# Patient Record
Sex: FEMALE | Race: WHITE | Employment: OTHER | URBAN - METROPOLITAN AREA
[De-identification: names, ages, dates, MRNs, and addresses within clinical notes are randomized per-mention and may not be internally consistent; named-entity substitution may affect disease eponyms.]

---

## 2017-01-03 ENCOUNTER — HOSPITAL ENCOUNTER (OUTPATIENT)
Dept: WOUND CARE | Age: 57
Discharge: HOME | End: 2017-01-03
Attending: SURGERY
Payer: COMMERCIAL

## 2017-01-03 PROCEDURE — 99213 OFFICE O/P EST LOW 20 MIN: CPT

## 2017-01-03 PROCEDURE — 77030013575 HC DRSG HYDROFERA HOLL -B

## 2017-01-17 PROCEDURE — 99213 OFFICE O/P EST LOW 20 MIN: CPT

## 2017-01-19 ENCOUNTER — HOSPITAL ENCOUNTER (OUTPATIENT)
Dept: MAMMOGRAPHY | Age: 57
Discharge: HOME | End: 2017-01-19
Attending: INTERNAL MEDICINE
Payer: COMMERCIAL

## 2017-01-19 DIAGNOSIS — Z12.39 SCREENING FOR MALIGNANT NEOPLASM OF BREAST: ICD-10-CM

## 2017-01-19 PROCEDURE — 77067 SCR MAMMO BI INCL CAD: CPT

## 2017-01-25 ENCOUNTER — HOSPITAL ENCOUNTER (OUTPATIENT)
Dept: MAMMOGRAPHY | Age: 57
Discharge: HOME | End: 2017-01-25
Attending: INTERNAL MEDICINE
Payer: COMMERCIAL

## 2017-01-25 DIAGNOSIS — R92.8 ABNORMAL SCREENING MAMMOGRAM: ICD-10-CM

## 2017-01-25 PROCEDURE — 77065 DX MAMMO INCL CAD UNI: CPT

## 2017-02-20 ENCOUNTER — HOSPITAL ENCOUNTER (OUTPATIENT)
Dept: MAMMOGRAPHY | Age: 57
Discharge: HOME | End: 2017-02-20
Attending: INTERNAL MEDICINE
Payer: COMMERCIAL

## 2017-02-20 VITALS
TEMPERATURE: 96.7 F | OXYGEN SATURATION: 100 % | DIASTOLIC BLOOD PRESSURE: 70 MMHG | SYSTOLIC BLOOD PRESSURE: 135 MMHG | HEART RATE: 58 BPM

## 2017-02-20 DIAGNOSIS — R92.1 BREAST CALCIFICATION SEEN ON MAMMOGRAM: ICD-10-CM

## 2017-02-20 PROCEDURE — 74011250636 HC RX REV CODE- 250/636: Performed by: INTERNAL MEDICINE

## 2017-02-20 PROCEDURE — 19081 BX BREAST 1ST LESION STRTCTC: CPT

## 2017-02-20 PROCEDURE — 77065 DX MAMMO INCL CAD UNI: CPT

## 2017-02-20 PROCEDURE — 88305 TISSUE EXAM BY PATHOLOGIST: CPT | Performed by: INTERNAL MEDICINE

## 2017-02-20 PROCEDURE — 74011000250 HC RX REV CODE- 250: Performed by: INTERNAL MEDICINE

## 2017-02-20 RX ORDER — LIDOCAINE HYDROCHLORIDE 10 MG/ML
8 INJECTION INFILTRATION; PERINEURAL
Status: COMPLETED | OUTPATIENT
Start: 2017-02-20 | End: 2017-02-20

## 2017-02-20 RX ORDER — LIDOCAINE HYDROCHLORIDE AND EPINEPHRINE 10; 10 MG/ML; UG/ML
1.5 INJECTION, SOLUTION INFILTRATION; PERINEURAL
Status: COMPLETED | OUTPATIENT
Start: 2017-02-20 | End: 2017-02-20

## 2017-02-20 RX ORDER — LIDOCAINE HYDROCHLORIDE AND EPINEPHRINE 10; 10 MG/ML; UG/ML
10 INJECTION, SOLUTION INFILTRATION; PERINEURAL
Status: COMPLETED | OUTPATIENT
Start: 2017-02-20 | End: 2017-02-20

## 2017-02-20 RX ADMIN — LIDOCAINE HYDROCHLORIDE,EPINEPHRINE BITARTRATE 100 MG: 10; .01 INJECTION, SOLUTION INFILTRATION; PERINEURAL at 10:08

## 2017-02-20 RX ADMIN — SODIUM BICARBONATE 2 ML: 0.2 INJECTION, SOLUTION INTRAVENOUS at 10:08

## 2017-02-20 RX ADMIN — SODIUM CHLORIDE 250 ML: 900 INJECTION, SOLUTION INTRAVENOUS at 10:09

## 2017-02-20 RX ADMIN — LIDOCAINE HYDROCHLORIDE,EPINEPHRINE BITARTRATE 15 MG: 10; .01 INJECTION, SOLUTION INFILTRATION; PERINEURAL at 10:08

## 2017-02-20 RX ADMIN — LIDOCAINE HYDROCHLORIDE 4 ML: 10 INJECTION, SOLUTION INFILTRATION; PERINEURAL at 10:07

## 2017-02-21 NOTE — PROGRESS NOTES
I spoke with Ms Tung Wheeler via phone today to give her the results of her Left breast Stereo bx. Results showed Fibrocystic Mastopathy; fibroadenoma. She is to return to her screening 1/18, no other recommendations needed.  The patient had no post bx issues or complaints and was very appreciative with her results

## 2018-03-08 ENCOUNTER — HOSPITAL ENCOUNTER (OUTPATIENT)
Dept: SURGERY | Age: 58
Discharge: HOME | End: 2018-03-08
Payer: COMMERCIAL

## 2018-03-08 VITALS
BODY MASS INDEX: 35.31 KG/M2 | WEIGHT: 233 LBS | HEART RATE: 75 BPM | DIASTOLIC BLOOD PRESSURE: 78 MMHG | SYSTOLIC BLOOD PRESSURE: 142 MMHG | TEMPERATURE: 98.1 F | HEIGHT: 68 IN | RESPIRATION RATE: 16 BRPM

## 2018-03-08 PROBLEM — M86.9 OSTEOMYELITIS OF TOE OF LEFT FOOT (HCC): Status: ACTIVE | Noted: 2018-03-08

## 2018-03-08 PROBLEM — L97.509 TOE ULCER (HCC): Status: ACTIVE | Noted: 2018-03-08

## 2018-03-08 LAB
ANION GAP SERPL CALC-SCNC: 5 MMOL/L (ref 7–16)
BUN SERPL-MCNC: 36 MG/DL (ref 6–23)
CALCIUM SERPL-MCNC: 9.4 MG/DL (ref 8.3–10.4)
CHLORIDE SERPL-SCNC: 107 MMOL/L (ref 98–107)
CO2 SERPL-SCNC: 25 MMOL/L (ref 21–32)
CREAT SERPL-MCNC: 2.06 MG/DL (ref 0.6–1)
GLUCOSE BLD STRIP.AUTO-MCNC: 104 MG/DL (ref 65–100)
GLUCOSE SERPL-MCNC: 112 MG/DL (ref 65–100)
POTASSIUM SERPL-SCNC: 4.5 MMOL/L (ref 3.5–5.1)
SODIUM SERPL-SCNC: 137 MMOL/L (ref 136–145)

## 2018-03-08 PROCEDURE — 80048 BASIC METABOLIC PNL TOTAL CA: CPT | Performed by: SURGERY

## 2018-03-08 PROCEDURE — 82962 GLUCOSE BLOOD TEST: CPT

## 2018-03-08 NOTE — PERIOP NOTES
Patient verified name, , and surgery as listed in [facility]. Patient provided medical/health information and PTA medications to the best of their ability. TYPE  CASE:1B  Orders per surgeon: Received  and dated  . Labs per surgeon:bmp and glucose. Labs per anesthesia protocol: glucose on arrival, cbc done 3/5/2018 and is on chart for review. EKG  :  ekg and echo from 2016 on chart, patient has hx of cad with CABG in , no chest pain, stents or cardiac events since    Patient provided with and instructed on education handouts including Guide to Surgery, blood transfusions, pain management, and hand hygiene for the family and community, and [facility] brochure. Antibacterial soap and hibiclens and instructions given per hospital policy. Instructed patient to continue previous medications as prescribed prior to surgery unless otherwise directed and to take the following medications the day of surgery according to anesthesia guidelines : amlodipine, and asa 81 mg , lexapro, and tresiba 48 units which is 80 of routine dose and levothyrixine . Instructed patient to hold  the following medications: metformin and invokona on am of surgery only. Original medication prescription bottles were not visualized during patient appointment. Patient teach back successful and patient demonstrates knowledge of instruction.

## 2018-03-08 NOTE — PERIOP NOTES
Recent Results (from the past 12 hour(s))   GLUCOSE, POC    Collection Time: 03/08/18 11:36 AM   Result Value Ref Range    Glucose (POC) 104 (H) 65 - 527 mg/dL   METABOLIC PANEL, BASIC    Collection Time: 03/08/18 11:55 AM   Result Value Ref Range    Sodium 137 136 - 145 mmol/L    Potassium 4.5 3.5 - 5.1 mmol/L    Chloride 107 98 - 107 mmol/L    CO2 25 21 - 32 mmol/L    Anion gap 5 (L) 7 - 16 mmol/L    Glucose 112 (H) 65 - 100 mg/dL    BUN 36 (H) 6 - 23 MG/DL    Creatinine 2.06 (H) 0.6 - 1.0 MG/DL    GFR est AA 32 (L) >60 ml/min/1.73m2    GFR est non-AA 26 (L) >60 ml/min/1.73m2    Calcium 9.4 8.3 - 10.4 MG/DL     Labs routed and called to Dr Estefania Nix office at request of Dr Roem Howell. Patient has had a significant decrease in kidney function since 11/2017. Dr Rome Howell cleared for this surgery due to the need for toe amputation (patient with osteomyelitis) . Spoke with Laurin Romberg at Dr Mac Ranks office and he said he would call Dr Zhang Del Toro and obtain any orders for this patient.

## 2018-03-12 ENCOUNTER — HOSPITAL ENCOUNTER (OUTPATIENT)
Age: 58
Setting detail: OUTPATIENT SURGERY
Discharge: HOME | End: 2018-03-12
Attending: SURGERY | Admitting: SURGERY
Payer: COMMERCIAL

## 2018-03-12 ENCOUNTER — ANESTHESIA (OUTPATIENT)
Dept: SURGERY | Age: 58
End: 2018-03-12
Payer: COMMERCIAL

## 2018-03-12 ENCOUNTER — APPOINTMENT (OUTPATIENT)
Dept: INTERVENTIONAL RADIOLOGY/VASCULAR | Age: 58
End: 2018-03-12
Attending: SURGERY
Payer: COMMERCIAL

## 2018-03-12 ENCOUNTER — ANESTHESIA EVENT (OUTPATIENT)
Dept: SURGERY | Age: 58
End: 2018-03-12
Payer: COMMERCIAL

## 2018-03-12 VITALS
BODY MASS INDEX: 35.39 KG/M2 | HEIGHT: 68 IN | TEMPERATURE: 98.4 F | RESPIRATION RATE: 16 BRPM | OXYGEN SATURATION: 98 % | DIASTOLIC BLOOD PRESSURE: 77 MMHG | WEIGHT: 233.5 LBS | SYSTOLIC BLOOD PRESSURE: 136 MMHG | HEART RATE: 74 BPM

## 2018-03-12 LAB
GLUCOSE BLD STRIP.AUTO-MCNC: 106 MG/DL (ref 65–100)
GLUCOSE BLD STRIP.AUTO-MCNC: 71 MG/DL (ref 65–100)
GLUCOSE BLD STRIP.AUTO-MCNC: 75 MG/DL (ref 65–100)

## 2018-03-12 PROCEDURE — 74011250636 HC RX REV CODE- 250/636

## 2018-03-12 PROCEDURE — 77030018836 HC SOL IRR NACL ICUM -A: Performed by: SURGERY

## 2018-03-12 PROCEDURE — 88311 DECALCIFY TISSUE: CPT | Performed by: SURGERY

## 2018-03-12 PROCEDURE — 77030002916 HC SUT ETHLN J&J -A: Performed by: SURGERY

## 2018-03-12 PROCEDURE — 74011250636 HC RX REV CODE- 250/636: Performed by: ANESTHESIOLOGY

## 2018-03-12 PROCEDURE — 77030011640 HC PAD GRND REM COVD -A: Performed by: SURGERY

## 2018-03-12 PROCEDURE — 77030003602 HC NDL NRV BLK BBMI -B: Performed by: NURSE ANESTHETIST, CERTIFIED REGISTERED

## 2018-03-12 PROCEDURE — 76010010054 HC POST OP PAIN BLOCK: Performed by: SURGERY

## 2018-03-12 PROCEDURE — 74011250637 HC RX REV CODE- 250/637: Performed by: ANESTHESIOLOGY

## 2018-03-12 PROCEDURE — 74011250636 HC RX REV CODE- 250/636: Performed by: SURGERY

## 2018-03-12 PROCEDURE — 82962 GLUCOSE BLOOD TEST: CPT

## 2018-03-12 PROCEDURE — 77030020782 HC GWN BAIR PAWS FLX 3M -B: Performed by: NURSE ANESTHETIST, CERTIFIED REGISTERED

## 2018-03-12 PROCEDURE — 76060000034 HC ANESTHESIA 1.5 TO 2 HR: Performed by: SURGERY

## 2018-03-12 PROCEDURE — 76210000006 HC OR PH I REC 0.5 TO 1 HR: Performed by: SURGERY

## 2018-03-12 PROCEDURE — 76010000129 HC CV SURG 1.5 TO 2 HR: Performed by: SURGERY

## 2018-03-12 PROCEDURE — 88305 TISSUE EXAM BY PATHOLOGIST: CPT | Performed by: SURGERY

## 2018-03-12 PROCEDURE — 74011000250 HC RX REV CODE- 250: Performed by: SURGERY

## 2018-03-12 PROCEDURE — 76210000020 HC REC RM PH II FIRST 0.5 HR: Performed by: SURGERY

## 2018-03-12 RX ORDER — HYDROMORPHONE HYDROCHLORIDE 2 MG/ML
0.5 INJECTION, SOLUTION INTRAMUSCULAR; INTRAVENOUS; SUBCUTANEOUS
Status: DISCONTINUED | OUTPATIENT
Start: 2018-03-12 | End: 2018-03-12 | Stop reason: HOSPADM

## 2018-03-12 RX ORDER — PROPOFOL 10 MG/ML
INJECTION, EMULSION INTRAVENOUS
Status: DISCONTINUED | OUTPATIENT
Start: 2018-03-12 | End: 2018-03-12 | Stop reason: HOSPADM

## 2018-03-12 RX ORDER — FAMOTIDINE 20 MG/1
20 TABLET, FILM COATED ORAL ONCE
Status: COMPLETED | OUTPATIENT
Start: 2018-03-12 | End: 2018-03-12

## 2018-03-12 RX ORDER — CEFAZOLIN SODIUM/WATER 2 G/20 ML
2 SYRINGE (ML) INTRAVENOUS
Status: COMPLETED | OUTPATIENT
Start: 2018-03-12 | End: 2018-03-12

## 2018-03-12 RX ORDER — SODIUM CHLORIDE 0.9 % (FLUSH) 0.9 %
5-10 SYRINGE (ML) INJECTION AS NEEDED
Status: DISCONTINUED | OUTPATIENT
Start: 2018-03-12 | End: 2018-03-12 | Stop reason: HOSPADM

## 2018-03-12 RX ORDER — NALOXONE HYDROCHLORIDE 0.4 MG/ML
0.1 INJECTION, SOLUTION INTRAMUSCULAR; INTRAVENOUS; SUBCUTANEOUS AS NEEDED
Status: DISCONTINUED | OUTPATIENT
Start: 2018-03-12 | End: 2018-03-12 | Stop reason: HOSPADM

## 2018-03-12 RX ORDER — FENTANYL CITRATE 50 UG/ML
INJECTION, SOLUTION INTRAMUSCULAR; INTRAVENOUS AS NEEDED
Status: DISCONTINUED | OUTPATIENT
Start: 2018-03-12 | End: 2018-03-12 | Stop reason: HOSPADM

## 2018-03-12 RX ORDER — LIDOCAINE HYDROCHLORIDE 10 MG/ML
0.1 INJECTION INFILTRATION; PERINEURAL AS NEEDED
Status: DISCONTINUED | OUTPATIENT
Start: 2018-03-12 | End: 2018-03-12 | Stop reason: HOSPADM

## 2018-03-12 RX ORDER — FENTANYL CITRATE 50 UG/ML
100 INJECTION, SOLUTION INTRAMUSCULAR; INTRAVENOUS ONCE
Status: DISCONTINUED | OUTPATIENT
Start: 2018-03-12 | End: 2018-03-12 | Stop reason: HOSPADM

## 2018-03-12 RX ORDER — HYDROCODONE BITARTRATE AND ACETAMINOPHEN 7.5; 325 MG/1; MG/1
1 TABLET ORAL AS NEEDED
Status: DISCONTINUED | OUTPATIENT
Start: 2018-03-12 | End: 2018-03-12 | Stop reason: HOSPADM

## 2018-03-12 RX ORDER — SODIUM CHLORIDE 9 MG/ML
25 INJECTION, SOLUTION INTRAVENOUS CONTINUOUS
Status: DISCONTINUED | OUTPATIENT
Start: 2018-03-12 | End: 2018-03-12 | Stop reason: HOSPADM

## 2018-03-12 RX ORDER — SODIUM CHLORIDE 0.9 % (FLUSH) 0.9 %
5-10 SYRINGE (ML) INJECTION EVERY 8 HOURS
Status: DISCONTINUED | OUTPATIENT
Start: 2018-03-12 | End: 2018-03-12 | Stop reason: HOSPADM

## 2018-03-12 RX ORDER — OXYCODONE HYDROCHLORIDE 5 MG/1
5 TABLET ORAL
Status: DISCONTINUED | OUTPATIENT
Start: 2018-03-12 | End: 2018-03-12 | Stop reason: HOSPADM

## 2018-03-12 RX ORDER — MIDAZOLAM HYDROCHLORIDE 1 MG/ML
INJECTION, SOLUTION INTRAMUSCULAR; INTRAVENOUS AS NEEDED
Status: DISCONTINUED | OUTPATIENT
Start: 2018-03-12 | End: 2018-03-12 | Stop reason: HOSPADM

## 2018-03-12 RX ORDER — SODIUM CHLORIDE, SODIUM LACTATE, POTASSIUM CHLORIDE, CALCIUM CHLORIDE 600; 310; 30; 20 MG/100ML; MG/100ML; MG/100ML; MG/100ML
100 INJECTION, SOLUTION INTRAVENOUS CONTINUOUS
Status: DISCONTINUED | OUTPATIENT
Start: 2018-03-12 | End: 2018-03-12 | Stop reason: HOSPADM

## 2018-03-12 RX ORDER — MIDAZOLAM HYDROCHLORIDE 1 MG/ML
2 INJECTION, SOLUTION INTRAMUSCULAR; INTRAVENOUS
Status: DISCONTINUED | OUTPATIENT
Start: 2018-03-12 | End: 2018-03-12 | Stop reason: HOSPADM

## 2018-03-12 RX ADMIN — MIDAZOLAM HYDROCHLORIDE 1 MG: 1 INJECTION, SOLUTION INTRAMUSCULAR; INTRAVENOUS at 13:04

## 2018-03-12 RX ADMIN — SODIUM CHLORIDE, SODIUM LACTATE, POTASSIUM CHLORIDE, AND CALCIUM CHLORIDE 100 ML/HR: 600; 310; 30; 20 INJECTION, SOLUTION INTRAVENOUS at 10:12

## 2018-03-12 RX ADMIN — FENTANYL CITRATE 50 MCG: 50 INJECTION, SOLUTION INTRAMUSCULAR; INTRAVENOUS at 14:29

## 2018-03-12 RX ADMIN — PROPOFOL 100 MCG/KG/MIN: 10 INJECTION, EMULSION INTRAVENOUS at 13:08

## 2018-03-12 RX ADMIN — FENTANYL CITRATE 25 MCG: 50 INJECTION, SOLUTION INTRAMUSCULAR; INTRAVENOUS at 14:34

## 2018-03-12 RX ADMIN — FAMOTIDINE 20 MG: 20 TABLET, FILM COATED ORAL at 10:06

## 2018-03-12 RX ADMIN — SODIUM CHLORIDE, SODIUM LACTATE, POTASSIUM CHLORIDE, AND CALCIUM CHLORIDE: 600; 310; 30; 20 INJECTION, SOLUTION INTRAVENOUS at 14:47

## 2018-03-12 RX ADMIN — Medication 2 G: at 14:04

## 2018-03-12 RX ADMIN — MIDAZOLAM HYDROCHLORIDE 1 MG: 1 INJECTION, SOLUTION INTRAMUSCULAR; INTRAVENOUS at 13:10

## 2018-03-12 RX ADMIN — FENTANYL CITRATE 25 MCG: 50 INJECTION, SOLUTION INTRAMUSCULAR; INTRAVENOUS at 14:39

## 2018-03-12 RX ADMIN — MIDAZOLAM HYDROCHLORIDE 2 MG: 1 INJECTION, SOLUTION INTRAMUSCULAR; INTRAVENOUS at 10:11

## 2018-03-12 NOTE — ANESTHESIA POSTPROCEDURE EVALUATION
Post-Anesthesia Evaluation and Assessment    Patient: Valentine Lee MRN: [id number]  SSN: [id number]    YOB: 1960  Age: 59 y.o. Sex: female       Cardiovascular Function/Vital Signs  Visit Vitals    /70    Pulse 66    Temp 36.7 °C (98 °F)    Resp 20    Ht 5' 8\" (1.727 m)    Wt 105.9 kg (233 lb 8 oz)    SpO2 98%    BMI 35.5 kg/m2       Patient is status post total IV anesthesia anesthesia for Procedure(s):  AMPUTATION TOE LEFT 4TH/ CHOICE. Nausea/Vomiting: None    Postoperative hydration reviewed and adequate. Pain:  Pain Scale 1: Numeric (0 - 10) (03/12/18 1501)  Pain Intensity 1: 0 (03/12/18 1501)   Managed    Neurological Status:   Neuro (WDL): Exceptions to WDL (03/12/18 1501)  Neuro  LUE Motor Response: Purposeful (03/12/18 1501)  LLE Motor Response: Numbness (left foot) (03/12/18 1501)  RUE Motor Response: Purposeful (03/12/18 1501)  RLE Motor Response: Purposeful (03/12/18 1501)   At baseline    Mental Status and Level of Consciousness: Arousable    Pulmonary Status:   O2 Device: Room air (03/12/18 1519)   Adequate oxygenation and airway patent    Complications related to anesthesia: None    Post-anesthesia assessment completed.  No concerns    Signed By: Alden Garcia MD     March 12, 2018

## 2018-03-12 NOTE — ANESTHESIA POSTPROCEDURE EVALUATION
Post-Anesthesia Evaluation and Assessment    Patient: Jessica Forde MRN: [id number]  SSN: [id number]    YOB: 1960  Age: 59 y.o. Sex: female       Cardiovascular Function/Vital Signs  Visit Vitals    /77    Pulse 74    Temp 36.9 °C (98.4 °F)    Resp 16    Ht 5' 8\" (1.727 m)    Wt 105.9 kg (233 lb 8 oz)    SpO2 98%    BMI 35.5 kg/m2       Patient is status post total IV anesthesia anesthesia for Procedure(s):  AMPUTATION TOE LEFT 4TH/ CHOICE. Nausea/Vomiting: None    Postoperative hydration reviewed and adequate. Pain:  Pain Scale 1: Numeric (0 - 10) (03/12/18 1534)  Pain Intensity 1: 0 (03/12/18 1534)   Managed    Neurological Status:   Neuro (WDL): Within Defined Limits (03/12/18 1534)  Neuro  LUE Motor Response: Purposeful (03/12/18 1501)  LLE Motor Response: Numbness (left foot) (03/12/18 1501)  RUE Motor Response: Purposeful (03/12/18 1501)  RLE Motor Response: Purposeful (03/12/18 1501)   At baseline    Mental Status and Level of Consciousness: Arousable    Pulmonary Status:   O2 Device: Room air (03/12/18 1534)   Adequate oxygenation and airway patent    Complications related to anesthesia: None    Post-anesthesia assessment completed.  No concerns    Signed By: Clement Becerril MD     March 12, 2018

## 2018-03-12 NOTE — BRIEF OP NOTE
BRIEF OPERATIVE NOTE    Date of Procedure: 3/12/2018   Preoperative Diagnosis: Osteomyelitis of toe of left foot (HCC) [M86.9]  Postoperative Diagnosis: Osteomyelitis of toe of left foot (HCC) [M86.9]    Procedure(s):  AMPUTATION TOE LEFT 4TH/ CHOICE  Surgeon(s) and Role:     * Sebastien Zaidi MD - Primary         Assistant Staff: None      Surgical Staff:  Circ-1: Alice Whyte RN  Scrub Tech-1: Anay Sharp  Scrub Tech-Relief: Rhonda Randolph  Event Time In   Incision Start 1427   Incision Close 1444     Anesthesia: Regional   Estimated Blood Loss: 10cc  Specimens:   ID Type Source Tests Collected by Time Destination   1 : left 4th toe Preservative Toe  Sebastien Zaidi MD 3/12/2018 1439 Pathology      Findings:    Complications: None  Implants: * No implants in log *

## 2018-03-12 NOTE — ANESTHESIA PROCEDURE NOTES
Peripheral Block    Start time: 3/12/2018 10:10 AM  End time: 3/12/2018 10:46 AM  Performed by: Dusty Kimbrough  Authorized by: Dusty Kimbrough       Pre-procedure: Indications: primary anesthetic    Preanesthetic Checklist: patient identified, risks and benefits discussed, site marked, timeout performed, anesthesia consent given and patient being monitored    Timeout Time: 10:10          Block Type:   Block Type:   Ankle  Laterality:  Left  Monitoring:  Continuous pulse ox, frequent vital sign checks, heart rate, responsive to questions and oxygen  Injection Technique:  Single shot  Procedures: other (comment)    Procedures comment:  Infiltration  Patient Position: supine  Prep: DuraPrep    Location:  Foot  Needle Type:  Stimuplex  Needle Gauge:  20 G  Medication Injected:  1.5%  mepivacaine  Volume (mL):  25  Volume (mL):  20    Assessment:  Number of attempts:  1  Injection Assessment:  Incremental injection every 5 mL, negative aspiration for blood, no intravascular symptoms and no paresthesia  Patient tolerance:  Patient tolerated the procedure well with no immediate complications

## 2018-03-12 NOTE — IP AVS SNAPSHOT
[address] [address] 
[phone] Patient: Silvia Yañez MRN: [id number] HMN:[id number] About your hospitalization You were admitted on:  March 12, 2018 You last received care in the:  [facility] PACU You were discharged on:  March 12, 2018 Why you were hospitalized Your primary diagnosis was:  Not on File Follow-up Information Follow up With Details Comments Contact Info Jordy Gill, DO   [address] [facility] [address] 
[phone] Your Scheduled Appointments Monday March 26, 2018  2:45 PM EDT Global Post Op with Toni Campos NP  
[facility] ([facility] [address] [place] [id number] [phone] Discharge Orders None A check jagdish indicates which time of day the medication should be taken. My Medications CHANGE how you take these medications Instructions Each Dose to Equal  
 Morning Noon Evening Bedtime  
 amLODIPine 5 mg tablet Commonly known as:  Iwona Sánchez What changed:  when to take this Your last dose was: Your next dose is: Take 1 Tab by mouth daily. 5 mg  
    
   
   
   
  
 atorvastatin 20 mg tablet Commonly known as:  LIPITOR What changed:  additional instructions Your last dose was: Your next dose is: Take 1 Tab by mouth daily. 20 mg  
    
   
   
   
  
 canagliflozin 300 mg tablet Commonly known as:  Bertram Fells What changed:  additional instructions Your last dose was: Your next dose is: Take 1 Tab by mouth Daily (before breakfast). 300 mg  
    
   
   
   
  
 metFORMIN  mg tablet Commonly known as:  GLUCOPHAGE XR What changed:   
- how much to take - when to take this 
- additional instructions Your last dose was: Your next dose is: 2tabs qam  
     
   
   
   
  
 valsartan 320 mg tablet Commonly known as:  DIOVAN What changed:  when to take this Your last dose was: Your next dose is: Take 1 Tab by mouth daily. 320 mg CONTINUE taking these medications Instructions Each Dose to Equal  
 Morning Noon Evening Bedtime  
 aspirin delayed-release 81 mg tablet Your last dose was: Your next dose is: Take 162 mg by mouth every morning. 162 mg  
    
   
   
   
  
 escitalopram oxalate 20 mg tablet Commonly known as:  Ky Lemmings Your last dose was: Your next dose is: Take 1 Tab by mouth two (2) times a day. 20 mg  
    
   
   
   
  
 exenatide microspheres 2 mg/0.65 mL Pnij Commonly known as:  BYDUREON Your last dose was: Your next dose is:    
   
   
 2 mg by SubCUTAneous route every seven (7) days. 2 mg  
    
   
   
   
  
 ibuprofen 200 mg Cap Your last dose was: Your next dose is: Take  by mouth as needed. levothyroxine 125 mcg tablet Commonly known as:  SYNTHROID Your last dose was: Your next dose is: Take 1 Tab by mouth Daily (before breakfast). 125 mcg TRESIBA FLEXTOUCH U-100 100 unit/mL (3 mL) Inpn Generic drug:  insulin degludec Your last dose was: Your next dose is:    
   
   
 60 Units by SubCUTAneous route every morning. Indications: type 1 diabetes mellitus 60 Units Discharge Instructions ACTIVITY · As tolerated and as directed by your doctor. · You may shower in 24 hours. Please keep your left foot dry. Do not take a bath until cleared by MD.  
 
DIET · Clear liquids until no nausea or vomiting; then light diet for the first day. · Advance to regular diet on second day, unless your doctor orders otherwise. · If nausea and vomiting continues, call your doctor. PAIN 
· Take pain medication as directed by your doctor. · Call your doctor if pain is NOT relieved by medication. · DO NOT take aspirin of blood thinners unless directed by your doctor. CALL YOUR DOCTOR IF  
· Excessive bleeding that does not stop after holding pressure over the area · Temperature of 101 degrees F or above · Excessive redness, swelling or bruising, and/ or green or yellow, smelly discharge from incision AFTER ANESTHESIA · For the first 24 hours: DO NOT Drive, Drink alcoholic beverages, or Make important decisions. · Be aware of dizziness following anesthesia and while taking pain medication. · Limit your activities · Do not operate hazardous machinery · If you have not urinated within 8 hours after discharge, please contact your surgeon on call. *  Please give a list of your current medications to your Primary Care Provider. *  Please update this list whenever your medications are discontinued, doses are 
    changed, or new medications (including over-the-counter products) are added. *  Please carry medication information at all times in case of emergency situations. These are general instructions for a healthy lifestyle: No smoking/ No tobacco products/ Avoid exposure to second hand smoke Surgeon General's Warning:  Quitting smoking now greatly reduces serious risk to your health. Obesity, smoking, and sedentary lifestyle greatly increases your risk for illness A healthy diet, regular physical exercise & weight monitoring are important for maintaining a healthy lifestyle Recognize signs and symptoms of STROKE: 
 
F-face looks uneven A-arms unable to move or move unevenly S-speech slurred or non-existent T-time-call 911 as soon as signs and symptoms begin-DO NOT go Back to bed or wait to see if you get better-TIME IS BRAIN. Introducing [facility] HE[facility]!    
 Dear Ryan Ch: 
 Thank you for requesting a [employer] account. Our records indicate that you already have an active [employer] account. You can access your account anytime at [email or web address] Did you know that you can access your hospital and ER discharge instructions at any time in [employer]? You can also review all of your test results from your hospital stay or ER visit. Additional Information If you have questions, please visit the Frequently Asked Questions section of the [employer] website at [email or web address]. Remember, [employer] is NOT to be used for urgent needs. For medical emergencies, dial 911. Now available from your iPhone and Android! Providers Seen During Your Hospitalization Provider Specialty Primary office phone Faby Martin MD Vascular Surgery [phone] Your Primary Care Physician (PCP) Primary Care Physician Office Phone Office Fax Salina Locket [phone] You are allergic to the following No active allergies Recent Documentation Height Weight BMI OB Status Smoking Status 1.727 m 105.9 kg 35.5 kg/m2 Postmenopausal Former Smoker Emergency Contacts Name Discharge Info Relation Home Work Mobile Eduardo Marte DISCHARGE CAREGIVER [3] Child [2] [phone] Patient Belongings The following personal items are in your possession at time of discharge: 
  Dental Appliances: None         Home Medications: None   Jewelry: None  Clothing: Shirt, Pants, Footwear, Undergarments    Other Valuables: None Please provide this summary of care documentation to your next provider. Signatures-by signing, you are acknowledging that this After Visit Summary has been reviewed with you and you have received a copy. Patient Signature:  ____________________________________________________________ Date:  ____________________________________________________________  
  
[place] Favorite Provider Signature:  ____________________________________________________________ Date:  ____________________________________________________________

## 2018-03-12 NOTE — ANESTHESIA PREPROCEDURE EVALUATION
Anesthetic History               Review of Systems / Medical History  Patient summary reviewed and pertinent labs reviewed    Pulmonary          Smoker (Former)         Neuro/Psych              Cardiovascular    Hypertension          CAD and CABG (2006)  Pertinent negatives: No past MI  Exercise tolerance: >4 METS     GI/Hepatic/Renal                Endo/Other    Diabetes: type 2         Other Findings              Physical Exam    Airway  Mallampati: II    Neck ROM: normal range of motion   Mouth opening: Normal     Cardiovascular  Regular rate and rhythm,  S1 and S2 normal,  no murmur, click, rub, or gallop             Dental  No notable dental hx       Pulmonary  Breath sounds clear to auscultation               Abdominal         Other Findings            Anesthetic Plan    ASA: 3  Anesthesia type: total IV anesthesia      Post-op pain plan if not by surgeon: peripheral nerve block single      Anesthetic plan and risks discussed with: Patient

## 2018-03-12 NOTE — DISCHARGE INSTRUCTIONS
ACTIVITY  · As tolerated and as directed by your doctor. · You may shower in 24 hours. Please keep your left foot dry. Do not take a bath until cleared by MD.     DIET  · Clear liquids until no nausea or vomiting; then light diet for the first day. · Advance to regular diet on second day, unless your doctor orders otherwise. · If nausea and vomiting continues, call your doctor. PAIN  · Take pain medication as directed by your doctor. · Call your doctor if pain is NOT relieved by medication. · DO NOT take aspirin of blood thinners unless directed by your doctor. CALL YOUR DOCTOR IF   · Excessive bleeding that does not stop after holding pressure over the area  · Temperature of 101 degrees F or above  · Excessive redness, swelling or bruising, and/ or green or yellow, smelly discharge from incision    AFTER ANESTHESIA   · For the first 24 hours: DO NOT Drive, Drink alcoholic beverages, or Make important decisions. · Be aware of dizziness following anesthesia and while taking pain medication. · Limit your activities  · Do not operate hazardous machinery  · If you have not urinated within 8 hours after discharge, please contact your surgeon on call. *  Please give a list of your current medications to your Primary Care Provider. *  Please update this list whenever your medications are discontinued, doses are      changed, or new medications (including over-the-counter products) are added. *  Please carry medication information at all times in case of emergency situations. These are general instructions for a healthy lifestyle:  No smoking/ No tobacco products/ Avoid exposure to second hand smoke  Surgeon General's Warning:  Quitting smoking now greatly reduces serious risk to your health.   Obesity, smoking, and sedentary lifestyle greatly increases your risk for illness  A healthy diet, regular physical exercise & weight monitoring are important for maintaining a healthy lifestyle    Recognize signs and symptoms of STROKE:    F-face looks uneven  A-arms unable to move or move unevenly  S-speech slurred or non-existent  T-time-call 911 as soon as signs and symptoms begin-DO NOT go       Back to bed or wait to see if you get better-TIME IS BRAIN.

## 2018-03-13 NOTE — OP NOTES
[facility] REPORT    Shannon Suarez  MR#: [id number]  : 1960  ACCOUNT #: [de-identified]   DATE OF SERVICE: 2018    PREOPERATIVE DIAGNOSIS:  Left fourth toe osteomyelitis. POSTOPERATIVE DIAGNOSIS:  Left fourth toe osteomyelitis. PROCEDURE PERFORMED:  Left fourth toe amputation. SURGEON:  Tennille Fuchs MD.    ANESTHESIA:  Regional block. ESTIMATED BLOOD LOSS:  20 mL. DRAINS:  None. SPECIMENS REMOVED:  None. COMPLICATIONS:  None. ASSISTANT: .     IMPLANTS: .     DESCRIPTION OF THE PROCEDURE:  The patient was brought to the operating room and placed on the operating table in the supine position. Following adequate regional block to the level of the ankle and a timeout procedure, the left foot was draped and prepped in sterile fashion. An elliptical incision was made circumferentially around the base of the fourth toe. The wound was deepened using the Bovie down to the level of the bone. The proximal phalanx was then divided with a bone cutter. The residual proximal phalanx was then debrided with a rongeur. The articular surface of the fourth metatarsal head was debrided as well. Once the surface of the debrided bone was made smooth, the wound was irrigated with sterile saline and then closed with interrupted nylon sutures. Sterile dressings were applied. The patient was awakened from anesthesia and transferred to the recovery room in stable condition. Patient tolerated the procedure well. No complications. All needle and instrument counts were correct.       MD Meño Duncan / Beryl Curry  D: 2018 10:35     T: 2018 12:08  JOB #: [id number]

## 2018-03-26 PROBLEM — Z89.422 S/P AMPUTATION OF LESSER TOE, LEFT (HCC): Status: ACTIVE | Noted: 2018-03-26

## 2018-05-02 PROBLEM — E66.01 SEVERE OBESITY (BMI 35.0-39.9) WITH COMORBIDITY (HCC): Status: ACTIVE | Noted: 2018-05-02

## 2018-05-02 PROBLEM — E11.21 TYPE 2 DIABETES WITH NEPHROPATHY (HCC): Status: ACTIVE | Noted: 2018-05-02

## 2019-04-24 ENCOUNTER — HOSPITAL ENCOUNTER (OUTPATIENT)
Dept: ULTRASOUND IMAGING | Age: 59
Discharge: HOME | End: 2019-04-24
Attending: NURSE PRACTITIONER
Payer: COMMERCIAL

## 2019-04-24 DIAGNOSIS — I73.9 RIGHT LEG CLAUDICATION (HCC): ICD-10-CM

## 2019-04-24 DIAGNOSIS — M79.661 PAIN IN RIGHT LOWER LEG: ICD-10-CM

## 2019-04-24 PROCEDURE — 93971 EXTREMITY STUDY: CPT

## 2019-05-01 ENCOUNTER — HOSPITAL ENCOUNTER (OUTPATIENT)
Dept: MAMMOGRAPHY | Age: 59
Discharge: HOME | End: 2019-05-01
Attending: NURSE PRACTITIONER
Payer: COMMERCIAL

## 2019-05-01 DIAGNOSIS — Z12.39 BREAST CANCER SCREENING: ICD-10-CM

## 2019-05-01 PROBLEM — M79.604 RIGHT LEG PAIN: Status: ACTIVE | Noted: 2019-05-01

## 2019-05-01 PROBLEM — M79.89 LEG SWELLING: Status: ACTIVE | Noted: 2019-05-01

## 2019-05-01 PROCEDURE — 77067 SCR MAMMO BI INCL CAD: CPT

## 2019-07-16 PROBLEM — E11.621 DIABETIC ULCER OF TOE OF RIGHT FOOT (HCC): Status: ACTIVE | Noted: 2019-07-16

## 2019-07-16 PROBLEM — L97.519 DIABETIC ULCER OF TOE OF RIGHT FOOT (HCC): Status: ACTIVE | Noted: 2019-07-16

## 2019-07-31 ENCOUNTER — ANESTHESIA EVENT (OUTPATIENT)
Dept: SURGERY | Age: 59
End: 2019-07-31
Payer: COMMERCIAL

## 2019-08-01 ENCOUNTER — HOSPITAL ENCOUNTER (OUTPATIENT)
Age: 59
Setting detail: OUTPATIENT SURGERY
Discharge: HOME | End: 2019-08-01
Attending: SURGERY | Admitting: SURGERY
Payer: COMMERCIAL

## 2019-08-01 ENCOUNTER — ANESTHESIA (OUTPATIENT)
Dept: SURGERY | Age: 59
End: 2019-08-01
Payer: COMMERCIAL

## 2019-08-01 VITALS
SYSTOLIC BLOOD PRESSURE: 161 MMHG | RESPIRATION RATE: 16 BRPM | HEART RATE: 69 BPM | TEMPERATURE: 98 F | BODY MASS INDEX: 39.75 KG/M2 | HEIGHT: 68 IN | WEIGHT: 262.3 LBS | OXYGEN SATURATION: 98 % | DIASTOLIC BLOOD PRESSURE: 95 MMHG

## 2019-08-01 DIAGNOSIS — Z89.422 S/P AMPUTATION OF LESSER TOE, LEFT (HCC): Primary | ICD-10-CM

## 2019-08-01 LAB
ANION GAP SERPL CALC-SCNC: 7 MMOL/L (ref 7–16)
BUN SERPL-MCNC: 37 MG/DL (ref 8–23)
CALCIUM SERPL-MCNC: 9 MG/DL (ref 8.3–10.4)
CHLORIDE SERPL-SCNC: 110 MMOL/L (ref 98–107)
CO2 SERPL-SCNC: 24 MMOL/L (ref 21–32)
CREAT SERPL-MCNC: 2.01 MG/DL (ref 0.6–1)
ERYTHROCYTE [DISTWIDTH] IN BLOOD BY AUTOMATED COUNT: 13.2 % (ref 11.9–14.6)
GLUCOSE BLD STRIP.AUTO-MCNC: 107 MG/DL (ref 65–100)
GLUCOSE BLD STRIP.AUTO-MCNC: 120 MG/DL (ref 65–100)
GLUCOSE SERPL-MCNC: 122 MG/DL (ref 65–100)
HCT VFR BLD AUTO: 37.5 % (ref 35.8–46.3)
HGB BLD-MCNC: 12.3 G/DL (ref 11.7–15.4)
MCH RBC QN AUTO: 29.7 PG (ref 26.1–32.9)
MCHC RBC AUTO-ENTMCNC: 32.8 G/DL (ref 31.4–35)
MCV RBC AUTO: 90.6 FL (ref 79.6–97.8)
NRBC # BLD: 0 K/UL (ref 0–0.2)
PLATELET # BLD AUTO: 222 K/UL (ref 150–450)
PMV BLD AUTO: 11 FL (ref 9.4–12.3)
POTASSIUM SERPL-SCNC: 4.1 MMOL/L (ref 3.5–5.1)
RBC # BLD AUTO: 4.14 M/UL (ref 4.05–5.2)
SODIUM SERPL-SCNC: 141 MMOL/L (ref 136–145)
WBC # BLD AUTO: 6.3 K/UL (ref 4.3–11.1)

## 2019-08-01 PROCEDURE — 76010000138 HC OR TIME 0.5 TO 1 HR: Performed by: SURGERY

## 2019-08-01 PROCEDURE — 74011250636 HC RX REV CODE- 250/636: Performed by: SURGERY

## 2019-08-01 PROCEDURE — 77030018836 HC SOL IRR NACL ICUM -A: Performed by: SURGERY

## 2019-08-01 PROCEDURE — 80048 BASIC METABOLIC PNL TOTAL CA: CPT

## 2019-08-01 PROCEDURE — 76010010054 HC POST OP PAIN BLOCK: Performed by: SURGERY

## 2019-08-01 PROCEDURE — 74011250636 HC RX REV CODE- 250/636: Performed by: NURSE PRACTITIONER

## 2019-08-01 PROCEDURE — 74011250636 HC RX REV CODE- 250/636

## 2019-08-01 PROCEDURE — 76060000032 HC ANESTHESIA 0.5 TO 1 HR: Performed by: SURGERY

## 2019-08-01 PROCEDURE — 74011250636 HC RX REV CODE- 250/636: Performed by: ANESTHESIOLOGY

## 2019-08-01 PROCEDURE — 74011000250 HC RX REV CODE- 250: Performed by: SURGERY

## 2019-08-01 PROCEDURE — 76210000020 HC REC RM PH II FIRST 0.5 HR: Performed by: SURGERY

## 2019-08-01 PROCEDURE — 88305 TISSUE EXAM BY PATHOLOGIST: CPT

## 2019-08-01 PROCEDURE — 77030002916 HC SUT ETHLN J&J -A: Performed by: SURGERY

## 2019-08-01 PROCEDURE — 85027 COMPLETE CBC AUTOMATED: CPT

## 2019-08-01 PROCEDURE — 82962 GLUCOSE BLOOD TEST: CPT

## 2019-08-01 PROCEDURE — 76210000006 HC OR PH I REC 0.5 TO 1 HR: Performed by: SURGERY

## 2019-08-01 PROCEDURE — 77030003602 HC NDL NRV BLK BBMI -B: Performed by: ANESTHESIOLOGY

## 2019-08-01 RX ORDER — MIDAZOLAM HYDROCHLORIDE 1 MG/ML
2 INJECTION, SOLUTION INTRAMUSCULAR; INTRAVENOUS
Status: DISCONTINUED | OUTPATIENT
Start: 2019-08-01 | End: 2019-08-01 | Stop reason: HOSPADM

## 2019-08-01 RX ORDER — PROPOFOL 10 MG/ML
INJECTION, EMULSION INTRAVENOUS
Status: DISCONTINUED | OUTPATIENT
Start: 2019-08-01 | End: 2019-08-01 | Stop reason: HOSPADM

## 2019-08-01 RX ORDER — PROPOFOL 10 MG/ML
INJECTION, EMULSION INTRAVENOUS AS NEEDED
Status: DISCONTINUED | OUTPATIENT
Start: 2019-08-01 | End: 2019-08-01 | Stop reason: HOSPADM

## 2019-08-01 RX ORDER — SODIUM CHLORIDE, SODIUM LACTATE, POTASSIUM CHLORIDE, CALCIUM CHLORIDE 600; 310; 30; 20 MG/100ML; MG/100ML; MG/100ML; MG/100ML
100 INJECTION, SOLUTION INTRAVENOUS CONTINUOUS
Status: DISCONTINUED | OUTPATIENT
Start: 2019-08-01 | End: 2019-08-01 | Stop reason: HOSPADM

## 2019-08-01 RX ORDER — FENTANYL CITRATE 50 UG/ML
100 INJECTION, SOLUTION INTRAMUSCULAR; INTRAVENOUS ONCE
Status: DISCONTINUED | OUTPATIENT
Start: 2019-08-01 | End: 2019-08-01 | Stop reason: HOSPADM

## 2019-08-01 RX ORDER — SODIUM CHLORIDE 0.9 % (FLUSH) 0.9 %
5-40 SYRINGE (ML) INJECTION AS NEEDED
Status: DISCONTINUED | OUTPATIENT
Start: 2019-08-01 | End: 2019-08-01 | Stop reason: HOSPADM

## 2019-08-01 RX ORDER — MIDAZOLAM HYDROCHLORIDE 1 MG/ML
2 INJECTION, SOLUTION INTRAMUSCULAR; INTRAVENOUS ONCE
Status: DISCONTINUED | OUTPATIENT
Start: 2019-08-01 | End: 2019-08-01 | Stop reason: HOSPADM

## 2019-08-01 RX ORDER — NALOXONE HYDROCHLORIDE 0.4 MG/ML
0.04 INJECTION, SOLUTION INTRAMUSCULAR; INTRAVENOUS; SUBCUTANEOUS
Status: DISCONTINUED | OUTPATIENT
Start: 2019-08-01 | End: 2019-08-01 | Stop reason: HOSPADM

## 2019-08-01 RX ORDER — LIDOCAINE HYDROCHLORIDE 10 MG/ML
0.1 INJECTION INFILTRATION; PERINEURAL AS NEEDED
Status: DISCONTINUED | OUTPATIENT
Start: 2019-08-01 | End: 2019-08-01 | Stop reason: HOSPADM

## 2019-08-01 RX ORDER — HYDROMORPHONE HYDROCHLORIDE 2 MG/ML
0.5 INJECTION, SOLUTION INTRAMUSCULAR; INTRAVENOUS; SUBCUTANEOUS
Status: DISCONTINUED | OUTPATIENT
Start: 2019-08-01 | End: 2019-08-01 | Stop reason: HOSPADM

## 2019-08-01 RX ORDER — SODIUM CHLORIDE 0.9 % (FLUSH) 0.9 %
5-40 SYRINGE (ML) INJECTION EVERY 8 HOURS
Status: DISCONTINUED | OUTPATIENT
Start: 2019-08-01 | End: 2019-08-01 | Stop reason: HOSPADM

## 2019-08-01 RX ORDER — OXYCODONE AND ACETAMINOPHEN 5; 325 MG/1; MG/1
1 TABLET ORAL
Qty: 18 TAB | Refills: 0 | Status: SHIPPED | OUTPATIENT
Start: 2019-08-01 | End: 2019-08-04

## 2019-08-01 RX ORDER — OXYCODONE HYDROCHLORIDE 5 MG/1
5 TABLET ORAL
Status: DISCONTINUED | OUTPATIENT
Start: 2019-08-01 | End: 2019-08-01 | Stop reason: HOSPADM

## 2019-08-01 RX ORDER — CEFAZOLIN SODIUM/WATER 2 G/20 ML
2 SYRINGE (ML) INTRAVENOUS ONCE
Status: COMPLETED | OUTPATIENT
Start: 2019-08-01 | End: 2019-08-01

## 2019-08-01 RX ORDER — LIDOCAINE HYDROCHLORIDE 20 MG/ML
INJECTION, SOLUTION EPIDURAL; INFILTRATION; INTRACAUDAL; PERINEURAL AS NEEDED
Status: DISCONTINUED | OUTPATIENT
Start: 2019-08-01 | End: 2019-08-01 | Stop reason: HOSPADM

## 2019-08-01 RX ADMIN — PROPOFOL 100 MCG/KG/MIN: 10 INJECTION, EMULSION INTRAVENOUS at 07:15

## 2019-08-01 RX ADMIN — SODIUM CHLORIDE, SODIUM LACTATE, POTASSIUM CHLORIDE, AND CALCIUM CHLORIDE 100 ML/HR: 600; 310; 30; 20 INJECTION, SOLUTION INTRAVENOUS at 06:14

## 2019-08-01 RX ADMIN — LIDOCAINE HYDROCHLORIDE 30 MG: 20 INJECTION, SOLUTION EPIDURAL; INFILTRATION; INTRACAUDAL; PERINEURAL at 07:14

## 2019-08-01 RX ADMIN — PROPOFOL 50 MG: 10 INJECTION, EMULSION INTRAVENOUS at 07:15

## 2019-08-01 RX ADMIN — Medication 2 G: at 07:15

## 2019-08-01 NOTE — ANESTHESIA POSTPROCEDURE EVALUATION
Procedure(s): 
AMPUTATION OF RIGHT SECOND TOE. total IV anesthesia Anesthesia Post Evaluation Patient location during evaluation: PACU Patient participation: complete - patient participated Level of consciousness: awake Pain management: satisfactory to patient Airway patency: patent Anesthetic complications: no 
Cardiovascular status: hemodynamically stable Respiratory status: spontaneous ventilation Hydration status: euvolemic Post anesthesia nausea and vomiting:  none Vitals Value Taken Time /80 8/1/2019  8:22 AM  
Temp Pulse 68 8/1/2019  8:25 AM  
Resp 16 8/1/2019  8:05 AM  
SpO2 97 % 8/1/2019  8:25 AM  
Vitals shown include unvalidated device data.

## 2019-08-01 NOTE — DISCHARGE INSTRUCTIONS
INSTRUCTIONS FOLLOWING FOOT SURGERY    ACTIVITY  Elevate foot (feet) for 48 hours. Use crutches as directed by your doctor. DO NOT BEAR WEIGHT ON YOUR RIGHT FOOT WHILE STILL NUMB!!!  Weight bearing as tolerated in post op shoe, ONLY when full sensation returns to your foot. DIET  Clear liquids until no nausea or vomiting; then light diet for the first day. Advance to regular diet on second day, unless your doctor orders otherwise. PAIN  Take pain medications as directed by your doctor. Call your doctor if pain is NOT relieved by medication. DO NOT take aspirin or blood thinners until directed by your doctor. DRESSING CARE  Keep clean and dry until follow up appointment. FOLLOW-UP PHONE CALLS  Calls will be made by nursing staff. If you have any problems, call your doctor as needed. CALL YOUR DOCTOR IF YOU HAVE  Excessive bleeding that does not stop after holding mild pressure over the area. Temperature of 101 degrees or above. Redness, excessive swelling or bruising, and/or green or yellow, smelly discharge from incision. Loss of sensation - cold, white or blue toes. After general anesthesia or intravenous sedation, for 24 hours or while taking prescription Narcotics:  · Limit your activities  · Do not drive and operate hazardous machinery  · Do not make important personal or business decisions  · Do  not drink alcoholic beverages  · If you have not urinated within 8 hours after discharge, please contact your surgeon on call. *  Please give a list of your current medications to your Primary Care Provider. *  Please update this list whenever your medications are discontinued, doses are      changed, or new medications (including over-the-counter products) are added. *  Please carry medication information at all times in case of emergency situations.       These are general instructions for a healthy lifestyle:    No smoking/ No tobacco products/ Avoid exposure to second hand smoke    Surgeon General's Warning:  Quitting smoking now greatly reduces serious risk to your health. Obesity, smoking, and sedentary lifestyle greatly increases your risk for illness    A healthy diet, regular physical exercise & weight monitoring are important for maintaining a healthy lifestyle    You may be retaining fluid if you have a history of heart failure or if you experience any of the following symptoms:  Weight gain of 3 pounds or more overnight or 5 pounds in a week, increased swelling in our hands or feet or shortness of breath while lying flat in bed. Please call your doctor as soon as you notice any of these symptoms; do not wait until your next office visit. Recognize signs and symptoms of STROKE:    F-face looks uneven    A-arms unable to move or move unevenly    S-speech slurred or non-existent    T-time-call 911 as soon as signs and symptoms begin-DO NOT go       Back to bed or wait to see if you get better-TIME IS BRAIN.

## 2019-08-01 NOTE — PROGRESS NOTES
Patient provided with post-op shoe, crutches and written Rx for oxycodone/APAP 5mg (via EMR). Hieu Butcher PA-C Physician Assistant with [facility] Vascular Surgery [facility].  Radha Gonzáles MD / Mariely Hernandez MD

## 2019-08-01 NOTE — ANESTHESIA PROCEDURE NOTES
Peripheral Block Start time: 8/1/2019 6:30 AM 
End time: 8/1/2019 6:38 AM 
Performed by: Liz Andrade MD 
Authorized by: Liz Andrade MD  
 
 
Pre-procedure: Indications: at surgeon's request and post-op pain management Preanesthetic Checklist: patient identified, risks and benefits discussed, site marked, timeout performed, anesthesia consent given and patient being monitored Timeout Time: 06:30 Block Type:  
Block Type: Ankle Laterality:  Right Monitoring:  Standard ASA monitoring, responsive to questions, continuous pulse ox, oxygen, heart rate and frequent vital sign checks Injection Technique:  Single shot Patient Position: supine Prep: chlorhexidine Catheter size: 25 ga 1.5 inch needle. Needle Localization:  Anatomical landmarks and infiltration Assessment: 
Number of attempts:  1 Injection Assessment:  Incremental injection every 5 mL, negative aspiration for blood, no intravascular symptoms and no paresthesia Patient tolerance:  Patient tolerated the procedure well with no immediate complications Propofol 30 mg for block.

## 2019-08-01 NOTE — PERIOP NOTES
PACU DISCHARGE NOTE Vital signs stable, pain well controlled, alert and oriented times three or at baseline, follow up per surgeon, no anesthetic complications. Discharge instructions given to pt and her daughter. Pt is without c/o pain or discomfort. Pt's IV removed intact. Pt to discharge door via wheelchair and left in care of her daughter.

## 2019-08-01 NOTE — BRIEF OP NOTE
BRIEF OPERATIVE NOTE Date of Procedure: 8/1/2019 Preoperative Diagnosis: Diabetic ulcer of toe of right foot associated with type 2 diabetes mellitus, unspecified ulcer stage ([facility] [P68.377, B40.658] Postoperative Diagnosis: Diabetic ulcer of toe of right foot associated with type 2 diabetes mellitus, unspecified ulcer stage ([facility] [J18.838, L97.519] Procedure(s): 
AMPUTATION OF RIGHT SECOND TOE Surgeon(s) and Role: No Randolph MD - Primary Surgical Assistant:  
 
Surgical Staff: 
Circ-1: Danna Owen RN Scrub Tech-1: Valentino Pott Scrub Tech-2: Tiffanyone Shove Event Time In Time Out Incision Start  7:26 AM   
Incision Close Anesthesia: General  
Estimated Blood Loss: 20cc Specimens:  
ID Type Source Tests Collected by Time Destination 1 : Right second toe Fresh Toe  Jeffrey Holter, MD 8/1/2019  7:29 AM Pathology Findings:   
Complications: None Implants: * No implants in log *

## 2019-08-02 NOTE — OP NOTES
[address] [facility] 
OPERATIVE REPORT Name:  Lu Islas 
MR#:  [id number] :  1960 ACCOUNT #:  [de-identified] DATE OF SERVICE:  2019 PREOPERATIVE DIAGNOSIS:  Right second toe gangrene. POSTOPERATIVE DIAGNOSIS:  Right second toe gangrene. PROCEDURE PERFORMED:  Right second toe amputation. SURGEON:  Nilsa Raya MD 
 
ASSISTANT:  None. ANESTHESIA:  Regional block. COMPLICATIONS:  None. SPECIMENS REMOVED:  None. IMPLANTS:  None. ESTIMATED BLOOD LOSS:  20 mL. DRAINS: None. DESCRIPTION OF PROCEDURE:  The patient was brought to the operating room and placed on the operating table in supine position. Following adequate IV sedation and a regional block at the ankle, the right foot was draped and prepped in a sterile fashion. Next, a circumferential incision was made at the base of the second toe. The wound was deepened using Bovie. The dissection was carried down at the level of the bone. The bone was then transected with a bone cutter. Next, the proximal phalanx was then debrided with a rongeur back to the metatarsal head. The articular surface of the metatarsal head was then removed. Next, hemostasis was achieved and the wound was irrigated. The wound was then closed with nylon suture. Sterile dry dressings were applied. The patient was awakened from anesthesia and transported to the recovery room in stable condition. The patient tolerated the procedure well. No complications. All needle and sponge counts were correct. Forest Benavides MD 
 
 
[id number]/ 
D:  2019 16:11 
T:  2019 21:21 
JOB #:  [id number]

## 2019-08-10 ENCOUNTER — HOSPITAL ENCOUNTER (INPATIENT)
Age: 59
LOS: 4 days | Discharge: HOME | DRG: 246 | End: 2019-08-14
Attending: EMERGENCY MEDICINE | Admitting: INTERNAL MEDICINE
Payer: COMMERCIAL

## 2019-08-10 ENCOUNTER — APPOINTMENT (OUTPATIENT)
Dept: GENERAL RADIOLOGY | Age: 59
DRG: 246 | End: 2019-08-10
Attending: EMERGENCY MEDICINE
Payer: COMMERCIAL

## 2019-08-10 DIAGNOSIS — I10 HYPERTENSION, UNSPECIFIED TYPE: ICD-10-CM

## 2019-08-10 DIAGNOSIS — E11.59 TYPE 2 DIABETES MELLITUS WITH OTHER CIRCULATORY COMPLICATION, UNSPECIFIED WHETHER LONG TERM INSULIN USE (HCC): ICD-10-CM

## 2019-08-10 DIAGNOSIS — I21.4 NSTEMI (NON-ST ELEVATED MYOCARDIAL INFARCTION) (HCC): Primary | ICD-10-CM

## 2019-08-10 LAB
ALBUMIN SERPL-MCNC: 2.8 G/DL (ref 3.2–4.6)
ALBUMIN/GLOB SERPL: 0.7 {RATIO} (ref 1.2–3.5)
ALP SERPL-CCNC: 81 U/L (ref 50–136)
ALT SERPL-CCNC: 26 U/L (ref 12–65)
ANION GAP SERPL CALC-SCNC: 9 MMOL/L (ref 7–16)
APTT PPP: 29.6 SEC (ref 24.7–39.8)
APTT PPP: 61 SEC (ref 24.7–39.8)
APTT PPP: 85.8 SEC (ref 24.7–39.8)
AST SERPL-CCNC: 47 U/L (ref 15–37)
ATRIAL RATE: 72 BPM
ATRIAL RATE: 88 BPM
BASOPHILS # BLD: 0.1 K/UL (ref 0–0.2)
BASOPHILS NFR BLD: 1 % (ref 0–2)
BILIRUB SERPL-MCNC: 0.4 MG/DL (ref 0.2–1.1)
BUN SERPL-MCNC: 29 MG/DL (ref 8–23)
CALCIUM SERPL-MCNC: 8.7 MG/DL (ref 8.3–10.4)
CALCULATED P AXIS, ECG09: 44 DEGREES
CALCULATED P AXIS, ECG09: 55 DEGREES
CALCULATED R AXIS, ECG10: -32 DEGREES
CALCULATED R AXIS, ECG10: -55 DEGREES
CALCULATED T AXIS, ECG11: 105 DEGREES
CALCULATED T AXIS, ECG11: 110 DEGREES
CHLORIDE SERPL-SCNC: 113 MMOL/L (ref 98–107)
CO2 SERPL-SCNC: 22 MMOL/L (ref 21–32)
CREAT SERPL-MCNC: 1.98 MG/DL (ref 0.6–1)
DIAGNOSIS, 93000: NORMAL
DIAGNOSIS, 93000: NORMAL
DIFFERENTIAL METHOD BLD: NORMAL
EOSINOPHIL # BLD: 0.4 K/UL (ref 0–0.8)
EOSINOPHIL NFR BLD: 6 % (ref 0.5–7.8)
ERYTHROCYTE [DISTWIDTH] IN BLOOD BY AUTOMATED COUNT: 13.2 % (ref 11.9–14.6)
EST. AVERAGE GLUCOSE BLD GHB EST-MCNC: 160 MG/DL
GLOBULIN SER CALC-MCNC: 4.1 G/DL (ref 2.3–3.5)
GLUCOSE BLD STRIP.AUTO-MCNC: 210 MG/DL (ref 65–100)
GLUCOSE BLD STRIP.AUTO-MCNC: 87 MG/DL (ref 65–100)
GLUCOSE BLD STRIP.AUTO-MCNC: 98 MG/DL (ref 65–100)
GLUCOSE SERPL-MCNC: 115 MG/DL (ref 65–100)
HBA1C MFR BLD: 7.2 % (ref 4.8–6)
HCT VFR BLD AUTO: 37.5 % (ref 35.8–46.3)
HGB BLD-MCNC: 12.2 G/DL (ref 11.7–15.4)
IMM GRANULOCYTES # BLD AUTO: 0 K/UL (ref 0–0.5)
IMM GRANULOCYTES NFR BLD AUTO: 1 % (ref 0–5)
LYMPHOCYTES # BLD: 1.4 K/UL (ref 0.5–4.6)
LYMPHOCYTES NFR BLD: 23 % (ref 13–44)
MCH RBC QN AUTO: 28.9 PG (ref 26.1–32.9)
MCHC RBC AUTO-ENTMCNC: 32.5 G/DL (ref 31.4–35)
MCV RBC AUTO: 88.9 FL (ref 79.6–97.8)
MONOCYTES # BLD: 0.5 K/UL (ref 0.1–1.3)
MONOCYTES NFR BLD: 8 % (ref 4–12)
NEUTS SEG # BLD: 3.9 K/UL (ref 1.7–8.2)
NEUTS SEG NFR BLD: 63 % (ref 43–78)
NRBC # BLD: 0 K/UL (ref 0–0.2)
P-R INTERVAL, ECG05: 174 MS
P-R INTERVAL, ECG05: 178 MS
PLATELET # BLD AUTO: 234 K/UL (ref 150–450)
PMV BLD AUTO: 10.8 FL (ref 9.4–12.3)
POTASSIUM SERPL-SCNC: 4.6 MMOL/L (ref 3.5–5.1)
PROT SERPL-MCNC: 6.9 G/DL (ref 6.3–8.2)
Q-T INTERVAL, ECG07: 380 MS
Q-T INTERVAL, ECG07: 422 MS
QRS DURATION, ECG06: 100 MS
QRS DURATION, ECG06: 104 MS
QTC CALCULATION (BEZET), ECG08: 459 MS
QTC CALCULATION (BEZET), ECG08: 462 MS
RBC # BLD AUTO: 4.22 M/UL (ref 4.05–5.2)
SODIUM SERPL-SCNC: 144 MMOL/L (ref 136–145)
TROPONIN I BLD-MCNC: 3.42 NG/ML (ref 0.02–0.05)
TROPONIN I SERPL-MCNC: 4.15 NG/ML (ref 0.02–0.05)
TROPONIN I SERPL-MCNC: 4.5 NG/ML (ref 0.02–0.05)
TROPONIN I SERPL-MCNC: 4.53 NG/ML (ref 0.02–0.05)
VENTRICULAR RATE, ECG03: 72 BPM
VENTRICULAR RATE, ECG03: 88 BPM
WBC # BLD AUTO: 6.2 K/UL (ref 4.3–11.1)

## 2019-08-10 PROCEDURE — 36415 COLL VENOUS BLD VENIPUNCTURE: CPT

## 2019-08-10 PROCEDURE — 74011250636 HC RX REV CODE- 250/636: Performed by: INTERNAL MEDICINE

## 2019-08-10 PROCEDURE — 71045 X-RAY EXAM CHEST 1 VIEW: CPT

## 2019-08-10 PROCEDURE — 74011250636 HC RX REV CODE- 250/636: Performed by: PHYSICIAN ASSISTANT

## 2019-08-10 PROCEDURE — 83036 HEMOGLOBIN GLYCOSYLATED A1C: CPT

## 2019-08-10 PROCEDURE — 84484 ASSAY OF TROPONIN QUANT: CPT

## 2019-08-10 PROCEDURE — 74011250637 HC RX REV CODE- 250/637: Performed by: INTERNAL MEDICINE

## 2019-08-10 PROCEDURE — 85025 COMPLETE CBC W/AUTO DIFF WBC: CPT

## 2019-08-10 PROCEDURE — 74011636637 HC RX REV CODE- 636/637: Performed by: INTERNAL MEDICINE

## 2019-08-10 PROCEDURE — 80053 COMPREHEN METABOLIC PANEL: CPT

## 2019-08-10 PROCEDURE — 85730 THROMBOPLASTIN TIME PARTIAL: CPT

## 2019-08-10 PROCEDURE — 93005 ELECTROCARDIOGRAM TRACING: CPT | Performed by: INTERNAL MEDICINE

## 2019-08-10 PROCEDURE — 93005 ELECTROCARDIOGRAM TRACING: CPT | Performed by: EMERGENCY MEDICINE

## 2019-08-10 PROCEDURE — 74011250637 HC RX REV CODE- 250/637: Performed by: EMERGENCY MEDICINE

## 2019-08-10 PROCEDURE — 99285 EMERGENCY DEPT VISIT HI MDM: CPT | Performed by: EMERGENCY MEDICINE

## 2019-08-10 PROCEDURE — 74011250636 HC RX REV CODE- 250/636: Performed by: EMERGENCY MEDICINE

## 2019-08-10 PROCEDURE — 74011000250 HC RX REV CODE- 250: Performed by: INTERNAL MEDICINE

## 2019-08-10 PROCEDURE — 65660000000 HC RM CCU STEPDOWN

## 2019-08-10 PROCEDURE — 82962 GLUCOSE BLOOD TEST: CPT

## 2019-08-10 RX ORDER — INSULIN GLARGINE 100 [IU]/ML
70 INJECTION, SOLUTION SUBCUTANEOUS
Status: DISCONTINUED | OUTPATIENT
Start: 2019-08-10 | End: 2019-08-11

## 2019-08-10 RX ORDER — INSULIN LISPRO 100 [IU]/ML
INJECTION, SOLUTION INTRAVENOUS; SUBCUTANEOUS
Status: DISCONTINUED | OUTPATIENT
Start: 2019-08-10 | End: 2019-08-14 | Stop reason: HOSPADM

## 2019-08-10 RX ORDER — GUAIFENESIN 100 MG/5ML
324 LIQUID (ML) ORAL
Status: COMPLETED | OUTPATIENT
Start: 2019-08-10 | End: 2019-08-10

## 2019-08-10 RX ORDER — AMLODIPINE BESYLATE 5 MG/1
5 TABLET ORAL DAILY
Status: DISCONTINUED | OUTPATIENT
Start: 2019-08-11 | End: 2019-08-14 | Stop reason: HOSPADM

## 2019-08-10 RX ORDER — HEPARIN SODIUM 5000 [USP'U]/ML
5000 INJECTION, SOLUTION INTRAVENOUS; SUBCUTANEOUS
Status: COMPLETED | OUTPATIENT
Start: 2019-08-10 | End: 2019-08-10

## 2019-08-10 RX ORDER — NITROGLYCERIN 20 MG/100ML
0-20 INJECTION INTRAVENOUS
Status: DISCONTINUED | OUTPATIENT
Start: 2019-08-10 | End: 2019-08-13

## 2019-08-10 RX ORDER — HEPARIN SODIUM 5000 [USP'U]/ML
35 INJECTION, SOLUTION INTRAVENOUS; SUBCUTANEOUS ONCE
Status: COMPLETED | OUTPATIENT
Start: 2019-08-11 | End: 2019-08-10

## 2019-08-10 RX ORDER — NITROGLYCERIN 20 MG/100ML
20 INJECTION INTRAVENOUS
Status: COMPLETED | OUTPATIENT
Start: 2019-08-10 | End: 2019-08-10

## 2019-08-10 RX ORDER — LEVOTHYROXINE SODIUM 50 UG/1
25 TABLET ORAL
Status: DISCONTINUED | OUTPATIENT
Start: 2019-08-11 | End: 2019-08-14 | Stop reason: HOSPADM

## 2019-08-10 RX ORDER — HEPARIN SODIUM 5000 [USP'U]/100ML
12-25 INJECTION, SOLUTION INTRAVENOUS
Status: DISCONTINUED | OUTPATIENT
Start: 2019-08-10 | End: 2019-08-13

## 2019-08-10 RX ORDER — ASPIRIN 81 MG/1
81 TABLET ORAL
Status: DISCONTINUED | OUTPATIENT
Start: 2019-08-11 | End: 2019-08-13

## 2019-08-10 RX ORDER — LEVOTHYROXINE SODIUM 100 UG/1
200 TABLET ORAL
Status: DISCONTINUED | OUTPATIENT
Start: 2019-08-11 | End: 2019-08-14 | Stop reason: HOSPADM

## 2019-08-10 RX ORDER — ESCITALOPRAM OXALATE 10 MG/1
20 TABLET ORAL 2 TIMES DAILY
Status: DISCONTINUED | OUTPATIENT
Start: 2019-08-10 | End: 2019-08-14 | Stop reason: HOSPADM

## 2019-08-10 RX ORDER — VALSARTAN 80 MG/1
320 TABLET ORAL DAILY
Status: DISCONTINUED | OUTPATIENT
Start: 2019-08-11 | End: 2019-08-12

## 2019-08-10 RX ORDER — NITROGLYCERIN 0.4 MG/1
0.4 TABLET SUBLINGUAL
Status: DISCONTINUED | OUTPATIENT
Start: 2019-08-10 | End: 2019-08-13

## 2019-08-10 RX ADMIN — ASPIRIN 81 MG 324 MG: 81 TABLET ORAL at 09:17

## 2019-08-10 RX ADMIN — ESCITALOPRAM OXALATE 20 MG: 10 TABLET, FILM COATED ORAL at 20:29

## 2019-08-10 RX ADMIN — HEPARIN SODIUM 12 UNITS/KG/HR: 5000 INJECTION, SOLUTION INTRAVENOUS at 09:19

## 2019-08-10 RX ADMIN — HEPARIN SODIUM 5000 UNITS: 5000 INJECTION INTRAVENOUS; SUBCUTANEOUS at 09:18

## 2019-08-10 RX ADMIN — NITROGLYCERIN 20 MCG/MIN: 20 INJECTION INTRAVENOUS at 09:20

## 2019-08-10 RX ADMIN — NITROGLYCERIN 0.4 MG: 0.4 TABLET, ORALLY DISINTEGRATING SUBLINGUAL at 08:13

## 2019-08-10 RX ADMIN — NITROGLYCERIN 0.4 MG: 0.4 TABLET, ORALLY DISINTEGRATING SUBLINGUAL at 08:06

## 2019-08-10 RX ADMIN — INSULIN LISPRO 6 UNITS: 100 INJECTION, SOLUTION INTRAVENOUS; SUBCUTANEOUS at 21:30

## 2019-08-10 RX ADMIN — HEPARIN SODIUM 4150 UNITS: 5000 INJECTION INTRAVENOUS; SUBCUTANEOUS at 23:34

## 2019-08-10 NOTE — H&P
History of present illness:59 y.o. female History of four-vessel coronary artery bypass grafting 2006 [facility] LIMA to LAD and SVG to diagonal and SVG to OM, SVG to OM. She has had no subsequent follow-up with a cardiologist.  She was previously seen by Dr. Jaja Gilmore who performed a cardiac catheterization in 2006. The patient had recent toe amputation by vascular surgery and August 2019. She's had subsequent events of substernal chest discomfort with pain in the center of her chest occurring on multiple occasions over the past week. Patient attributed these symptoms to anxiety attacks. She had onset of symptoms of chest discomfort approximately 9 PM on 8/9/2019 with improvement of symptoms after several hours. At the time of exam patient is chest pain-free. Cardiac history 1. 2006 coronary artery bypass grafting LIMA to LAD and SVG diagonal SVG OM, SVG OM 
2. 2016 echocardiogram EF 60-65% Assessment 1. Non-ST elevated myocardial infarction current Killip class I symptoms. Patient has ECG findings on admission with 1 mm of ST elevation noted in inferior leads. At the time of exam patient chest pain-free troponin of 4 with repeat point-of-care troponin and 3. She has Q waves on inferior leads and lateral leads that a change from her previous ECG. At this time ECGs will be repeated with close monitoring of troponins. We will continue to monitor her clinical course closely to see if cardiac catheterization is necessary within the next several hours. Unfortunately she has chronic kidney disease with elevated creatinine and bypass graft anatomy that was complicated with 4 bypass grafts. 2. Diabetes last A1c not known resume insulin 3. CKD No signs of CHF careful hydration prior to cardiac catheterization 4. Elevated LFT Review of Systems Constitutional: Negative for fever. Respiratory: Positive for cough. Cardiovascular: Negative for chest pain. Genitourinary: Negative for dysuria. Musculoskeletal: Positive for myalgias. Skin: Negative for rash. Neurological: Negative for dizziness. Past Medical History:  
Diagnosis Date  Acute bronchitis  Acute pharyngitis  Allergic rhinitis due to other allergen  Coronary atherosclerosis of native coronary artery CABG x3  ~2006, no cardiac events or stents or chest pain since  Depressive disorder, not elsewhere classified   
 daily meds  Essential hypertension, benign   
 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 9)  Osteomyelitis of toe ([address] forth toe of left foot  Other specified acquired hypothyroidism  Thromboembolus ([address]   
 possible to back of left knee???, superficial no anticoagulation therapy required  Type II or unspecified type diabetes mellitus without mention of complication, uncontrolled   
 type 2: oral/insulin: avg. 112: s/s hypo shakes @100: A1C 7.2 on 07/2019 Past Surgical History:  
Procedure Laterality Date  CARDIAC SURG PROCEDURE UNLIST  2006 CABG x3  
 HX BREAST BIOPSY Left 02/20/2017  
 stereo  HX CORONARY ARTERY BYPASS GRAFT  2006 Three  VASCULAR SURGERY PROCEDURE UNLIST  08/10/2016  
 arteriogram of leg  VASCULAR SURGERY PROCEDURE UNLIST Left 08/26/2016  
  fem pop endarterectomy  VASCULAR SURGERY PROCEDURE UNLIST Left 03/12/2018  
 4th toe amp Family History Problem Relation Age of Onset  Diabetes Other  Hypertension Other  Heart Disease Other  Diabetes Mother  Hypertension Mother  Cancer Father  Diabetes Sister  Heart Disease Sister  Diabetes Brother  Diabetes Sister  Hypertension Sister  Diabetes Sister  Hypertension Sister  Diabetes Sister  Hypertension Sister  Diabetes Brother Social History Socioeconomic History  Marital status:  Spouse name: Not on file  Number of children: Not on file  Years of education: Not on file  Highest education level: Not on file Occupational History  Not on file Social Needs  Financial resource strain: Not on file  Food insecurity:  
  Worry: Not on file Inability: Not on file  Transportation needs:  
  Medical: Not on file Non-medical: Not on file Tobacco Use  Smoking status: Former Smoker  Smokeless tobacco: Never Used  Tobacco comment: quit early 1990's Substance and Sexual Activity  Alcohol use: No  
 Drug use: No  
 Sexual activity: Not on file Lifestyle  Physical activity:  
  Days per week: Not on file Minutes per session: Not on file  Stress: Not on file Relationships  Social connections:  
  Talks on phone: Not on file Gets together: Not on file Attends Mosque service: Not on file Active member of club or organization: Not on file Attends meetings of clubs or organizations: Not on file Relationship status: Not on file  Intimate partner violence:  
  Fear of current or ex partner: Not on file Emotionally abused: Not on file Physically abused: Not on file Forced sexual activity: Not on file Other Topics Concern  Not on file Social History Narrative  Not on file Current Facility-Administered Medications Medication Dose Route Frequency  nitroglycerin (NITROSTAT) tablet 0.4 mg  0.4 mg SubLINGual Q5MIN PRN  
 heparin 25,000 units in dextrose 500 mL infusion  12-25 Units/kg/hr (Adjusted) IntraVENous TITRATE  insulin lispro (HUMALOG) injection   SubCUTAneous AC&HS Current Outpatient Medications Medication Sig  escitalopram oxalate (LEXAPRO) 20 mg tablet Take 1 Tab by mouth two (2) times a day.  amLODIPine (NORVASC) 5 mg tablet Take 1 Tab by mouth daily.  valsartan (DIOVAN) 320 mg tablet Take 1 Tab by mouth daily.  exenatide microspheres (BYDUREON BCISE) 2 TN/7.66 mL atIn 1 Applicator by SubCUTAneous route every seven (7) days. (Patient taking differently: 1 Applicator by SubCUTAneous route Every Thursday. Indications: type 2 diabetes mellitus)  atorvastatin (LIPITOR) 40 mg tablet Take 1 Tab by mouth daily.  insulin degludec (TRESIBA FLEXTOUCH U-100) 100 unit/mL (3 mL) inpn 70 Units by SubCUTAneous route daily for 90 days. (Patient taking differently: 70 Units by SubCUTAneous route daily. Indications: type 2 diabetes mellitus)  levothyroxine (SYNTHROID) 200 mcg tablet Take 1 Tab by mouth Daily (before breakfast).  levothyroxine (SYNTHROID) 25 mcg tablet Take 1 Tab by mouth Daily (before breakfast).  aspirin delayed-release 81 mg tablet Take 81 mg by mouth every morning. Visit Vitals /77 Pulse 79 Temp 97.7 °F (36.5 °C) Resp 22 Ht 5' 8\" (1.727 m) Wt 118.8 kg (262 lb) SpO2 94% BMI 39.84 kg/m² Physical Exam  
Constitutional: She is oriented to person, place, and time. HENT:  
Head: Normocephalic and atraumatic. Eyes: Pupils are equal, round, and reactive to light. Conjunctivae are normal.  
Neck: Normal range of motion. No JVD present. Cardiovascular: Normal rate and normal heart sounds. No murmur heard. Pulmonary/Chest: Effort normal. She has no wheezes. Abdominal: She exhibits no distension. Musculoskeletal: She exhibits no edema. Neurological: She is alert and oriented to person, place, and time. No cranial nerve deficit. Skin: Skin is warm and dry. No rash noted. She is not diaphoretic. Psychiatric: She has a normal mood and affect. No intake or output data in the 24 hours ending 08/10/19 0929 Lab Results Component Value Date/Time  Sodium 144 08/10/2019 07:21 AM  
 Potassium 4.6 08/10/2019 07:21 AM  
 Chloride 113 (H) 08/10/2019 07:21 AM  
 CO2 22 08/10/2019 07:21 AM  
 Anion gap 9 08/10/2019 07:21 AM  
 Glucose 115 (H) 08/10/2019 07:21 AM  
 BUN 29 (H) 08/10/2019 07:21 AM  
 Creatinine 1.98 (H) 08/10/2019 07:21 AM  
 BUN/Creatinine ratio 15 07/25/2019 09:12 AM  
 GFR est AA 33 (L) 08/10/2019 07:21 AM  
 GFR est non-AA 27 (L) 08/10/2019 07:21 AM  
 Calcium 8.7 08/10/2019 07:21 AM  
 
Lab Results Component Value Date/Time WBC 6.2 08/10/2019 07:21 AM  
 HGB 12.2 08/10/2019 07:21 AM  
 HCT 37.5 08/10/2019 07:21 AM  
 PLATELET 294 51/87/1633 07:21 AM  
 MCV 88.9 08/10/2019 07:21 AM  
 
Lab Results Component Value Date/Time Troponin-I, Qt. 4.50 (HH) 08/10/2019 07:21 AM  
 
Lab Results Component Value Date/Time Hemoglobin A1c 7.2 (H) 07/16/2019 10:16 AM  
 Hemoglobin A1c, External 9.1 12/18/2014 Assessment: 
[unfilled] Recommendations:

## 2019-08-10 NOTE — PROGRESS NOTES
Bedside and Verbal shift change report given to self (oncoming nurse) by Sharon Levi (offgoing nurse). Report included the following information SBAR, Kardex, MAR and Recent Results. Nitro gtt running at 20 and heparin running at 12; BP cycling on the eagle every hour

## 2019-08-10 NOTE — ROUTINE PROCESS
TRANSFER - OUT REPORT: 
 
Verbal report given to Altaf Manzanares RN on Cristo Lay  being transferred to tele for routine progression of care Report consisted of patients Situation, Background, Assessment and  
Recommendations(SBAR). Information from the following report(s) ED Summary was reviewed with the receiving nurse. Lines:  
Peripheral IV 08/10/19 Right Hand (Active) Site Assessment Clean, dry, & intact 8/10/2019  9:17 AM  
Phlebitis Assessment 0 8/10/2019  9:17 AM  
Infiltration Assessment 0 8/10/2019  9:17 AM  
Dressing Status Clean, dry, & intact 8/10/2019  9:17 AM  
Hub Color/Line Status Pink 8/10/2019  9:17 AM  
  
 
Opportunity for questions and clarification was provided. Patient transported with: 
 Monitor Patient-specific medications from Pharmacy Registered Nurse

## 2019-08-10 NOTE — PROGRESS NOTES
Verbal bedside report given to dustin Denson RN. Patient's situation, background, assessment and recommendations provided. Opportunity for questions provided. Oncoming RN assumed care of patient. Heparin and Nitro IV drip verified at bedside with oncoming RN.

## 2019-08-10 NOTE — PROGRESS NOTES
TRANSFER - IN REPORT: 
 
Verbal report received from NENA Sandhu on Marichuy Fuentes being received from ED for routine progression of care. Report consisted of patients Situation, Background, Assessment and Recommendations(SBAR). Information from the following report(s) SBAR, Kardex and Cardiac Rhythm NSR was reviewed. Opportunity for questions and clarification was provided. Assessment completed upon patients arrival to unit and care assumed. Patient received to room 309. Patient connected to monitor and assessment completed. Plan of care reviewed. Patient oriented to room and call light. Patient aware to use call light to communicate any chest pain or needs. Admission skin assessment completed with second RN and reveals the following: Sacrum and heels are intact, without breakdown. Pt has abrasion on R foot 2nd digit where the toe was removed surgically 1 week ago. Band-aid currently on the site. Pt has 4th digit on left foot removed. CABG scar.

## 2019-08-10 NOTE — ED PROVIDER NOTES
Patient has a history of diabetes and peripheral vascular disease and coronary artery disease with CABG in 2006, is complaining of chest pain. She states it started at 9 PM last night and lasted for approximately 6 hours before resolving. She describes it as left-sided tightness radiating to her neck with associated shortness of breath. It is worse when she moves. She denies any diaphoresis. She did have some nausea this morning. She says her pain gets up to an 8/10, is currently a 5/10 in intensity. She did not take any medicine for her symptoms. Elements of this note were made using speech recognition software. As such, errors of speech recognition may occur. Past Medical History:  
Diagnosis Date  Acute bronchitis  Acute pharyngitis  Allergic rhinitis due to other allergen  Coronary atherosclerosis of native coronary artery CABG x3  ~2006, no cardiac events or stents or chest pain since  Depressive disorder, not elsewhere classified   
 daily meds  Essential hypertension, benign   
 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 9)  Osteomyelitis of toe ([address] forth toe of left foot  Other specified acquired hypothyroidism  Thromboembolus ([address]   
 possible to back of left knee???, superficial no anticoagulation therapy required  Type II or unspecified type diabetes mellitus without mention of complication, uncontrolled   
 type 2: oral/insulin: avg. 112: s/s hypo shakes @100: A1C 7.2 on 07/2019 Past Surgical History:  
Procedure Laterality Date  CARDIAC SURG PROCEDURE UNLIST  2006 CABG x3  
 HX BREAST BIOPSY Left 02/20/2017  
 stereo  HX CORONARY ARTERY BYPASS GRAFT  2006 Three  VASCULAR SURGERY PROCEDURE UNLIST  08/10/2016  
 arteriogram of leg  VASCULAR SURGERY PROCEDURE UNLIST Left 08/26/2016  
  fem pop endarterectomy  VASCULAR SURGERY PROCEDURE UNLIST Left 03/12/2018  
 4th toe amp Family History:  
Problem Relation Age of Onset  Diabetes Other  Hypertension Other  Heart Disease Other  Diabetes Mother  Hypertension Mother  Cancer Father  Diabetes Sister  Heart Disease Sister  Diabetes Brother  Diabetes Sister  Hypertension Sister  Diabetes Sister  Hypertension Sister  Diabetes Sister  Hypertension Sister  Diabetes Brother Social History Socioeconomic History  Marital status:  Spouse name: Not on file  Number of children: Not on file  Years of education: Not on file  Highest education level: Not on file Occupational History  Not on file Social Needs  Financial resource strain: Not on file  Food insecurity:  
  Worry: Not on file Inability: Not on file  Transportation needs:  
  Medical: Not on file Non-medical: Not on file Tobacco Use  Smoking status: Former Smoker  Smokeless tobacco: Never Used  Tobacco comment: quit early 1990's Substance and Sexual Activity  Alcohol use: No  
 Drug use: No  
 Sexual activity: Not on file Lifestyle  Physical activity:  
  Days per week: Not on file Minutes per session: Not on file  Stress: Not on file Relationships  Social connections:  
  Talks on phone: Not on file Gets together: Not on file Attends Mu-ism service: Not on file Active member of club or organization: Not on file Attends meetings of clubs or organizations: Not on file Relationship status: Not on file  Intimate partner violence:  
  Fear of current or ex partner: Not on file Emotionally abused: Not on file Physically abused: Not on file Forced sexual activity: Not on file Other Topics Concern  Not on file Social History Narrative  Not on file ALLERGIES: Patient has no known allergies. Review of Systems Constitutional: Negative for chills and fever. Gastrointestinal: Negative for nausea and vomiting. All other systems reviewed and are negative. Vitals:  
 08/10/19 0014 BP: 189/84 Pulse: 100 Resp: (!) 34 Temp: 97.7 °F (36.5 °C) SpO2: 98% Weight: 118.8 kg (262 lb) Height: 5' 8\" (1.727 m) Physical Exam  
Constitutional: She is oriented to person, place, and time. She appears well-developed and well-nourished. HENT:  
Head: Normocephalic and atraumatic. Eyes: Pupils are equal, round, and reactive to light. Conjunctivae are normal.  
Neck: Normal range of motion. Neck supple. Cardiovascular: Normal rate, regular rhythm and normal heart sounds. Pulmonary/Chest: Effort normal and breath sounds normal.  
Abdominal: Soft. Bowel sounds are normal.  
Musculoskeletal: She exhibits no edema or tenderness. Neurological: She is alert and oriented to person, place, and time. Skin: Skin is warm and dry. Psychiatric: She has a normal mood and affect. Her behavior is normal.  
Nursing note and vitals reviewed. MDM Number of Diagnoses or Management Options Diagnosis management comments: 7:33 AM story is very concerning for unstable angina. She had bypass surgery in 2006, has not seen a cardiologist since then. Her pain is currently a 5/10, will give her nitroglycerin 8:21 AM prior to nitroglycerin, her chest pain was a 2/10. After 2 nitroglycerin, her chest pain is unchanged but her systolic blood pressure is now 102 
8:50 AM troponin is 4.5. I spoke with Dr. Martina Montgomery, to see patient. Patient is still having pain at 2/10 intensity. Discussed results with patient, need for admission. Total critical care time spent on initial evaluation, repeat evaluations, couple records, documenting and speaking with the consultant was 40 minutes. Amount and/or Complexity of Data Reviewed Clinical lab tests: ordered and reviewed Tests in the radiology section of CPT®: ordered and reviewed Tests in the medicine section of CPT®: ordered and reviewed Discuss the patient with other providers: yes Risk of Complications, Morbidity, and/or Mortality Presenting problems: high Diagnostic procedures: moderate Management options: high Critical Care Total time providing critical care: 30-74 minutes Procedures

## 2019-08-11 LAB
ANION GAP SERPL CALC-SCNC: 8 MMOL/L (ref 7–16)
APTT PPP: 108.8 SEC (ref 24.7–39.8)
APTT PPP: 135.3 SEC (ref 24.7–39.8)
APTT PPP: 72.5 SEC (ref 24.7–39.8)
BUN SERPL-MCNC: 26 MG/DL (ref 8–23)
CALCIUM SERPL-MCNC: 8.5 MG/DL (ref 8.3–10.4)
CHLORIDE SERPL-SCNC: 113 MMOL/L (ref 98–107)
CO2 SERPL-SCNC: 23 MMOL/L (ref 21–32)
CREAT SERPL-MCNC: 1.94 MG/DL (ref 0.6–1)
GLUCOSE BLD STRIP.AUTO-MCNC: 129 MG/DL (ref 65–100)
GLUCOSE BLD STRIP.AUTO-MCNC: 137 MG/DL (ref 65–100)
GLUCOSE BLD STRIP.AUTO-MCNC: 144 MG/DL (ref 65–100)
GLUCOSE BLD STRIP.AUTO-MCNC: 224 MG/DL (ref 65–100)
GLUCOSE SERPL-MCNC: 131 MG/DL (ref 65–100)
POTASSIUM SERPL-SCNC: 4.3 MMOL/L (ref 3.5–5.1)
SODIUM SERPL-SCNC: 144 MMOL/L (ref 136–145)

## 2019-08-11 PROCEDURE — 74011250636 HC RX REV CODE- 250/636: Performed by: INTERNAL MEDICINE

## 2019-08-11 PROCEDURE — 74011250637 HC RX REV CODE- 250/637: Performed by: INTERNAL MEDICINE

## 2019-08-11 PROCEDURE — 82962 GLUCOSE BLOOD TEST: CPT

## 2019-08-11 PROCEDURE — 65660000000 HC RM CCU STEPDOWN

## 2019-08-11 PROCEDURE — 36415 COLL VENOUS BLD VENIPUNCTURE: CPT

## 2019-08-11 PROCEDURE — C8929 TTE W OR WO FOL WCON,DOPPLER: HCPCS

## 2019-08-11 PROCEDURE — 85730 THROMBOPLASTIN TIME PARTIAL: CPT

## 2019-08-11 PROCEDURE — 74011636637 HC RX REV CODE- 636/637: Performed by: INTERNAL MEDICINE

## 2019-08-11 PROCEDURE — 80048 BASIC METABOLIC PNL TOTAL CA: CPT

## 2019-08-11 PROCEDURE — 77030020263 HC SOL INJ SOD CL0.9% LFCR 1000ML

## 2019-08-11 RX ORDER — HEPARIN SODIUM 5000 [USP'U]/ML
35 INJECTION, SOLUTION INTRAVENOUS; SUBCUTANEOUS ONCE
Status: COMPLETED | OUTPATIENT
Start: 2019-08-11 | End: 2019-08-11

## 2019-08-11 RX ORDER — SODIUM CHLORIDE 9 MG/ML
100 INJECTION, SOLUTION INTRAVENOUS CONTINUOUS
Status: DISPENSED | OUTPATIENT
Start: 2019-08-11 | End: 2019-08-12

## 2019-08-11 RX ORDER — METOPROLOL TARTRATE 25 MG/1
25 TABLET, FILM COATED ORAL EVERY 12 HOURS
Status: DISCONTINUED | OUTPATIENT
Start: 2019-08-11 | End: 2019-08-14 | Stop reason: HOSPADM

## 2019-08-11 RX ORDER — INSULIN GLARGINE 100 [IU]/ML
60 INJECTION, SOLUTION SUBCUTANEOUS
Status: DISCONTINUED | OUTPATIENT
Start: 2019-08-11 | End: 2019-08-14 | Stop reason: HOSPADM

## 2019-08-11 RX ADMIN — ASPIRIN 81 MG: 81 TABLET ORAL at 07:04

## 2019-08-11 RX ADMIN — HEPARIN SODIUM 3000 UNITS: 5000 INJECTION INTRAVENOUS; SUBCUTANEOUS at 15:02

## 2019-08-11 RX ADMIN — LEVOTHYROXINE SODIUM 200 MCG: 150 TABLET ORAL at 07:04

## 2019-08-11 RX ADMIN — METOPROLOL TARTRATE 25 MG: 25 TABLET ORAL at 22:28

## 2019-08-11 RX ADMIN — ESCITALOPRAM OXALATE 20 MG: 10 TABLET, FILM COATED ORAL at 09:06

## 2019-08-11 RX ADMIN — METOPROLOL TARTRATE 25 MG: 25 TABLET ORAL at 09:06

## 2019-08-11 RX ADMIN — AMLODIPINE BESYLATE 5 MG: 5 TABLET ORAL at 09:06

## 2019-08-11 RX ADMIN — ESCITALOPRAM OXALATE 20 MG: 10 TABLET, FILM COATED ORAL at 22:28

## 2019-08-11 RX ADMIN — INSULIN LISPRO 6 UNITS: 100 INJECTION, SOLUTION INTRAVENOUS; SUBCUTANEOUS at 22:28

## 2019-08-11 RX ADMIN — SODIUM CHLORIDE 100 ML/HR: 900 INJECTION, SOLUTION INTRAVENOUS at 23:45

## 2019-08-11 RX ADMIN — LEVOTHYROXINE SODIUM 25 MCG: 150 TABLET ORAL at 07:04

## 2019-08-11 NOTE — PROGRESS NOTES
[facility] CARDIOLOGY PROGRESS NOTE 
      
 
8/11/2019 7:34 AM 
 
Admit Date: 8/10/2019 Subjective: No pain overnight Review of Systems Constitutional: Negative. Negative for fever. Eyes: Negative for blurred vision. Cardiovascular: Negative for chest pain. Genitourinary: Negative for dysuria. Musculoskeletal: Negative for myalgias. Psychiatric/Behavioral: Negative for depression. Objective:  
  
Vitals:  
 08/10/19 1850 08/10/19 2031 08/11/19 0034 08/11/19 0440 BP: 151/71 156/73 141/81 132/78 Pulse: 88 84 89 82 Resp:  18 18 16 Temp:  98.8 °F (37.1 °C) 98 °F (36.7 °C) 98 °F (36.7 °C) SpO2:  93% 94% 97% Weight:    118.8 kg (262 lb) Height:      
 
 
 
Physical Exam  
HENT:  
Head: Normocephalic. Eyes: Pupils are equal, round, and reactive to light. Cardiovascular: Murmur heard. Pulmonary/Chest: No respiratory distress. Musculoskeletal: She exhibits edema. Neurological: No cranial nerve deficit. Psychiatric: She has a normal mood and affect. Data Review:  
Recent Labs 08/10/19 
1327 08/10/19 
0919 08/10/19 
7547 NA  --   --  144 K  --   --  4.6 BUN  --   --  29* CREA  --   --  1.98* GLU  --   --  115* WBC  --   --  6.2 HGB  --   --  12.2 HCT  --   --  37.5 PLT  --   --  234 TROIQ 4.15* 4.53* 4.50* No intake or output data in the 24 hours ending 08/11/19 0734 Current Facility-Administered Medications Medication Dose Route Frequency  metoprolol tartrate (LOPRESSOR) tablet 25 mg  25 mg Oral Q12H  
 0.9% sodium chloride infusion  100 mL/hr IntraVENous CONTINUOUS  
 insulin glargine (LANTUS) injection 60 Units  60 Units SubCUTAneous QHS  nitroglycerin (NITROSTAT) tablet 0.4 mg  0.4 mg SubLINGual Q5MIN PRN  
 heparin 25,000 units in dextrose 500 mL infusion  12-25 Units/kg/hr (Adjusted) IntraVENous TITRATE  aspirin delayed-release tablet 81 mg  81 mg Oral 7am  
  amLODIPine (NORVASC) tablet 5 mg  5 mg Oral DAILY  escitalopram oxalate (LEXAPRO) tablet 20 mg  20 mg Oral BID  [START ON 8/15/2019] exenatide microspheres serr 2 mcg (Patient Supplied)  2 mcg SubCUTAneous Q7D  
 levothyroxine (SYNTHROID) tablet 200 mcg  200 mcg Oral ACB  levothyroxine (SYNTHROID) tablet 25 mcg  25 mcg Oral ACB  [Held by provider] valsartan (DIOVAN) tablet 320 mg  320 mg Oral DAILY  insulin lispro (HUMALOG) injection   SubCUTAneous AC&HS  nitroglycerin (Tridil) 200 mcg/ml infusion  0-20 mcg/min IntraVENous TITRATE History of four-vessel bypass 2006 Coronary anatomy LIMA to LAD SVG to diagonal SVG OM, SVG OM Assessment/Plan:  
 
77-year-old female with history of diabetes coronary artery disease status post coronary artery bypass grafting in 2006 who presented with several day history of chest pain and shortness of breath. ECG shows Q waves in inferior and lateral leads suggesting an infarct in the past 
 
 
1. Non-ST elevated myocardial infarction previous coronary artery bypass grafting 2006 patient now chest pain-free on heparin drip and nitroglycerin. Plan for cardiac catheterization with the next 24-48 hours. IV hydration overnight and start beta blocker. Echo pending 2. Hypertension uncontrolled elevated blood pressure readings hold Ace/ARB overnight 3. Diabetes A1c 7.2 decrease Lantus dose due to hypoglycemia this morning to 16 units daily consult hospitalists if patient has further issues 4.  Chronic kidney disease IV hydration to be initiated tonight holding valsartan and 
 
 
Joanna Hernandez MD 
8/11/2019 7:34 AM

## 2019-08-11 NOTE — PROGRESS NOTES
Verbal bedside report given to dustin Ellison RN. Patient's situation, background, assessment and recommendations provided. Opportunity for questions provided. Oncoming RN assumed care of patient. Heparin and Cardizem IV drip verified at bedside with oncoming RN.

## 2019-08-11 NOTE — PROGRESS NOTES
Bedside and Verbal shift change report given to self (oncoming nurse) by Naomy Bartlett (offgoing nurse). Report included the following information SBAR, Kardex, MAR and Recent Results. Heparin running at 12 ml/hr verified with oncoming RN; Nitro running at 20ml/hr; BP cycling on the eagle hourly

## 2019-08-12 PROBLEM — I21.4 NSTEMI (NON-ST ELEVATED MYOCARDIAL INFARCTION) (HCC): Status: RESOLVED | Noted: 2019-08-10 | Resolved: 2019-08-12

## 2019-08-12 LAB
ACT BLD: 246 SECS (ref 70–128)
ACT BLD: 274 SECS (ref 70–128)
ACT BLD: 296 SECS (ref 70–128)
ANION GAP SERPL CALC-SCNC: 5 MMOL/L (ref 7–16)
APTT PPP: 114.4 SEC (ref 24.7–39.8)
BUN SERPL-MCNC: 24 MG/DL (ref 8–23)
CALCIUM SERPL-MCNC: 8.6 MG/DL (ref 8.3–10.4)
CHLORIDE SERPL-SCNC: 113 MMOL/L (ref 98–107)
CO2 SERPL-SCNC: 25 MMOL/L (ref 21–32)
CREAT SERPL-MCNC: 2.03 MG/DL (ref 0.6–1)
GLUCOSE BLD STRIP.AUTO-MCNC: 117 MG/DL (ref 65–100)
GLUCOSE BLD STRIP.AUTO-MCNC: 130 MG/DL (ref 65–100)
GLUCOSE BLD STRIP.AUTO-MCNC: 156 MG/DL (ref 65–100)
GLUCOSE SERPL-MCNC: 140 MG/DL (ref 65–100)
POTASSIUM SERPL-SCNC: 4.6 MMOL/L (ref 3.5–5.1)
SODIUM SERPL-SCNC: 143 MMOL/L (ref 136–145)

## 2019-08-12 PROCEDURE — 74011250636 HC RX REV CODE- 250/636: Performed by: INTERNAL MEDICINE

## 2019-08-12 PROCEDURE — 80048 BASIC METABOLIC PNL TOTAL CA: CPT

## 2019-08-12 PROCEDURE — 77030013687 HC GD NDL BARD -B

## 2019-08-12 PROCEDURE — 93455 CORONARY ART/GRFT ANGIO S&I: CPT

## 2019-08-12 PROCEDURE — 65610000006 HC RM INTENSIVE CARE

## 2019-08-12 PROCEDURE — 74011250636 HC RX REV CODE- 250/636

## 2019-08-12 PROCEDURE — C1769 GUIDE WIRE: HCPCS

## 2019-08-12 PROCEDURE — 77030013794 HC KT TRNSDUC BLD EDWD -B

## 2019-08-12 PROCEDURE — 92928 PRQ TCAT PLMT NTRAC ST 1 LES: CPT

## 2019-08-12 PROCEDURE — 74011636637 HC RX REV CODE- 636/637: Performed by: INTERNAL MEDICINE

## 2019-08-12 PROCEDURE — 77030004534 HC CATH ANGI DX INFN CARD -A

## 2019-08-12 PROCEDURE — 77030029997 HC DEV COM RDL R BND TELE -B

## 2019-08-12 PROCEDURE — 85730 THROMBOPLASTIN TIME PARTIAL: CPT

## 2019-08-12 PROCEDURE — 36415 COLL VENOUS BLD VENIPUNCTURE: CPT

## 2019-08-12 PROCEDURE — C1894 INTRO/SHEATH, NON-LASER: HCPCS

## 2019-08-12 PROCEDURE — 74011250637 HC RX REV CODE- 250/637: Performed by: INTERNAL MEDICINE

## 2019-08-12 PROCEDURE — 82962 GLUCOSE BLOOD TEST: CPT

## 2019-08-12 PROCEDURE — 77030002888 HC SUT CHRMC J&J -A

## 2019-08-12 PROCEDURE — 77030034850

## 2019-08-12 PROCEDURE — C1887 CATHETER, GUIDING: HCPCS

## 2019-08-12 PROCEDURE — 85347 COAGULATION TIME ACTIVATED: CPT

## 2019-08-12 PROCEDURE — 77030020263 HC SOL INJ SOD CL0.9% LFCR 1000ML

## 2019-08-12 PROCEDURE — C1874 STENT, COATED/COV W/DEL SYS: HCPCS

## 2019-08-12 PROCEDURE — C1725 CATH, TRANSLUMIN NON-LASER: HCPCS

## 2019-08-12 PROCEDURE — 99153 MOD SED SAME PHYS/QHP EA: CPT

## 2019-08-12 PROCEDURE — 74011636320 HC RX REV CODE- 636/320: Performed by: INTERNAL MEDICINE

## 2019-08-12 PROCEDURE — 92937 PRQ TRLUML REVSC CAB GRF 1: CPT

## 2019-08-12 PROCEDURE — 74011000250 HC RX REV CODE- 250: Performed by: INTERNAL MEDICINE

## 2019-08-12 PROCEDURE — 92938: CPT

## 2019-08-12 PROCEDURE — 99152 MOD SED SAME PHYS/QHP 5/>YRS: CPT

## 2019-08-12 RX ORDER — FENTANYL CITRATE 50 UG/ML
25-50 INJECTION, SOLUTION INTRAMUSCULAR; INTRAVENOUS
Status: DISCONTINUED | OUTPATIENT
Start: 2019-08-12 | End: 2019-08-13

## 2019-08-12 RX ORDER — HEPARIN SODIUM 200 [USP'U]/100ML
3 INJECTION, SOLUTION INTRAVENOUS CONTINUOUS
Status: DISCONTINUED | OUTPATIENT
Start: 2019-08-12 | End: 2019-08-13

## 2019-08-12 RX ORDER — SODIUM CHLORIDE 9 MG/ML
100 INJECTION, SOLUTION INTRAVENOUS CONTINUOUS
Status: DISPENSED | OUTPATIENT
Start: 2019-08-12 | End: 2019-08-12

## 2019-08-12 RX ORDER — ATORVASTATIN CALCIUM 40 MG/1
40 TABLET, FILM COATED ORAL
Status: DISCONTINUED | OUTPATIENT
Start: 2019-08-12 | End: 2019-08-14 | Stop reason: HOSPADM

## 2019-08-12 RX ORDER — HEPARIN SODIUM 10000 [USP'U]/ML
1000-10000 INJECTION, SOLUTION INTRAVENOUS; SUBCUTANEOUS
Status: DISCONTINUED | OUTPATIENT
Start: 2019-08-12 | End: 2019-08-13

## 2019-08-12 RX ORDER — LIDOCAINE HYDROCHLORIDE 10 MG/ML
6 INJECTION INFILTRATION; PERINEURAL ONCE
Status: COMPLETED | OUTPATIENT
Start: 2019-08-12 | End: 2019-08-12

## 2019-08-12 RX ORDER — FUROSEMIDE 10 MG/ML
20 INJECTION INTRAMUSCULAR; INTRAVENOUS ONCE
Status: COMPLETED | OUTPATIENT
Start: 2019-08-12 | End: 2019-08-12

## 2019-08-12 RX ORDER — FUROSEMIDE 10 MG/ML
40 INJECTION INTRAMUSCULAR; INTRAVENOUS ONCE
Status: COMPLETED | OUTPATIENT
Start: 2019-08-12 | End: 2019-08-12

## 2019-08-12 RX ORDER — MIDAZOLAM HYDROCHLORIDE 1 MG/ML
.5-2 INJECTION, SOLUTION INTRAMUSCULAR; INTRAVENOUS
Status: DISCONTINUED | OUTPATIENT
Start: 2019-08-12 | End: 2019-08-13

## 2019-08-12 RX ORDER — ROSUVASTATIN CALCIUM 20 MG/1
20 TABLET, COATED ORAL
Status: DISCONTINUED | OUTPATIENT
Start: 2019-08-12 | End: 2019-08-12

## 2019-08-12 RX ADMIN — MIDAZOLAM HYDROCHLORIDE 2 MG: 1 INJECTION, SOLUTION INTRAMUSCULAR; INTRAVENOUS at 16:10

## 2019-08-12 RX ADMIN — HEPARIN SODIUM 14 UNITS/KG/HR: 5000 INJECTION, SOLUTION INTRAVENOUS at 06:16

## 2019-08-12 RX ADMIN — TICAGRELOR 180 MG: 90 TABLET ORAL at 18:21

## 2019-08-12 RX ADMIN — METOPROLOL TARTRATE 25 MG: 25 TABLET ORAL at 08:52

## 2019-08-12 RX ADMIN — ESCITALOPRAM OXALATE 20 MG: 10 TABLET, FILM COATED ORAL at 08:51

## 2019-08-12 RX ADMIN — LIDOCAINE HYDROCHLORIDE 6 ML: 10 INJECTION, SOLUTION INFILTRATION; PERINEURAL at 15:39

## 2019-08-12 RX ADMIN — IOPAMIDOL 280 ML: 755 INJECTION, SOLUTION INTRAVENOUS at 18:01

## 2019-08-12 RX ADMIN — HEPARIN SODIUM 6000 UNITS: 10000 INJECTION INTRAVENOUS; SUBCUTANEOUS at 16:28

## 2019-08-12 RX ADMIN — HEPARIN SODIUM 3000 UNITS: 10000 INJECTION INTRAVENOUS; SUBCUTANEOUS at 16:38

## 2019-08-12 RX ADMIN — HEPARIN SODIUM 2000 UNITS: 10000 INJECTION INTRAVENOUS; SUBCUTANEOUS at 16:54

## 2019-08-12 RX ADMIN — HEPARIN SODIUM 2 ML: 10000 INJECTION INTRAVENOUS; SUBCUTANEOUS at 17:35

## 2019-08-12 RX ADMIN — SODIUM CHLORIDE 100 ML/HR: 900 INJECTION, SOLUTION INTRAVENOUS at 08:46

## 2019-08-12 RX ADMIN — HEPARIN SODIUM 3 ML/HR: 200 INJECTION, SOLUTION INTRAVENOUS at 15:32

## 2019-08-12 RX ADMIN — ESCITALOPRAM OXALATE 20 MG: 10 TABLET, FILM COATED ORAL at 20:03

## 2019-08-12 RX ADMIN — HEPARIN SODIUM 2000 UNITS: 10000 INJECTION INTRAVENOUS; SUBCUTANEOUS at 17:39

## 2019-08-12 RX ADMIN — ATORVASTATIN CALCIUM 40 MG: 40 TABLET, FILM COATED ORAL at 20:03

## 2019-08-12 RX ADMIN — METOPROLOL TARTRATE 25 MG: 25 TABLET ORAL at 20:03

## 2019-08-12 RX ADMIN — FUROSEMIDE 40 MG: 10 INJECTION, SOLUTION INTRAMUSCULAR; INTRAVENOUS at 17:30

## 2019-08-12 RX ADMIN — HEPARIN SODIUM 12 UNITS/KG/HR: 5000 INJECTION, SOLUTION INTRAVENOUS at 16:10

## 2019-08-12 RX ADMIN — NITROGLYCERIN 0.2 MG: 200 INJECTION, SOLUTION INTRAVENOUS at 16:34

## 2019-08-12 RX ADMIN — INSULIN LISPRO 3 UNITS: 100 INJECTION, SOLUTION INTRAVENOUS; SUBCUTANEOUS at 22:34

## 2019-08-12 RX ADMIN — FENTANYL CITRATE 50 MCG: 50 INJECTION, SOLUTION INTRAMUSCULAR; INTRAVENOUS at 15:31

## 2019-08-12 RX ADMIN — ASPIRIN 81 MG: 81 TABLET ORAL at 06:13

## 2019-08-12 RX ADMIN — LIDOCAINE HYDROCHLORIDE 3 ML: 10 INJECTION, SOLUTION INFILTRATION; PERINEURAL at 17:36

## 2019-08-12 RX ADMIN — MIDAZOLAM HYDROCHLORIDE 2 MG: 1 INJECTION, SOLUTION INTRAMUSCULAR; INTRAVENOUS at 15:31

## 2019-08-12 RX ADMIN — MIDAZOLAM HYDROCHLORIDE 2 MG: 1 INJECTION, SOLUTION INTRAMUSCULAR; INTRAVENOUS at 17:27

## 2019-08-12 RX ADMIN — NITROGLYCERIN 20 MCG/MIN: 20 INJECTION INTRAVENOUS at 03:30

## 2019-08-12 RX ADMIN — LEVOTHYROXINE SODIUM 25 MCG: 150 TABLET ORAL at 08:20

## 2019-08-12 RX ADMIN — HEPARIN SODIUM 2 ML: 10000 INJECTION INTRAVENOUS; SUBCUTANEOUS at 15:39

## 2019-08-12 RX ADMIN — LEVOTHYROXINE SODIUM 200 MCG: 150 TABLET ORAL at 08:19

## 2019-08-12 RX ADMIN — FUROSEMIDE 20 MG: 10 INJECTION, SOLUTION INTRAMUSCULAR; INTRAVENOUS at 08:51

## 2019-08-12 RX ADMIN — AMLODIPINE BESYLATE 5 MG: 5 TABLET ORAL at 08:52

## 2019-08-12 RX ADMIN — FENTANYL CITRATE 50 MCG: 50 INJECTION, SOLUTION INTRAMUSCULAR; INTRAVENOUS at 16:09

## 2019-08-12 RX ADMIN — LIDOCAINE HYDROCHLORIDE 6 ML: 10 INJECTION, SOLUTION INFILTRATION; PERINEURAL at 16:12

## 2019-08-12 NOTE — PROGRESS NOTES
Bedside and Verbal shift change report given to Maru Chatman RN (oncoming nurse) by self (offgoing nurse). Report included the following information SBAR, Kardex, MAR and Recent Results.

## 2019-08-12 NOTE — PROGRESS NOTES
[facility] CARDIOLOGY PROGRESS NOTE 
      
 
8/12/2019 7:46 AM 
 
Admit Date: 8/10/2019 Subjective:  
Patient denies any chest pain. Notes worsening dyspnea and mild orthopnea. Has been on IV fluids. Echo with normal LV function. ROS: 
Cardiovascular:  As noted above Objective:  
  
Vitals:  
 08/11/19 1745 08/11/19 2030 08/12/19 0005 08/12/19 6741 BP: 129/64 154/81 121/57 146/81 Pulse: 83 85 77 84 Resp: 16 18 16 18 Temp: 97.6 °F (36.4 °C) 98.1 °F (36.7 °C) 97.7 °F (36.5 °C) 98.2 °F (36.8 °C) SpO2: 97% 95% 96% 97% Weight:    118.8 kg (261 lb 14.4 oz) Height:    5' 8\" (1.727 m) Physical Exam: 
General-No Acute Distress Neck- supple, no JVD 
CV- regular rate and rhythm no MRG Lung- clear bilaterally Abd- soft, nontender, nondistended Ext- no edema bilaterally. Skin- warm and dry Data Review:  
Recent Labs 08/12/19 
5783 08/11/19 
0533 08/10/19 
4103  144 144  
K 4.6 4.3 4.6 BUN 24* 26* 29* CREA 2.03* 1.94* 1.98* * 131* 115* WBC  --   --  6.2 HGB  --   --  12.2 HCT  --   --  37.5 PLT  --   --  234 No results found for: JULIA Garcia Echo (8/11/19):  -  Left ventricle: Systolic function was at the lower limits of normal. 
Ejection fraction was estimated in the range of 50 % to 55 %. There is basilar-mid inferoseptal hypokinesis. There was mild concentric hypertrophy. Left ventricular diastolic function parameters were abnormal. 
-  Aortic valve: The valve was structurally normal, tri-commissural. Leaflets exhibited mild sclerosis. -  Mitral valve: There was mild regurgitation. Assessment/Plan: Active Problems: 
  Essential hypertension, benign () BP controlled. Mixed hyperlipidemia () Not on home Liptor. Will restart. Coronary atherosclerosis of native coronary artery () See below. Type 2 diabetes, uncontrolled, with cellulitis of foot ([address] () Continue insulin and diabetic diet. Non-STEMI (non-ST elevated myocardial infarction) ([address] (8/10/2019) On Heparin. ON ASA. [facility] today.   
 
 
 
 
 
 
 
 
Red Zhong MD 
8/12/2019 7:46 AM

## 2019-08-12 NOTE — PROGRESS NOTES
Bedside report completed and pt is npo for cath later today. Family at bedside and  IV drips reviewed.

## 2019-08-12 NOTE — PROCEDURES
Brief Cardiac Procedure Note Patient: Gallito Nova MRN: [id number]  SSN: [id number] YOB: 1960  Age: 62 y.o. Sex: female Date of Procedure: 8/12/2019 Pre-procedure Diagnosis: NSTEMI Post-procedure Diagnosis: Coronary Artery Disease Procedure: Left Heart Catheterization with Percutaneous Coronary Intervention Brief Description of Procedure: As above Performed By: Eri Fiore MD  
 
Assistants: None Anesthesia: Moderate Sedation Estimated Blood Loss: Less than 10 mL Specimens: None Implants: None Findings: 95% to 0% LAD via LIMA. Complicated by dissection of LIMA at ostium with retrograde involvement of Left subclavian. Stents in proximal vessel with good flow. Reviewed with vascular surgery. SVG to diagonal with good flow to LAD. Ione best approach is close monitoring and anticoagulation for 24 hours. Complications: None Recommendations: Continue medical therapy. Signed By: Eri Fiore MD   
 August 12, 2019

## 2019-08-12 NOTE — PROGRESS NOTES
TRANSFER - OUT REPORT: 
 
Verbal report given to reyna monson(name) on Lloyd Lua  being transferred to cvicu(unit) for routine progression of care Report consisted of patients Situation, Background, Assessment and  
Recommendations(SBAR). Information from the following report(s) SBAR was reviewed with the receiving nurse. Opportunity for questions and clarification was provided. Procedure: [facility]   Finding Summary: stent to lad x 2, stent to lima x 2(cath/pci/pacer settings) Location: right groin,left groin,left wrist    Closure Device: tr band right wrist 14ml(yes/no/description) Post Site Assessment: no bleeding or hematoma Pre Procedure Meds:(if any) Intra Procedure Meds: 
 
Versed: 6mg Fentanyl: 100mcg Heparin: 76094 units, [email or web address] Angiomax Stop Time: na 
Reopro:  na Integrelin: na Antiplatelet: brilinta [id number] Left wrist 6 fr sheath art line Right groin 6 fr sheath art line Peripheral IV 08/10/19 Right Hand (Active) Site Assessment Clean, dry, & intact 8/12/2019  4:00 AM  
Phlebitis Assessment 0 8/12/2019  4:00 AM  
Infiltration Assessment 0 8/12/2019  4:00 AM  
Dressing Status Clean, dry, & intact 8/12/2019  4:00 AM  
Dressing Type Tape;Transparent 8/12/2019  4:00 AM  
Hub Color/Line Status Infusing 8/12/2019  4:00 AM  
Alcohol Cap Used No 8/11/2019  4:23 AM  
   
Peripheral IV 08/10/19 Left Hand (Active) Site Assessment Clean, dry, & intact 8/12/2019  4:00 AM  
Phlebitis Assessment 0 8/12/2019  4:00 AM  
Infiltration Assessment 0 8/12/2019  4:00 AM  
Dressing Status Clean, dry, & intact 8/12/2019  4:00 AM  
Dressing Type Tape;Transparent 8/12/2019  4:00 AM  
Hub Color/Line Status Infusing 8/12/2019  4:00 AM  
Alcohol Cap Used No 8/11/2019  4:23 AM  
 
Sheath 08/12/19 (Active) Site Assessment Clean, dry, & intact 8/12/2019  6:25 PM  
Dressing Status Clean, dry, & intact 8/12/2019  6:25 PM  
Hub Color/Line Status Green 8/12/2019  6:25 PM  
   
 Sheath 08/12/19 (Active) Site Assessment Clean, dry, & intact 8/12/2019  6:26 PM  
Dressing Status Clean, dry, & intact 8/12/2019  6:26 PM  
Hub Color/Line Status Green 8/12/2019  6:26 PM  
 
Post-Procedure Site Assessment (1) Wound Type: Catheter entry/exit Location: Wrist 
Orientation : Right Hemostasis : TR Band(14ml) Site Assessment: No bleeding, No hematoma, Dry/intact Post-Procedure Site Assessment (2) Wound Type: Catheter entry/exit Location : Groin Orientation : Right Closure Device : Tegaderm(sutured in attached to pressure bag) Site Assessment : No bleeding, No hematoma, Dry/intact Post-Procedure Site Assessment (3) Wound Type: Catheter entry/exit Location : Radial 
Orientation : Left Closure Device : Tegaderm(attached to pressure bag) Site Assessment : No bleeding, No hematoma 
  
  
  
  
  
has No Known Allergies. Past Medical History:  
Diagnosis Date  Acute bronchitis  Acute pharyngitis  Allergic rhinitis due to other allergen  Coronary atherosclerosis of native coronary artery CABG x3  ~2006, no cardiac events or stents or chest pain since  Depressive disorder, not elsewhere classified   
 daily meds  Essential hypertension, benign   
 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 9)  Osteomyelitis of toe ([address] forth toe of left foot  Other specified acquired hypothyroidism  Thromboembolus ([address]   
 possible to back of left knee???, superficial no anticoagulation therapy required  Type II or unspecified type diabetes mellitus without mention of complication, uncontrolled   
 type 2: oral/insulin: avg. 112: s/s hypo shakes @100: A1C 7.2 on 07/2019 Visit Vitals /68 Pulse 76 Temp 98.7 °F (37.1 °C) Resp 19 Ht 5' 8\" (1.727 m) Wt 118.8 kg (261 lb 14.4 oz) SpO2 99% BMI 39.82 kg/m²

## 2019-08-12 NOTE — PROGRESS NOTES
CM met with patient / family at bedside to discuss d/c plan. Patient alert/oriented to name, place and . Patient verified demographic sheet no changes needed. Patient states she lives in a one level home with one step to enter into the home. States she's independent with her ADL's and has no DME's in the home. 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 States she will be returning back home with no needs identified. CM will continue to monitor. Care Management Interventions PCP Verified by CM: Alize Zimmer,) Mode of Transport at Discharge: Other (see comment)(Family) Transition of Care Consult (CM Consult): Discharge Planning Discharge Durable Medical Equipment: No 
Physical Therapy Consult: No 
Occupational Therapy Consult: No 
Speech Therapy Consult: No 
Current Support Network: Own Home, Lives with Spouse Confirm Follow Up Transport: Family Plan discussed with Pt/Family/Caregiver: Yes [employer] Information Provided?: No 
Discharge Location Discharge Placement: Home

## 2019-08-12 NOTE — PROGRESS NOTES
Dual skin assessment with Radha Triplett RN. Skin is intact and pink to sacrum. No breakdown or redness, foam allevyn placed since patient will be on bedrest with femoral sheath. Fourth toe on left foot has been amputated previously and is healed. Right second toe has been recently surgically amputated. No open wounds or breakdown noted at any other locations. Right femoral arterial sheath and Left radial arterial sheaths in place. Right radial access site from cath is intact with some old drainage and TR band is in place with 14 ml of air.

## 2019-08-12 NOTE — PROGRESS NOTES
Called in the Cath Lab to evaluate for small subclavian artery dissection. Patient underwent cardiac cath with stent placement of the LIMA. There was concerns of dissection of the proximal LIMA and proximal subclavian artery. I reviewed the images while in the Cath Lab saw a small filling defect however distal subclavian axillary artery both field on antegrade and retrograde shots, left art line in place still with biphasic flow. Discussed about anticoagulation for 24 hours. No indication for any surgical intervention at this time. Maddie Punch

## 2019-08-12 NOTE — PROGRESS NOTES
TRANSFER - IN REPORT: 
 
Verbal report received from Bridgette Thrasher RN on Cristo Lay  being received from Cath Lab for routine progression of care Report consisted of patients Situation, Background, Assessment and  
Recommendations(SBAR). Information from the following report(s) SBAR, Kardex, Procedure Summary, Intake/Output, MAR, Recent Results, Med Rec Status and Cardiac Rhythm of NSR was reviewed with the receiving nurse. Opportunity for questions and clarification was provided. Assessment completed upon patients arrival to unit and care assumed.

## 2019-08-13 PROBLEM — I77.79 DISSECTION OF LEFT SUBCLAVIAN ARTERY (HCC): Status: ACTIVE | Noted: 2019-08-13

## 2019-08-13 LAB
ACT BLD: 136 SECS (ref 70–128)
ANION GAP SERPL CALC-SCNC: 11 MMOL/L (ref 7–16)
APTT PPP: >200 SEC (ref 24.7–39.8)
ATRIAL RATE: 73 BPM
BASOPHILS # BLD: 0 K/UL (ref 0–0.2)
BASOPHILS NFR BLD: 1 % (ref 0–2)
BUN SERPL-MCNC: 23 MG/DL (ref 8–23)
CALCIUM SERPL-MCNC: 8.4 MG/DL (ref 8.3–10.4)
CALCULATED P AXIS, ECG09: 48 DEGREES
CALCULATED R AXIS, ECG10: -30 DEGREES
CALCULATED T AXIS, ECG11: 104 DEGREES
CHLORIDE SERPL-SCNC: 110 MMOL/L (ref 98–107)
CHOLEST SERPL-MCNC: 164 MG/DL
CO2 SERPL-SCNC: 21 MMOL/L (ref 21–32)
CREAT SERPL-MCNC: 2.02 MG/DL (ref 0.6–1)
DIAGNOSIS, 93000: NORMAL
DIFFERENTIAL METHOD BLD: ABNORMAL
EOSINOPHIL # BLD: 0.1 K/UL (ref 0–0.8)
EOSINOPHIL NFR BLD: 1 % (ref 0.5–7.8)
ERYTHROCYTE [DISTWIDTH] IN BLOOD BY AUTOMATED COUNT: 13.6 % (ref 11.9–14.6)
GLUCOSE BLD STRIP.AUTO-MCNC: 129 MG/DL (ref 65–100)
GLUCOSE BLD STRIP.AUTO-MCNC: 148 MG/DL (ref 65–100)
GLUCOSE BLD STRIP.AUTO-MCNC: 210 MG/DL (ref 65–100)
GLUCOSE BLD STRIP.AUTO-MCNC: 243 MG/DL (ref 65–100)
GLUCOSE SERPL-MCNC: 131 MG/DL (ref 65–100)
HCT VFR BLD AUTO: 31.3 % (ref 35.8–46.3)
HDLC SERPL-MCNC: 55 MG/DL (ref 40–60)
HDLC SERPL: 3 {RATIO}
HGB BLD-MCNC: 10.2 G/DL (ref 11.7–15.4)
IMM GRANULOCYTES # BLD AUTO: 0 K/UL (ref 0–0.5)
IMM GRANULOCYTES NFR BLD AUTO: 0 % (ref 0–5)
LDLC SERPL CALC-MCNC: 88 MG/DL
LIPID PROFILE,FLP: NORMAL
LYMPHOCYTES # BLD: 1 K/UL (ref 0.5–4.6)
LYMPHOCYTES NFR BLD: 17 % (ref 13–44)
MAGNESIUM SERPL-MCNC: 1.9 MG/DL (ref 1.8–2.4)
MCH RBC QN AUTO: 29.2 PG (ref 26.1–32.9)
MCHC RBC AUTO-ENTMCNC: 32.6 G/DL (ref 31.4–35)
MCV RBC AUTO: 89.7 FL (ref 79.6–97.8)
MONOCYTES # BLD: 0.5 K/UL (ref 0.1–1.3)
MONOCYTES NFR BLD: 8 % (ref 4–12)
NEUTS SEG # BLD: 4.6 K/UL (ref 1.7–8.2)
NEUTS SEG NFR BLD: 74 % (ref 43–78)
NRBC # BLD: 0 K/UL (ref 0–0.2)
P-R INTERVAL, ECG05: 194 MS
PLATELET # BLD AUTO: 213 K/UL (ref 150–450)
PMV BLD AUTO: 10.6 FL (ref 9.4–12.3)
POTASSIUM SERPL-SCNC: 4 MMOL/L (ref 3.5–5.1)
Q-T INTERVAL, ECG07: 440 MS
QRS DURATION, ECG06: 112 MS
QTC CALCULATION (BEZET), ECG08: 484 MS
RBC # BLD AUTO: 3.49 M/UL (ref 4.05–5.2)
SODIUM SERPL-SCNC: 142 MMOL/L (ref 136–145)
TRIGL SERPL-MCNC: 105 MG/DL (ref 35–150)
VENTRICULAR RATE, ECG03: 73 BPM
VLDLC SERPL CALC-MCNC: 21 MG/DL (ref 6–23)
WBC # BLD AUTO: 6.3 K/UL (ref 4.3–11.1)

## 2019-08-13 PROCEDURE — 74011250637 HC RX REV CODE- 250/637: Performed by: INTERNAL MEDICINE

## 2019-08-13 PROCEDURE — 74011250637 HC RX REV CODE- 250/637: Performed by: NURSE PRACTITIONER

## 2019-08-13 PROCEDURE — 83735 ASSAY OF MAGNESIUM: CPT

## 2019-08-13 PROCEDURE — 85025 COMPLETE CBC W/AUTO DIFF WBC: CPT

## 2019-08-13 PROCEDURE — 74011250636 HC RX REV CODE- 250/636: Performed by: NURSE PRACTITIONER

## 2019-08-13 PROCEDURE — 65660000004 HC RM CVT STEPDOWN

## 2019-08-13 PROCEDURE — 85347 COAGULATION TIME ACTIVATED: CPT

## 2019-08-13 PROCEDURE — 93005 ELECTROCARDIOGRAM TRACING: CPT | Performed by: INTERNAL MEDICINE

## 2019-08-13 PROCEDURE — 82962 GLUCOSE BLOOD TEST: CPT

## 2019-08-13 PROCEDURE — 36600 WITHDRAWAL OF ARTERIAL BLOOD: CPT

## 2019-08-13 PROCEDURE — 80061 LIPID PANEL: CPT

## 2019-08-13 PROCEDURE — 74011636637 HC RX REV CODE- 636/637: Performed by: INTERNAL MEDICINE

## 2019-08-13 PROCEDURE — 80048 BASIC METABOLIC PNL TOTAL CA: CPT

## 2019-08-13 PROCEDURE — 85730 THROMBOPLASTIN TIME PARTIAL: CPT

## 2019-08-13 RX ORDER — ACETAMINOPHEN 325 MG/1
650 TABLET ORAL
Status: DISCONTINUED | OUTPATIENT
Start: 2019-08-13 | End: 2019-08-14 | Stop reason: HOSPADM

## 2019-08-13 RX ORDER — HYDROCODONE BITARTRATE AND ACETAMINOPHEN 5; 325 MG/1; MG/1
1 TABLET ORAL
Status: DISCONTINUED | OUTPATIENT
Start: 2019-08-13 | End: 2019-08-14 | Stop reason: HOSPADM

## 2019-08-13 RX ORDER — SODIUM CHLORIDE 9 MG/ML
75 INJECTION, SOLUTION INTRAVENOUS CONTINUOUS
Status: DISCONTINUED | OUTPATIENT
Start: 2019-08-13 | End: 2019-08-13

## 2019-08-13 RX ORDER — GUAIFENESIN 100 MG/5ML
81 LIQUID (ML) ORAL DAILY
Status: DISCONTINUED | OUTPATIENT
Start: 2019-08-13 | End: 2019-08-14 | Stop reason: HOSPADM

## 2019-08-13 RX ORDER — SODIUM CHLORIDE 0.9 % (FLUSH) 0.9 %
5-40 SYRINGE (ML) INJECTION EVERY 8 HOURS
Status: DISCONTINUED | OUTPATIENT
Start: 2019-08-13 | End: 2019-08-14 | Stop reason: HOSPADM

## 2019-08-13 RX ORDER — MORPHINE SULFATE 2 MG/ML
2 INJECTION, SOLUTION INTRAMUSCULAR; INTRAVENOUS
Status: DISCONTINUED | OUTPATIENT
Start: 2019-08-13 | End: 2019-08-14 | Stop reason: HOSPADM

## 2019-08-13 RX ORDER — NITROGLYCERIN 0.4 MG/1
0.4 TABLET SUBLINGUAL
Status: DISCONTINUED | OUTPATIENT
Start: 2019-08-13 | End: 2019-08-14 | Stop reason: HOSPADM

## 2019-08-13 RX ORDER — SODIUM CHLORIDE 0.9 % (FLUSH) 0.9 %
5-40 SYRINGE (ML) INJECTION AS NEEDED
Status: DISCONTINUED | OUTPATIENT
Start: 2019-08-13 | End: 2019-08-14 | Stop reason: HOSPADM

## 2019-08-13 RX ADMIN — METOPROLOL TARTRATE 25 MG: 25 TABLET ORAL at 20:42

## 2019-08-13 RX ADMIN — Medication 10 ML: at 20:44

## 2019-08-13 RX ADMIN — ESCITALOPRAM OXALATE 20 MG: 10 TABLET, FILM COATED ORAL at 08:01

## 2019-08-13 RX ADMIN — ACETAMINOPHEN 650 MG: 325 TABLET, FILM COATED ORAL at 00:51

## 2019-08-13 RX ADMIN — INSULIN LISPRO 6 UNITS: 100 INJECTION, SOLUTION INTRAVENOUS; SUBCUTANEOUS at 16:46

## 2019-08-13 RX ADMIN — TICAGRELOR 90 MG: 90 TABLET ORAL at 07:25

## 2019-08-13 RX ADMIN — ESCITALOPRAM OXALATE 20 MG: 10 TABLET, FILM COATED ORAL at 20:41

## 2019-08-13 RX ADMIN — AMLODIPINE BESYLATE 5 MG: 5 TABLET ORAL at 08:01

## 2019-08-13 RX ADMIN — Medication 10 ML: at 14:00

## 2019-08-13 RX ADMIN — Medication 10 ML: at 07:30

## 2019-08-13 RX ADMIN — ATORVASTATIN CALCIUM 40 MG: 40 TABLET, FILM COATED ORAL at 20:42

## 2019-08-13 RX ADMIN — ASPIRIN 81 MG 81 MG: 81 TABLET ORAL at 08:01

## 2019-08-13 RX ADMIN — INSULIN LISPRO 6 UNITS: 100 INJECTION, SOLUTION INTRAVENOUS; SUBCUTANEOUS at 20:38

## 2019-08-13 RX ADMIN — MORPHINE SULFATE 2 MG: 2 INJECTION, SOLUTION INTRAMUSCULAR; INTRAVENOUS at 09:00

## 2019-08-13 RX ADMIN — INSULIN GLARGINE 60 UNITS: 100 INJECTION, SOLUTION SUBCUTANEOUS at 20:38

## 2019-08-13 RX ADMIN — METOPROLOL TARTRATE 25 MG: 25 TABLET ORAL at 08:01

## 2019-08-13 RX ADMIN — LEVOTHYROXINE SODIUM 200 MCG: 150 TABLET ORAL at 07:25

## 2019-08-13 RX ADMIN — LEVOTHYROXINE SODIUM 25 MCG: 150 TABLET ORAL at 07:25

## 2019-08-13 RX ADMIN — TICAGRELOR 90 MG: 90 TABLET ORAL at 20:42

## 2019-08-13 NOTE — PROGRESS NOTES
Patient seen and examined. Patient status post non-ST elevation MI status post cardiac cath with a dissection in the ostium of the left internal mammary and proximal subclavian. Overnight patient denies any numbness in her hand. Clinically she has a palpable brachial pulse and left art line is triphasic flow. Patient is well-perfused. Continue medical management and withdrawal art line from vascular standpoint. Patient is followed by Dr. Stephanie Baptiste with her peripheral vascular disease, when patient is discharged I have her follow-up in 2 weeks with an upper extremity duplex study. Sandra Chan

## 2019-08-13 NOTE — PROGRESS NOTES
[facility] CARDIOLOGY PROGRESS NOTE 
      
 
8/13/2019 7:39 AM 
 
Admit Date: 8/10/2019 Subjective:  
Patient denies any chest pain or dyspnea. Some discomfort at left radial cath site at sheath site. No neurologic symptoms. ROS: 
Cardiovascular:  As noted above Objective:  
  
Vitals:  
 08/13/19 0615 08/13/19 0630 08/13/19 0645 08/13/19 0700 BP:      
Pulse: 76 76 73 72 Resp: 15 24 20 19 Temp:      
SpO2: 99% 99% 99% 100% Weight:      
Height:      
 
 
Physical Exam: 
General-No Acute Distress Neck- supple, no JVD 
CV- regular rate and rhythm no MRG Lung- clear bilaterally Abd- soft, nontender, nondistended Ext- no edema bilaterally. Skin- warm and dry Data Review:  
Recent Labs 08/13/19 
0400 08/12/19 
0511  143  
K 4.0 4.6 BUN 23 24* CREA 2.02* 2.03* * 140* No results found for: JULIA Varela Assessment/Plan: Active Problems: 
  Essential hypertension, benign () BP controlled. Mixed hyperlipidemia () On statin therapy. Type 2 diabetes, uncontrolled, with cellulitis of foot ([address] () Continue insulin and SSI. Non-STEMI (non-ST elevated myocardial infarction) ([address] (8/10/2019) HAd occlusion of SVG to OM felt best treated medically. 95% mLAD stenosis treated with overlapping [place]. On ASA and Brilinta. Dissection of left subclavian artery [address] (8/13/2019) Occurred with LIMA intervention. Vascular surgery input appreciated. Stop IV heparin. Pull sheaths. Monitor closely. Disp:  Telemetry later today if stable.   
 
 
 
 
 
Sergio Hinds MD 
8/13/2019 7:39 AM

## 2019-08-13 NOTE — PROGRESS NOTES
Patient seen and examined. Left arm still warm. Okay to stop heparin drip and pull sheaths. Rosibel Francois

## 2019-08-13 NOTE — PROCEDURES
[facility] 
CARDIAC CATH Name:  Eboni Lin 
MR#:  [id number] :  1960 ACCOUNT #:  [de-identified] DATE OF SERVICE:  2019 INDICATIONS:  Non-ST-elevation myocardial infarction. PREOPERATIVE DIAGNOSIS:  NSTEMI. POSTOPERATIVE DIAGNOSIS:  Obstructive coronary artery disease requiring percutaneous coronary intervention. PROCEDURES PERFORMED: 
1. Coronary angiography. 2.  LIMA angiography. 3.  Saphenous vein graft angiography. 4.  PCI and stenting of the native LAD from the LIMA. 5.  PCI and stenting of the LIMA at the ostium due to dissection. SURGEON:  Tamiko Cao MD 
 
SURGICAL ASSISTANT:  None. ANESTHESIA:  The patient received moderate supervised conscious sedation. Total of 6 mg of Versed, 100 mcg of fentanyl. Sedation start time was 1534 with a procedure completion time of 1806. COMPLICATIONS:  Dissection of the ostium of the left internal mammary artery with retrograde involvement of the left subclavian. FINDINGS:  As below. SPECIMENS REMOVED:  None. ESTIMATED BLOOD LOSS:  5 mL. IMPLANTS:  None. TECHNIQUE AND FINDINGS:  After informed consent was obtained, the patient was brought to the cardiac catheterization lab. The left radial artery was prepped and draped in the usual sterile fashion. Utilizing modified Seldinger technique and micropuncture needle, the left radial artery was entered. Selective injection verification performed. A LIMA catheter was used to select and engage the ostium of the left internal mammary artery to LAD. Selective injection verification was performed. A multipurpose catheter then used to select and engage the ostium of the saphenous vein graft to diagonal.  Selective injection verification performed. A multipurpose catheter was used to select and engage the saphenous vein graft to the OM which was occluded and the native right coronary artery. Selective injection verification was performed. A JL-3.5 catheter was used to select and engage the ostium of the left main coronary artery. Selective injection verification was performed. Multiple attempts to engage the SVG to presumably OM3 were unsuccessful. Ultimately, a pigtail catheter was advanced to the aorta and used for aortic root injection. The aortogram showed that there was only one patent saphenous vein graft. Further attempts were not performed. At the conclusion of the diagnostic procedure, the patient was referred for PCI of the native LAD via the LIMA. Please see procedure note for details. CONTRAST:  [address] 280 for diagnostic interventional procedures. HEMODYNAMIC RESULTS: 
1. Aortic pressure of 116/56. 2.  There was no left ventricular end-diastolic systolic pressure. ANGIOGRAPHIC RESULTS: 
1. Left main coronary artery:  Large-caliber vessel. It is patent. 2.  LAD:  Appears to be occluded in the mid segment. 30% proximal stenosis. 3.  Left circumflex: It is a medium-caliber dominant vessel. Contains a 95% proximal stenosis and what appears to be a 100% distal stenosis with left-to-left collateralization of the left PDA. 4.  First obtuse marginal artery:  100% occluded. 5.  Right coronary artery is a medium-caliber nondominant vessel. 30% mid stenosis. BYPASS GRAFT ANATOMY: 
1. LIMA to LAD is a very small atretic-appearing vessel. It attaches to the native LAD at which point, there is a high-grade 95% mid stenosis. There is competitive filling from the saphenous vein graft to diagonal. 
2.  Saphenous vein graft to diagonal:  Medium-caliber vessel. Attaches to the first diagonal artery and retrograde fills the LAD. Again, there is a high-grade stenosis in the mid LAD after the diagonal fills the LAD. 3.  Saphenous vein graft to OM:  100% occluded. Obvious thrombus.  
4.  Saphenous vein graft to third obtuse marginal:  Was not adequately cannulated but did not show any evidence on aortogram of being patent. 5.  Aortogram showed that the aortic root is nondilated. A single saphenous vein graft which goes to first diagonal was noted. CONCLUSIONS: 
1. Severe multivessel coronary artery disease. 2.  Occluded obtuse marginal graft. 3.  Patent saphenous vein graft to diagonal and LIMA to LAD. Both of these grafts supply the mid distal LAD but there is a high-grade 95% mid LAD stenosis which appears ulcerated. 4.  No left ventriculogram was performed. PLAN:  Proceed with PCI of the native LAD via the LIMA. PCI NOTE: The right femoral artery was prepped and draped in the usual sterile fashion due to difficulty with accessing the LIMA and the vein graft to OM. Under direct visualization, the right common femoral artery was entered. A 6-[place] sheath was placed without difficulty. The patient was given intravenous heparin. ACT was greater than 290. A 6-[place] LIMA catheter was advanced, used to engage the left internal mammary artery. A Whisper wire was placed distal to the stenosis. The stenosis was then predilated with 2 x 15 mm Euphora balloon to 14 atmospheres. Following predilatation, a Resolute [place] 2.25 x 34 mm drug-eluting stent was positioned and deployed to 14 atmospheres. This was then postdilated with a 2.25 x 15 mm NC Euphora to 18 atmospheres. There was distal haziness concerning for dissection. Therefore, a Resolute [place] 2 x 8 mm drug-eluting stent was positioned and deployed to 12 atmospheres distally. The stent balloon was pulled back and deployed to 16 atmospheres. Following this, there was excellent result within the LAD with pulling back of the balloon. Final angiography revealed that there was a dissection at the ostium of the left internal mammary artery with staining of the left subclavian.   At this point, a 2.25 x 38 mm Resolute [place] drug-eluting stent was positioned in the proximal LIMA after the dissection and then a 2.5 x 38 mm Resolute [place] drug-eluting stent was placed at the ostium of the LIMA. Both deployed to 16 atmospheres. Following this, there was MIA III flow in the LAD. There is retrograde filling from the diagonal. 
 
At this point, the right radial artery was prepped and draped in the usual sterile fashion. A 6-[place] slender sheath was placed without difficulty. Another radial cocktail consisting of 2000 units of heparin, 2 mg of verapamil, and 200 mcg of nitroglycerin was administered. A multipurpose catheter was advanced and used to engage the saphenous vein graft to diagonal.  With this, there was excellent filling of the native LAD. The stents are widely patent. At this point, my concern was the left subclavian dissection. It appeared not to be flow limiting based on pressure with pullback of the JR-4 catheter. Angiography was performed to the left subclavian via the left radial artery. This did show a dissection flap but there was no further staining of the left subclavian. I reviewed these films with Dr. Polina Denton. It was felt that the dissection hopefully will heal without stenting of the left subclavian. Based on this, no stenting was performed. The left radial and right femoral sheaths were left in place. The patient will be anticoagulated for 24 hours. Vascular Surgery will follow. CONCLUSIONS:  Successful PCI stenting of the mid LAD via the LIMA with Resolute Manuel 2.25 x 34 and 2 x 8 mm drug-eluting stent. This was complicated by ostial LIMA dissection requiring PCI stenting of the proximal and ostial LIMA with a 2.5 x 38 and 2.25 x 38 mm drug-eluting stents. There was a residual staining of the left subclavian which will be monitored closely. There was an excellent flow based on the left radial waveforms. There was no involvement of the left vertebral. 
 
PLAN:  Monitor closely in the ICU overnight.   Continue anticoagulation with heparin. Leave sheaths in placed. MD CAROL [id number] 
D:  08/12/2019 18:37 
T:  08/12/2019 18:43 JOB #:  [id number]

## 2019-08-13 NOTE — PROGRESS NOTES
Follow up visit to build relationship of connectedness, care & emotional / spiritual support. _x_____    active listening/ guided questions to understand pt's spiritual needs _x_____    exploration of hope & the presence of God to normalize struggles as Holy, reframe, introduce coping skills 
 
___x____   prayer / blessing of pt & struggles, to convey peace & lessen anxiety Additional  Comments / interventions:    
 
Pt stated her surgery was a success; pt feels much better. Pt's daughter-in-law was emotional as we prayed; she had been worried about pt and was finally able to release her worries thru catharsis. Autumn Martinez MDiv, ThM, PhD 
Roro Sanders

## 2019-08-13 NOTE — PROGRESS NOTES
Bedside shift report received from Thelma Anaya RN (offgoing nurse). Report given to Pablo Sykes RN. Vital signs stable. No complaints present. Patient in stable condition.

## 2019-08-14 VITALS
HEART RATE: 76 BPM | BODY MASS INDEX: 37.04 KG/M2 | HEIGHT: 68 IN | OXYGEN SATURATION: 97 % | SYSTOLIC BLOOD PRESSURE: 141 MMHG | WEIGHT: 244.4 LBS | RESPIRATION RATE: 18 BRPM | DIASTOLIC BLOOD PRESSURE: 67 MMHG | TEMPERATURE: 97.9 F

## 2019-08-14 LAB
ANION GAP SERPL CALC-SCNC: 8 MMOL/L (ref 7–16)
BUN SERPL-MCNC: 33 MG/DL (ref 8–23)
CALCIUM SERPL-MCNC: 8.1 MG/DL (ref 8.3–10.4)
CHLORIDE SERPL-SCNC: 109 MMOL/L (ref 98–107)
CO2 SERPL-SCNC: 23 MMOL/L (ref 21–32)
CREAT SERPL-MCNC: 3.05 MG/DL (ref 0.6–1)
GLUCOSE BLD STRIP.AUTO-MCNC: 134 MG/DL (ref 65–100)
GLUCOSE SERPL-MCNC: 123 MG/DL (ref 65–100)
POTASSIUM SERPL-SCNC: 4.1 MMOL/L (ref 3.5–5.1)
SODIUM SERPL-SCNC: 140 MMOL/L (ref 136–145)

## 2019-08-14 PROCEDURE — 027137Z DILATION OF CORONARY ARTERY, TWO ARTERIES WITH FOUR OR MORE DRUG-ELUTING INTRALUMINAL DEVICES, PERCUTANEOUS APPROACH: ICD-10-PCS | Performed by: INTERNAL MEDICINE

## 2019-08-14 PROCEDURE — B2111ZZ FLUOROSCOPY OF MULTIPLE CORONARY ARTERIES USING LOW OSMOLAR CONTRAST: ICD-10-PCS | Performed by: INTERNAL MEDICINE

## 2019-08-14 PROCEDURE — 80048 BASIC METABOLIC PNL TOTAL CA: CPT

## 2019-08-14 PROCEDURE — B2131ZZ FLUOROSCOPY OF MULTIPLE CORONARY ARTERY BYPASS GRAFTS USING LOW OSMOLAR CONTRAST: ICD-10-PCS | Performed by: INTERNAL MEDICINE

## 2019-08-14 PROCEDURE — 74011250637 HC RX REV CODE- 250/637: Performed by: NURSE PRACTITIONER

## 2019-08-14 PROCEDURE — B2181ZZ FLUOROSCOPY OF LEFT INTERNAL MAMMARY BYPASS GRAFT USING LOW OSMOLAR CONTRAST: ICD-10-PCS | Performed by: INTERNAL MEDICINE

## 2019-08-14 PROCEDURE — B2151ZZ FLUOROSCOPY OF LEFT HEART USING LOW OSMOLAR CONTRAST: ICD-10-PCS | Performed by: INTERNAL MEDICINE

## 2019-08-14 PROCEDURE — 74011250637 HC RX REV CODE- 250/637: Performed by: INTERNAL MEDICINE

## 2019-08-14 PROCEDURE — 4A023N7 MEASUREMENT OF CARDIAC SAMPLING AND PRESSURE, LEFT HEART, PERCUTANEOUS APPROACH: ICD-10-PCS | Performed by: INTERNAL MEDICINE

## 2019-08-14 PROCEDURE — 36415 COLL VENOUS BLD VENIPUNCTURE: CPT

## 2019-08-14 PROCEDURE — 82962 GLUCOSE BLOOD TEST: CPT

## 2019-08-14 PROCEDURE — 74011250636 HC RX REV CODE- 250/636: Performed by: INTERNAL MEDICINE

## 2019-08-14 RX ORDER — NITROGLYCERIN 0.4 MG/1
0.4 TABLET SUBLINGUAL
Qty: 1 BOTTLE | Refills: 4 | Status: SHIPPED | OUTPATIENT
Start: 2019-08-14

## 2019-08-14 RX ORDER — POTASSIUM CHLORIDE 20 MEQ/1
20 TABLET, EXTENDED RELEASE ORAL DAILY
Qty: 30 TAB | Refills: 5 | Status: SHIPPED | OUTPATIENT
Start: 2019-08-14 | End: 2019-10-07

## 2019-08-14 RX ORDER — FUROSEMIDE 40 MG/1
40 TABLET ORAL DAILY
Qty: 30 TAB | Refills: 11 | Status: SHIPPED | OUTPATIENT
Start: 2019-08-14 | End: 2019-10-03 | Stop reason: ALTCHOICE

## 2019-08-14 RX ORDER — METOPROLOL TARTRATE 25 MG/1
25 TABLET, FILM COATED ORAL EVERY 12 HOURS
Qty: 60 TAB | Refills: 11 | Status: SHIPPED | OUTPATIENT
Start: 2019-08-14 | End: 2019-11-27

## 2019-08-14 RX ORDER — ACETAMINOPHEN 325 MG/1
650 TABLET ORAL
Status: SHIPPED | COMMUNITY
Start: 2019-08-14

## 2019-08-14 RX ORDER — FUROSEMIDE 10 MG/ML
40 INJECTION INTRAMUSCULAR; INTRAVENOUS ONCE
Status: COMPLETED | OUTPATIENT
Start: 2019-08-14 | End: 2019-08-14

## 2019-08-14 RX ORDER — FUROSEMIDE 40 MG/1
40 TABLET ORAL DAILY
Status: DISCONTINUED | OUTPATIENT
Start: 2019-08-14 | End: 2019-08-14 | Stop reason: HOSPADM

## 2019-08-14 RX ORDER — FUROSEMIDE 40 MG/1
40 TABLET ORAL DAILY
Qty: 30 TAB | Refills: 11 | OUTPATIENT
Start: 2019-08-14

## 2019-08-14 RX ADMIN — ACETAMINOPHEN 650 MG: 325 TABLET, FILM COATED ORAL at 06:07

## 2019-08-14 RX ADMIN — FUROSEMIDE 40 MG: 10 INJECTION, SOLUTION INTRAVENOUS at 08:05

## 2019-08-14 RX ADMIN — ESCITALOPRAM OXALATE 20 MG: 10 TABLET, FILM COATED ORAL at 08:08

## 2019-08-14 RX ADMIN — Medication 10 ML: at 06:10

## 2019-08-14 RX ADMIN — METOPROLOL TARTRATE 25 MG: 25 TABLET ORAL at 08:07

## 2019-08-14 RX ADMIN — ASPIRIN 81 MG 81 MG: 81 TABLET ORAL at 08:07

## 2019-08-14 RX ADMIN — LEVOTHYROXINE SODIUM 200 MCG: 150 TABLET ORAL at 06:05

## 2019-08-14 RX ADMIN — LEVOTHYROXINE SODIUM 25 MCG: 150 TABLET ORAL at 06:06

## 2019-08-14 RX ADMIN — AMLODIPINE BESYLATE 5 MG: 5 TABLET ORAL at 08:07

## 2019-08-14 RX ADMIN — TICAGRELOR 90 MG: 90 TABLET ORAL at 08:08

## 2019-08-14 NOTE — PROGRESS NOTES
Pt to discharge home this day. No voiced discharge needs at this time. Milestones met. Care Management Interventions PCP Verified by CM: Caitlyn Thomson,) Mode of Transport at Discharge: Other (see comment)(Family) Transition of Care Consult (CM Consult): Discharge Planning Discharge Durable Medical Equipment: No 
Physical Therapy Consult: No 
Occupational Therapy Consult: No 
Speech Therapy Consult: No 
Current Support Network: Own Home, Lives with Spouse Confirm Follow Up Transport: Family Plan discussed with Pt/Family/Caregiver: Yes [employer] Information Provided?: No 
Discharge Location Discharge Placement: Home

## 2019-08-14 NOTE — PROGRESS NOTES
Bedside and verbal shift report received from Kings Davidson RN  
R groin site and bilateral Radial sites visualized at shift change. Bruising noted to R groin.

## 2019-08-14 NOTE — PROGRESS NOTES
Discharge instructions reviewed with patient. Prescriptions given for BMP, Lopressor, Potassium, Brilinta, Nitro and med info sheets provided for all new medications. Opportunity for questions provided. Patient voiced understanding of all discharge instructions.

## 2019-08-14 NOTE — PROGRESS NOTES
am 
8/14/2019 7:07 AM 
 
Admit Date: 8/10/2019 Admit Diagnosis: Non-STEMI (non-ST elevated myocardial infarction) ([facility] [address] [facility] [I21.4]; [facility] [[facility]] Subjective:  
 Patient doing well today. Suspect she is ready to go home. Seems to be no sequele from left subclavian Objective:  
  
Visit Vitals /67 (BP 1 Location: Left arm, BP Patient Position: At rest) Pulse 77 Temp 98.2 °F (36.8 °C) Resp 18 Ht 5' 8\" (1.727 m) Wt 110.9 kg (244 lb 6.4 oz) SpO2 97% BMI 37.16 kg/m² ROS:  General ROS: negative for - chills Hematological and Lymphatic ROS: negative for - blood clots or jaundice Respiratory ROS: no cough, shortness of breath, or wheezing Cardiovascular ROS: no chest pain or dyspnea on exertion Gastrointestinal ROS: no abdominal pain, change in bowel habits, or black or bloody stools Neurological ROS: no TIA or stroke symptoms Physical Exam: 
 
Physical Examination: General appearance - alert, well appearing, and in no distress Mental status - alert, oriented to person, place, and time Eyes - pupils equal and reactive, extraocular eye movements intact Neck/lymph - supple, no significant adenopathy Chest/CV - clear to auscultation, no wheezes, rales or rhonchi, symmetric air entry Heart - normal rate, regular rhythm, normal S1, S2, no murmurs, rubs, clicks or gallops Abdomen/GI - soft, nontender, nondistended, no masses or organomegaly Musculoskeletal - no joint tenderness, deformity or swelling Extremities - peripheral pulses normal, no pedal edema, no clubbing or cyanosis Skin - normal coloration and turgor, no rashes, no suspicious skin lesions noted Current Facility-Administered Medications Medication Dose Route Frequency  sodium chloride (NS) flush 5-40 mL  5-40 mL IntraVENous Q8H  
 sodium chloride (NS) flush 5-40 mL  5-40 mL IntraVENous PRN  
 acetaminophen (TYLENOL) tablet 650 mg  650 mg Oral Q4H PRN  
  nitroglycerin (NITROSTAT) tablet 0.4 mg  0.4 mg SubLINGual Q5MIN PRN  
 aspirin chewable tablet 81 mg  81 mg Oral DAILY  ticagrelor (BRILINTA) tablet 90 mg  90 mg Oral Q12H  
 acetaminophen (TYLENOL) tablet 650 mg  650 mg Oral Q6H PRN  
 HYDROcodone-acetaminophen (NORCO) 5-325 mg per tablet 1 Tab  1 Tab Oral Q6H PRN  
 morphine injection 2 mg  2 mg IntraVENous Q2H PRN  
 atorvastatin (LIPITOR) tablet 40 mg  40 mg Oral QHS  metoprolol tartrate (LOPRESSOR) tablet 25 mg  25 mg Oral Q12H  
 insulin glargine (LANTUS) injection 60 Units  60 Units SubCUTAneous QHS  amLODIPine (NORVASC) tablet 5 mg  5 mg Oral DAILY  escitalopram oxalate (LEXAPRO) tablet 20 mg  20 mg Oral BID  [START ON 8/15/2019] exenatide microspheres serr 2 mcg (Patient Supplied)  2 mcg SubCUTAneous Q7D  
 levothyroxine (SYNTHROID) tablet 200 mcg  200 mcg Oral ACB  levothyroxine (SYNTHROID) tablet 25 mcg  25 mcg Oral ACB  insulin lispro (HUMALOG) injection   SubCUTAneous AC&HS Data Review:  
@LABRCNT(Na,K,BUN,CREA,WBC,HGB,HCT,PLT,INR,TRP,TCHOL*,Triglyceride*,LDL*,LDLCPOC HDL*,HDL])@ TELEMETRY: nsr Assessment/Plan: Active Problems: 
  Essential hypertension, benign () The current medical regimen is effective;  continue present plan and medications. Mixed hyperlipidemia () Type 2 diabetes, uncontrolled, with cellulitis of foot ([facility] [address] ()The current medical regimen is effective;  continue present plan and medications. Non-STEMI (non-ST elevated myocardial infarction) ([facility] [address] (8/10/2019) [facility] conservative management. Plan home today Lucien Nichols MD

## 2019-08-14 NOTE — PROGRESS NOTES
Cardiac Rehab: Spoke with patient regarding referral to cardiac rehab. Patient meets admission criteria based on NSTEMI with PCI (08/13/19). Written information about Cardiac Rehab given and reviewed with patient. Discussed lifestyle modifications to promote cardiac wellness. She states she did not participate in Cardiac Rehab after her CABG due to travel requirements. Patient indicated that she does not want to participate in the cardiac rehab program due to time and travel requirements. She works five days a week and lives in [facility]. She is aware that her referral is valid for six months after her PCI and was encouraged to discuss program with MD at her follow up. Her Cardiologist is Dr. Shelton Fernández. 
 
 
Thank you, MARIELENA BlountN, RN Cardiopulmonary Rehabilitation Nurse Liaison [facility]

## 2019-08-14 NOTE — PROGRESS NOTES
Bedside shift report given to Enrique Green RN (oncoming nurse) by Marina Mejias RN (offgoing nurse). Bedside shift report included the following information: SBAR, Kardex, ED Summary, MAR, and Recent Results.

## 2019-08-14 NOTE — DISCHARGE INSTRUCTIONS
Patient Education      You will have a Lab called BMP drawn in one week  No lifting more than 20 lbs for the next week  No lifting more than 8 lbs from your wrist sites for the next week  No submerging cath sites in water for the next week. Percutaneous Coronary Intervention: What to Expect at [facility]    Percutaneous coronary intervention (PCI) is the name for procedures that are used to open a narrowed or blocked coronary artery. The two most common PCI procedures are coronary angioplasty and coronary stent placement. Your groin or arm may have a bruise and feel sore for a day or two after a percutaneous coronary intervention (PCI). You can do light activities around the house, but nothing strenuous for several days. This care sheet gives you a general idea about how long it will take for you to recover. But each person recovers at a different pace. Follow the steps below to get better as quickly as possible. How can you care for yourself at home? Activity    · If the doctor gave you a sedative:  ? For 24 hours, don't do anything that requires attention to detail, such as going to work, making important decisions, or signing any legal documents. It takes time for the medicine's effects to completely wear off.  ? For your safety, do not drive or operate any machinery that could be dangerous. Wait until the medicine wears off and you can think clearly and react easily.     · Do not do strenuous exercise and do not lift, pull, or push anything heavy until your doctor says it is okay. This may be for a day or two. You can walk around the house and do light activity, such as cooking.     · If the catheter was placed in your groin, try not to walk up stairs for the first couple of days.     · If the catheter was placed in your arm near your wrist, do not bend your wrist deeply for the first couple of days.  Be careful using your hand to get into and out of a chair or bed.     · Carry your stent identification card with you at all times.     · If your doctor recommends it, get more exercise. Walking is a good choice. Bit by bit, increase the amount you walk every day. Try for at least 30 minutes on most days of the week. Diet    · Drink plenty of fluids to help your body flush out the dye. If you have kidney, heart, or liver disease and have to limit fluids, talk with your doctor before you increase the amount of fluids you drink.     · Keep eating a heart-healthy diet that has lots of fruits, vegetables, and whole grains. If you have not been eating this way, talk to your doctor. You also may want to talk to a dietitian. This expert can help you to learn about healthy foods and plan meals. Medicines    · Your doctor will tell you if and when you can restart your medicines. He or she will also give you instructions about taking any new medicines.     · If you take blood thinners, such as warfarin (Coumadin), clopidogrel (Plavix), or aspirin, be sure to talk to your doctor. He or she will tell you if and when to start taking those medicines again. Make sure that you understand exactly what your doctor wants you to do.     · Your doctor will prescribe blood-thinning medicines. You will likely take aspirin plus another antiplatelet, such as clopidogrel (Plavix). It is very important that you take these medicines exactly as directed. These medicines help keep the coronary artery open and reduce your risk of a heart attack.     · Call your doctor if you think you are having a problem with your medicine.    Care of the catheter site    · For 1 or 2 days, keep a bandage over the spot where the catheter was inserted. The bandage probably will fall off in this time.     · Put ice or a cold pack on the area for 10 to 20 minutes at a time to help with soreness or swelling. Put a thin cloth between the ice and your skin.     · You may shower 24 to 48 hours after the procedure, if your doctor okays it.  Pat the incision dry.     · Do not soak the catheter site until it is healed. Don't take a bath for 1 week, or until your doctor tells you it is okay.     · Watch for bleeding from the site. A small amount of blood (up to the size of a quarter) on the bandage can be normal.     · If you are bleeding, lie down and press on the area for 15 minutes to try to make it stop. If the bleeding does not stop, call your doctor or seek immediate medical care. Follow-up care is a key part of your treatment and safety. Be sure to make and go to all appointments, and call your doctor if you are having problems. It's also a good idea to know your test results and keep a list of the medicines you take. When should you call for help? Call 911 anytime you think you may need emergency care. For example, call if:    · You passed out (lost consciousness).     · You have severe trouble breathing.     · You have sudden chest pain and shortness of breath, or you cough up blood.     · You have symptoms of a heart attack, such as:  ? Chest pain or pressure. ? Sweating. ? Shortness of breath. ? Nausea or vomiting. ? Pain that spreads from the chest to the neck, jaw, or one or both shoulders or arms. ? Dizziness or lightheadedness. ? A fast or uneven pulse. After calling 911, chew 1 adult-strength aspirin. Wait for an ambulance. Do not try to drive yourself.     · You have been diagnosed with angina, and you have angina symptoms that do not go away with rest or are not getting better within 5 minutes after you take one dose of nitroglycerin.    Call your doctor now or seek immediate medical care if:    · You are bleeding from the area where the catheter was put in your artery.     · You have a fast-growing, painful lump at the catheter site.     · You have signs of infection, such as:  ? Increased pain, swelling, warmth, or redness. ? Red streaks leading from the catheter site. ? Pus draining from the catheter site.   ? A fever.     · Your leg, arm, or hand is painful, looks blue, or feels cold, numb, or tingly.    Watch closely for changes in your health, and be sure to contact your doctor if you have any problems. Where can you learn more? Go to http://bernardo-magaly.info/. Enter N092 in the search box to learn more about \"Percutaneous Coronary Intervention: What to Expect at Home. \"  Current as of: July 22, 2018  Content Version: 12.1  © 2796-7555 [employer] [place]. Care instructions adapted under license by [employer] (which disclaims liability or warranty for this information). If you have questions about a medical condition or this instruction, always ask your healthcare professional. [place] [address] any warranty or liability for your use of this information. DISCHARGE SUMMARY from Nurse    PATIENT INSTRUCTIONS:    After general anesthesia or intravenous sedation, for 24 hours or while taking prescription Narcotics:  · Limit your activities  · Do not drive and operate hazardous machinery  · Do not make important personal or business decisions  · Do  not drink alcoholic beverages  · If you have not urinated within 8 hours after discharge, please contact your surgeon on call. Report the following to your surgeon:  · Excessive pain, swelling, redness or odor of or around the surgical area  · Temperature over 100.5  · Nausea and vomiting lasting longer than 4 hours or if unable to take medications  · Any signs of decreased circulation or nerve impairment to extremity: change in color, persistent  numbness, tingling, coldness or increase pain  · Any questions    What to do at Home:  Recommended activity: Activity as tolerated and Activity as tolerated and no driving for today  The patient is being discharged home in stable condition on a low saturated fat, low cholesterol and low salt diet.  The patient is instructed to advance activities as tolerated to the limit of fatigue or shortness of breath. The patient is instructed to avoid all heavy lifting for 5 days. The patient is instructed to watch the cath site for bleeding/oozing; if seen, the patient is instructed to apply firm pressure with a clean cloth and call [facility] at [phone]. The patient is instructed to watch for signs of infection which include: increasing area of redness, fever/hot to touch or purulent drainage at the catheterization site. The patient is instructed not to soak in a bathtub for 7-10 days, but is cleared to shower. The patient is instructed to call the office or return to the ER for immediate evaluation for any shortness of breath or chest pain not relieved by NTG.        *  Please give a list of your current medications to your Primary Care Provider. *  Please update this list whenever your medications are discontinued, doses are      changed, or new medications (including over-the-counter products) are added. *  Please carry medication information at all times in case of emergency situations. These are general instructions for a healthy lifestyle:    No smoking/ No tobacco products/ Avoid exposure to second hand smoke  Surgeon General's Warning:  Quitting smoking now greatly reduces serious risk to your health. Obesity, smoking, and sedentary lifestyle greatly increases your risk for illness    A healthy diet, regular physical exercise & weight monitoring are important for maintaining a healthy lifestyle    You may be retaining fluid if you have a history of heart failure or if you experience any of the following symptoms:  Weight gain of 3 pounds or more overnight or 5 pounds in a week, increased swelling in our hands or feet or shortness of breath while lying flat in bed. Please call your doctor as soon as you notice any of these symptoms; do not wait until your next office visit. The discharge information has been reviewed with the patient.   The patient verbalized understanding. Discharge medications reviewed with the patient and appropriate educational materials and side effects teaching were provided.   ___________________________________________________________________________________________________________________________________

## 2019-08-14 NOTE — PROGRESS NOTES
No activity or weight restrictions at discharge per Vascular Surgery standpoint - pt to follow cardiology discharge orders and instructions

## 2019-08-14 NOTE — PROGRESS NOTES
Patient seen and examined. No acute issues overnight. Patient denies any left arm pain. Patient has palpable left brachial and radial pulses. Continue medical therapy. No indication for any intervention. Okay from vascular standpoint to be discharged home. She will follow-up with Dr. Mcdonnell in 1 month (with upper extremity duplex study). Ana Epstein

## 2019-08-14 NOTE — DISCHARGE SUMMARY
[facility] Cardiology Discharge Summary Patient ID: 
Nakul Lynne [id number] 
52 y.o. 
1960 Admit date: 8/10/2019 Discharge date:  08/14/19 Admitting Physician: Gustabo Mahoney MD  
 
Discharge Physician: Nicolas Almeida NP/Dr. Hans Euceda Admission Diagnoses: Non-STEMI (non-ST elevated myocardial infarction) ([facility] [I21.4] NSTEMI (non-ST elevated myocardial infarction) ([facility] [address] [I21.4] Discharge Diagnoses:  
Patient Active Problem List  
 Diagnosis Date Noted  Dissection of left subclavian artery ([facility] [address] 08/13/2019  Non-STEMI (non-ST elevated myocardial infarction) ([facility] [address] 08/10/2019  Diabetic ulcer of toe of right foot ([facility] [address] 07/16/2019  Leg swelling 05/01/2019  Right leg pain 05/01/2019  Type 2 diabetes with nephropathy ([address] 05/02/2018  Severe obesity (BMI 35.0-39. 9) with comorbidity ([address] 05/02/2018  S/P amputation of lesser toe, left ([facility] [address] 03/26/2018  Toe ulcer ([facility] [address] 03/08/2018  Osteomyelitis of toe of left foot ([facility] [address] 03/08/2018  Ischemia of left lower extremity 08/26/2016  PAD (peripheral artery disease) ([facility] [address] 08/18/2016  Ischemic ulcer of foot due to atherosclerosis of native artery of extremity ([facility] [address] 08/18/2016  Essential hypertension, benign  Mixed hyperlipidemia  Acquired hypothyroidism  Coronary atherosclerosis of native coronary artery  Type 2 diabetes, uncontrolled, with cellulitis of foot ([address] Cardiology Procedures this admission:  Left heart catheterization with PCI Echo Consults: Vascular Surgery Hospital Course: Patient was seen in the ED with complaints of NSTEMI killip class I symptom with Trop of 4 by Dr. Yonathan Gibson and was subsequently scheduled for a LHC at [facility] on 8/12/2019. Patient underwent cardiac catheterization by Dr. Peng Pascal. Patient was found to have a the following: 
 
ANGIOGRAPHIC RESULTS: 
1. Left main coronary artery:  Large-caliber vessel. It is patent. 2.  LAD:  Appears to be occluded in the mid segment. 30% proximal stenosis. 3.  Left circumflex: It is a medium-caliber dominant vessel. Contains a 95% proximal stenosis and what appears to be a 100% distal stenosis with left-to-left collateralization of the left PDA. 4.  First obtuse marginal artery:  100% occluded. 5.  Right coronary artery is a medium-caliber nondominant vessel. 30% mid stenosis. 
  
BYPASS GRAFT ANATOMY: 
1. LIMA to LAD is a very small atretic-appearing vessel. It attaches to the native LAD at which point, there is a high-grade 95% mid stenosis. There is competitive filling from the saphenous vein graft to diagonal. 
2.  Saphenous vein graft to diagonal:  Medium-caliber vessel. Attaches to the first diagonal artery and retrograde fills the LAD. Again, there is a high-grade stenosis in the mid LAD after the diagonal fills the LAD. 3.  Saphenous vein graft to OM:  100% occluded. Obvious thrombus. 4.  Saphenous vein graft to third obtuse marginal:  Was not adequately cannulated but did not show any evidence on aortogram of being patent. 5.  Aortogram showed that the aortic root is nondilated. A single saphenous vein graft which goes to first diagonal was noted. 
  
CONCLUSIONS: 
1. Severe multivessel coronary artery disease. 2.  Occluded obtuse marginal graft. 3.  Patent saphenous vein graft to diagonal and LIMA to LAD. Both of these grafts supply the mid distal LAD but there is a high-grade 95% mid LAD stenosis which appears ulcerated. 4.  No left ventriculogram was performed. CONCLUSIONS:  Successful PCI stenting of the mid LAD via the LIMA with Resolute Manuel 2.25 x 34 and 2 x 8 mm drug-eluting stent. This was complicated by ostial LIMA dissection requiring PCI stenting of the proximal and ostial LIMA with a 2.5 x 38 and 2.25 x 38 mm drug-eluting stents.   There was a residual staining of the left subclavian which will be monitored closely. There was an excellent flow based on the left radial waveforms. There was no involvement of the left vertebral. 
 
Vascular Surgery: Per Dr Daja Miramontes \"Patient seen and examined. No acute issues overnight. Patient denies any left arm pain. Patient has palpable left brachial and radial pulses. Continue medical therapy. No indication for any intervention. Okay from vascular standpoint to be discharged home. She will follow-up with Dr. Lisa Nixon in 1 month (with upper extremity duplex study). \" 
 
Echo Showed: (8/11/19):  -  Left ventricle: Systolic function was at the lower limits of normal. 
Ejection fraction was estimated in the range of 50 % to 55 %. There is basilar-mid inferoseptal hypokinesis. There was mild concentric hypertrophy. Left ventricular diastolic function parameters were abnormal. 
-  Aortic valve: The valve was structurally normal, tri-commissural. Leaflets exhibited mild sclerosis. -  Mitral valve: There was mild regurgitation. Patient tolerated the procedure well and was taken to the CVICU for close monitoring and then transferred to the [facility]. The following morning patient was up feeling well without any complaints of chest pain or shortness of breath. Patient's right radial cath site, R groin site, and L radial sites were clean, dry and intact without hematoma or bruit. Patient's labs were WNL. Patient was seen and examined by Dr. Wesley Carbajal and determined stable and ready for discharge. Patient was instructed on the importance of medication compliance including taking aspirin and plavix/effient/Brilinta everyday without missing a dose. After receiving drug eluting stents, the patient will remain on dual anti-platelet therapy for 1 year. For maximized medical therapy for CAD, patient will continue BB, and statin as well.   ARB has been stopped due to OG on CKD,  Will have BMP drawn in one week  The patient will follow up with [address] Rosalio Ying and has been referred to cardiac rehab. DISPOSITION: The patient is being discharged home in stable condition on a low saturated fat, low cholesterol and low salt diet. The patient is instructed to advance activities as tolerated to the limit of fatigue or shortness of breath. The patient is instructed to avoid all heavy lifting for 5 days. The patient is instructed to watch the cath site for bleeding/oozing; if seen, the patient is instructed to apply firm pressure with a clean cloth and call [facility] Cardiology at [phone]. The patient is instructed to watch for signs of infection which include: increasing area of redness, fever/hot to touch or purulent drainage at the catheterization site. The patient is instructed not to soak in a bathtub for 7-10 days, but is cleared to shower. The patient is instructed to call the office or return to the ER for immediate evaluation for any shortness of breath or chest pain not relieved by NTG. Discharge Exam:  
Visit Vitals /67 Pulse 76 Temp 97.9 °F (36.6 °C) Resp 18 Ht 5' 8\" (1.727 m) Wt 110.9 kg (244 lb 6.4 oz) SpO2 97% BMI 37.16 kg/m² Patient has been seen by Dr. Carly Duval: see his progress note for exam details. Recent Results (from the past 24 hour(s)) GLUCOSE, POC Collection Time: 08/13/19 12:14 PM  
Result Value Ref Range Glucose (POC) 148 (H) 65 - 100 mg/dL GLUCOSE, POC Collection Time: 08/13/19  4:44 PM  
Result Value Ref Range Glucose (POC) 243 (H) 65 - 100 mg/dL GLUCOSE, POC Collection Time: 08/13/19  8:37 PM  
Result Value Ref Range Glucose (POC) 210 (H) 65 - 100 mg/dL METABOLIC PANEL, BASIC Collection Time: 08/14/19  5:52 AM  
Result Value Ref Range Sodium 140 136 - 145 mmol/L Potassium 4.1 3.5 - 5.1 mmol/L Chloride 109 (H) 98 - 107 mmol/L  
 CO2 23 21 - 32 mmol/L Anion gap 8 7 - 16 mmol/L Glucose 123 (H) 65 - 100 mg/dL BUN 33 (H) 8 - 23 MG/DL  Creatinine 3.05 (H) 0.6 - 1.0 MG/DL  
 GFR est AA 20 (L) >60 ml/min/1.73m2 GFR est non-AA 17 (L) >60 ml/min/1.73m2 Calcium 8.1 (L) 8.3 - 10.4 MG/DL  
GLUCOSE, POC Collection Time: 08/14/19  6:05 AM  
Result Value Ref Range Glucose (POC) 134 (H) 65 - 100 mg/dL Patient Instructions:  
 
Current Discharge Medication List  
  
START taking these medications Details  
acetaminophen (TYLENOL) 325 mg tablet Take 2 Tabs by mouth every six (6) hours as needed for Pain. ticagrelor (BRILINTA) 90 mg tablet Take 1 Tab by mouth every twelve (12) hours every twelve (12) hours. Qty: 60 Tab, Refills: 11  
  
nitroglycerin (NITROSTAT) 0.4 mg SL tablet 1 Tab by SubLINGual route every five (5) minutes as needed for Chest Pain. Up to 3 doses. Qty: 1 Bottle, Refills: 4  
  
metoprolol tartrate (LOPRESSOR) 25 mg tablet Take 1 Tab by mouth every twelve (12) hours. Qty: 60 Tab, Refills: 11  
  
furosemide (LASIX) 40 mg tablet Take 1 Tab by mouth daily. Qty: 30 Tab, Refills: 11  
  
potassium chloride (K-DUR, KLOR-CON) 20 mEq tablet Take 1 Tab by mouth daily. Qty: 30 Tab, Refills: 5 CONTINUE these medications which have NOT CHANGED Details  
escitalopram oxalate (LEXAPRO) 20 mg tablet Take 1 Tab by mouth two (2) times a day. Qty: 180 Tab, Refills: 3 Associated Diagnoses: Depression, unspecified depression type  
  
amLODIPine (NORVASC) 5 mg tablet Take 1 Tab by mouth daily. Qty: 90 Tab, Refills: 3 Associated Diagnoses: Essential hypertension  
  
exenatide microspheres (BYDUREON BCISE) 2 RW/9.50 mL atIn 1 Applicator by SubCUTAneous route every seven (7) days. Qty: 4 Syringe, Refills: 11  
  
atorvastatin (LIPITOR) 40 mg tablet Take 1 Tab by mouth daily. Qty: 90 Tab, Refills: 3 Associated Diagnoses: Mixed hyperlipidemia  
  
insulin degludec (TRESIBA FLEXTOUCH U-100) 100 unit/mL (3 mL) inpn 70 Units by SubCUTAneous route daily for 90 days. Qty: 63 mL, Refills: 3 !! levothyroxine (SYNTHROID) 200 mcg tablet Take 1 Tab by mouth Daily (before breakfast). Qty: 90 Tab, Refills: 3  
  
!! levothyroxine (SYNTHROID) 25 mcg tablet Take 1 Tab by mouth Daily (before breakfast). Qty: 90 Tab, Refills: 3  
  
aspirin delayed-release 81 mg tablet Take 81 mg by mouth every morning. !! - Potential duplicate medications found. Please discuss with provider.   
  
STOP taking these medications  
  
 valsartan (DIOVAN) 320 mg tablet Comments:  
Reason for Stopping:   
   
  
 
 
 
Signed: 
WALDEMAR Trejo 
8/14/2019 
8:38 AM

## 2019-08-14 NOTE — ADT AUTH CERT NOTES
Myocardial Infarction - Care Day 4 (8/13/2019) by Suzan Clancy  
 
   
Review Entered Review Status 8/14/2019 09:28 Completed  
   
Criteria Review Care Day: 4 Care Date: 8/13/2019 Level of Care: Telemetry Guideline Day 2 Level Of Care (X) Transfer to intermediate care or telemetry Clinical Status   
(X) * Hemodynamic stability 8/14/2019 09:28:31 EDT by Mariela Ellison   
  72-24   
(X) * Chest pain, dyspnea, or anginal equivalent absent 8/14/2019 09:28:31 EDT by Driss Norwood DENIES Routes   
(X) * Oral hydration, medications 8/14/2019 09:28:31 EDT by Mariela Ellison   
  CARDIAC DIET Interventions   
(X) * Oxygen absent 8/14/2019 09:28:31 EDT by Mariela Ellison   
  RA Medications   
(X) * IV nitroglycerin absent 8/14/2019 09:28:32 EDT by Francis Singer   
(X) Anticoagulation 8/14/2019 09:28:32 EDT by [address] [place] [address] GTT   
(X) Antiplatelet agents (eg, aspirin, clopidogrel, ticagrelor) 8/14/2019 09:28:32 EDT by Mariela Ellison   
  ASA PO QD, BRILINTA   
(X) Beta-blocker 8/14/2019 09:28:32 EDT by Edna Mejia   
(X) Statin medication 8/14/2019 09:28:32 EDT by Driss Norwood ON STATINS   
* Milestone Additional Notes 8/13:   
VS 72-24-99% RA Patient denies any chest pain or dyspnea.  Some discomfort at left radial cath site at sheath site.  No neurologic symptoms. CONTINUE ACCUCHECKS AND SSI. ON STATINS FOR HYPERLIPIDEMIA. CONTINUE ASA AND BRILINTA.   
    
LABS: CREAT 2.02. MEDS: TYLENOL 650MG PO X1; NORVASC 5MG PO QD; ASA 81MG PO QD; LIPITOR 40MG PO QD; LEXAPRO 20MG PO BID; SSI; SYNTHROID 200MCG PO QD, 25MCG PO QD; LOPRESSOR 25MG PO BID; MORPHINE 2MG IV X1; BRILINTA 90MG PO BID; CONTINUOUS HEPARIN GTT.   
   
Myocardial Infarction - Care Day 3 (8/12/2019) by Suzan Clancy  
 
   
Review Entered Review Status 8/13/2019 17:03 Completed  
   
 Criteria Review Care Day: 3 Care Date: 8/12/2019 Level of Care: Telemetry Guideline Day 2 Level Of Care (X) Transfer to intermediate care or telemetry Clinical Status   
(X) * Hemodynamic stability 8/13/2019 17:03:50 EDT by Pablo Medellin   
  83-/64   
( ) * Chest pain, dyspnea, or anginal equivalent absent Routes ( ) * Oral hydration, medications Interventions ( ) * Oxygen absent Medications ( ) * IV nitroglycerin absent * Milestone Additional Notes 8/12:   
Procedure: Left Heart Catheterization with Percutaneous Coronary Intervention Patient denies any chest pain.  Notes worsening dyspnea and mild orthopnea.  Has been on IV fluids.  Echo with normal LV function. VS 97.6-83-/64-97% RA   
LABS: BUN 24; CREAT 2.03. MEDS: NORVASC 5MG PO QD; SSI; LOPRESSOR 25MG PO BID; LASIX 20MG IV X1, 40MG IV X1; BRILINTA 180MG PO X1; NS @ 100; ASA 81MG PO X1; CONTINUOUS HEPARIN GTT; CONTINUOUS TRIDIL GTT.

## 2019-10-07 ENCOUNTER — APPOINTMENT (OUTPATIENT)
Dept: GENERAL RADIOLOGY | Age: 59
DRG: 247 | End: 2019-10-07
Attending: EMERGENCY MEDICINE
Payer: COMMERCIAL

## 2019-10-07 ENCOUNTER — HOSPITAL ENCOUNTER (INPATIENT)
Age: 59
LOS: 1 days | Discharge: HOME | DRG: 247 | End: 2019-10-08
Attending: EMERGENCY MEDICINE | Admitting: INTERNAL MEDICINE
Payer: COMMERCIAL

## 2019-10-07 DIAGNOSIS — I21.4 NON-ST ELEVATION (NSTEMI) MYOCARDIAL INFARCTION (HCC): Primary | ICD-10-CM

## 2019-10-07 DIAGNOSIS — N18.30 CKD (CHRONIC KIDNEY DISEASE) STAGE 3, GFR 30-59 ML/MIN (HCC): Chronic | ICD-10-CM

## 2019-10-07 DIAGNOSIS — I21.4 NSTEMI (NON-ST ELEVATED MYOCARDIAL INFARCTION) (HCC): ICD-10-CM

## 2019-10-07 LAB
ALBUMIN SERPL-MCNC: 2.9 G/DL (ref 3.2–4.6)
ALBUMIN/GLOB SERPL: 0.8 {RATIO} (ref 1.2–3.5)
ALP SERPL-CCNC: 74 U/L (ref 50–136)
ALT SERPL-CCNC: 22 U/L (ref 12–65)
ANION GAP SERPL CALC-SCNC: 8 MMOL/L (ref 7–16)
APTT PPP: 31.3 SEC (ref 24.7–39.8)
AST SERPL-CCNC: 22 U/L (ref 15–37)
ATRIAL RATE: 58 BPM
ATRIAL RATE: 64 BPM
BASOPHILS # BLD: 0.1 K/UL (ref 0–0.2)
BASOPHILS NFR BLD: 1 % (ref 0–2)
BILIRUB SERPL-MCNC: 0.6 MG/DL (ref 0.2–1.1)
BNP SERPL-MCNC: 970 PG/ML
BUN SERPL-MCNC: 32 MG/DL (ref 8–23)
CALCIUM SERPL-MCNC: 8.9 MG/DL (ref 8.3–10.4)
CALCULATED P AXIS, ECG09: 36 DEGREES
CALCULATED P AXIS, ECG09: 48 DEGREES
CALCULATED R AXIS, ECG10: -52 DEGREES
CALCULATED R AXIS, ECG10: -71 DEGREES
CALCULATED T AXIS, ECG11: 100 DEGREES
CALCULATED T AXIS, ECG11: 92 DEGREES
CHLORIDE SERPL-SCNC: 108 MMOL/L (ref 98–107)
CO2 SERPL-SCNC: 23 MMOL/L (ref 21–32)
CREAT SERPL-MCNC: 2.19 MG/DL (ref 0.6–1)
DIAGNOSIS, 93000: NORMAL
DIAGNOSIS, 93000: NORMAL
DIFFERENTIAL METHOD BLD: ABNORMAL
EOSINOPHIL # BLD: 0.2 K/UL (ref 0–0.8)
EOSINOPHIL NFR BLD: 2 % (ref 0.5–7.8)
ERYTHROCYTE [DISTWIDTH] IN BLOOD BY AUTOMATED COUNT: 13.1 % (ref 11.9–14.6)
EST. AVERAGE GLUCOSE BLD GHB EST-MCNC: 174 MG/DL
GLOBULIN SER CALC-MCNC: 3.8 G/DL (ref 2.3–3.5)
GLUCOSE BLD STRIP.AUTO-MCNC: 153 MG/DL (ref 65–100)
GLUCOSE BLD STRIP.AUTO-MCNC: 198 MG/DL (ref 65–100)
GLUCOSE BLD STRIP.AUTO-MCNC: 260 MG/DL (ref 65–100)
GLUCOSE SERPL-MCNC: 249 MG/DL (ref 65–100)
HBA1C MFR BLD: 7.7 % (ref 4.8–6)
HCT VFR BLD AUTO: 32.6 % (ref 35.8–46.3)
HGB BLD-MCNC: 10.6 G/DL (ref 11.7–15.4)
IMM GRANULOCYTES # BLD AUTO: 0 K/UL (ref 0–0.5)
IMM GRANULOCYTES NFR BLD AUTO: 0 % (ref 0–5)
INR PPP: 1
LYMPHOCYTES # BLD: 1.3 K/UL (ref 0.5–4.6)
LYMPHOCYTES NFR BLD: 17 % (ref 13–44)
MCH RBC QN AUTO: 28.7 PG (ref 26.1–32.9)
MCHC RBC AUTO-ENTMCNC: 32.5 G/DL (ref 31.4–35)
MCV RBC AUTO: 88.3 FL (ref 79.6–97.8)
MONOCYTES # BLD: 0.7 K/UL (ref 0.1–1.3)
MONOCYTES NFR BLD: 9 % (ref 4–12)
NEUTS SEG # BLD: 5.5 K/UL (ref 1.7–8.2)
NEUTS SEG NFR BLD: 71 % (ref 43–78)
NRBC # BLD: 0 K/UL (ref 0–0.2)
P-R INTERVAL, ECG05: 160 MS
P-R INTERVAL, ECG05: 168 MS
PLATELET # BLD AUTO: 189 K/UL (ref 150–450)
PMV BLD AUTO: 11 FL (ref 9.4–12.3)
POTASSIUM SERPL-SCNC: 4.1 MMOL/L (ref 3.5–5.1)
PROT SERPL-MCNC: 6.7 G/DL (ref 6.3–8.2)
PROTHROMBIN TIME: 13.9 SEC (ref 11.7–14.5)
Q-T INTERVAL, ECG07: 406 MS
Q-T INTERVAL, ECG07: 488 MS
QRS DURATION, ECG06: 104 MS
QRS DURATION, ECG06: 106 MS
QTC CALCULATION (BEZET), ECG08: 418 MS
QTC CALCULATION (BEZET), ECG08: 479 MS
RBC # BLD AUTO: 3.69 M/UL (ref 4.05–5.2)
SODIUM SERPL-SCNC: 139 MMOL/L (ref 136–145)
TROPONIN I SERPL-MCNC: 2.9 NG/ML (ref 0.02–0.05)
TROPONIN I SERPL-MCNC: 3.46 NG/ML (ref 0.02–0.05)
TROPONIN I SERPL-MCNC: 3.77 NG/ML (ref 0.02–0.05)
VENTRICULAR RATE, ECG03: 58 BPM
VENTRICULAR RATE, ECG03: 64 BPM
WBC # BLD AUTO: 7.7 K/UL (ref 4.3–11.1)

## 2019-10-07 PROCEDURE — 92929 HC PLC DE STNT +/-PTA MAJOR COR VESL/BRNCH  ADD LC: CPT

## 2019-10-07 PROCEDURE — B2111ZZ FLUOROSCOPY OF MULTIPLE CORONARY ARTERIES USING LOW OSMOLAR CONTRAST: ICD-10-PCS | Performed by: INTERNAL MEDICINE

## 2019-10-07 PROCEDURE — B2151ZZ FLUOROSCOPY OF LEFT HEART USING LOW OSMOLAR CONTRAST: ICD-10-PCS | Performed by: INTERNAL MEDICINE

## 2019-10-07 PROCEDURE — C1874 STENT, COATED/COV W/DEL SYS: HCPCS

## 2019-10-07 PROCEDURE — 92937 PRQ TRLUML REVSC CAB GRF 1: CPT

## 2019-10-07 PROCEDURE — 82962 GLUCOSE BLOOD TEST: CPT

## 2019-10-07 PROCEDURE — 74011000258 HC RX REV CODE- 258: Performed by: INTERNAL MEDICINE

## 2019-10-07 PROCEDURE — 74011250636 HC RX REV CODE- 250/636: Performed by: INTERNAL MEDICINE

## 2019-10-07 PROCEDURE — 96365 THER/PROPH/DIAG IV INF INIT: CPT | Performed by: EMERGENCY MEDICINE

## 2019-10-07 PROCEDURE — 77030012468 HC VLV BLEEDBK CNTRL ABBT -B

## 2019-10-07 PROCEDURE — 74011250637 HC RX REV CODE- 250/637: Performed by: EMERGENCY MEDICINE

## 2019-10-07 PROCEDURE — 85610 PROTHROMBIN TIME: CPT

## 2019-10-07 PROCEDURE — C1894 INTRO/SHEATH, NON-LASER: HCPCS

## 2019-10-07 PROCEDURE — 77030004534 HC CATH ANGI DX INFN CARD -A

## 2019-10-07 PROCEDURE — C1760 CLOSURE DEV, VASC: HCPCS

## 2019-10-07 PROCEDURE — 99284 EMERGENCY DEPT VISIT MOD MDM: CPT | Performed by: EMERGENCY MEDICINE

## 2019-10-07 PROCEDURE — 93459 L HRT ART/GRFT ANGIO: CPT

## 2019-10-07 PROCEDURE — 83036 HEMOGLOBIN GLYCOSYLATED A1C: CPT

## 2019-10-07 PROCEDURE — 99152 MOD SED SAME PHYS/QHP 5/>YRS: CPT

## 2019-10-07 PROCEDURE — C1725 CATH, TRANSLUMIN NON-LASER: HCPCS

## 2019-10-07 PROCEDURE — 80053 COMPREHEN METABOLIC PANEL: CPT

## 2019-10-07 PROCEDURE — C1769 GUIDE WIRE: HCPCS

## 2019-10-07 PROCEDURE — 92928 PRQ TCAT PLMT NTRAC ST 1 LES: CPT

## 2019-10-07 PROCEDURE — B2181ZZ FLUOROSCOPY OF LEFT INTERNAL MAMMARY BYPASS GRAFT USING LOW OSMOLAR CONTRAST: ICD-10-PCS | Performed by: INTERNAL MEDICINE

## 2019-10-07 PROCEDURE — 74011636637 HC RX REV CODE- 636/637: Performed by: PHYSICIAN ASSISTANT

## 2019-10-07 PROCEDURE — 93005 ELECTROCARDIOGRAM TRACING: CPT | Performed by: INTERNAL MEDICINE

## 2019-10-07 PROCEDURE — 99153 MOD SED SAME PHYS/QHP EA: CPT

## 2019-10-07 PROCEDURE — 77030013687 HC GD NDL BARD -B

## 2019-10-07 PROCEDURE — 4A023N7 MEASUREMENT OF CARDIAC SAMPLING AND PRESSURE, LEFT HEART, PERCUTANEOUS APPROACH: ICD-10-PCS | Performed by: INTERNAL MEDICINE

## 2019-10-07 PROCEDURE — 93005 ELECTROCARDIOGRAM TRACING: CPT | Performed by: EMERGENCY MEDICINE

## 2019-10-07 PROCEDURE — 36415 COLL VENOUS BLD VENIPUNCTURE: CPT

## 2019-10-07 PROCEDURE — 84484 ASSAY OF TROPONIN QUANT: CPT

## 2019-10-07 PROCEDURE — 85730 THROMBOPLASTIN TIME PARTIAL: CPT

## 2019-10-07 PROCEDURE — C1887 CATHETER, GUIDING: HCPCS

## 2019-10-07 PROCEDURE — 74011636320 HC RX REV CODE- 636/320: Performed by: INTERNAL MEDICINE

## 2019-10-07 PROCEDURE — C8929 TTE W OR WO FOL WCON,DOPPLER: HCPCS

## 2019-10-07 PROCEDURE — 74011000250 HC RX REV CODE- 250: Performed by: INTERNAL MEDICINE

## 2019-10-07 PROCEDURE — 74011250636 HC RX REV CODE- 250/636: Performed by: EMERGENCY MEDICINE

## 2019-10-07 PROCEDURE — 65660000000 HC RM CCU STEPDOWN

## 2019-10-07 PROCEDURE — 74011250636 HC RX REV CODE- 250/636: Performed by: PHYSICIAN ASSISTANT

## 2019-10-07 PROCEDURE — 83880 ASSAY OF NATRIURETIC PEPTIDE: CPT

## 2019-10-07 PROCEDURE — 027135Z DILATION OF CORONARY ARTERY, TWO ARTERIES WITH TWO DRUG-ELUTING INTRALUMINAL DEVICES, PERCUTANEOUS APPROACH: ICD-10-PCS | Performed by: INTERNAL MEDICINE

## 2019-10-07 PROCEDURE — 85025 COMPLETE CBC W/AUTO DIFF WBC: CPT

## 2019-10-07 PROCEDURE — B2131ZZ FLUOROSCOPY OF MULTIPLE CORONARY ARTERY BYPASS GRAFTS USING LOW OSMOLAR CONTRAST: ICD-10-PCS | Performed by: INTERNAL MEDICINE

## 2019-10-07 PROCEDURE — 71046 X-RAY EXAM CHEST 2 VIEWS: CPT

## 2019-10-07 PROCEDURE — 77030004559 HC CATH ANGI DX SUPT CARD -B

## 2019-10-07 PROCEDURE — 74011250637 HC RX REV CODE- 250/637: Performed by: PHYSICIAN ASSISTANT

## 2019-10-07 RX ORDER — METOPROLOL TARTRATE 25 MG/1
25 TABLET, FILM COATED ORAL EVERY 12 HOURS
Status: DISCONTINUED | OUTPATIENT
Start: 2019-10-07 | End: 2019-10-08 | Stop reason: HOSPADM

## 2019-10-07 RX ORDER — ACETAMINOPHEN 325 MG/1
650 TABLET ORAL
Status: DISCONTINUED | OUTPATIENT
Start: 2019-10-07 | End: 2019-10-07 | Stop reason: SDUPTHER

## 2019-10-07 RX ORDER — SODIUM CHLORIDE 0.9 % (FLUSH) 0.9 %
5-40 SYRINGE (ML) INJECTION AS NEEDED
Status: DISCONTINUED | OUTPATIENT
Start: 2019-10-07 | End: 2019-10-07 | Stop reason: SDUPTHER

## 2019-10-07 RX ORDER — FENTANYL CITRATE 50 UG/ML
25-100 INJECTION, SOLUTION INTRAMUSCULAR; INTRAVENOUS
Status: DISCONTINUED | OUTPATIENT
Start: 2019-10-07 | End: 2019-10-07 | Stop reason: ALTCHOICE

## 2019-10-07 RX ORDER — ACETAMINOPHEN 325 MG/1
650 TABLET ORAL
Status: DISCONTINUED | OUTPATIENT
Start: 2019-10-07 | End: 2019-10-08 | Stop reason: HOSPADM

## 2019-10-07 RX ORDER — ESCITALOPRAM OXALATE 10 MG/1
20 TABLET ORAL 2 TIMES DAILY
Status: DISCONTINUED | OUTPATIENT
Start: 2019-10-07 | End: 2019-10-08 | Stop reason: HOSPADM

## 2019-10-07 RX ORDER — ASPIRIN 81 MG/1
81 TABLET ORAL DAILY
Status: DISCONTINUED | OUTPATIENT
Start: 2019-10-07 | End: 2019-10-08 | Stop reason: HOSPADM

## 2019-10-07 RX ORDER — LORAZEPAM 1 MG/1
1 TABLET ORAL
Status: DISCONTINUED | OUTPATIENT
Start: 2019-10-07 | End: 2019-10-08 | Stop reason: HOSPADM

## 2019-10-07 RX ORDER — MIDAZOLAM HYDROCHLORIDE 1 MG/ML
.5-2 INJECTION, SOLUTION INTRAMUSCULAR; INTRAVENOUS
Status: DISCONTINUED | OUTPATIENT
Start: 2019-10-07 | End: 2019-10-07 | Stop reason: ALTCHOICE

## 2019-10-07 RX ORDER — SODIUM CHLORIDE 0.9 % (FLUSH) 0.9 %
5-40 SYRINGE (ML) INJECTION EVERY 8 HOURS
Status: DISCONTINUED | OUTPATIENT
Start: 2019-10-07 | End: 2019-10-07 | Stop reason: SDUPTHER

## 2019-10-07 RX ORDER — HEPARIN SODIUM 5000 [USP'U]/100ML
12-25 INJECTION, SOLUTION INTRAVENOUS
Status: DISCONTINUED | OUTPATIENT
Start: 2019-10-07 | End: 2019-10-07

## 2019-10-07 RX ORDER — HEPARIN SODIUM 5000 [USP'U]/ML
4000 INJECTION, SOLUTION INTRAVENOUS; SUBCUTANEOUS
Status: COMPLETED | OUTPATIENT
Start: 2019-10-07 | End: 2019-10-07

## 2019-10-07 RX ORDER — SODIUM CHLORIDE 9 MG/ML
75 INJECTION, SOLUTION INTRAVENOUS CONTINUOUS
Status: DISCONTINUED | OUTPATIENT
Start: 2019-10-07 | End: 2019-10-08 | Stop reason: HOSPADM

## 2019-10-07 RX ORDER — NITROGLYCERIN 0.4 MG/1
0.4 TABLET SUBLINGUAL
Status: DISCONTINUED | OUTPATIENT
Start: 2019-10-07 | End: 2019-10-07 | Stop reason: SDUPTHER

## 2019-10-07 RX ORDER — GUAIFENESIN 100 MG/5ML
324 LIQUID (ML) ORAL
Status: COMPLETED | OUTPATIENT
Start: 2019-10-07 | End: 2019-10-07

## 2019-10-07 RX ORDER — LEVOTHYROXINE SODIUM 200 UG/1
200 TABLET ORAL
Status: DISCONTINUED | OUTPATIENT
Start: 2019-10-08 | End: 2019-10-08 | Stop reason: HOSPADM

## 2019-10-07 RX ORDER — AMLODIPINE BESYLATE 5 MG/1
5 TABLET ORAL DAILY
Status: DISCONTINUED | OUTPATIENT
Start: 2019-10-07 | End: 2019-10-08 | Stop reason: HOSPADM

## 2019-10-07 RX ORDER — INSULIN LISPRO 100 [IU]/ML
INJECTION, SOLUTION INTRAVENOUS; SUBCUTANEOUS
Status: DISCONTINUED | OUTPATIENT
Start: 2019-10-07 | End: 2019-10-08 | Stop reason: HOSPADM

## 2019-10-07 RX ORDER — NITROGLYCERIN 0.4 MG/1
0.4 TABLET SUBLINGUAL
Status: DISCONTINUED | OUTPATIENT
Start: 2019-10-07 | End: 2019-10-08 | Stop reason: HOSPADM

## 2019-10-07 RX ORDER — MORPHINE SULFATE 2 MG/ML
2 INJECTION, SOLUTION INTRAMUSCULAR; INTRAVENOUS
Status: DISCONTINUED | OUTPATIENT
Start: 2019-10-07 | End: 2019-10-07 | Stop reason: SDUPTHER

## 2019-10-07 RX ORDER — HYDROCODONE BITARTRATE AND ACETAMINOPHEN 5; 325 MG/1; MG/1
1 TABLET ORAL
Status: DISCONTINUED | OUTPATIENT
Start: 2019-10-07 | End: 2019-10-08 | Stop reason: HOSPADM

## 2019-10-07 RX ORDER — LIDOCAINE HYDROCHLORIDE 10 MG/ML
2-20 INJECTION INFILTRATION; PERINEURAL
Status: DISCONTINUED | OUTPATIENT
Start: 2019-10-07 | End: 2019-10-07 | Stop reason: ALTCHOICE

## 2019-10-07 RX ORDER — INSULIN GLARGINE 100 [IU]/ML
62 INJECTION, SOLUTION SUBCUTANEOUS DAILY
Status: DISCONTINUED | OUTPATIENT
Start: 2019-10-07 | End: 2019-10-08 | Stop reason: HOSPADM

## 2019-10-07 RX ORDER — ASPIRIN 81 MG/1
81 TABLET ORAL
Status: DISCONTINUED | OUTPATIENT
Start: 2019-10-07 | End: 2019-10-07 | Stop reason: SDUPTHER

## 2019-10-07 RX ORDER — MORPHINE SULFATE 2 MG/ML
2 INJECTION, SOLUTION INTRAMUSCULAR; INTRAVENOUS
Status: DISCONTINUED | OUTPATIENT
Start: 2019-10-07 | End: 2019-10-08 | Stop reason: HOSPADM

## 2019-10-07 RX ORDER — SODIUM CHLORIDE 9 MG/ML
75 INJECTION, SOLUTION INTRAVENOUS CONTINUOUS
Status: DISCONTINUED | OUTPATIENT
Start: 2019-10-07 | End: 2019-10-07

## 2019-10-07 RX ORDER — ONDANSETRON 2 MG/ML
4 INJECTION INTRAMUSCULAR; INTRAVENOUS
Status: DISCONTINUED | OUTPATIENT
Start: 2019-10-07 | End: 2019-10-08 | Stop reason: HOSPADM

## 2019-10-07 RX ORDER — ATORVASTATIN CALCIUM 40 MG/1
80 TABLET, FILM COATED ORAL
Status: DISCONTINUED | OUTPATIENT
Start: 2019-10-07 | End: 2019-10-08 | Stop reason: HOSPADM

## 2019-10-07 RX ORDER — SODIUM CHLORIDE 0.9 % (FLUSH) 0.9 %
5-40 SYRINGE (ML) INJECTION EVERY 8 HOURS
Status: DISCONTINUED | OUTPATIENT
Start: 2019-10-07 | End: 2019-10-08 | Stop reason: HOSPADM

## 2019-10-07 RX ORDER — HEPARIN SODIUM 200 [USP'U]/100ML
2 INJECTION, SOLUTION INTRAVENOUS CONTINUOUS
Status: DISCONTINUED | OUTPATIENT
Start: 2019-10-07 | End: 2019-10-07 | Stop reason: ALTCHOICE

## 2019-10-07 RX ORDER — LEVOTHYROXINE SODIUM 50 UG/1
25 TABLET ORAL
Status: DISCONTINUED | OUTPATIENT
Start: 2019-10-08 | End: 2019-10-08 | Stop reason: HOSPADM

## 2019-10-07 RX ORDER — SODIUM CHLORIDE 0.9 % (FLUSH) 0.9 %
5-40 SYRINGE (ML) INJECTION AS NEEDED
Status: DISCONTINUED | OUTPATIENT
Start: 2019-10-07 | End: 2019-10-08 | Stop reason: HOSPADM

## 2019-10-07 RX ADMIN — FENTANYL CITRATE 50 MCG: 50 INJECTION INTRAMUSCULAR; INTRAVENOUS at 13:56

## 2019-10-07 RX ADMIN — HEPARIN SODIUM 2 ML/HR: 5000 INJECTION, SOLUTION INTRAVENOUS; SUBCUTANEOUS at 13:43

## 2019-10-07 RX ADMIN — SODIUM CHLORIDE 75 ML/HR: 900 INJECTION, SOLUTION INTRAVENOUS at 10:25

## 2019-10-07 RX ADMIN — PERFLUTREN 1 ML: 6.52 INJECTION, SUSPENSION INTRAVENOUS at 13:16

## 2019-10-07 RX ADMIN — ASPIRIN 81 MG 324 MG: 81 TABLET ORAL at 09:07

## 2019-10-07 RX ADMIN — INSULIN LISPRO 6 UNITS: 100 INJECTION, SOLUTION INTRAVENOUS; SUBCUTANEOUS at 21:36

## 2019-10-07 RX ADMIN — HEPARIN SODIUM 4000 UNITS: 5000 INJECTION INTRAVENOUS; SUBCUTANEOUS at 09:08

## 2019-10-07 RX ADMIN — ESCITALOPRAM OXALATE 20 MG: 10 TABLET ORAL at 21:35

## 2019-10-07 RX ADMIN — IOPAMIDOL 250 ML: 755 INJECTION, SOLUTION INTRAVENOUS at 15:07

## 2019-10-07 RX ADMIN — ATORVASTATIN CALCIUM 80 MG: 40 TABLET, FILM COATED ORAL at 21:35

## 2019-10-07 RX ADMIN — BIVALIRUDIN 1.75 MG/KG/HR: 250 INJECTION, POWDER, LYOPHILIZED, FOR SOLUTION INTRAVENOUS at 14:25

## 2019-10-07 RX ADMIN — Medication 5 ML: at 21:35

## 2019-10-07 RX ADMIN — MIDAZOLAM 2 MG: 1 INJECTION INTRAMUSCULAR; INTRAVENOUS at 14:07

## 2019-10-07 RX ADMIN — HEPARIN SODIUM 12 UNITS/KG/HR: 5000 INJECTION, SOLUTION INTRAVENOUS at 09:07

## 2019-10-07 RX ADMIN — TICAGRELOR 90 MG: 90 TABLET ORAL at 21:35

## 2019-10-07 RX ADMIN — FENTANYL CITRATE 50 MCG: 50 INJECTION INTRAMUSCULAR; INTRAVENOUS at 14:07

## 2019-10-07 RX ADMIN — MIDAZOLAM 2 MG: 1 INJECTION INTRAMUSCULAR; INTRAVENOUS at 13:56

## 2019-10-07 RX ADMIN — METOPROLOL TARTRATE 25 MG: 25 TABLET ORAL at 21:36

## 2019-10-07 RX ADMIN — LIDOCAINE HYDROCHLORIDE 10 ML: 10 INJECTION, SOLUTION INFILTRATION; PERINEURAL at 14:07

## 2019-10-07 NOTE — PROGRESS NOTES
Report received from [address] [place] [address]. Procedural findings communicated. Intra procedural  medication administration reviewed. Progression of care discussed. Patient received into [id number] [facility] 5 post sheath removal.  
 
Access site without bleeding or swelling Dressing dry and intact Patient instructed to limit movement to right lower extremity Routine post procedural vital signs and site assessment initiated

## 2019-10-07 NOTE — PROGRESS NOTES
Patient is being admitted to floor from ER 
CALM Family present Prayer offered Will follow upon transfer to floor Mahsa Sotomayor,  Staff  C: [phone]  /  [email or web address]

## 2019-10-07 NOTE — ED NOTES
TRANSFER - OUT REPORT: 
 
Verbal report given to NENA Dacosta(name) on Tisha James  being transferred to TELE(unit) for routine progression of care Report consisted of patients Situation, Background, Assessment and  
Recommendations(SBAR). Information from the following report(s) ED Summary was reviewed with the receiving nurse. Lines:  
Peripheral IV 10/07/19 Left Antecubital (Active) Peripheral IV 10/07/19 Left Forearm (Active) Opportunity for questions and clarification was provided. Patient transported with: 
 Registered Nurse

## 2019-10-07 NOTE — ED PROVIDER NOTES
66-year-old female with onset of substernal tightness last evening radiating to the neck. Started 730. Waxing and waning all evening. Discomfort enough to keep her up about 4 hours during the middle of the night. Took a nitroglycerin morning which helped. No vomiting or diaphoresis. No other radiation of the pain or back pain. No fever chills or cough. Some shortness of breath. Pain not pleuritic. Reminiscent of the previous pain she had when she was in the hospital 2 months ago. Received a number stents at that time. The history is provided by the patient. Chest Pain (Angina) This is a new problem. The current episode started 6 to 12 hours ago. The problem has been gradually improving. The pain is present in the substernal region. The pain is moderate. The quality of the pain is described as tightness. The pain radiates to the left neck and right neck. Associated symptoms include cough, lower extremity edema and shortness of breath. Pertinent negatives include no abdominal pain, no back pain, no diaphoresis, no exertional chest pressure, no fever, no headaches, no irregular heartbeat, no leg pain, no malaise/fatigue, no nausea, no palpitations, no vomiting and no weakness. Risk factors include cardiac disease. Her past medical history is significant for DM and HTN. Her past medical history does not include DVT or PE. Procedural history includes cardiac stents and CABG. Pertinent negatives include no cardiac catheterization. Past Medical History:  
Diagnosis Date  Acute bronchitis  Acute pharyngitis  Allergic rhinitis due to other allergen  Coronary atherosclerosis of native coronary artery CABG x3  ~2006, no cardiac events or stents or chest pain since  Depressive disorder, not elsewhere classified   
 daily meds  Essential hypertension, benign   
 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 9)  Osteomyelitis of toe ([address] forth toe of left foot  Other specified acquired hypothyroidism  Thromboembolus ([address]   
 possible to back of left knee???, superficial no anticoagulation therapy required  Type II or unspecified type diabetes mellitus without mention of complication, uncontrolled   
 type 2: oral/insulin: avg. 112: s/s hypo shakes @100: A1C 7.2 on 07/2019 Past Surgical History:  
Procedure Laterality Date  CARDIAC SURG PROCEDURE UNLIST  2006 CABG x3  
 HX BREAST BIOPSY Left 02/20/2017  
 stereo  HX CORONARY ARTERY BYPASS GRAFT  2006 Three  VASCULAR SURGERY PROCEDURE UNLIST  08/10/2016  
 arteriogram of leg  VASCULAR SURGERY PROCEDURE UNLIST Left 08/26/2016  
  fem pop endarterectomy  VASCULAR SURGERY PROCEDURE UNLIST Left 03/12/2018  
 4th toe amp  VASCULAR SURGERY PROCEDURE UNLIST Right 08/01/2019 Right second toe amputation Family History:  
Problem Relation Age of Onset  Diabetes Other  Hypertension Other  Heart Disease Other  Diabetes Mother  Hypertension Mother  Cancer Father  Diabetes Sister  Heart Disease Sister  Diabetes Brother  Diabetes Sister  Hypertension Sister  Diabetes Sister  Hypertension Sister  Diabetes Sister  Hypertension Sister  Diabetes Brother Social History Socioeconomic History  Marital status:  Spouse name: Not on file  Number of children: Not on file  Years of education: Not on file  Highest education level: Not on file Occupational History  Not on file Social Needs  Financial resource strain: Not on file  Food insecurity:  
  Worry: Not on file Inability: Not on file  Transportation needs:  
  Medical: Not on file Non-medical: Not on file Tobacco Use  Smoking status: Former Smoker  Smokeless tobacco: Never Used  Tobacco comment: quit early 1990's Substance and Sexual Activity  Alcohol use: No  
 Drug use: No  
 Sexual activity: Not on file Lifestyle  Physical activity:  
  Days per week: Not on file Minutes per session: Not on file  Stress: Not on file Relationships  Social connections:  
  Talks on phone: Not on file Gets together: Not on file Attends Confucianism service: Not on file Active member of club or organization: Not on file Attends meetings of clubs or organizations: Not on file Relationship status: Not on file  Intimate partner violence:  
  Fear of current or ex partner: Not on file Emotionally abused: Not on file Physically abused: Not on file Forced sexual activity: Not on file Other Topics Concern  Not on file Social History Narrative  Not on file ALLERGIES: Patient has no known allergies. Review of Systems Constitutional: Negative for chills, diaphoresis, fever and malaise/fatigue. Respiratory: Positive for cough and shortness of breath. Cardiovascular: Positive for chest pain. Negative for palpitations. Gastrointestinal: Negative for abdominal pain, diarrhea, nausea and vomiting. Musculoskeletal: Negative for back pain and neck pain. Skin: Negative for color change and rash. Neurological: Negative for syncope, weakness and headaches. All other systems reviewed and are negative. Vitals:  
 10/07/19 0740 BP: 174/74 Pulse: 68 Resp: 17 Temp: 99.1 °F (37.3 °C) SpO2: 99% Weight: 113.9 kg (251 lb) Height: 5' 8\" (1.727 m) Physical Exam  
Constitutional: She is oriented to person, place, and time. She appears well-developed and well-nourished. No distress. HENT:  
Head: Normocephalic and atraumatic. Right Ear: External ear normal.  
Left Ear: External ear normal.  
Mouth/Throat: Oropharynx is clear and moist. No oropharyngeal exudate. Eyes: Pupils are equal, round, and reactive to light. Conjunctivae and EOM are normal.  
Neck: Normal range of motion. Neck supple. Cardiovascular: Normal rate, regular rhythm and intact distal pulses. No murmur heard. Pulmonary/Chest: Breath sounds normal. No respiratory distress. Abdominal: Soft. Bowel sounds are normal. She exhibits no mass. There is no tenderness. There is no rebound and no guarding. No hernia. Musculoskeletal:  
     Right lower leg: She exhibits edema (1+ pitting). Left lower leg: She exhibits edema (1+ pitting). Neurological: She is alert and oriented to person, place, and time. Gait normal.  
Nl speech Skin: Skin is warm and dry. Psychiatric: She has a normal mood and affect. Her speech is normal.  
Nursing note and vitals reviewed. MDM Number of Diagnoses or Management Options Diagnosis management comments: EKG and serial troponins. Check chest x-ray and check for congestive heart failure. Check for pneumonia. Symptomatology not convincing for pulmonary embolism. Amount and/or Complexity of Data Reviewed Clinical lab tests: ordered and reviewed Tests in the radiology section of CPT®: ordered and reviewed Tests in the medicine section of CPT®: ordered and reviewed Review and summarize past medical records: yes (Coronary artery bypass grafting. About 2008. Recent catheterization with placement of stents to the LAD through the LIMA. There was some dissection and some stents placed. Suspicion of left subclavian dissection. Patient has been followed with good radial pulses.) Independent visualization of images, tracings, or specimens: yes (EKG reveals normal sinus rhythm. Left axis deviation. Somewhat poor R wave progression. This EKG is unchanged from August 2019.) Risk of Complications, Morbidity, and/or Mortality Presenting problems: moderate Diagnostic procedures: minimal 
Management options: low Patient Progress Patient progress: stable Procedures Results Include: 
 
Recent Results (from the past 24 hour(s)) EKG, 12 LEAD, INITIAL Collection Time: 10/07/19  7:47 AM  
Result Value Ref Range Ventricular Rate 64 BPM  
 Atrial Rate 64 BPM  
 P-R Interval 160 ms QRS Duration 106 ms  
 Q-T Interval 406 ms QTC Calculation (Bezet) 418 ms Calculated P Axis 36 degrees Calculated R Axis -52 degrees Calculated T Axis 92 degrees Diagnosis    
  !! AGE AND GENDER SPECIFIC ECG ANALYSIS !! Normal sinus rhythm Possible Left atrial enlargement Left axis deviation Inferior infarct (cited on or before 10-AUG-2019) Anterolateral infarct (cited on or before 10-AUG-2019) Abnormal ECG When compared with ECG of 13-AUG-2019 07:30, 
QT has shortened CBC WITH AUTOMATED DIFF Collection Time: 10/07/19  7:53 AM  
Result Value Ref Range WBC 7.7 4.3 - 11.1 K/uL  
 RBC 3.69 (L) 4.05 - 5.2 M/uL  
 HGB 10.6 (L) 11.7 - 15.4 g/dL HCT 32.6 (L) 35.8 - 46.3 % MCV 88.3 79.6 - 97.8 FL  
 MCH 28.7 26.1 - 32.9 PG  
 MCHC 32.5 31.4 - 35.0 g/dL  
 RDW 13.1 11.9 - 14.6 % PLATELET 317 801 - 373 K/uL MPV 11.0 9.4 - 12.3 FL ABSOLUTE NRBC 0.00 0.0 - 0.2 K/uL  
 DF AUTOMATED NEUTROPHILS 71 43 - 78 % LYMPHOCYTES 17 13 - 44 % MONOCYTES 9 4.0 - 12.0 % EOSINOPHILS 2 0.5 - 7.8 % BASOPHILS 1 0.0 - 2.0 % IMMATURE GRANULOCYTES 0 0.0 - 5.0 %  
 ABS. NEUTROPHILS 5.5 1.7 - 8.2 K/UL  
 ABS. LYMPHOCYTES 1.3 0.5 - 4.6 K/UL  
 ABS. MONOCYTES 0.7 0.1 - 1.3 K/UL  
 ABS. EOSINOPHILS 0.2 0.0 - 0.8 K/UL  
 ABS. BASOPHILS 0.1 0.0 - 0.2 K/UL  
 ABS. IMM. GRANS. 0.0 0.0 - 0.5 K/UL METABOLIC PANEL, COMPREHENSIVE Collection Time: 10/07/19  7:53 AM  
Result Value Ref Range Sodium 139 136 - 145 mmol/L Potassium 4.1 3.5 - 5.1 mmol/L Chloride 108 (H) 98 - 107 mmol/L  
 CO2 23 21 - 32 mmol/L Anion gap 8 7 - 16 mmol/L Glucose 249 (H) 65 - 100 mg/dL  BUN 32 (H) 8 - 23 MG/DL  
 Creatinine 2.19 (H) 0.6 - 1.0 MG/DL  
 GFR est AA 29 (L) >60 ml/min/1.73m2 GFR est non-AA 24 (L) >60 ml/min/1.73m2 Calcium 8.9 8.3 - 10.4 MG/DL Bilirubin, total 0.6 0.2 - 1.1 MG/DL  
 ALT (SGPT) 22 12 - 65 U/L  
 AST (SGOT) 22 15 - 37 U/L Alk. phosphatase 74 50 - 136 U/L Protein, total 6.7 6.3 - 8.2 g/dL Albumin 2.9 (L) 3.2 - 4.6 g/dL Globulin 3.8 (H) 2.3 - 3.5 g/dL A-G Ratio 0.8 (L) 1.2 - 3.5    
TROPONIN I Collection Time: 10/07/19  7:53 AM  
Result Value Ref Range Troponin-I, Qt. 2.90 (HH) 0.02 - 0.05 NG/ML Xr Chest Pa Lat Result Date: 10/7/2019 History: cp Two views chest COMPARISON: 8/10/2019 Findings: The lungs are well expanded and clear. The cardiac silhouette, and mediastinal contour, and osseous structures are stable. Impression: Stable two-view chest.  
 
Patient is pain-free at this point. Elevated troponin. Giving aspirin and heparin. Notified cardiology and they will see patient.

## 2019-10-07 NOTE — PROGRESS NOTES
TRANSFER - IN REPORT: 
 
Verbal report received from April, RN on Rina Abraham being received from ED for routine progression of care Report consisted of patients Situation, Background, Assessment and Recommendations(SBAR). Information from the following report(s) Kardex and ED Summary was reviewed with the receiving nurse. Opportunity for questions and clarification was provided. Assessment completed upon patients arrival to unit and care assumed.

## 2019-10-07 NOTE — H&P
[facility] Cardiology History & Physical  
  
Date of  Admission: 10/7/2019  7:39 AM  
 
Primary Care Physician: Dr. Bill Frankel Primary Cardiologist: Dr. Karma Horan 
Referring Physician: Dr. Genesis Montez Admitting Physician: Dr. Julieta Monterroso CC: chest tightness HPI:  Genesis Aviles is a 62 y.o. obese WF with h/o CAD s/p CABG 2006 with LIMA-LAD, SVG-Diagonal and SVG-OM 2006, NSTEMI 8/2019 with PCI mLAD via LIMA with subsequent dissection requiring stents, DM II, HTN, CKD, dyslipidemia, PVD s/p toe amputation and hypothyroidism who was recently seen in the  office by Dr. Karma Horan and due to CKD had Lasix decreased from 40 mg every day to every other day. She felt SOB on Saturday 10/5. On Sunday evening 10/6, she developed 7/10 chest tightness at rest with radiation to bilateral neck with associated SOB, nausea w/o vomiting but no diaphoresis, palpitations or syncope. She has LE edema and reports it has worsened since decreasing lasix dose. The tightness waxed and waned for hours until she finally took a SL NTG early this morning. She reports relief down to 2/10. She presented to [facility] ED with these complaints. ECG showed NSR 64 bpm with non specific ST-T wave changes similar to previous ECG in August. Initial troponin was 2.90, , /74, Hgb 10.6, Cr 2.19 and CXR unremarkable. In the ED, she continues to have chest tightness 1-2/10 and has been started on IV heparin. Past Medical History:  
Diagnosis Date  Acute bronchitis  Acute pharyngitis  Allergic rhinitis due to other allergen  Coronary atherosclerosis of native coronary artery CABG x3  ~2006, no cardiac events or stents or chest pain since  Depressive disorder, not elsewhere classified   
 daily meds  Essential hypertension, benign   
 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 9)  Osteomyelitis of toe ([address] forth toe of left foot  Other specified acquired hypothyroidism  Thromboembolus ([facility] [address]   
 possible to back of left knee???, superficial no anticoagulation therapy required  Type II or unspecified type diabetes mellitus without mention of complication, uncontrolled   
 type 2: oral/insulin: avg. 112: s/s hypo shakes @100: A1C 7.2 on 07/2019 Past Surgical History:  
Procedure Laterality Date  CARDIAC SURG PROCEDURE UNLIST  2006 CABG x3  
 HX BREAST BIOPSY Left 02/20/2017  
 stereo  HX CORONARY ARTERY BYPASS GRAFT  2006 Three  VASCULAR SURGERY PROCEDURE UNLIST  08/10/2016  
 arteriogram of leg  VASCULAR SURGERY PROCEDURE UNLIST Left 08/26/2016  
  fem pop endarterectomy  VASCULAR SURGERY PROCEDURE UNLIST Left 03/12/2018  
 4th toe amp  VASCULAR SURGERY PROCEDURE UNLIST Right 08/01/2019 Right second toe amputation No Known Allergies Social History Socioeconomic History  Marital status:  Spouse name: Not on file  Number of children: Not on file  Years of education: Not on file  Highest education level: Not on file Occupational History  Not on file Social Needs  Financial resource strain: Not on file  Food insecurity:  
  Worry: Not on file Inability: Not on file  Transportation needs:  
  Medical: Not on file Non-medical: Not on file Tobacco Use  Smoking status: Former Smoker  Smokeless tobacco: Never Used  Tobacco comment: quit early 1990's Substance and Sexual Activity  Alcohol use: No  
 Drug use: No  
 Sexual activity: Not on file Lifestyle  Physical activity:  
  Days per week: Not on file Minutes per session: Not on file  Stress: Not on file Relationships  Social connections:  
  Talks on phone: Not on file Gets together: Not on file Attends Rastafarian service: Not on file Active member of club or organization: Not on file Attends meetings of clubs or organizations: Not on file Relationship status: Not on file  Intimate partner violence:  
  Fear of current or ex partner: Not on file Emotionally abused: Not on file Physically abused: Not on file Forced sexual activity: Not on file Other Topics Concern  Not on file Social History Narrative  Not on file Family History Problem Relation Age of Onset  Diabetes Other  Hypertension Other  Heart Disease Other  Diabetes Mother  Hypertension Mother  Cancer Father  Diabetes Sister  Heart Disease Sister  Diabetes Brother  Diabetes Sister  Hypertension Sister  Diabetes Sister  Hypertension Sister  Diabetes Sister  Hypertension Sister  Diabetes Brother Current Facility-Administered Medications Medication Dose Route Frequency  heparin 25,000 units in dextrose 500 mL infusion  12-25 Units/kg/hr (Adjusted) IntraVENous TITRATE Current Outpatient Medications Medication Sig  furosemide (LASIX) 40 mg tablet Take 1 Tab by mouth every other day.  acetaminophen (TYLENOL) 325 mg tablet Take 2 Tabs by mouth every six (6) hours as needed for Pain.  ticagrelor (BRILINTA) 90 mg tablet Take 1 Tab by mouth every twelve (12) hours every twelve (12) hours.  nitroglycerin (NITROSTAT) 0.4 mg SL tablet 1 Tab by SubLINGual route every five (5) minutes as needed for Chest Pain. Up to 3 doses.  metoprolol tartrate (LOPRESSOR) 25 mg tablet Take 1 Tab by mouth every twelve (12) hours.  potassium chloride (K-DUR, KLOR-CON) 20 mEq tablet Take 1 Tab by mouth daily.  escitalopram oxalate (LEXAPRO) 20 mg tablet Take 1 Tab by mouth two (2) times a day.  amLODIPine (NORVASC) 5 mg tablet Take 1 Tab by mouth daily.  atorvastatin (LIPITOR) 40 mg tablet Take 1 Tab by mouth daily.   
 insulin degludec (TRESIBA FLEXTOUCH U-100) 100 unit/mL (3 mL) inpn 70 Units by SubCUTAneous route daily for 90 days. (Patient taking differently: 62 Units by SubCUTAneous route daily. Indications: type 2 diabetes mellitus)  levothyroxine (SYNTHROID) 200 mcg tablet Take 1 Tab by mouth Daily (before breakfast).  levothyroxine (SYNTHROID) 25 mcg tablet Take 1 Tab by mouth Daily (before breakfast).  aspirin delayed-release 81 mg tablet Take 81 mg by mouth every morning. Review of symptoms: 
General: no recent weight loss/gain, +weakness, +fatigue, no fever or chills Skin: no rashes, lumps, or other skin changes HEENT: no headache, dizziness, lightheadedness, vision changes, hearing changes, tinnitus, vertigo, sinus pressure/pain, bleeding gums, sore throat, or hoarseness Neck: no swollen glands, goiter, pain or stiffness Respiratory: no cough, sputum, hemoptysis, +dyspnea, wheezing Cardiovascular: +chest pain or discomfort, no palpitations, +dyspnea, no orthopnea, paroxysmal nocturnal dyspnea, +peripheral edema Gastrointestinal: no trouble swallowing, heartburn, change of appetite, nausea, change in bowel habits, pain with defecation, rectal bleeding or black/tarry stools, hemorrhoids, constipation, diarrhea, abdominal pain, jaundice, liver or gallbladder problems Urinary: no frequency, urgency , hematuria, burning/pain with urination, recent flank pain, polyuria, nocturia, or difficulty urinating Genital: no vaginal or pelvic infections Peripheral Vascular: + claudication, leg cramps, prior DVTs, swelling of calves, legs, or feet, color change, or swelling with redness or tenderness Musculoskeletal: no muscle or joint pain/stiffness, joint swelling, erythema of joints, or back pain Psychiatric: no depression, mental disorders, or excessive stress Neurological: no history of CVA, dizziness, no sensory or motor loss, seizures, syncope, tremors, numbness, tingling, no changes in mood, attention, or speech, no changes in orientation, memory, insight, or judgment. no headache, vertigo. Hematologic: +anemia, easy bruising or bleeding Endocrine: +diabetes, +thyroid problems, heat or cold intolerance, excessive sweating, polyuria, polydipsia Subjective:  
Physical Exam 
 
Visit Vitals /70 Pulse 64 Temp 99.1 °F (37.3 °C) Resp 17 Ht 5' 8\" (1.727 m) Wt 113.9 kg (251 lb) SpO2 99% BMI 38.16 kg/m² General Appearance:  Well developed, well nourished, alert and oriented x 3, and individual in no acute distress. Ears/Nose/Mouth/Throat:   Hearing grossly normal. 
  
    Neck: Supple. Chest:   Lungs fairly clear to auscultation bilaterally. Cardiovascular:  Regular rate and rhythm, S1, S2 normal, no murmur. Abdomen:   Soft, non-tender, bowel sounds are active. Extremities: 1+ pitting edema bilaterally. Skin: Warm and dry. Labs:  
Recent Results (from the past 24 hour(s)) EKG, 12 LEAD, INITIAL Collection Time: 10/07/19  7:47 AM  
Result Value Ref Range Ventricular Rate 64 BPM  
 Atrial Rate 64 BPM  
 P-R Interval 160 ms QRS Duration 106 ms  
 Q-T Interval 406 ms QTC Calculation (Bezet) 418 ms Calculated P Axis 36 degrees Calculated R Axis -52 degrees Calculated T Axis 92 degrees Diagnosis    
  !! AGE AND GENDER SPECIFIC ECG ANALYSIS !! Normal sinus rhythm Possible Left atrial enlargement Left axis deviation Inferior infarct (cited on or before 10-AUG-2019) Anterolateral infarct (cited on or before 10-AUG-2019) Abnormal ECG When compared with ECG of 13-AUG-2019 07:30, 
QT has shortened CBC WITH AUTOMATED DIFF Collection Time: 10/07/19  7:53 AM  
Result Value Ref Range WBC 7.7 4.3 - 11.1 K/uL  
 RBC 3.69 (L) 4.05 - 5.2 M/uL  
 HGB 10.6 (L) 11.7 - 15.4 g/dL HCT 32.6 (L) 35.8 - 46.3 % MCV 88.3 79.6 - 97.8 FL  
 MCH 28.7 26.1 - 32.9 PG  
 MCHC 32.5 31.4 - 35.0 g/dL  
 RDW 13.1 11.9 - 14.6 % PLATELET 284 153 - 216 K/uL  MPV 11.0 9.4 - 12.3 FL  
 ABSOLUTE NRBC 0.00 0.0 - 0.2 K/uL  
 DF AUTOMATED NEUTROPHILS 71 43 - 78 % LYMPHOCYTES 17 13 - 44 % MONOCYTES 9 4.0 - 12.0 % EOSINOPHILS 2 0.5 - 7.8 % BASOPHILS 1 0.0 - 2.0 % IMMATURE GRANULOCYTES 0 0.0 - 5.0 %  
 ABS. NEUTROPHILS 5.5 1.7 - 8.2 K/UL  
 ABS. LYMPHOCYTES 1.3 0.5 - 4.6 K/UL  
 ABS. MONOCYTES 0.7 0.1 - 1.3 K/UL  
 ABS. EOSINOPHILS 0.2 0.0 - 0.8 K/UL  
 ABS. BASOPHILS 0.1 0.0 - 0.2 K/UL  
 ABS. IMM. GRANS. 0.0 0.0 - 0.5 K/UL METABOLIC PANEL, COMPREHENSIVE Collection Time: 10/07/19  7:53 AM  
Result Value Ref Range Sodium 139 136 - 145 mmol/L Potassium 4.1 3.5 - 5.1 mmol/L Chloride 108 (H) 98 - 107 mmol/L  
 CO2 23 21 - 32 mmol/L Anion gap 8 7 - 16 mmol/L Glucose 249 (H) 65 - 100 mg/dL BUN 32 (H) 8 - 23 MG/DL Creatinine 2.19 (H) 0.6 - 1.0 MG/DL  
 GFR est AA 29 (L) >60 ml/min/1.73m2 GFR est non-AA 24 (L) >60 ml/min/1.73m2 Calcium 8.9 8.3 - 10.4 MG/DL Bilirubin, total 0.6 0.2 - 1.1 MG/DL  
 ALT (SGPT) 22 12 - 65 U/L  
 AST (SGOT) 22 15 - 37 U/L Alk. phosphatase 74 50 - 136 U/L Protein, total 6.7 6.3 - 8.2 g/dL Albumin 2.9 (L) 3.2 - 4.6 g/dL Globulin 3.8 (H) 2.3 - 3.5 g/dL A-G Ratio 0.8 (L) 1.2 - 3.5    
TROPONIN I Collection Time: 10/07/19  7:53 AM  
Result Value Ref Range Troponin-I, Qt. 2.90 (HH) 0.02 - 0.05 NG/ML  
BNP Collection Time: 10/07/19  7:53 AM  
Result Value Ref Range  (H) 0 pg/mL PROTHROMBIN TIME + INR Collection Time: 10/07/19  7:53 AM  
Result Value Ref Range Prothrombin time 13.9 11.7 - 14.5 sec INR 1.0    
PTT Collection Time: 10/07/19  7:53 AM  
Result Value Ref Range aPTT 31.3 24.7 - 39.8 SEC Pt has been seen and examined by Dr. Sumaya Chirinos and he agrees with the following assessment and plan: 
 
 Assessment/Plan:  
  
  
 Diagnosis  NSTEMI (non-ST elevated myocardial infarction)- admit to telemetry, continue serial CE's, ASA, Brilinta, heparin, BB, no ACE-I/ARB with CKD, statin, plan LH today to evaluate for ischemia, check echo  CAD (coronary atherosclerosis disease) s/p CABG 2006, NSTEMI 8/2019-  ASA, Brilinta, BB, no ACE-I/ARB with CKD, statin  Diabetes Mellitus- continue home meds, SSI, monitor glucose  Essential hypertension- uncontrolled, continue meds, nitrates, monitor and titrate as needed  CKD (chronic kidney disease)- IVF in prep for [facility] today, monitor, no ACE-I/ARB  PAD (peripheral artery disease)  Mixed hyperlipidemia- statin  Acquired hypothyroidism- continue home dose synthroid  Diabetic ulcer of toe s/p toe amputation per vascular  Severe obesity (BMI 35.0-39. 9) with comorbidity ([address]- weight loss encouraged [employer]

## 2019-10-07 NOTE — ED TRIAGE NOTES
Patient reports chest pain since yesterday. Radiation to neck. Pain described as a tightness to chest. Some nausea and headache, denies vomiting or diarrhea.  Took 1 nitro at home before arriving to ED

## 2019-10-07 NOTE — PROGRESS NOTES
Bedside and Verbal shift change report given to self, RN (oncoming nurse) by Roe Wagner RN (offgoing nurse). Report included the following information SBAR, Kardex, Intake/Output, MAR and Recent Results. Left groin cath site visualized with off going RN. Dressing CDI.

## 2019-10-07 NOTE — PROCEDURES
[address] [facility] 
CARDIAC CATH Name:  Zachary Sheehan 
MR#:  [id number] :  1960 ACCOUNT #:  [de-identified] DATE OF SERVICE:  10/07/2019 PRIMARY CARDIOLOGIST:  Jerald Cm. Lori Goldberg MD 
 
PRIMARY CARE PHYSICIAN:  Jayant Cedeno DO 
 
BRIEF HISTORY:  The patient is a 67-year-old female with well-established atherosclerotic coronary disease presented in August of this year with unstable angina, has complicated intervention with subclavian left internal mammary artery dissection with reconstruction of the native LAD via the LIMA as well as the proximal LIMA body. The retrograde dissection of the subclavian was felt to be best left alone as suspected would resolve. The patient had severe native circumflex disease into a native left posterior descending, which was managed medically. The patient had a patent vein graft to a diagonal, which actually filled the LAD quite well. There was an occluded vein graft noted and a small nondominant right. The patient presents now with recurrent chest pain, elevated troponin consistent with non-ST-elevation myocardial infarction for cardiac catheterization. PREOPERATIVE DIAGNOSIS:  Non-ST-elevation myocardial infarction. POSTOPERATIVE DIAGNOSIS:  Non-ST-elevation myocardial infarction. PROCEDURES PERFORMED:  Bilateral selective coronary angiography, left ventricular pressure measurements, saphenous vein graft injection x3, left internal mammary artery injection as well as PCI and stenting of the left internal mammary artery, PCI and stenting of the mid left circumflex, PCI and stenting of the left posterior descending, left subclavian angiography was performed. SURGEON:  Chad Nobles MD 
 
ASSISTANT:  None. COMPLICATIONS:  None. ANESTHESIA:  Conscious sedation. Start time 13:55, end time 15:05. MEDICATIONS:  4 mg of Versed, 100 mcg of fentanyl. MONITORING RN:  Douglas Lyn. SPECIMENS:  None. IMPLANTS:  Please see below. ESTIMATED BLOOD LOSS:  10 mL. PROCEDURE:  After informed consent, she was prepped and draped in the usual sterile fashion. Left groin was infiltrated with lidocaine. Under ultrasound guidance, the left femoral artery was accessed. A 6-Nepalese sheath was advanced. A 6-Nepalese JL4 was utilized for left coronary injection. A 6-Nepalese JR4 for right coronary injection. A 6-Nepalese JR4 was utilized for saphenous vein graft injection to the obtuse marginal as well as saphenous vein graft injection to the diagonal in an occluded vein graft. A 6-Nepalese JR4 was utilized for left subclavian angiography. Over a long wire, a 5-Nepalese LIMA catheter was utilized for left mammary artery injection. Multipurpose was utilized for left ventricular pressure measurements. Isovue contrast utilized. FINDINGS: 
1. Left ventricle:  Left ventriculogram not obtained. Left ventricular end-diastolic pressure measured 24 mmHg. There was no aortic valve gradient. 2.  Left main:  Left main is large, bifurcates into LAD and circumflex system, appears angiographically normal. 
3.  Left anterior descending coronary: This is functionally occluded in the midportion. There may be mild channel present, but no distal vessel is seen. 4.  Left circumflex coronary: It is a large dominant vessel, has a 99% mid stenosis. The first obtuse marginal is occluded. The ongoing distal left circumflex appears to have severe disease into the left posterior descending on the order of 90%. There were collaterals to left posterior descending that are small, but visible from the diagonal graft. 5.  Right coronary: It is a small nondominant vessel, diffuse 50% stenosis. 6.  Left internal mammary artery to the LAD:  It is a moderate-sized graft. There is a 90% ostial circumferential severe stenosis with marked dampening upon engagement.   The stents in the proximal LIMA remain widely patent. There was significant competitive flow due to the patent vein graft to the diagonal, but the entire LIMA does remain patent through the LAD as well as the LAD stenting beyond the graft. 7.  Left subclavian artery:  Left subclavian artery has no residual dissection. There is moderate to severe ostial stenosis on the order of 60-70% with a 25 mm gradient across the left internal mammary artery. 8.  Saphenous vein graft to the obtuse marginal is a large graft, good proximal takeoff, good distal anastomosis, fills the obtuse marginal well. The obtuse marginal does have diffuse 50% small vessel/terminal disease consistent with diabetic vasculopathy. 9.  Saphenous vein graft to diagonal is a large graft, good proximal takeoff, good distal anastomosis. The diagonal itself is fairly small, sub 2-mm in nature. This primarily backfills the LAD system, which remains widely patent. As well as the stents. 10.  Saphenous vein graft to unknown vessel appears chronically occluded. PERCUTANEOUS CORONARY INTERVENTION: 
Lesion #1:  Ostial left internal mammary artery graft. Pre-stenosis 90%, , post-stenosis 0%. DETAILS:  The patient was anticoagulated with Angiomax. A 6-Niuean multipurpose guide was utilized. A Whisper wire was advanced distally. This was predilated with 2.5 x 10 AngioSculpt atherectomy balloon followed by stenting with a 2.5 x 12 [place] drug-eluting stent postdilated with 2.5 noncompliant balloon. This yielded an excellent angiographic result. The wire was removed and orthogonal views were obtained. Lesion #2:  Left PDA 90% and 0%. Mid left circumflex 99% and 0%. PROCEDURE:  A 6-Niuean 3.0 guide was utilized. A Whisper wire was advanced into the superior branch and a second Whisper wire into the inferior branch of the left posterior descending vessel.   The mid vessel was predilated with 2.5 x 20 Euphora balloon and then the superior branch was predilated with 2.25 x 20 Euphora balloon. This restored MIA 3 flow throughout the left circumflex into the left posterior descending vessels. The distal bifurcation remain widely patent. There was no compromise of either branch and the more inferior branch Whisper wire was removed. A 2.25 x 26 Manuel drug-eluting stent was positioned from the superior branch back into the circumflex proper and inflated up to 14 atmospheres. Following this, a 2.75 x 38 Manuel drug-eluting stent was positioned from the proximal left circumflex across the area of severe mid disease, inflated up to 18 atmospheres. The distal portion was postdilated with a 2.75 noncompliant balloon and the proximal left posterior descending vessel was postdilated to high pressure with a 2.5 noncompliant balloon. This yielded an excellent angiographic result. The wire was removed and orthogonal views were obtained. Successful hemostasis with Mynx closure device. CONCLUSIONS: 
1. Severe ostial left internal mammary artery disease status post stenting with Manuel drug-eluting stent. 2.  Reconstruction of the left dominant circumflex with Simpson drug-eluting stent x2, one to the mid left circumflex and one to the left posterior descending as described above. 3.  Widely patent vein graft to the diagonal. 
4.  Widely patent vein graft to the obtuse marginal.  Both the diagonal and the obtuse marginal both demonstrate small vessel diabetic vasculopathy. 5.  Widely patent proximal LIMA stenting and LAD distal to the LIMA stenting. 6.  Moderate to severe proximal left subclavian artery stenosis with 25 mm gradient. Given the patient's advanced renal failure and need for multivessel intervention, the subclavian was not addressed. This should be considered if the patient has any angina in the near future to improve and support blood flow through the left internal mammary artery. Thank you for allowing us to participate in the care of this patient. Any questions or concerns, please feel free to contact me. Juan Ruelas MD 
 
 
[id number] 
D:  10/07/2019 15:24 
T:  10/07/2019 17:05 
JOB #:  [id number] CC:  MD Cesar Martínez DO

## 2019-10-07 NOTE — PROCEDURES
Brief Cardiac Procedure Note Patient: Yareli Hope MRN: [id number]  SSN: [id number] YOB: 1960  Age: 62 y.o. Sex: female Date of Procedure: 10/7/2019 Pre-procedure Diagnosis: NSTEMI Post-procedure Diagnosis: Coronary Artery Disease Procedure: Left Heart Catheterization with Percutaneous Coronary Intervention Brief Description of Procedure: See note Performed By: Elena Farrell MD  
 
Assistants: None Anesthesia: Moderate Sedation Estimated Blood Loss: Less than 10 mL Specimens: None Implants: None Findings:  
LV:  EDP 24mmHg LM:  NML 
LAD:  99%mid LCx:  99% mi, OM1 100%, 99% (L)PDA RCA:  Small non-dominant LIMA-LAD:  Patent with 90% ostial LIMA stenosis - all other stents patent SVG-Dx:  Patent - severe small vessel Dx disease, LAD fills well retrograde SVG-OM:  Patent SVG-?:  100% PCI LIMA 90-0% 2. 5x10 Angiosculpt 2.5x12 Manuel PCI mLCx 99-0% 2.5x20 Jorden Lobstein 2.75x38 Manuel PCI (L)PDA 90-0% 2.5x20 [place] 2.25x26 Manuel 2,5x20 NC Jorden Lobstein (L) subclavian with 25mmHg gradient - 60-70% stenosis 
 
(L)CFA Mynx Complications: None Recommendations: Continue medical therapy. Signed By: Elena Farrell MD   
 October 7, 2019

## 2019-10-07 NOTE — PROGRESS NOTES
TRANSFER - OUT REPORT: 
 
Verbal report given to Rolando Lewis RN(name) on Maximino Friday  being transferred to CPRU(unit) for routine progression of care Report consisted of patients Situation, Background, Assessment and  
Recommendations(SBAR). Information from the following report(s) Procedure Summary, MAR and Cardiac Rhythm SR was reviewed with the receiving nurse. Lines:  
Peripheral IV 10/07/19 Left Antecubital (Active) Site Assessment Clean, dry, & intact 10/7/2019 11:13 AM  
Phlebitis Assessment 0 10/7/2019 11:13 AM  
Infiltration Assessment 0 10/7/2019 11:13 AM  
Dressing Status Clean, dry, & intact 10/7/2019 11:13 AM  
Hub Color/Line Status Infusing 10/7/2019 11:13 AM  
   
Peripheral IV 10/07/19 Left Forearm (Active) Site Assessment Clean, dry, & intact 10/7/2019 11:13 AM  
Phlebitis Assessment 0 10/7/2019 11:13 AM  
Infiltration Assessment 0 10/7/2019 11:13 AM  
Dressing Status Clean, dry, & intact 10/7/2019 11:13 AM  
Hub Color/Line Status Infusing 10/7/2019 11:13 AM  
  
 
Opportunity for questions and clarification was provided. Patient transported with: 
 Registered Nurse [facility] Dr Manan Valladares Left femoral access - 6fe mynx closure to site - site C/D/I - tegaderm to cover Stent x1 CRANE Stent x1 LPda Stent x1 mLC Left femoral access Versed 4 mg IV Fent 100 mcg IV Angiomax on @ 1424 Angiomax off @ [id number]

## 2019-10-07 NOTE — PROGRESS NOTES
TRANSFER - IN REPORT: 
 
Verbal report received from Shayan Meyer RN on Anabell Roberts being received from CCL for ordered procedure Report consisted of patients Situation, Background, Assessment and Recommendations(SBAR). Information from the following report(s) Procedure Summary was reviewed with the receiving nurse. Opportunity for questions and clarification was provided. Assessment completed upon patients arrival to unit and care assumed.

## 2019-10-08 VITALS
BODY MASS INDEX: 37.68 KG/M2 | HEART RATE: 68 BPM | RESPIRATION RATE: 20 BRPM | OXYGEN SATURATION: 93 % | SYSTOLIC BLOOD PRESSURE: 116 MMHG | DIASTOLIC BLOOD PRESSURE: 62 MMHG | TEMPERATURE: 98.8 F | HEIGHT: 68 IN | WEIGHT: 248.6 LBS

## 2019-10-08 PROBLEM — N18.30 CKD (CHRONIC KIDNEY DISEASE) STAGE 3, GFR 30-59 ML/MIN (HCC): Chronic | Status: ACTIVE | Noted: 2019-10-08

## 2019-10-08 LAB
ANION GAP SERPL CALC-SCNC: 8 MMOL/L (ref 7–16)
ATRIAL RATE: 63 BPM
BASOPHILS # BLD: 0.1 K/UL (ref 0–0.2)
BASOPHILS NFR BLD: 1 % (ref 0–2)
BUN SERPL-MCNC: 33 MG/DL (ref 8–23)
CALCIUM SERPL-MCNC: 8.2 MG/DL (ref 8.3–10.4)
CALCULATED P AXIS, ECG09: 36 DEGREES
CALCULATED R AXIS, ECG10: -36 DEGREES
CALCULATED T AXIS, ECG11: 105 DEGREES
CHLORIDE SERPL-SCNC: 107 MMOL/L (ref 98–107)
CHOLEST SERPL-MCNC: 120 MG/DL
CO2 SERPL-SCNC: 24 MMOL/L (ref 21–32)
CREAT SERPL-MCNC: 2.36 MG/DL (ref 0.6–1)
DIAGNOSIS, 93000: NORMAL
DIFFERENTIAL METHOD BLD: NORMAL
EOSINOPHIL # BLD: 0.3 K/UL (ref 0–0.8)
EOSINOPHIL NFR BLD: 4 % (ref 0.5–7.8)
ERYTHROCYTE [DISTWIDTH] IN BLOOD BY AUTOMATED COUNT: 13.1 % (ref 11.9–14.6)
GLUCOSE BLD STRIP.AUTO-MCNC: 126 MG/DL (ref 65–100)
GLUCOSE SERPL-MCNC: 129 MG/DL (ref 65–100)
HCT VFR BLD AUTO: 29.6 % (ref 35.8–46.3)
HDLC SERPL-MCNC: 41 MG/DL (ref 40–60)
HDLC SERPL: 2.9 {RATIO}
HGB BLD-MCNC: 9.5 G/DL (ref 11.7–15.4)
IMM GRANULOCYTES # BLD AUTO: 0 K/UL (ref 0–0.5)
IMM GRANULOCYTES NFR BLD AUTO: 0 % (ref 0–5)
LDLC SERPL CALC-MCNC: 56.4 MG/DL
LIPID PROFILE,FLP: NORMAL
LYMPHOCYTES # BLD: 1.2 K/UL (ref 0.5–4.6)
LYMPHOCYTES NFR BLD: 18 % (ref 13–44)
MCH RBC QN AUTO: 28.4 PG (ref 26.1–32.9)
MCHC RBC AUTO-ENTMCNC: 32.1 G/DL (ref 31.4–35)
MCV RBC AUTO: 88.6 FL (ref 79.6–97.8)
MONOCYTES # BLD: 0.7 K/UL (ref 0.1–1.3)
MONOCYTES NFR BLD: 10 % (ref 4–12)
NEUTS SEG # BLD: 4.5 K/UL (ref 1.7–8.2)
NEUTS SEG NFR BLD: 67 % (ref 43–78)
NRBC # BLD: 0 K/UL (ref 0–0.2)
P-R INTERVAL, ECG05: 170 MS
PLATELET # BLD AUTO: 188 K/UL (ref 150–450)
PMV BLD AUTO: 11.1 FL (ref 9.4–12.3)
POTASSIUM SERPL-SCNC: 4.1 MMOL/L (ref 3.5–5.1)
Q-T INTERVAL, ECG07: 464 MS
QRS DURATION, ECG06: 102 MS
QTC CALCULATION (BEZET), ECG08: 474 MS
RBC # BLD AUTO: 3.34 M/UL (ref 4.05–5.2)
SODIUM SERPL-SCNC: 139 MMOL/L (ref 136–145)
TRIGL SERPL-MCNC: 113 MG/DL (ref 35–150)
VENTRICULAR RATE, ECG03: 63 BPM
VLDLC SERPL CALC-MCNC: 22.6 MG/DL (ref 6–23)
WBC # BLD AUTO: 6.6 K/UL (ref 4.3–11.1)

## 2019-10-08 PROCEDURE — 80048 BASIC METABOLIC PNL TOTAL CA: CPT

## 2019-10-08 PROCEDURE — 74011250637 HC RX REV CODE- 250/637: Performed by: PHYSICIAN ASSISTANT

## 2019-10-08 PROCEDURE — 93005 ELECTROCARDIOGRAM TRACING: CPT | Performed by: INTERNAL MEDICINE

## 2019-10-08 PROCEDURE — 82962 GLUCOSE BLOOD TEST: CPT

## 2019-10-08 PROCEDURE — 74011636637 HC RX REV CODE- 636/637: Performed by: PHYSICIAN ASSISTANT

## 2019-10-08 PROCEDURE — 85027 COMPLETE CBC AUTOMATED: CPT

## 2019-10-08 PROCEDURE — 80061 LIPID PANEL: CPT

## 2019-10-08 PROCEDURE — 36415 COLL VENOUS BLD VENIPUNCTURE: CPT

## 2019-10-08 RX ORDER — ATORVASTATIN CALCIUM 80 MG/1
80 TABLET, FILM COATED ORAL
Qty: 30 TAB | Refills: 11 | Status: SHIPPED | OUTPATIENT
Start: 2019-10-08 | End: 2020-01-01

## 2019-10-08 RX ADMIN — AMLODIPINE BESYLATE 5 MG: 5 TABLET ORAL at 08:20

## 2019-10-08 RX ADMIN — Medication 5 ML: at 05:52

## 2019-10-08 RX ADMIN — INSULIN GLARGINE 62 UNITS: 100 INJECTION, SOLUTION SUBCUTANEOUS at 08:19

## 2019-10-08 RX ADMIN — LEVOTHYROXINE SODIUM 25 MCG: 50 TABLET ORAL at 08:21

## 2019-10-08 RX ADMIN — ESCITALOPRAM OXALATE 20 MG: 10 TABLET ORAL at 08:19

## 2019-10-08 RX ADMIN — METOPROLOL TARTRATE 25 MG: 25 TABLET ORAL at 08:19

## 2019-10-08 RX ADMIN — TICAGRELOR 90 MG: 90 TABLET ORAL at 08:19

## 2019-10-08 RX ADMIN — LEVOTHYROXINE SODIUM 200 MCG: 200 TABLET ORAL at 08:21

## 2019-10-08 RX ADMIN — ASPIRIN 81 MG: 81 TABLET ORAL at 08:19

## 2019-10-08 NOTE — PROGRESS NOTES
Bedside and Verbal shift change report given to Cass Frye RN (oncoming nurse) by self, RN (offgoing nurse). Report included the following information SBAR, Kardex, Intake/Output, MAR and Recent Results. Left groin cath site visualized with oncoming RN. Dressing CDI.

## 2019-10-08 NOTE — DISCHARGE INSTRUCTIONS
Patient Education   Patient Education        Percutaneous Coronary Intervention: What to Expect at Home  Your Recovery    Percutaneous coronary intervention (PCI) is the name for procedures that are used to open a narrowed or blocked coronary artery. The two most common PCI procedures are coronary angioplasty and coronary stent placement. Your groin or arm may have a bruise and feel sore for a day or two after a percutaneous coronary intervention (PCI). You can do light activities around the house, but nothing strenuous for several days. This care sheet gives you a general idea about how long it will take for you to recover. But each person recovers at a different pace. Follow the steps below to get better as quickly as possible. How can you care for yourself at home? Activity    · If the doctor gave you a sedative:  ? For 24 hours, don't do anything that requires attention to detail, such as going to work, making important decisions, or signing any legal documents. It takes time for the medicine's effects to completely wear off.  ? For your safety, do not drive or operate any machinery that could be dangerous. Wait until the medicine wears off and you can think clearly and react easily.     · Do not do strenuous exercise and do not lift, pull, or push anything heavy until your doctor says it is okay. This may be for a day or two. You can walk around the house and do light activity, such as cooking.     · If the catheter was placed in your groin, try not to walk up stairs for the first couple of days.     · If the catheter was placed in your arm near your wrist, do not bend your wrist deeply for the first couple of days. Be careful using your hand to get into and out of a chair or bed.     · Carry your stent identification card with you at all times.     · If your doctor recommends it, get more exercise. Walking is a good choice. Bit by bit, increase the amount you walk every day.  Try for at least 30 minutes on most days of the week. Diet    · Drink plenty of fluids to help your body flush out the dye. If you have kidney, heart, or liver disease and have to limit fluids, talk with your doctor before you increase the amount of fluids you drink.     · Keep eating a heart-healthy diet that has lots of fruits, vegetables, and whole grains. If you have not been eating this way, talk to your doctor. You also may want to talk to a dietitian. This expert can help you to learn about healthy foods and plan meals. Medicines    · Your doctor will tell you if and when you can restart your medicines. He or she will also give you instructions about taking any new medicines.     · If you take blood thinners, such as warfarin (Coumadin), clopidogrel (Plavix), or aspirin, be sure to talk to your doctor. He or she will tell you if and when to start taking those medicines again. Make sure that you understand exactly what your doctor wants you to do.     · Your doctor will prescribe blood-thinning medicines. You will likely take aspirin plus another antiplatelet, such as clopidogrel (Plavix). It is very important that you take these medicines exactly as directed. These medicines help keep the coronary artery open and reduce your risk of a heart attack.     · Call your doctor if you think you are having a problem with your medicine.    Care of the catheter site    · For 1 or 2 days, keep a bandage over the spot where the catheter was inserted. The bandage probably will fall off in this time.     · Put ice or a cold pack on the area for 10 to 20 minutes at a time to help with soreness or swelling. Put a thin cloth between the ice and your skin.     · You may shower 24 to 48 hours after the procedure, if your doctor okays it. Pat the incision dry.     · Do not soak the catheter site until it is healed. Don't take a bath for 1 week, or until your doctor tells you it is okay.     · Watch for bleeding from the site.  A small amount of blood (up to the size of a quarter) on the bandage can be normal.     · If you are bleeding, lie down and press on the area for 15 minutes to try to make it stop. If the bleeding does not stop, call your doctor or seek immediate medical care. Follow-up care is a key part of your treatment and safety. Be sure to make and go to all appointments, and call your doctor if you are having problems. It's also a good idea to know your test results and keep a list of the medicines you take. When should you call for help? Call 911 anytime you think you may need emergency care. For example, call if:    · You passed out (lost consciousness).     · You have severe trouble breathing.     · You have sudden chest pain and shortness of breath, or you cough up blood.     · You have symptoms of a heart attack, such as:  ? Chest pain or pressure. ? Sweating. ? Shortness of breath. ? Nausea or vomiting. ? Pain that spreads from the chest to the neck, jaw, or one or both shoulders or arms. ? Dizziness or lightheadedness. ? A fast or uneven pulse. After calling 911, chew 1 adult-strength aspirin. Wait for an ambulance. Do not try to drive yourself.     · You have been diagnosed with angina, and you have angina symptoms that do not go away with rest or are not getting better within 5 minutes after you take one dose of nitroglycerin.    Call your doctor now or seek immediate medical care if:    · You are bleeding from the area where the catheter was put in your artery.     · You have a fast-growing, painful lump at the catheter site.     · You have signs of infection, such as:  ? Increased pain, swelling, warmth, or redness. ? Red streaks leading from the catheter site. ? Pus draining from the catheter site. ? A fever.     · Your leg, arm, or hand is painful, looks blue, or feels cold, numb, or tingly.    Watch closely for changes in your health, and be sure to contact your doctor if you have any problems. Where can you learn more?   Go to http://alina.info/. Enter W819 in the search box to learn more about \"Percutaneous Coronary Intervention: What to Expect at Home. \"  Current as of: April 9, 2019  Content Version: 12.2  © 3598-2494 [employer]. Care instructions adapted under license by [employer] (which disclaims liability or warranty for this information). If you have questions about a medical condition or this instruction, always ask your healthcare professional. [place] [address] any warranty or liability for your use of this information. Heart Attack: Care Instructions  Your Care Instructions    A heart attack (myocardial infarction, or MI) occurs when one or more of the coronary arteries, which supply the heart with oxygen-rich blood, is blocked. A blockage usually occurs when plaque inside the artery breaks open and a blood clot forms in the artery. After a heart attack, you may be worried about your future. Over the next several weeks, your heart will start to heal. Though it can be hard to break old habits, you can prevent another heart attack by making some lifestyle changes and by taking medicines. You may use this information for ideas about what to do at home to speed your recovery. Follow-up care is a key part of your treatment and safety. Be sure to make and go to all appointments, and call your doctor if you are having problems. It's also a good idea to know your test results and keep a list of the medicines you take. How can you care for yourself at home? Activity    · Until your doctor says it is okay, do not do strenuous exercise. And do not lift, pull, or push anything heavy. Ask your doctor what types of activities are safe for you.     · If your doctor has not set you up with a cardiac rehabilitation (rehab) program, talk to him or her about whether that is right for you. Cardiac rehab includes supervised exercise.  It also includes help with diet and lifestyle changes and emotional support. It may reduce your risk of future heart problems.     · Increase your activities slowly. Take short rest breaks when you get tired.     · If your doctor recommends it, get more exercise. Walking is a good choice. Bit by bit, increase the amount you walk every day. Try for at least 30 minutes on most days of the week. You also may want to swim, bike, or do other activities. Talk with your doctor before you start an exercise program to make sure it is safe for you.     · Ask your doctor when you can drive, go back to work, and do other daily activities again.     · You can have sex as soon as you feel ready for it. Often this means when you can easily walk around or climb stairs. Talk with your doctor if you have any concerns. If you are taking nitroglycerin, do not take erection-enhancing medicine such as sildenafil (Viagra), tadalafil (Cialis), or vardenafil (Levitra) .    Lifestyle changes    · Do not smoke. Smoking increases your risk of another heart attack. If you need help quitting, talk to your doctor about stop-smoking programs and medicines. These can increase your chances of quitting for good.     · Eat a heart-healthy diet that is low in saturated fat and salt, and is full of fruits, vegetables and whole-grains. Eat at least two servings of fish each week. You may get more details about how to eat healthy. But these tips can help you get started.     · Stay at a healthy weight, or lose weight if you need to. Medicines    · Be safe with medicines. Take your medicines exactly as prescribed. Call your doctor if you think you are having a problem with your medicine. You will get more details on the specific medicines your doctor prescribes. Do not stop taking your medicine unless your doctor tells you to.  Not taking your medicine might raise your risk of having another heart attack.     · You may need several medicines to help lower your risk of another heart attack. These include:  ? Blood pressure medicines such as angiotensin-converting enzyme (ACE) inhibitors, ARBs (angiotensin II receptor blockers), and beta-blockers. ? Cholesterol medicine called statins. ? Aspirin and other blood thinners. These prevent blood clots that can cause a heart attack.     · If your doctor has given you nitroglycerin, keep it with you at all times. If you have angina symptoms, such as chest pain or pressure, sit down and rest. Take the first dose of nitroglycerin as directed. If symptoms get worse or are not getting better within 5 minutes, call 911 right away. Stay on the phone. The emergency  will tell you what to do.     · Do not take any over-the-counter medicines, vitamins, or herbal products without talking to your doctor first.    Staying healthy    · Manage other health conditions such as high blood pressure and diabetes.     · Avoid colds and flu. Get a pneumococcal vaccine shot. If you have had one before, ask your doctor whether you need another dose. Get the flu vaccine every year. If you must be around people with colds or flu, wash your hands often.     · Be sure to tell your doctor about any angina symptoms you have had, even if they went away. Pay attention to your angina symptoms. Know what is typical for you and learn how to control it. Know when to call for help.     · Talk to your family, friends, or a counselor about your feelings. It is normal to feel frightened, angry, hopeless, helpless, and even guilty. Talking openly about bad feelings can help you cope. If you have symptoms of depression, talk to your doctor. When should you call for help? Call 911 anytime you think you may need emergency care. For example, call if:    · You have symptoms of a heart attack. These may include:  ? Chest pain or pressure, or a strange feeling in the chest.  ? Sweating. ? Shortness of breath. ? Nausea or vomiting.   ? Pain, pressure, or a strange feeling in the back, neck, jaw, or upper belly or in one or both shoulders or arms. ? Lightheadedness or sudden weakness. ? A fast or irregular heartbeat. After you call 911, the  may tell you to chew 1 adult-strength or 2 to 4 low-dose aspirin. Wait for an ambulance. Do not try to drive yourself.     · You have angina symptoms (such as chest pain or pressure) that do not go away with rest or are not getting better within 5 minutes after you take a dose of nitroglycerin.     · You passed out (lost consciousness).     · You feel like you are having another heart attack.    Call your doctor now or seek immediate medical care if:    · You are having angina symptoms, such as chest pain or pressure, more often than usual, or the symptoms are different or worse than usual.     · You have new or increased shortness of breath.     · You are dizzy or lightheaded, or you feel like you may faint.    Watch closely for changes in your health, and be sure to contact your doctor if you have any problems. Where can you learn more? Go to http://bernardo-magaly.info/. Enter [id number] in the search box to learn more about \"Heart Attack: Care Instructions. \"  Current as of: April 9, 2019  Content Version: 12.2  © 1613-0731 [employer]. Care instructions adapted under license by [employer] (which disclaims liability or warranty for this information). If you have questions about a medical condition or this instruction, always ask your healthcare professional. [place] [address] any warranty or liability for your use of this information. HEART CATHETERIZATION/ANGIOGRAPHY DISCHARGE INSTRUCTIONS    1. Check puncture site frequently for swelling or bleeding. If there is any bleeding, lie down and apply pressure over the area with a clean towel or washcloth. Notify your doctor for any redness, swelling, drainage, or oozing from the puncture site.  Notify your doctor for any fever or chills. 2. If the extremity becomes cold, numb, or painful call [facility] Cardiology at [phone].  3. Activity should be limited for the next 48 hours. Climb stairs as little as possible and avoid any stooping, bending, or strenuous activity for 48 hours. No heavy lifting (anything over 10 pounds) for 3 days. 4. You may resume your usual diet. Drink more fluids than usual.  5. Have a responsible person drive you home and stay with you for at least 24 hours after your heart catheterization/angiography. 6. You may remove bandage from your left groin in 24 hours. You may shower in 24 hours. No tub baths, hot tubs, or swimming for 1 week. Do not place any lotions, creams, powders, or ointments over puncture site for 1 week. You may place a clean band-aid over the puncture site each day for 5 days. Change daily. DISCHARGE SUMMARY from Nurse    PATIENT INSTRUCTIONS:    After general anesthesia or intravenous sedation, for 24 hours or while taking prescription Narcotics:  · Limit your activities  · Do not drive and operate hazardous machinery  · Do not make important personal or business decisions  · Do  not drink alcoholic beverages  · If you have not urinated within 8 hours after discharge, please contact your surgeon on call. Report the following to your surgeon:  · Excessive pain, swelling, redness or odor of or around the surgical area  · Temperature over 100.5  · Nausea and vomiting lasting longer than 4 hours or if unable to take medications  · Any signs of decreased circulation or nerve impairment to extremity: change in color, persistent  numbness, tingling, coldness or increase pain  · Any questions    What to do at Home:  Recommended activity: No heavy lifting, pushing, pulling for 1 week.     If you experience any of the following symptoms chest pain, shortness of breath, palpitations, numbness, please follow up with MD.    *  Please give a list of your current medications to your Primary Care Provider. *  Please update this list whenever your medications are discontinued, doses are      changed, or new medications (including over-the-counter products) are added. *  Please carry medication information at all times in case of emergency situations. These are general instructions for a healthy lifestyle:    No smoking/ No tobacco products/ Avoid exposure to second hand smoke  Surgeon General's Warning:  Quitting smoking now greatly reduces serious risk to your health. Obesity, smoking, and sedentary lifestyle greatly increases your risk for illness    A healthy diet, regular physical exercise & weight monitoring are important for maintaining a healthy lifestyle    You may be retaining fluid if you have a history of heart failure or if you experience any of the following symptoms:  Weight gain of 3 pounds or more overnight or 5 pounds in a week, increased swelling in our hands or feet or shortness of breath while lying flat in bed. Please call your doctor as soon as you notice any of these symptoms; do not wait until your next office visit. The discharge information has been reviewed with the patient. The patient verbalized understanding. Discharge medications reviewed with the patient and appropriate educational materials and side effects teaching were provided.   ___________________________________________________________________________________________________________________________________

## 2019-10-08 NOTE — PROGRESS NOTES
Care Management Interventions PCP Verified by CM: Yes 
Palliative Care Criteria Met (RRAT>21 & CHF Dx)?: No(RRAT 21 Dx NSTEMI) Mode of Transport at Discharge: Other (see comment)(family) Transition of Care Consult (CM Consult): Discharge Planning Discharge Durable Medical Equipment: No(none) Physical Therapy Consult: No 
Occupational Therapy Consult: No 
Speech Therapy Consult: No 
Current Support Network: Lives with Spouse Confirm Follow Up Transport: Self Plan discussed with Pt/Family/Caregiver: Yes Freedom of Choice Offered: Yes Discharge Location Discharge Placement: Home Met with patient for d/c planning. Patient alert and oriented x 3, independent of ADL's and lives with her  in one story home with 1 step to entrance. She requires no DME and has transportation and able to drive. She works full time at [employer] ble. She has [facility] and able to obtain medications without difficulty at [address] [place] [address]. She voices no concerns or needs for d/c. Patient to d/c home with family.

## 2019-10-08 NOTE — PROGRESS NOTES
Bedside and Verbal shift change report given to self (oncoming nurse) by Filomena Muñoz RN (offgoing nurse). Report included the following information SBAR, Kardex, MAR and Recent Results.

## 2019-10-08 NOTE — PROGRESS NOTES
[facility] CARDIOLOGY PROGRESS NOTE 
      
 
10/8/2019 8:04 AM 
 
Admit Date: 10/7/2019 Subjective:  
Patient feels markedly better. Hemodynamics are better. ROS: 
Cardiovascular:  As noted above Objective:  
  
Vitals:  
 10/07/19 2036 10/08/19 8212 10/08/19 6528 10/08/19 1460 BP: 153/66 120/64  116/63 Pulse: 70 (!) 59  65 Resp: 18 18  18 Temp: 97.7 °F (36.5 °C) 98.1 °F (36.7 °C)  99.3 °F (37.4 °C) SpO2: 99% 96%  96% Weight:   112.8 kg (248 lb 9.6 oz) Height:      
 
 
Physical Exam: 
General-No Acute Distress Neck- supple, no JVD 
CV- regular rate and rhythm no MRG Lung- clear bilaterally Abd- soft, nontender, nondistended Ext- no edema bilaterally. Skin- warm and dry Data Review:  
Recent Labs 10/08/19 
21  10/07/19 
1744 10/07/19 
1246 10/07/19 
7339   --   --  139  
K 4.1  --   --  4.1 BUN 33*  --   --  32* CREA 2.36*  --   --  2.19* *  --   --  249* WBC 6.6  --   --  7.7 HGB 9.5*  --   --  10.6* HCT 29.6*  --   --  32.6*  
  --   --  189 INR  --   --   --  1.0  
TROIQ  --  3.77* 3.46* 2.90* CHOL 120  --   --   --   
LDLC 56.4  --   --   --   
HDL 41  --   --   --   
 
 
Assessment/Plan:  
 
Principal Problem: 
  NSTEMI (non-ST elevated myocardial infarction) ([facility] [address] (10/7/2019) Complex CAD and s/p PCI LIMA ostium and reconstruction of LCx into (L)PDA. EF normal.  Medical therapy appropriate. Active Problems: 
  Coronary atherosclerosis of native coronary artery () As above Essential hypertension, benign () Better controlled Mixed hyperlipidemia () On atorvastatin Type 2 diabetes, uncontrolled, with cellulitis of foot ([facility] [address] () Encouraged to address as outpatient CKD (chronic kidney disease) stage 3, GFR 30-59 ml/min ([facility]) (10/8/2019) Stable Will need TC-7 and labs to reassess Cr as outpatient Bradley Chavez MD 
10/8/2019 8:04 AM 
'

## 2019-10-08 NOTE — PROGRESS NOTES
Cardiac Rehab: Spoke with patient regarding referral to cardiac rehab. Patient meets admission criteria based on NSTEMI with PCI (10/07/19). Written information about Cardiac Rehab given and reviewed with patient. Discussed lifestyle modifications to promote cardiac wellness. Patient indicated that she can not participate in the cardiac rehab program due to her work schedule. Pt states that if her work schedule changes, she will call to schedule her orientation to Cardiac Rehab program. Her Cardiologist is Dr. Kyler Fonseca. 
 
 
Thank you, MARIELENA CaraballoN, RN Cardiopulmonary Rehabilitation Nurse Liaison [facility]

## 2019-10-08 NOTE — PROGRESS NOTES
Discharge instructions and medications reviewed with patient. Patient educated on lipitor. Patient verbalizes understanding. All questions answered. IV and monitor removed by primary RN.

## 2019-10-08 NOTE — PROGRESS NOTES
Problem: Falls - Risk of 
Goal: *Absence of Falls Description Document Daryl [place] Fall Risk and appropriate interventions in the flowsheet. Outcome: Progressing Towards Goal 
Note:  
Fall Risk Interventions: 
  
 
  
 
Medication Interventions: Evaluate medications/consider consulting pharmacy, Patient to call before getting OOB, Teach patient to arise slowly Elimination Interventions: Call light in reach, Patient to call for help with toileting needs, Toilet paper/wipes in reach, Toileting schedule/hourly rounds Problem: Diabetes Self-Management Goal: *Disease process and treatment process Description Define diabetes and identify own type of diabetes; list 3 options for treating diabetes. Outcome: Progressing Towards Goal 
  
Problem: Cath Lab Procedures: Post-Cath Day 1 Goal: Activity/Safety Outcome: Progressing Towards Goal

## 2019-11-25 ENCOUNTER — APPOINTMENT (OUTPATIENT)
Dept: GENERAL RADIOLOGY | Age: 59
End: 2019-11-25
Attending: EMERGENCY MEDICINE
Payer: COMMERCIAL

## 2019-11-25 ENCOUNTER — HOSPITAL ENCOUNTER (OUTPATIENT)
Age: 59
Setting detail: OBSERVATION
Discharge: HOME | End: 2019-11-27
Attending: EMERGENCY MEDICINE | Admitting: INTERNAL MEDICINE
Payer: COMMERCIAL

## 2019-11-25 ENCOUNTER — HOSPITAL ENCOUNTER (OUTPATIENT)
Dept: LAB | Age: 59
Discharge: HOME | End: 2019-11-25
Payer: COMMERCIAL

## 2019-11-25 DIAGNOSIS — N18.9 CHRONIC KIDNEY DISEASE, UNSPECIFIED CKD STAGE: ICD-10-CM

## 2019-11-25 DIAGNOSIS — R07.9 ACUTE CHEST PAIN: Primary | ICD-10-CM

## 2019-11-25 DIAGNOSIS — R94.39 ABNORMAL STRESS TEST: ICD-10-CM

## 2019-11-25 DIAGNOSIS — I50.9 ACUTE ON CHRONIC CONGESTIVE HEART FAILURE, UNSPECIFIED HEART FAILURE TYPE (HCC): ICD-10-CM

## 2019-11-25 PROBLEM — E11.621 DIABETIC ULCER OF TOE OF RIGHT FOOT (HCC): Status: RESOLVED | Noted: 2019-07-16 | Resolved: 2019-11-25

## 2019-11-25 PROBLEM — M79.604 RIGHT LEG PAIN: Status: RESOLVED | Noted: 2019-05-01 | Resolved: 2019-11-25

## 2019-11-25 PROBLEM — M86.9 OSTEOMYELITIS OF TOE OF LEFT FOOT (HCC): Status: RESOLVED | Noted: 2018-03-08 | Resolved: 2019-11-25

## 2019-11-25 PROBLEM — L97.509 TOE ULCER (HCC): Status: RESOLVED | Noted: 2018-03-08 | Resolved: 2019-11-25

## 2019-11-25 PROBLEM — L97.519 DIABETIC ULCER OF TOE OF RIGHT FOOT (HCC): Status: RESOLVED | Noted: 2019-07-16 | Resolved: 2019-11-25

## 2019-11-25 PROBLEM — I77.79 DISSECTION OF LEFT SUBCLAVIAN ARTERY (HCC): Status: RESOLVED | Noted: 2019-08-13 | Resolved: 2019-11-25

## 2019-11-25 PROBLEM — Z89.422 S/P AMPUTATION OF LESSER TOE, LEFT (HCC): Status: RESOLVED | Noted: 2018-03-26 | Resolved: 2019-11-25

## 2019-11-25 PROBLEM — I21.4 NON-STEMI (NON-ST ELEVATED MYOCARDIAL INFARCTION) (HCC): Status: RESOLVED | Noted: 2019-08-10 | Resolved: 2019-11-25

## 2019-11-25 PROBLEM — I21.4 NSTEMI (NON-ST ELEVATED MYOCARDIAL INFARCTION) (HCC): Status: RESOLVED | Noted: 2019-10-07 | Resolved: 2019-11-25

## 2019-11-25 LAB
ALBUMIN SERPL-MCNC: 2.8 G/DL (ref 3.2–4.6)
ALBUMIN/GLOB SERPL: 0.7 {RATIO} (ref 1.2–3.5)
ALP SERPL-CCNC: 103 U/L (ref 50–136)
ALT SERPL-CCNC: 31 U/L (ref 12–65)
ANION GAP SERPL CALC-SCNC: 4 MMOL/L (ref 7–16)
ANION GAP SERPL CALC-SCNC: 8 MMOL/L (ref 7–16)
AST SERPL-CCNC: 21 U/L (ref 15–37)
BASOPHILS # BLD: 0.1 K/UL (ref 0–0.2)
BASOPHILS # BLD: 0.1 K/UL (ref 0–0.2)
BASOPHILS NFR BLD: 1 % (ref 0–2)
BASOPHILS NFR BLD: 1 % (ref 0–2)
BILIRUB SERPL-MCNC: 0.3 MG/DL (ref 0.2–1.1)
BNP SERPL-MCNC: ABNORMAL PG/ML (ref 5–125)
BUN SERPL-MCNC: 31 MG/DL (ref 8–23)
BUN SERPL-MCNC: 33 MG/DL (ref 8–23)
CALCIUM SERPL-MCNC: 8.5 MG/DL (ref 8.3–10.4)
CALCIUM SERPL-MCNC: 8.7 MG/DL (ref 8.3–10.4)
CHLORIDE SERPL-SCNC: 111 MMOL/L (ref 98–107)
CHLORIDE SERPL-SCNC: 115 MMOL/L (ref 98–107)
CO2 SERPL-SCNC: 24 MMOL/L (ref 21–32)
CO2 SERPL-SCNC: 25 MMOL/L (ref 21–32)
CREAT SERPL-MCNC: 2 MG/DL (ref 0.6–1)
CREAT SERPL-MCNC: 2.22 MG/DL (ref 0.6–1)
DIFFERENTIAL METHOD BLD: ABNORMAL
DIFFERENTIAL METHOD BLD: ABNORMAL
EOSINOPHIL # BLD: 0.3 K/UL (ref 0–0.8)
EOSINOPHIL # BLD: 0.4 K/UL (ref 0–0.8)
EOSINOPHIL NFR BLD: 5 % (ref 0.5–7.8)
EOSINOPHIL NFR BLD: 5 % (ref 0.5–7.8)
ERYTHROCYTE [DISTWIDTH] IN BLOOD BY AUTOMATED COUNT: 14.1 % (ref 11.9–14.6)
ERYTHROCYTE [DISTWIDTH] IN BLOOD BY AUTOMATED COUNT: 14.2 % (ref 11.9–14.6)
GLOBULIN SER CALC-MCNC: 4.3 G/DL (ref 2.3–3.5)
GLUCOSE SERPL-MCNC: 115 MG/DL (ref 65–100)
GLUCOSE SERPL-MCNC: 308 MG/DL (ref 65–100)
HCT VFR BLD AUTO: 32.9 % (ref 35.8–46.3)
HCT VFR BLD AUTO: 33.2 % (ref 35.8–46.3)
HGB BLD-MCNC: 10.6 G/DL (ref 11.7–15.4)
HGB BLD-MCNC: 10.6 G/DL (ref 11.7–15.4)
IMM GRANULOCYTES # BLD AUTO: 0 K/UL (ref 0–0.5)
IMM GRANULOCYTES # BLD AUTO: 0.1 K/UL (ref 0–0.5)
IMM GRANULOCYTES NFR BLD AUTO: 0 % (ref 0–5)
IMM GRANULOCYTES NFR BLD AUTO: 1 % (ref 0–5)
INR PPP: 0.9
LYMPHOCYTES # BLD: 1.3 K/UL (ref 0.5–4.6)
LYMPHOCYTES # BLD: 1.4 K/UL (ref 0.5–4.6)
LYMPHOCYTES NFR BLD: 18 % (ref 13–44)
LYMPHOCYTES NFR BLD: 20 % (ref 13–44)
MCH RBC QN AUTO: 27.7 PG (ref 26.1–32.9)
MCH RBC QN AUTO: 27.8 PG (ref 26.1–32.9)
MCHC RBC AUTO-ENTMCNC: 31.9 G/DL (ref 31.4–35)
MCHC RBC AUTO-ENTMCNC: 32.2 G/DL (ref 31.4–35)
MCV RBC AUTO: 86.4 FL (ref 79.6–97.8)
MCV RBC AUTO: 86.9 FL (ref 79.6–97.8)
MONOCYTES # BLD: 0.4 K/UL (ref 0.1–1.3)
MONOCYTES # BLD: 0.5 K/UL (ref 0.1–1.3)
MONOCYTES NFR BLD: 6 % (ref 4–12)
MONOCYTES NFR BLD: 7 % (ref 4–12)
NEUTS SEG # BLD: 4.9 K/UL (ref 1.7–8.2)
NEUTS SEG # BLD: 5 K/UL (ref 1.7–8.2)
NEUTS SEG NFR BLD: 67 % (ref 43–78)
NEUTS SEG NFR BLD: 70 % (ref 43–78)
NRBC # BLD: 0 K/UL (ref 0–0.2)
NRBC # BLD: 0 K/UL (ref 0–0.2)
PLATELET # BLD AUTO: 280 K/UL (ref 150–450)
PLATELET # BLD AUTO: 286 K/UL (ref 150–450)
PMV BLD AUTO: 10.1 FL (ref 9.4–12.3)
PMV BLD AUTO: 9.7 FL (ref 9.4–12.3)
POTASSIUM SERPL-SCNC: 4.4 MMOL/L (ref 3.5–5.1)
POTASSIUM SERPL-SCNC: 4.5 MMOL/L (ref 3.5–5.1)
PROT SERPL-MCNC: 7.1 G/DL (ref 6.3–8.2)
PROTHROMBIN TIME: 12.8 SEC (ref 11.7–14.5)
RBC # BLD AUTO: 3.81 M/UL (ref 4.05–5.2)
RBC # BLD AUTO: 3.82 M/UL (ref 4.05–5.2)
SODIUM SERPL-SCNC: 143 MMOL/L (ref 136–145)
SODIUM SERPL-SCNC: 144 MMOL/L (ref 136–145)
TROPONIN I SERPL-MCNC: 0.05 NG/ML (ref 0.02–0.05)
WBC # BLD AUTO: 7.2 K/UL (ref 4.3–11.1)
WBC # BLD AUTO: 7.4 K/UL (ref 4.3–11.1)

## 2019-11-25 PROCEDURE — 85610 PROTHROMBIN TIME: CPT

## 2019-11-25 PROCEDURE — 71045 X-RAY EXAM CHEST 1 VIEW: CPT

## 2019-11-25 PROCEDURE — 85025 COMPLETE CBC W/AUTO DIFF WBC: CPT

## 2019-11-25 PROCEDURE — 93005 ELECTROCARDIOGRAM TRACING: CPT | Performed by: EMERGENCY MEDICINE

## 2019-11-25 PROCEDURE — 99285 EMERGENCY DEPT VISIT HI MDM: CPT | Performed by: EMERGENCY MEDICINE

## 2019-11-25 PROCEDURE — 80053 COMPREHEN METABOLIC PANEL: CPT

## 2019-11-25 PROCEDURE — 36415 COLL VENOUS BLD VENIPUNCTURE: CPT

## 2019-11-25 PROCEDURE — 74011250637 HC RX REV CODE- 250/637: Performed by: EMERGENCY MEDICINE

## 2019-11-25 PROCEDURE — 96374 THER/PROPH/DIAG INJ IV PUSH: CPT | Performed by: EMERGENCY MEDICINE

## 2019-11-25 PROCEDURE — 84484 ASSAY OF TROPONIN QUANT: CPT

## 2019-11-25 PROCEDURE — 74011250636 HC RX REV CODE- 250/636

## 2019-11-25 PROCEDURE — 96374 THER/PROPH/DIAG INJ IV PUSH: CPT

## 2019-11-25 PROCEDURE — 83880 ASSAY OF NATRIURETIC PEPTIDE: CPT

## 2019-11-25 PROCEDURE — 99218 HC RM OBSERVATION: CPT

## 2019-11-25 RX ORDER — INSULIN LISPRO 100 [IU]/ML
INJECTION, SOLUTION INTRAVENOUS; SUBCUTANEOUS
Status: DISCONTINUED | OUTPATIENT
Start: 2019-11-26 | End: 2019-11-27 | Stop reason: HOSPADM

## 2019-11-25 RX ORDER — ASPIRIN 81 MG/1
81 TABLET ORAL
Status: DISCONTINUED | OUTPATIENT
Start: 2019-11-26 | End: 2019-11-25 | Stop reason: SDUPTHER

## 2019-11-25 RX ORDER — HYDROCODONE BITARTRATE AND ACETAMINOPHEN 5; 325 MG/1; MG/1
1 TABLET ORAL
Status: DISCONTINUED | OUTPATIENT
Start: 2019-11-25 | End: 2019-11-27 | Stop reason: HOSPADM

## 2019-11-25 RX ORDER — NITROGLYCERIN 0.4 MG/1
0.4 TABLET SUBLINGUAL
Status: DISCONTINUED | OUTPATIENT
Start: 2019-11-25 | End: 2019-11-26 | Stop reason: SDUPTHER

## 2019-11-25 RX ORDER — INSULIN DEGLUDEC 100 U/ML
70 INJECTION, SOLUTION SUBCUTANEOUS DAILY
COMMUNITY
End: 2020-09-03

## 2019-11-25 RX ORDER — AMLODIPINE BESYLATE 5 MG/1
5 TABLET ORAL DAILY
Status: DISCONTINUED | OUTPATIENT
Start: 2019-11-26 | End: 2019-11-26

## 2019-11-25 RX ORDER — LEVOTHYROXINE SODIUM 50 UG/1
25 TABLET ORAL
Status: DISCONTINUED | OUTPATIENT
Start: 2019-11-26 | End: 2019-11-27 | Stop reason: HOSPADM

## 2019-11-25 RX ORDER — FUROSEMIDE 10 MG/ML
60 INJECTION INTRAMUSCULAR; INTRAVENOUS
Status: COMPLETED | OUTPATIENT
Start: 2019-11-25 | End: 2019-11-25

## 2019-11-25 RX ORDER — HYDRALAZINE HYDROCHLORIDE 20 MG/ML
10 INJECTION INTRAMUSCULAR; INTRAVENOUS
Status: DISCONTINUED | OUTPATIENT
Start: 2019-11-25 | End: 2019-11-27 | Stop reason: HOSPADM

## 2019-11-25 RX ORDER — SODIUM CHLORIDE 0.9 % (FLUSH) 0.9 %
5-40 SYRINGE (ML) INJECTION EVERY 8 HOURS
Status: DISCONTINUED | OUTPATIENT
Start: 2019-11-25 | End: 2019-11-27 | Stop reason: HOSPADM

## 2019-11-25 RX ORDER — ACETAMINOPHEN 325 MG/1
650 TABLET ORAL
Status: DISCONTINUED | OUTPATIENT
Start: 2019-11-25 | End: 2019-11-26 | Stop reason: SDUPTHER

## 2019-11-25 RX ORDER — METOPROLOL TARTRATE 25 MG/1
25 TABLET, FILM COATED ORAL EVERY 12 HOURS
Status: DISCONTINUED | OUTPATIENT
Start: 2019-11-25 | End: 2019-11-26

## 2019-11-25 RX ORDER — SODIUM CHLORIDE 0.9 % (FLUSH) 0.9 %
5-40 SYRINGE (ML) INJECTION AS NEEDED
Status: DISCONTINUED | OUTPATIENT
Start: 2019-11-25 | End: 2019-11-27 | Stop reason: HOSPADM

## 2019-11-25 RX ORDER — ATORVASTATIN CALCIUM 40 MG/1
80 TABLET, FILM COATED ORAL
Status: DISCONTINUED | OUTPATIENT
Start: 2019-11-25 | End: 2019-11-27 | Stop reason: HOSPADM

## 2019-11-25 RX ORDER — ESCITALOPRAM OXALATE 10 MG/1
20 TABLET ORAL 2 TIMES DAILY
Status: DISCONTINUED | OUTPATIENT
Start: 2019-11-26 | End: 2019-11-27 | Stop reason: HOSPADM

## 2019-11-25 RX ORDER — MORPHINE SULFATE 2 MG/ML
2 INJECTION, SOLUTION INTRAMUSCULAR; INTRAVENOUS
Status: DISCONTINUED | OUTPATIENT
Start: 2019-11-25 | End: 2019-11-27 | Stop reason: HOSPADM

## 2019-11-25 RX ORDER — ONDANSETRON 2 MG/ML
4 INJECTION INTRAMUSCULAR; INTRAVENOUS
Status: DISCONTINUED | OUTPATIENT
Start: 2019-11-25 | End: 2019-11-27 | Stop reason: HOSPADM

## 2019-11-25 RX ORDER — GUAIFENESIN 100 MG/5ML
81 LIQUID (ML) ORAL DAILY
Status: DISCONTINUED | OUTPATIENT
Start: 2019-11-26 | End: 2019-11-26 | Stop reason: SDUPTHER

## 2019-11-25 RX ORDER — FUROSEMIDE 10 MG/ML
INJECTION INTRAMUSCULAR; INTRAVENOUS
Status: COMPLETED
Start: 2019-11-25 | End: 2019-11-25

## 2019-11-25 RX ORDER — LEVOTHYROXINE SODIUM 100 UG/1
200 TABLET ORAL
Status: DISCONTINUED | OUTPATIENT
Start: 2019-11-26 | End: 2019-11-27 | Stop reason: HOSPADM

## 2019-11-25 RX ORDER — GUAIFENESIN 100 MG/5ML
324 LIQUID (ML) ORAL DAILY
Status: DISCONTINUED | OUTPATIENT
Start: 2019-11-26 | End: 2019-11-25 | Stop reason: SDUPTHER

## 2019-11-25 RX ADMIN — NITROGLYCERIN 1 INCH: 20 OINTMENT TOPICAL at 22:57

## 2019-11-25 RX ADMIN — FUROSEMIDE 60 MG: 40 INJECTION, SOLUTION INTRAMUSCULAR; INTRAVENOUS at 23:00

## 2019-11-25 RX ADMIN — FUROSEMIDE 60 MG: 10 INJECTION INTRAMUSCULAR; INTRAVENOUS at 23:00

## 2019-11-26 LAB
ANION GAP SERPL CALC-SCNC: 7 MMOL/L (ref 7–16)
ATRIAL RATE: 87 BPM
BUN SERPL-MCNC: 36 MG/DL (ref 8–23)
CALCIUM SERPL-MCNC: 8.2 MG/DL (ref 8.3–10.4)
CALCULATED P AXIS, ECG09: 64 DEGREES
CALCULATED R AXIS, ECG10: 20 DEGREES
CALCULATED T AXIS, ECG11: 157 DEGREES
CHLORIDE SERPL-SCNC: 115 MMOL/L (ref 98–107)
CO2 SERPL-SCNC: 22 MMOL/L (ref 21–32)
CREAT SERPL-MCNC: 2.1 MG/DL (ref 0.6–1)
DIAGNOSIS, 93000: NORMAL
GLUCOSE BLD STRIP.AUTO-MCNC: 106 MG/DL (ref 65–100)
GLUCOSE BLD STRIP.AUTO-MCNC: 140 MG/DL (ref 65–100)
GLUCOSE BLD STRIP.AUTO-MCNC: 195 MG/DL (ref 65–100)
GLUCOSE BLD STRIP.AUTO-MCNC: 96 MG/DL (ref 65–100)
GLUCOSE SERPL-MCNC: 171 MG/DL (ref 65–100)
P-R INTERVAL, ECG05: 174 MS
POTASSIUM SERPL-SCNC: 4.3 MMOL/L (ref 3.5–5.1)
Q-T INTERVAL, ECG07: 400 MS
QRS DURATION, ECG06: 94 MS
QTC CALCULATION (BEZET), ECG08: 481 MS
SODIUM SERPL-SCNC: 144 MMOL/L (ref 136–145)
TROPONIN I SERPL-MCNC: 0.06 NG/ML (ref 0.02–0.05)
VENTRICULAR RATE, ECG03: 87 BPM

## 2019-11-26 PROCEDURE — 74011250636 HC RX REV CODE- 250/636: Performed by: INTERNAL MEDICINE

## 2019-11-26 PROCEDURE — 36591 DRAW BLOOD OFF VENOUS DEVICE: CPT

## 2019-11-26 PROCEDURE — 74011636637 HC RX REV CODE- 636/637: Performed by: NURSE PRACTITIONER

## 2019-11-26 PROCEDURE — 99218 HC RM OBSERVATION: CPT

## 2019-11-26 PROCEDURE — 80048 BASIC METABOLIC PNL TOTAL CA: CPT

## 2019-11-26 PROCEDURE — 77030004534 HC CATH ANGI DX INFN CARD -A

## 2019-11-26 PROCEDURE — 84484 ASSAY OF TROPONIN QUANT: CPT

## 2019-11-26 PROCEDURE — 82962 GLUCOSE BLOOD TEST: CPT

## 2019-11-26 PROCEDURE — 74011250637 HC RX REV CODE- 250/637: Performed by: NURSE PRACTITIONER

## 2019-11-26 PROCEDURE — 74011250637 HC RX REV CODE- 250/637: Performed by: INTERNAL MEDICINE

## 2019-11-26 PROCEDURE — 99153 MOD SED SAME PHYS/QHP EA: CPT

## 2019-11-26 PROCEDURE — 77030004559 HC CATH ANGI DX SUPT CARD -B

## 2019-11-26 PROCEDURE — 99152 MOD SED SAME PHYS/QHP 5/>YRS: CPT

## 2019-11-26 PROCEDURE — 77030013687 HC GD NDL BARD -B

## 2019-11-26 PROCEDURE — C1769 GUIDE WIRE: HCPCS

## 2019-11-26 PROCEDURE — C1725 CATH, TRANSLUMIN NON-LASER: HCPCS

## 2019-11-26 PROCEDURE — 74011000258 HC RX REV CODE- 258: Performed by: INTERNAL MEDICINE

## 2019-11-26 PROCEDURE — C1894 INTRO/SHEATH, NON-LASER: HCPCS

## 2019-11-26 PROCEDURE — 93459 L HRT ART/GRFT ANGIO: CPT

## 2019-11-26 PROCEDURE — 77030040934 HC CATH DIAG DXTERITY MEDT -A

## 2019-11-26 PROCEDURE — C1760 CLOSURE DEV, VASC: HCPCS

## 2019-11-26 PROCEDURE — C1876 STENT, NON-COA/NON-COV W/DEL: HCPCS

## 2019-11-26 PROCEDURE — 74011636320 HC RX REV CODE- 636/320: Performed by: INTERNAL MEDICINE

## 2019-11-26 PROCEDURE — 37246 TRLUML BALO ANGIOP 1ST ART: CPT

## 2019-11-26 PROCEDURE — 96375 TX/PRO/DX INJ NEW DRUG ADDON: CPT

## 2019-11-26 PROCEDURE — 74011250636 HC RX REV CODE- 250/636: Performed by: NURSE PRACTITIONER

## 2019-11-26 PROCEDURE — 93005 ELECTROCARDIOGRAM TRACING: CPT | Performed by: INTERNAL MEDICINE

## 2019-11-26 PROCEDURE — 77030020263 HC SOL INJ SOD CL0.9% LFCR 1000ML

## 2019-11-26 RX ORDER — MIDAZOLAM HYDROCHLORIDE 1 MG/ML
.5-2 INJECTION, SOLUTION INTRAMUSCULAR; INTRAVENOUS
Status: DISCONTINUED | OUTPATIENT
Start: 2019-11-26 | End: 2019-11-26 | Stop reason: HOSPADM

## 2019-11-26 RX ORDER — LIDOCAINE HYDROCHLORIDE 10 MG/ML
10-30 INJECTION INFILTRATION; PERINEURAL ONCE
Status: DISCONTINUED | OUTPATIENT
Start: 2019-11-26 | End: 2019-11-26 | Stop reason: HOSPADM

## 2019-11-26 RX ORDER — AMLODIPINE BESYLATE 5 MG/1
5 TABLET ORAL DAILY
Status: DISCONTINUED | OUTPATIENT
Start: 2019-11-27 | End: 2019-11-26

## 2019-11-26 RX ORDER — LORAZEPAM 1 MG/1
1 TABLET ORAL
Status: DISCONTINUED | OUTPATIENT
Start: 2019-11-26 | End: 2019-11-27 | Stop reason: HOSPADM

## 2019-11-26 RX ORDER — HEPARIN SODIUM 200 [USP'U]/100ML
3 INJECTION, SOLUTION INTRAVENOUS CONTINUOUS
Status: DISCONTINUED | OUTPATIENT
Start: 2019-11-26 | End: 2019-11-26 | Stop reason: HOSPADM

## 2019-11-26 RX ORDER — GUAIFENESIN 100 MG/5ML
81 LIQUID (ML) ORAL DAILY
Status: DISCONTINUED | OUTPATIENT
Start: 2019-11-27 | End: 2019-11-27 | Stop reason: HOSPADM

## 2019-11-26 RX ORDER — SODIUM CHLORIDE 9 MG/ML
75 INJECTION, SOLUTION INTRAVENOUS CONTINUOUS
Status: DISCONTINUED | OUTPATIENT
Start: 2019-11-26 | End: 2019-11-27 | Stop reason: HOSPADM

## 2019-11-26 RX ORDER — SODIUM CHLORIDE 0.9 % (FLUSH) 0.9 %
5-40 SYRINGE (ML) INJECTION EVERY 8 HOURS
Status: DISCONTINUED | OUTPATIENT
Start: 2019-11-26 | End: 2019-11-27 | Stop reason: HOSPADM

## 2019-11-26 RX ORDER — SODIUM CHLORIDE 9 MG/ML
50 INJECTION, SOLUTION INTRAVENOUS CONTINUOUS
Status: DISCONTINUED | OUTPATIENT
Start: 2019-11-26 | End: 2019-11-27 | Stop reason: HOSPADM

## 2019-11-26 RX ORDER — FENTANYL CITRATE 50 UG/ML
25-75 INJECTION, SOLUTION INTRAMUSCULAR; INTRAVENOUS
Status: DISCONTINUED | OUTPATIENT
Start: 2019-11-26 | End: 2019-11-26 | Stop reason: HOSPADM

## 2019-11-26 RX ORDER — ISOSORBIDE MONONITRATE 60 MG/1
120 TABLET, EXTENDED RELEASE ORAL DAILY
Status: DISCONTINUED | OUTPATIENT
Start: 2019-11-27 | End: 2019-11-27 | Stop reason: HOSPADM

## 2019-11-26 RX ORDER — GUAIFENESIN 100 MG/5ML
324 LIQUID (ML) ORAL ONCE
Status: COMPLETED | OUTPATIENT
Start: 2019-11-26 | End: 2019-11-26

## 2019-11-26 RX ORDER — AMLODIPINE BESYLATE 10 MG/1
10 TABLET ORAL DAILY
Status: DISCONTINUED | OUTPATIENT
Start: 2019-11-27 | End: 2019-11-26

## 2019-11-26 RX ORDER — GUAIFENESIN 100 MG/5ML
LIQUID (ML) ORAL
Status: ACTIVE
Start: 2019-11-26 | End: 2019-11-26

## 2019-11-26 RX ORDER — RANOLAZINE 500 MG/1
500 TABLET, EXTENDED RELEASE ORAL EVERY 12 HOURS
Status: DISCONTINUED | OUTPATIENT
Start: 2019-11-26 | End: 2019-11-27 | Stop reason: HOSPADM

## 2019-11-26 RX ORDER — SODIUM CHLORIDE 0.9 % (FLUSH) 0.9 %
5-40 SYRINGE (ML) INJECTION AS NEEDED
Status: DISCONTINUED | OUTPATIENT
Start: 2019-11-26 | End: 2019-11-27 | Stop reason: HOSPADM

## 2019-11-26 RX ORDER — AMLODIPINE BESYLATE 5 MG/1
5 TABLET ORAL DAILY
Status: DISCONTINUED | OUTPATIENT
Start: 2019-11-26 | End: 2019-11-27 | Stop reason: HOSPADM

## 2019-11-26 RX ORDER — ACETAMINOPHEN 325 MG/1
650 TABLET ORAL
Status: DISCONTINUED | OUTPATIENT
Start: 2019-11-26 | End: 2019-11-27 | Stop reason: HOSPADM

## 2019-11-26 RX ORDER — METOPROLOL SUCCINATE 50 MG/1
50 TABLET, EXTENDED RELEASE ORAL DAILY
Status: DISCONTINUED | OUTPATIENT
Start: 2019-11-27 | End: 2019-11-27 | Stop reason: HOSPADM

## 2019-11-26 RX ORDER — NITROGLYCERIN 0.4 MG/1
0.4 TABLET SUBLINGUAL
Status: DISCONTINUED | OUTPATIENT
Start: 2019-11-26 | End: 2019-11-27 | Stop reason: HOSPADM

## 2019-11-26 RX ORDER — AMLODIPINE BESYLATE 5 MG/1
5 TABLET ORAL ONCE
Status: COMPLETED | OUTPATIENT
Start: 2019-11-26 | End: 2019-11-26

## 2019-11-26 RX ADMIN — AMLODIPINE BESYLATE 5 MG: 5 TABLET ORAL at 08:48

## 2019-11-26 RX ADMIN — RANOLAZINE 500 MG: 500 TABLET, FILM COATED, EXTENDED RELEASE ORAL at 22:26

## 2019-11-26 RX ADMIN — BIVALIRUDIN 1.75 MG/KG/HR: 250 INJECTION, POWDER, LYOPHILIZED, FOR SOLUTION INTRAVENOUS at 15:01

## 2019-11-26 RX ADMIN — ASPIRIN 81 MG 324 MG: 81 TABLET ORAL at 01:31

## 2019-11-26 RX ADMIN — NITROGLYCERIN 1 INCH: 20 OINTMENT TOPICAL at 05:39

## 2019-11-26 RX ADMIN — AMLODIPINE BESYLATE 5 MG: 5 TABLET ORAL at 18:39

## 2019-11-26 RX ADMIN — HEPARIN SODIUM 3 UNITS/HR: 200 INJECTION, SOLUTION INTRAVENOUS at 14:27

## 2019-11-26 RX ADMIN — Medication 10 ML: at 22:34

## 2019-11-26 RX ADMIN — SODIUM CHLORIDE 50 ML/HR: 900 INJECTION, SOLUTION INTRAVENOUS at 05:57

## 2019-11-26 RX ADMIN — Medication 10 ML: at 05:39

## 2019-11-26 RX ADMIN — Medication 10 ML: at 16:30

## 2019-11-26 RX ADMIN — INSULIN LISPRO 2 UNITS: 100 INJECTION, SOLUTION INTRAVENOUS; SUBCUTANEOUS at 22:27

## 2019-11-26 RX ADMIN — TICAGRELOR 90 MG: 90 TABLET ORAL at 11:51

## 2019-11-26 RX ADMIN — FENTANYL CITRATE 50 MCG: 50 INJECTION, SOLUTION INTRAMUSCULAR; INTRAVENOUS at 14:31

## 2019-11-26 RX ADMIN — ESCITALOPRAM OXALATE 20 MG: 10 TABLET ORAL at 17:10

## 2019-11-26 RX ADMIN — IOPAMIDOL 170 ML: 755 INJECTION, SOLUTION INTRAVENOUS at 15:27

## 2019-11-26 RX ADMIN — TICAGRELOR 90 MG: 90 TABLET ORAL at 23:36

## 2019-11-26 RX ADMIN — Medication 5 ML: at 00:04

## 2019-11-26 RX ADMIN — LEVOTHYROXINE SODIUM 25 MCG: 50 TABLET ORAL at 08:47

## 2019-11-26 RX ADMIN — SODIUM CHLORIDE 75 ML/HR: 900 INJECTION, SOLUTION INTRAVENOUS at 16:33

## 2019-11-26 RX ADMIN — FENTANYL CITRATE 25 MCG: 50 INJECTION, SOLUTION INTRAMUSCULAR; INTRAVENOUS at 14:42

## 2019-11-26 RX ADMIN — Medication 10 ML: at 13:44

## 2019-11-26 RX ADMIN — AMLODIPINE BESYLATE 5 MG: 5 TABLET ORAL at 22:34

## 2019-11-26 RX ADMIN — LEVOTHYROXINE SODIUM 200 MCG: 100 TABLET ORAL at 08:46

## 2019-11-26 RX ADMIN — MIDAZOLAM 2 MG: 1 INJECTION INTRAMUSCULAR; INTRAVENOUS at 14:31

## 2019-11-26 RX ADMIN — HYDRALAZINE HYDROCHLORIDE 10 MG: 20 INJECTION, SOLUTION INTRAMUSCULAR; INTRAVENOUS at 00:19

## 2019-11-26 RX ADMIN — METOPROLOL TARTRATE 25 MG: 25 TABLET ORAL at 08:47

## 2019-11-26 RX ADMIN — ASPIRIN 81 MG 81 MG: 81 TABLET ORAL at 08:47

## 2019-11-26 RX ADMIN — MIDAZOLAM 1 MG: 1 INJECTION INTRAMUSCULAR; INTRAVENOUS at 14:42

## 2019-11-26 RX ADMIN — ESCITALOPRAM OXALATE 20 MG: 10 TABLET ORAL at 08:47

## 2019-11-26 RX ADMIN — HYDRALAZINE HYDROCHLORIDE 10 MG: 20 INJECTION, SOLUTION INTRAMUSCULAR; INTRAVENOUS at 16:50

## 2019-11-26 RX ADMIN — ATORVASTATIN CALCIUM 80 MG: 40 TABLET, FILM COATED ORAL at 22:27

## 2019-11-26 NOTE — PROCEDURES
[facility] 
CARDIAC CATH Name:  Joanna Rader 
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 The patient also had stenting of the ostium of the LIMA to the LAD. She presents now with inferolateral ischemia and far anteroapical ischemia for repeat cardiac catheterization after being admitted with recurrent chest pain. PREOPERATIVE DIAGNOSIS:  Unstable angina. POSTOPERATIVE DIAGNOSIS:  Advanced three-vessel atherosclerotic coronary disease. PROCEDURES PERFORMED: 
1.  Bilateral selective coronary angiography. 2.  Saphenous vein graft injection x3. 
3.  Left internal mammary artery injection. 4.  PCI and stenting of the ostium of the left subclavian. SURGEON:  Chad Marlow MD 
 
ASSISTANT:  None. ESTIMATED BLOOD LOSS:  10 mL. SPECIMENS REMOVED:  None. COMPLICATIONS:  None. IMPLANTS:  Please see above. ANESTHESIA:  Conscious sedation. Start time 14:33, end time 15:34. MEDICATIONS:  3 mg Versed, 75 mcg of fentanyl. MONITORING RN:  Jayjay Sen. PROCEDURE:  After informed consent, she was prepped and draped in the usual sterile fashion. The right groin was infiltrated with lidocaine. Under ultrasound guidance, the right femoral artery was accessed, 6-Georgian sheath was advanced. A 6-Georgian JL4 was utilized for left coronary injection.   A 6-Georgian JR4 was utilized for right coronary injection as well as saphenous vein graft injection to what was likely the left posterior descending saphenous vein graft injection to obtuse marginal, saphenous vein graft injection to diagonal.  A 6-Serbian LIMA catheter was utilized for the internal mammary artery to the LAD injection as well as subclavian artery angiography. A 7-Serbian Shuttle sheath was utilized for intervention of the ostium of subclavian. Isovue contrast utilized. FINDINGS: 
1. Left ventricle:  Left ventriculogram not obtained. Left ventricular end-diastolic pressure is measured at 26 mmHg. There is no aortic valve gradient. 2.  Left main:  Left main is moderate in size, bifurcates into LAD and circumflex vessels. Appears angiographically. 3.  Left anterior descending coronary: This is severely diseased proximally. Appears to be 80% proximal stenosis, but is patent with another 80% stenosis into the distal LAD. There is a septal branch which has high-grade stenosis present. There is competitive flow distally due to patent vein graft to the diagonal and patent LIMA to the LAD. 4.  Left circumflex coronary: This has been extensively stented. The stents throughout the mid remain widely patent. There is a stent to a distal posterolateral branch, which remains widely patent. The left posterior descending branch is severely diseased distally and occluded with competitive flow due to left-to-left collaterals as well as collaterals from the LIMA to the LAD as well as collaterals from the vein graft to the diagonal.  The first obtuse marginal is seen filling by collaterals with left-to-left collaterals from the native system as well as collaterals from the LIMA to the LAD and vein graft to the diagonal. 
5.  Right coronary: This is a small nondominant vessel with 30% stenosis. 6.  Left internal mammary to the LAD:  This was difficult to engage. The stent extends into the subclavian by a couple of millimeters.   This remains widely patent with competitive flow distally due to antegrade native flow, flow from the vein graft to diagonal as well as flow from the LIMA to LAD. The patient has recurrent issues. Consideration could be given to intervention via the native system. 7.  Saphenous vein graft to the diagonal:  This graft remains widely patent. The diagonal itself was extremely small and has changed significantly with severe worsening diffuse small vessel disease beyond the anastomosis but this does retrograde to fill the LAD via very short segment. Consideration also could be given to intervention via the vein graft to the LAD, although I do believe antegrade intervention could be feasible in the event the patient loses LIMA in the future. 8.  Saphenous vein graft to the obtuse marginal:  This is now occluded. There is extensive formed thrombus throughout the entire graft suggesting subacute occlusion. 9.  Saphenous vein graft likely to the left posterior descending:  Chronically occluded. PERCUTANEOUS CORONARY INTERVENTION: 
Lesion:  Left subclavian. Pre-stenosis 80%, post-stenosis 0%. DETAILS:  The patient was anticoagulated with Angiomax. A 7-Chilean Shuttle sheath was utilized. A 0.035 wire was advanced into the left arm. The lesion was predilated with a 8.0 x 20 [place]side balloon and subsequently stented with a 8.0 x 29 mm Herculink bare-metal stent. This was postdilated in the mid and proximal portions with a 10 mm x 20 mm Southside balloon. This resulted in excellent angiographic result. The wire was removed and final views were obtained. Successful hemostasis with Angio-Seal closure device. The patient is chronically on Brilinta. CONCLUSIONS: 
1.  Moderately elevated end-diastolic pressures. 2.  Poorly controlled hypertension, diabetes and morbid obesity leading to rapidly progressive vascular artery coronary disease.  
3.  Significant progression of small diagonal disease as well as thrombotic occlusion of the vein graft to the obtuse marginal in less than one month. 4.  Collateralization is seen to the chronically occluded left posterior descending as well as the now chronically occluded small first obtuse marginal.  The diagonal itself has had significant progressive small vessel disease in the last 30 days. 5.  Successful stenting of the left subclavian with continued patency of the left internal mammary artery to the LAD as well as patency of the vein graft to the small diagonal which retrograde fills the LAD. 6.  Widely patent stenting of the prox and mid left circumflex into the large left posterolateral branch with stable chronic total occlusion of the left posterior descending. RECOMMENDATIONS:  Considerations for aggressive medical management to include Ranexa and addressing of poorly controlled hypertension, morbid obesity and diabetes. Thank you for allowing us to participate in the care of this patient. For any questions or concerns, please feel free to contact me. MD DARIUS [email or web address] 
D:  11/26/2019 15:45 T:  11/26/2019 18:02 
JOB #:  [id number] CC:

## 2019-11-26 NOTE — PROGRESS NOTES
Patient arrived to the floor via stretcher to room 327 . Patient oriented to the room and use of the call light. Assessment completed. Telemetry box placed and verified with monitor tech. Call light within reach. Dual admission skin assessment is as follows: Skin warm, dry, and intact. Midsternal CABG scar present. Heels dry, intact. R foot 2nd toe amputation. L foot 4th toe amputation. LLE trace edema. RLE 1+ pitting edema. Sacrum intact and blanchable. Tattoos present.

## 2019-11-26 NOTE — ED NOTES
Pt reports chest pain and sob earlier tonight. Pt reports heart hx . Pt reports taking 1 nitro at home and getting relief from chest pain

## 2019-11-26 NOTE — ED TRIAGE NOTES
Pt arrived from home with son via POV with c/o CP and SOB x 30 minutes. Pt has extensive cardiac hx, 7 stents with another planned stent to be placed on Monday. Pt states she was changing into night clothes when she felt tightness of the chest and SOB. Pt took 1 SL nitro, with some relief. Pt states pain now a 5/10, was 9/10. Hx of NIDDM, is a non-smoker, and denies alcohol use.

## 2019-11-26 NOTE — PROGRESS NOTES
TRANSFER - IN REPORT: 
 
Verbal report received from Rhett Dowell RN on Isrrael Rivas being received from ED for routine progression of care Report consisted of patients Situation, Background, Assessment and Recommendations(SBAR). Information from the following report(s) ED Summary and Recent Results was reviewed with the receiving nurse. Opportunity for questions and clarification was provided.

## 2019-11-26 NOTE — PROGRESS NOTES
Problem: Falls - Risk of 
Goal: *Absence of Falls Description Document Ann Benitez Fall Risk and appropriate interventions in the flowsheet. Outcome: Progressing Towards Goal 
Note: Fall Risk Interventions: 
  
 
  
 
Medication Interventions: Assess postural VS orthostatic hypotension, Evaluate medications/consider consulting pharmacy, Patient to call before getting OOB, Teach patient to arise slowly Problem: Patient Education: Go to Patient Education Activity Goal: Patient/Family Education Outcome: Progressing Towards Goal 
  
Problem: Cath Lab Procedures: Pre-Procedure Goal: Off Pathway (Use only if patient is Off Pathway) Outcome: Progressing Towards Goal 
Goal: Activity/Safety Outcome: Progressing Towards Goal 
Goal: Consults, if ordered Outcome: Progressing Towards Goal 
Goal: Diagnostic Test/Procedures Outcome: Progressing Towards Goal 
Goal: Nutrition/Diet Outcome: Progressing Towards Goal 
Goal: Discharge Planning Outcome: Progressing Towards Goal 
Goal: Medications Outcome: Progressing Towards Goal 
Goal: Respiratory Outcome: Progressing Towards Goal 
Goal: Treatments/Interventions/Procedures Outcome: Progressing Towards Goal 
Goal: Psychosocial 
Outcome: Progressing Towards Goal 
Goal: *Verbalize description of procedure Outcome: Progressing Towards Goal 
Goal: *Consent signed Outcome: Progressing Towards Goal

## 2019-11-26 NOTE — PROGRESS NOTES
Care Management Interventions Transition of Care Consult (CM Consult): Discharge Planning Discharge Durable Medical Equipment: No 
Physical Therapy Consult: No 
Occupational Therapy Consult: No 
Speech Therapy Consult: No 
Discharge Location Discharge Placement: Home Chart screened, no CM needs noted. DC home once medically ready.

## 2019-11-26 NOTE — ED PROVIDER NOTES
58-year-old female patient with aggressive coronary artery disease presents with chest pain reminiscent of angina tonight. All associated with shortness of breath but no diaphoresis or nausea. Patient took one nitroglycerin with resolution of both her chest pain and shortness of breath. She recently suffered an NSTEMI around October 7. She got 2 new stents at that time. Was found to have subclavian stenosis potentially undermining flow to her LIMA graft to 1 of her coronaries. She is actually due for stenting of that subclavian artery Monday Patient takes Lasix every other day, she had been on every day Lasix but ran into some renal insufficiency problems. Since cutting down her diuretic she has had increased shortness of breath. She presents with mild leg edema Past Medical History:  
Diagnosis Date  Acute bronchitis  Acute pharyngitis  Allergic rhinitis due to other allergen  Coronary atherosclerosis of native coronary artery CABG x3  ~2006, no cardiac events or stents or chest pain since  Depressive disorder, not elsewhere classified   
 daily meds  Essential hypertension, benign   
 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 9)  Osteomyelitis of toe ([address] forth toe of left foot  Other specified acquired hypothyroidism  Thromboembolus ([address]   
 possible to back of left knee???, superficial no anticoagulation therapy required  Type II or unspecified type diabetes mellitus without mention of complication, uncontrolled   
 type 2: oral/insulin: avg. 112: s/s hypo shakes @100: A1C 7.2 on 07/2019 Past Surgical History:  
Procedure Laterality Date  CARDIAC SURG PROCEDURE UNLIST  2006 CABG x3  
 HX BREAST BIOPSY Left 02/20/2017  
 stereo  HX CORONARY ARTERY BYPASS GRAFT  2006 Three  VASCULAR SURGERY PROCEDURE UNLIST  08/10/2016 arteriogram of leg  VASCULAR SURGERY PROCEDURE UNLIST Left 08/26/2016  
  fem pop endarterectomy  VASCULAR SURGERY PROCEDURE UNLIST Left 03/12/2018  
 4th toe amp  VASCULAR SURGERY PROCEDURE UNLIST Right 08/01/2019 Right second toe amputation Family History:  
Problem Relation Age of Onset  Diabetes Other  Hypertension Other  Heart Disease Other  Diabetes Mother  Hypertension Mother  Cancer Father  Diabetes Sister  Heart Disease Sister  Diabetes Brother  Diabetes Sister  Hypertension Sister  Diabetes Sister  Hypertension Sister  Diabetes Sister  Hypertension Sister  Diabetes Brother Social History Socioeconomic History  Marital status:  Spouse name: Not on file  Number of children: Not on file  Years of education: Not on file  Highest education level: Not on file Occupational History  Not on file Social Needs  Financial resource strain: Not on file  Food insecurity:  
  Worry: Not on file Inability: Not on file  Transportation needs:  
  Medical: Not on file Non-medical: Not on file Tobacco Use  Smoking status: Former Smoker  Smokeless tobacco: Never Used  Tobacco comment: quit early 1990's Substance and Sexual Activity  Alcohol use: No  
 Drug use: No  
 Sexual activity: Not on file Lifestyle  Physical activity:  
  Days per week: Not on file Minutes per session: Not on file  Stress: Not on file Relationships  Social connections:  
  Talks on phone: Not on file Gets together: Not on file Attends [employer] service: Not on file Active member of club or organization: Not on file Attends meetings of clubs or organizations: Not on file Relationship status: Not on file  Intimate partner violence:  
  Fear of current or ex partner: Not on file Emotionally abused: Not on file Physically abused: Not on file Forced sexual activity: Not on file Other Topics Concern  Not on file Social History Narrative  Not on file ALLERGIES: Patient has no known allergies. Review of Systems Constitutional: Negative for chills and fever. HENT: Negative for rhinorrhea and sore throat. Eyes: Negative for discharge and redness. Respiratory: Positive for shortness of breath. Negative for cough. Cardiovascular: Positive for chest pain and leg swelling. Negative for palpitations. Gastrointestinal: Negative for abdominal pain, diarrhea, nausea and vomiting. Genitourinary: Negative for difficulty urinating and dysuria. Musculoskeletal: Negative for arthralgias and back pain. Skin: Negative for rash. Neurological: Negative for dizziness and headaches. All other systems reviewed and are negative. Vitals:  
 11/25/19 2238 11/25/19 2245 11/25/19 2257 BP: 164/70  168/77 Pulse: 72  72 Resp: 16 Temp: 98.5 °F (36.9 °C) SpO2: 98% 97% Physical Exam 
Vitals signs and nursing note reviewed. Constitutional:   
   General: She is not in acute distress. Appearance: She is well-developed. She is not diaphoretic. HENT:  
   Head: Normocephalic and atraumatic. Eyes:  
   General: No scleral icterus. Right eye: No discharge. Left eye: No discharge. Conjunctiva/sclera: Conjunctivae normal.  
   Pupils: Pupils are equal, round, and reactive to light. Neck: Musculoskeletal: Normal range of motion and neck supple. Cardiovascular:  
   Rate and Rhythm: Normal rate and regular rhythm. Heart sounds: Normal heart sounds. No murmur. No gallop. Pulmonary:  
   Effort: Pulmonary effort is normal. No respiratory distress. Breath sounds: Rales present. No wheezing. Abdominal:  
   General: Bowel sounds are normal.  
   Palpations: Abdomen is soft. Tenderness: There is no tenderness. There is no guarding. Musculoskeletal: Normal range of motion. Right lower leg: Edema present. Left lower leg: Edema present. Skin: 
   General: Skin is warm and dry. Neurological:  
   Mental Status: She is alert and oriented to person, place, and time. Motor: No abnormal muscle tone. Comments: cni 2-12 grossly Psychiatric:     
   Behavior: Behavior normal.  
 
  
 
MDM Number of Diagnoses or Management Options Diagnosis management comments: Medical decision making note: 
Anginal chest pain with dyspnea and CHF. Pro BNP is elevated at 10,000, initial troponin is 0.05 in the setting of mild renal insufficiency which is chronic. Pain-free in the ER, will diurese with some Lasix and apply Nitropaste. Case discussed with cardiology, we have collectively decided in light of her ongoing chest pain she will be admitted tonight and at least diuresed with decisions tomorrow as to whether to bump up her impending PCI This concludes the \"medical decision making note\" part of this emergency department visit note. Procedures

## 2019-11-26 NOTE — PROGRESS NOTES
Bedside and Verbal shift change report given to Amber Benavidez (oncoming nurse) by self (offgoing nurse). Report included the following information SBAR, Kardex, MAR and Recent Results. Right groin site with slight dry blood from earlier oozing on bedrest until 2000.

## 2019-11-26 NOTE — ROUTINE PROCESS
TRANSFER - OUT REPORT: 
 
LHC/grafts Left subclavian intervention Dr. Shell Dy RFA access Versed 3mg, fentanyl 75mcg,  
angiomax for anticoagulation, stopped @ 1524 Diagnostic cath One stent in left subclavian artery 
angioseal right groin Verbal report given to [facility] RN(name) on [place]  being transferred to cpru(unit) for routine progression of care Report consisted of patients Situation, Background, Assessment and  
Recommendations(SBAR). Information from the following report(s) Procedure Summary was reviewed with the receiving nurse. Lines:  
Peripheral IV 11/25/19 Left Antecubital (Active) Site Assessment Clean, dry, & intact 11/26/2019 11:51 AM  
Phlebitis Assessment 0 11/26/2019 11:51 AM  
Infiltration Assessment 0 11/26/2019 11:51 AM  
Dressing Status Clean, dry, & intact 11/26/2019 11:51 AM  
Dressing Type Tape;Transparent 11/26/2019 11:51 AM  
Hub Color/Line Status Infusing 11/26/2019 11:51 AM  
Alcohol Cap Used No 11/26/2019 11:51 AM  
   
Peripheral IV 11/26/19 Posterior;Right Forearm (Active) Site Assessment Clean, dry, & intact 11/26/2019 11:51 AM  
Phlebitis Assessment 0 11/26/2019 11:51 AM  
Infiltration Assessment 0 11/26/2019 11:51 AM  
Dressing Status Clean, dry, & intact 11/26/2019 11:51 AM  
Dressing Type Tape;Transparent 11/26/2019 11:51 AM  
Hub Color/Line Status Patent 11/26/2019 11:51 AM  
Action Taken Open ports on tubing capped 11/26/2019  9:00 AM  
Alcohol Cap Used No 11/26/2019 11:51 AM  
  
 
Opportunity for questions and clarification was provided. Patient transported with: 
 Registered Nurse

## 2019-11-26 NOTE — PROGRESS NOTES
Diane Fisher NP notified of patient's /84 despite 10mg hydralazine given a hour and half ago. Orders to give 5mg Norvasc now and change the 10 mg norvasc to 5 mg in the morning. Patient's home dose of Norvasc is 5mg

## 2019-11-26 NOTE — ROUTINE PROCESS
Cath Lab Transfer In Transferred from tele Transferred to : Cath Lab Procedure Room 1 Reason for Transfer: [address] [place] PCI Attending physician: Suma Klein Patient Assessment Patient received into procedure room. No acute distress noted or verbalized. Procedural prep completed. Iv accesses assessed for patency. Review of pertinent labs and allergies completed. Noted presence of current History & Physical, updated within the previous 24 hours. Consent signed.

## 2019-11-26 NOTE — PROGRESS NOTES
TRANSFER - IN REPORT: 
 
Verbal report received from [facility] on [place] being received from [facility] for routine progression of care. Report consisted of patients Situation, Background, Assessment and Recommendations(SBAR). Information from the following report(s) Procedure Summary and Cardiac Rhythm NSR was reviewed. Opportunity for questions and clarification was provided. Assessment completed upon patients arrival to unit and care assumed. Patient received to room 327. Patient connected to monitor and assessment completed. Plan of care reviewed. Patient oriented to room and call light. Patient aware to use call light to communicate any chest pain or needs. Admission skin assessment completed with second RN and reveals the following: Patient has a right groin site slight oozing from the site. Sacrum is free of any breakdown heels are free of bogginess no iother skin abnormalities noted

## 2019-11-26 NOTE — H&P
[facility] Cardiology History & Physical  
  
Date of  Admission: 11/25/2019 10:08 PM  
 
Primary Care Physician: Dr. Beto Delarosa Primary Cardiologist: Dr. Nicolette Sung 
Admitting Physician: Dr. Zafar Hernandez CC: chest pain, shortness of breath HPI:  Sharad Lizarraga is a 62 y.o. female with past medical history of CAD s/p recent PCI and remote CABG, PAD, uncontrolled DM2, HTN, hypothyroidism and CKD who presented to the ER with complaints of chest pain with associated shortness of breath. Patient reports mild shortness of breath over the past week that is worse with exertion. She currently taking oral Lasix EOD and has not missed any doses. Tonight while getting ready for bed, she developed acute chest pain and shortness of breath. She reports taking SL nitro at home with resolution of symptoms. Remains chest pain free at this time. Patient underwent LHC in 10/2019 with stenting of ostial LIMA and reconstruction of dominant left circumflex/PDA. She was also found to have moderate to severe proximal left subclavian artery stenosis. Due to patient's worsening renal function and need for multi-vessel intervention, it was recommended to address subclavian stenosis if patient had refractory angina in attempt to improve and support blood flow through the LIMA. She is scheduled for LHC on Monday of next week to address this. Upon arrival to the ER, she is somewhat hypertensive with BPs in upper 160s. Initial troponin is negative. EKG shows NSR with LVH without acute ST/T wave changes. While in the ER, she was given 60mg IV lasix for treatment of pro-BNP 04951 and topical nitrates. CXR is pending. Past Medical History:  
Diagnosis Date  Acute bronchitis  Acute pharyngitis  Allergic rhinitis due to other allergen  Coronary atherosclerosis of native coronary artery CABG x3  ~2006, no cardiac events or stents or chest pain since  Depressive disorder, not elsewhere classified   
 daily meds  Essential hypertension, benign   
 daily meds  Ischemic ulcer of foot due to atherosclerosis of native artery of extremity ([facility] [address]  Mixed hyperlipidemia  Obesity (BMI 30-39. 9)  Osteomyelitis of toe ([address] forth toe of left foot  Other specified acquired hypothyroidism  Thromboembolus ([facility] [address]   
 possible to back of left knee???, superficial no anticoagulation therapy required  Type II or unspecified type diabetes mellitus without mention of complication, uncontrolled   
 type 2: oral/insulin: avg. 112: s/s hypo shakes @100: A1C 7.2 on 07/2019 Past Surgical History:  
Procedure Laterality Date  CARDIAC SURG PROCEDURE UNLIST  2006 CABG x3  
 HX BREAST BIOPSY Left 02/20/2017  
 stereo  HX CORONARY ARTERY BYPASS GRAFT  2006 Three  VASCULAR SURGERY PROCEDURE UNLIST  08/10/2016  
 arteriogram of leg  VASCULAR SURGERY PROCEDURE UNLIST Left 08/26/2016  
  fem pop endarterectomy  VASCULAR SURGERY PROCEDURE UNLIST Left 03/12/2018  
 4th toe amp  VASCULAR SURGERY PROCEDURE UNLIST Right 08/01/2019 Right second toe amputation No Known Allergies Social History Socioeconomic History  Marital status:  Spouse name: Not on file  Number of children: Not on file  Years of education: Not on file  Highest education level: Not on file Occupational History  Not on file Social Needs  Financial resource strain: Not on file  Food insecurity:  
  Worry: Not on file Inability: Not on file  Transportation needs:  
  Medical: Not on file Non-medical: Not on file Tobacco Use  Smoking status: Former Smoker  Smokeless tobacco: Never Used  Tobacco comment: quit early 1990's Substance and Sexual Activity  Alcohol use: No  
 Drug use: No  
 Sexual activity: Not on file Lifestyle  Physical activity:  
  Days per week: Not on file Minutes per session: Not on file  Stress: Not on file Relationships  Social connections:  
  Talks on phone: Not on file Gets together: Not on file Attends Muslim service: Not on file Active member of club or organization: Not on file Attends meetings of clubs or organizations: Not on file Relationship status: Not on file  Intimate partner violence:  
  Fear of current or ex partner: Not on file Emotionally abused: Not on file Physically abused: Not on file Forced sexual activity: Not on file Other Topics Concern  Not on file Social History Narrative  Not on file Family History Problem Relation Age of Onset  Diabetes Other  Hypertension Other  Heart Disease Other  Diabetes Mother  Hypertension Mother  Cancer Father  Diabetes Sister  Heart Disease Sister  Diabetes Brother  Diabetes Sister  Hypertension Sister  Diabetes Sister  Hypertension Sister  Diabetes Sister  Hypertension Sister  Diabetes Brother No current facility-administered medications for this encounter. Current Outpatient Medications Medication Sig  
 valsartan (DIOVAN) 80 mg tablet Take  by mouth daily.  atorvastatin (LIPITOR) 80 mg tablet Take 1 Tab by mouth nightly.  furosemide (LASIX) 40 mg tablet Take 1 Tab by mouth every other day.  acetaminophen (TYLENOL) 325 mg tablet Take 2 Tabs by mouth every six (6) hours as needed for Pain.  ticagrelor (BRILINTA) 90 mg tablet Take 1 Tab by mouth every twelve (12) hours every twelve (12) hours.  nitroglycerin (NITROSTAT) 0.4 mg SL tablet 1 Tab by SubLINGual route every five (5) minutes as needed for Chest Pain. Up to 3 doses.  metoprolol tartrate (LOPRESSOR) 25 mg tablet Take 1 Tab by mouth every twelve (12) hours.  escitalopram oxalate (LEXAPRO) 20 mg tablet Take 1 Tab by mouth two (2) times a day.  amLODIPine (NORVASC) 5 mg tablet Take 1 Tab by mouth daily.  levothyroxine (SYNTHROID) 200 mcg tablet Take 1 Tab by mouth Daily (before breakfast).  levothyroxine (SYNTHROID) 25 mcg tablet Take 1 Tab by mouth Daily (before breakfast).  aspirin delayed-release 81 mg tablet Take 81 mg by mouth every morning. Review of Systems Review of Systems Constitutional: Negative. HENT: Negative. Eyes: Negative. Respiratory: Positive for shortness of breath. Cardiovascular: Positive for chest pain and leg swelling. Gastrointestinal: Negative. Genitourinary: Negative. Musculoskeletal: Negative. Skin: Negative. Neurological: Negative. Endo/Heme/Allergies: Negative. Psychiatric/Behavioral: Negative. Subjective:  
 
Visit Vitals /77 Pulse 72 Temp 98.5 °F (36.9 °C) Resp 16 SpO2 97% Physical Exam 
Constitutional:   
   Appearance: Normal appearance. She is obese. HENT:  
   Head: Normocephalic. Nose: Nose normal.  
Eyes:  
   Pupils: Pupils are equal, round, and reactive to light. Neck: Musculoskeletal: Normal range of motion. Cardiovascular:  
   Rate and Rhythm: Normal rate and regular rhythm. Pulses: Normal pulses. Heart sounds: Normal heart sounds. Pulmonary:  
   Effort: Pulmonary effort is normal.  
   Breath sounds: Normal breath sounds. Abdominal:  
   General: Bowel sounds are normal.  
   Palpations: Abdomen is soft. Musculoskeletal:  
   Comments: Mild BLE edema, patient reports stable. Skin: 
   General: Skin is warm and dry. Neurological:  
   General: No focal deficit present. Mental Status: She is alert and oriented to person, place, and time. Psychiatric:     
   Mood and Affect: Mood normal.  
 
 
 
Cardiographics Telemetry: normal sinus rhythm ECG:  normal sinus rhythm with LVH Echocardiogram: from 10/7/19 
-  Left ventricle: Systolic function was normal. Ejection fraction was estimated in the range of 55 % to 60 %.  There were no regional wall motion abnormalities. There was mild concentric hypertrophy. The E/e' ratio was 36.75. There was Diastolic Dysfunction. 
 -  Left atrium: The atrium was mildly dilated. 
 -  Mitral valve: There was mild regurgitation. Labs:  
Recent Results (from the past 24 hour(s)) CBC WITH AUTOMATED DIFF Collection Time: 11/25/19  9:55 AM  
Result Value Ref Range WBC 7.4 4.3 - 11.1 K/uL  
 RBC 3.82 (L) 4.05 - 5.2 M/uL  
 HGB 10.6 (L) 11.7 - 15.4 g/dL HCT 33.2 (L) 35.8 - 46.3 % MCV 86.9 79.6 - 97.8 FL  
 MCH 27.7 26.1 - 32.9 PG  
 MCHC 31.9 31.4 - 35.0 g/dL  
 RDW 14.1 11.9 - 14.6 % PLATELET 131 056 - 530 K/uL MPV 9.7 9.4 - 12.3 FL ABSOLUTE NRBC 0.00 0.0 - 0.2 K/uL  
 DF AUTOMATED NEUTROPHILS 67 43 - 78 % LYMPHOCYTES 20 13 - 44 % MONOCYTES 7 4.0 - 12.0 % EOSINOPHILS 5 0.5 - 7.8 % BASOPHILS 1 0.0 - 2.0 % IMMATURE GRANULOCYTES 0 0.0 - 5.0 %  
 ABS. NEUTROPHILS 4.9 1.7 - 8.2 K/UL  
 ABS. LYMPHOCYTES 1.4 0.5 - 4.6 K/UL  
 ABS. MONOCYTES 0.5 0.1 - 1.3 K/UL  
 ABS. EOSINOPHILS 0.4 0.0 - 0.8 K/UL  
 ABS. BASOPHILS 0.1 0.0 - 0.2 K/UL  
 ABS. IMM. GRANS. 0.0 0.0 - 0.5 K/UL METABOLIC PANEL, BASIC Collection Time: 11/25/19  9:55 AM  
Result Value Ref Range Sodium 144 136 - 145 mmol/L Potassium 4.5 3.5 - 5.1 mmol/L Chloride 115 (H) 98 - 107 mmol/L  
 CO2 25 21 - 32 mmol/L Anion gap 4 (L) 7 - 16 mmol/L Glucose 115 (H) 65 - 100 mg/dL BUN 31 (H) 8 - 23 MG/DL Creatinine 2.00 (H) 0.6 - 1.0 MG/DL  
 GFR est AA 33 (L) >60 ml/min/1.73m2 GFR est non-AA 27 (L) >60 ml/min/1.73m2 Calcium 8.7 8.3 - 10.4 MG/DL PROTHROMBIN TIME + INR Collection Time: 11/25/19  9:55 AM  
Result Value Ref Range Prothrombin time 12.8 11.7 - 14.5 sec INR 0.9    
CBC WITH AUTOMATED DIFF Collection Time: 11/25/19  9:59 PM  
Result Value Ref Range WBC 7.2 4.3 - 11.1 K/uL  
 RBC 3.81 (L) 4.05 - 5.2 M/uL  
 HGB 10.6 (L) 11.7 - 15.4 g/dL HCT 32.9 (L) 35.8 - 46.3 %  MCV 86.4 79.6 - 97.8 FL  
 MCH 27.8 26.1 - 32.9 PG  
 MCHC 32.2 31.4 - 35.0 g/dL  
 RDW 14.2 11.9 - 14.6 % PLATELET 240 726 - 893 K/uL MPV 10.1 9.4 - 12.3 FL ABSOLUTE NRBC 0.00 0.0 - 0.2 K/uL  
 DF AUTOMATED NEUTROPHILS 70 43 - 78 % LYMPHOCYTES 18 13 - 44 % MONOCYTES 6 4.0 - 12.0 % EOSINOPHILS 5 0.5 - 7.8 % BASOPHILS 1 0.0 - 2.0 % IMMATURE GRANULOCYTES 1 0.0 - 5.0 %  
 ABS. NEUTROPHILS 5.0 1.7 - 8.2 K/UL  
 ABS. LYMPHOCYTES 1.3 0.5 - 4.6 K/UL  
 ABS. MONOCYTES 0.4 0.1 - 1.3 K/UL  
 ABS. EOSINOPHILS 0.3 0.0 - 0.8 K/UL  
 ABS. BASOPHILS 0.1 0.0 - 0.2 K/UL  
 ABS. IMM. GRANS. 0.1 0.0 - 0.5 K/UL METABOLIC PANEL, COMPREHENSIVE Collection Time: 11/25/19  9:59 PM  
Result Value Ref Range Sodium 143 136 - 145 mmol/L Potassium 4.4 3.5 - 5.1 mmol/L Chloride 111 (H) 98 - 107 mmol/L  
 CO2 24 21 - 32 mmol/L Anion gap 8 7 - 16 mmol/L Glucose 308 (H) 65 - 100 mg/dL BUN 33 (H) 8 - 23 MG/DL Creatinine 2.22 (H) 0.6 - 1.0 MG/DL  
 GFR est AA 29 (L) >60 ml/min/1.73m2 GFR est non-AA 24 (L) >60 ml/min/1.73m2 Calcium 8.5 8.3 - 10.4 MG/DL Bilirubin, total 0.3 0.2 - 1.1 MG/DL  
 ALT (SGPT) 31 12 - 65 U/L  
 AST (SGOT) 21 15 - 37 U/L Alk. phosphatase 103 50 - 136 U/L Protein, total 7.1 6.3 - 8.2 g/dL Albumin 2.8 (L) 3.2 - 4.6 g/dL Globulin 4.3 (H) 2.3 - 3.5 g/dL A-G Ratio 0.7 (L) 1.2 - 3.5    
TROPONIN I Collection Time: 11/25/19  9:59 PM  
Result Value Ref Range Troponin-I, Qt. 0.05 0.02 - 0.05 NG/ML  
NT-PRO BNP Collection Time: 11/25/19  9:59 PM  
Result Value Ref Range NT pro-BNP 10,145 (H) 5 - 125 PG/ML Patient has been seen and examined by Dr. Diana Vivas and he agrees with the following assessment and plan: 
 
 Assessment/Plan:  
  
   Chest pain -- admit to telemetry for observation. Give 324mg ASA now, continue ASA daily. Continue BB, Brilinta, statin and CCB. Holding ARB for now after IV lasix in the ER. NPO after midnight.  Recheck BMP in the AM, if stable, proceed with [facility] to address subclavian stenosis. Essential hypertension, benign -- elevated in the ER. Continue home medications except ARB for now. Monitor closely. Titrate medications as needed. IV hydralazine prn Mixed hyperlipidemia -- continue statin Acquired hypothyroidism -- continue home dose Synthroid Coronary atherosclerosis of native coronary artery -- see above. PAD (peripheral artery disease) -- see above Type 2 diabetes with nephropathy -- nursing to verify medications. Add SSI ACHS for glucose monitoring and coverage. Last A1c last month was 7.7. Managed by PCP Severe obesity (BMI 35.0-39. 9) with comorbidity Leg swelling -- patient reports normal swelling at this time. On Norvasc as outpatient. Given IV lasix in the ER. CKD (chronic kidney disease) stage 3, GFR 30-59 ml/min -- followed by Dr. Luly Ch as outpatient. Monitor closely after IV lasix. Holding home Lasix and ARB dose for now. ? Need for IV hydration prior to [facility] -- will readdress in the AM after labs.   
 
 
 
Sue Del Toro NP 
11/25/2019 11:18 PM

## 2019-11-26 NOTE — PROGRESS NOTES
Report received from [address] [place] [address] Procedural findings communicated. Intra procedural  medication administration reviewed. Progression of care discussed. Patient received into [id number] [facility] 7 post sheath removal.  
 
Access site without bleeding or swelling yes Dressing dry and intact yes Patient instructed to limit movement to right lower extremity Routine post procedural vital signs and site assessment initiated yes

## 2019-11-26 NOTE — PROGRESS NOTES
Bedside and Verbal shift change report given to self (oncoming nurse) by Iron Shaw (offgoing nurse). Report included the following information SBAR, Kardex, MAR, Recent Results and Cardiac Rhythm NSR.

## 2019-11-26 NOTE — ROUTINE PROCESS
TRANSFER - OUT REPORT: 
 
Verbal report given to zeinab(name) on Meghna Perdomo  being transferred to [facility](unit) for routine progression of care Report consisted of patients Situation, Background, Assessment and  
Recommendations(SBAR). Information from the following report(s) SBAR was reviewed with the receiving nurse. Lines:  
Peripheral IV 11/25/19 Left Antecubital (Active) Site Assessment Clean, dry, & intact 11/25/2019  9:58 PM  
Phlebitis Assessment 0 11/25/2019  9:58 PM  
Infiltration Assessment 0 11/25/2019  9:58 PM  
Dressing Status Clean, dry, & intact 11/25/2019  9:58 PM  
Dressing Type Transparent 11/25/2019  9:58 PM  
Hub Color/Line Status Pink 11/25/2019  9:58 PM  
  
 
Opportunity for questions and clarification was provided. Patient transported with: 
 Registered Nurse

## 2019-11-26 NOTE — PROGRESS NOTES
Problem: Falls - Risk of 
Goal: *Absence of Falls Description Document Janki Ledesma Fall Risk and appropriate interventions in the flowsheet. Outcome: Progressing Towards Goal 
Note: Fall Risk Interventions: 
  
 
  
 
Medication Interventions: Evaluate medications/consider consulting pharmacy Problem: Cath Lab Procedures: Pre-Procedure Goal: Activity/Safety Outcome: Progressing Towards Goal 
Goal: Diagnostic Test/Procedures Outcome: Progressing Towards Goal 
Goal: Nutrition/Diet Outcome: Progressing Towards Goal 
Goal: Discharge Planning Outcome: Progressing Towards Goal 
Goal: Medications Outcome: Progressing Towards Goal 
Goal: Respiratory Outcome: Progressing Towards Goal 
Goal: Treatments/Interventions/Procedures Outcome: Progressing Towards Goal 
Goal: Psychosocial 
Outcome: Progressing Towards Goal 
Goal: *Verbalize description of procedure Outcome: Progressing Towards Goal 
Goal: *Consent signed Outcome: Progressing Towards Goal

## 2019-11-27 VITALS
HEIGHT: 68 IN | DIASTOLIC BLOOD PRESSURE: 65 MMHG | SYSTOLIC BLOOD PRESSURE: 158 MMHG | TEMPERATURE: 98.6 F | RESPIRATION RATE: 17 BRPM | OXYGEN SATURATION: 97 % | HEART RATE: 82 BPM | BODY MASS INDEX: 37.5 KG/M2 | WEIGHT: 247.4 LBS

## 2019-11-27 LAB
ANION GAP SERPL CALC-SCNC: 6 MMOL/L (ref 7–16)
ATRIAL RATE: 62 BPM
ATRIAL RATE: 81 BPM
BASOPHILS # BLD: 0.1 K/UL (ref 0–0.2)
BASOPHILS NFR BLD: 1 % (ref 0–2)
BUN SERPL-MCNC: 37 MG/DL (ref 8–23)
CALCIUM SERPL-MCNC: 8.2 MG/DL (ref 8.3–10.4)
CALCULATED P AXIS, ECG09: 42 DEGREES
CALCULATED P AXIS, ECG09: 63 DEGREES
CALCULATED R AXIS, ECG10: -38 DEGREES
CALCULATED R AXIS, ECG10: -60 DEGREES
CALCULATED T AXIS, ECG11: 91 DEGREES
CALCULATED T AXIS, ECG11: 99 DEGREES
CHLORIDE SERPL-SCNC: 117 MMOL/L (ref 98–107)
CO2 SERPL-SCNC: 23 MMOL/L (ref 21–32)
CREAT SERPL-MCNC: 2.34 MG/DL (ref 0.6–1)
DIAGNOSIS, 93000: NORMAL
DIAGNOSIS, 93000: NORMAL
DIFFERENTIAL METHOD BLD: ABNORMAL
EOSINOPHIL # BLD: 0.2 K/UL (ref 0–0.8)
EOSINOPHIL NFR BLD: 2 % (ref 0.5–7.8)
ERYTHROCYTE [DISTWIDTH] IN BLOOD BY AUTOMATED COUNT: 14.6 % (ref 11.9–14.6)
GLUCOSE BLD STRIP.AUTO-MCNC: 149 MG/DL (ref 65–100)
GLUCOSE SERPL-MCNC: 140 MG/DL (ref 65–100)
HCT VFR BLD AUTO: 27.9 % (ref 35.8–46.3)
HGB BLD-MCNC: 9 G/DL (ref 11.7–15.4)
IMM GRANULOCYTES # BLD AUTO: 0 K/UL (ref 0–0.5)
IMM GRANULOCYTES NFR BLD AUTO: 0 % (ref 0–5)
LYMPHOCYTES # BLD: 1.2 K/UL (ref 0.5–4.6)
LYMPHOCYTES NFR BLD: 16 % (ref 13–44)
MCH RBC QN AUTO: 28 PG (ref 26.1–32.9)
MCHC RBC AUTO-ENTMCNC: 32.3 G/DL (ref 31.4–35)
MCV RBC AUTO: 86.9 FL (ref 79.6–97.8)
MONOCYTES # BLD: 0.5 K/UL (ref 0.1–1.3)
MONOCYTES NFR BLD: 7 % (ref 4–12)
NEUTS SEG # BLD: 5.5 K/UL (ref 1.7–8.2)
NEUTS SEG NFR BLD: 74 % (ref 43–78)
NRBC # BLD: 0 K/UL (ref 0–0.2)
P-R INTERVAL, ECG05: 194 MS
P-R INTERVAL, ECG05: 200 MS
PLATELET # BLD AUTO: 231 K/UL (ref 150–450)
PMV BLD AUTO: 9.3 FL (ref 9.4–12.3)
POTASSIUM SERPL-SCNC: 4.5 MMOL/L (ref 3.5–5.1)
Q-T INTERVAL, ECG07: 428 MS
Q-T INTERVAL, ECG07: 490 MS
QRS DURATION, ECG06: 94 MS
QRS DURATION, ECG06: 96 MS
QTC CALCULATION (BEZET), ECG08: 497 MS
QTC CALCULATION (BEZET), ECG08: 497 MS
RBC # BLD AUTO: 3.21 M/UL (ref 4.05–5.2)
SODIUM SERPL-SCNC: 146 MMOL/L (ref 136–145)
VENTRICULAR RATE, ECG03: 62 BPM
VENTRICULAR RATE, ECG03: 81 BPM
WBC # BLD AUTO: 7.5 K/UL (ref 4.3–11.1)

## 2019-11-27 PROCEDURE — 74011250637 HC RX REV CODE- 250/637: Performed by: NURSE PRACTITIONER

## 2019-11-27 PROCEDURE — 93005 ELECTROCARDIOGRAM TRACING: CPT | Performed by: INTERNAL MEDICINE

## 2019-11-27 PROCEDURE — 74011250637 HC RX REV CODE- 250/637: Performed by: INTERNAL MEDICINE

## 2019-11-27 PROCEDURE — 99218 HC RM OBSERVATION: CPT

## 2019-11-27 PROCEDURE — 80048 BASIC METABOLIC PNL TOTAL CA: CPT

## 2019-11-27 PROCEDURE — 36415 COLL VENOUS BLD VENIPUNCTURE: CPT

## 2019-11-27 PROCEDURE — 82962 GLUCOSE BLOOD TEST: CPT

## 2019-11-27 PROCEDURE — 85025 COMPLETE CBC W/AUTO DIFF WBC: CPT

## 2019-11-27 RX ORDER — METOPROLOL SUCCINATE 50 MG/1
50 TABLET, EXTENDED RELEASE ORAL DAILY
Qty: 90 TAB | Refills: 3 | Status: SHIPPED | OUTPATIENT
Start: 2019-11-28 | End: 2020-01-01 | Stop reason: SDUPTHER

## 2019-11-27 RX ORDER — ISOSORBIDE MONONITRATE 120 MG/1
120 TABLET, EXTENDED RELEASE ORAL DAILY
Qty: 90 TAB | Refills: 3 | Status: SHIPPED | OUTPATIENT
Start: 2019-11-28 | End: 2019-11-27 | Stop reason: SDUPTHER

## 2019-11-27 RX ORDER — METOPROLOL SUCCINATE 50 MG/1
50 TABLET, EXTENDED RELEASE ORAL DAILY
Qty: 90 TAB | Refills: 3 | Status: SHIPPED | OUTPATIENT
Start: 2019-11-28 | End: 2019-11-27 | Stop reason: SDUPTHER

## 2019-11-27 RX ORDER — RANOLAZINE 500 MG/1
500 TABLET, EXTENDED RELEASE ORAL EVERY 12 HOURS
Qty: 60 TAB | Refills: 11 | Status: SHIPPED | OUTPATIENT
Start: 2019-11-27 | End: 2021-01-01 | Stop reason: SDUPTHER

## 2019-11-27 RX ORDER — RANOLAZINE 500 MG/1
500 TABLET, EXTENDED RELEASE ORAL EVERY 12 HOURS
Qty: 60 TAB | Refills: 11 | Status: SHIPPED | OUTPATIENT
Start: 2019-11-27 | End: 2019-11-27 | Stop reason: SDUPTHER

## 2019-11-27 RX ORDER — ISOSORBIDE MONONITRATE 120 MG/1
120 TABLET, EXTENDED RELEASE ORAL DAILY
Qty: 90 TAB | Refills: 3 | Status: SHIPPED | OUTPATIENT
Start: 2019-11-28 | End: 2020-01-01 | Stop reason: SDUPTHER

## 2019-11-27 RX ORDER — VALSARTAN 80 MG/1
TABLET ORAL
Qty: 30 TAB | Refills: 3 | Status: SHIPPED | OUTPATIENT
Start: 2019-11-27 | End: 2020-07-20 | Stop reason: CLARIF

## 2019-11-27 RX ADMIN — ISOSORBIDE MONONITRATE 120 MG: 60 TABLET, EXTENDED RELEASE ORAL at 08:05

## 2019-11-27 RX ADMIN — Medication 10 ML: at 05:50

## 2019-11-27 RX ADMIN — METOPROLOL SUCCINATE 50 MG: 50 TABLET, EXTENDED RELEASE ORAL at 08:05

## 2019-11-27 RX ADMIN — LEVOTHYROXINE SODIUM 25 MCG: 50 TABLET ORAL at 08:05

## 2019-11-27 RX ADMIN — LEVOTHYROXINE SODIUM 200 MCG: 100 TABLET ORAL at 08:05

## 2019-11-27 RX ADMIN — ESCITALOPRAM OXALATE 20 MG: 10 TABLET ORAL at 08:05

## 2019-11-27 RX ADMIN — RANOLAZINE 500 MG: 500 TABLET, FILM COATED, EXTENDED RELEASE ORAL at 08:05

## 2019-11-27 RX ADMIN — ASPIRIN 81 MG 81 MG: 81 TABLET ORAL at 08:04

## 2019-11-27 NOTE — PROGRESS NOTES
Bedside shift change report given to Torres (oncoming nurse) by  Erin allison). Report included the following information SBAR, Kardex, MAR and Recent Results. Right groin site visualized

## 2019-11-27 NOTE — PROGRESS NOTES
[facility] CARDIOLOGY PROGRESS NOTE 
      
 
11/27/2019 8:14 AM 
 
Admit Date: 11/25/2019 Subjective:  
Patient is stable and feels well. ROS: 
Cardiovascular:  As noted above Objective:  
  
Vitals:  
 11/26/19 2040 11/26/19 2311 11/27/19 0046 11/27/19 8153 BP: 171/73 159/74 132/74 139/67 Pulse: 78  89 78 Resp: 18 18 18 Temp: 97.8 °F (36.6 °C)  98.3 °F (36.8 °C) 98.4 °F (36.9 °C) SpO2: 99%  96% 96% Weight:    112.2 kg (247 lb 6.4 oz) Height:      
 
 
Physical Exam: 
General-No Acute Distress Neck- supple, no JVD 
CV- regular rate and rhythm no MRG Lung- clear bilaterally Abd- soft, nontender, nondistended Ext- no edema bilaterally. Skin- warm and dry Data Review:  
Recent Labs  
  11/27/19 
0500 11/26/19 
0436 11/25/19 
2159 11/25/19 
0735 * 144 143 144  
K 4.5 4.3 4.4 4.5 BUN 37* 36* 33* 31* CREA 2.34* 2.10* 2.22* 2.00* * 171* 308* 115* WBC 7.5  --  7.2 7.4 HGB 9.0*  --  10.6* 10.6* HCT 27.9*  --  32.9* 33.2*  
  --  286 280 INR  --   --   --  0.9  
TROIQ  --  0.06* 0.05  -- Assessment/Plan:  
 
Principal Problem: 
  Chest pain (11/25/2019) This is better and stable post PPI of the (L) subclavian artery. She had subacute occlusion of SVG-OM. She has developed collaterals to the OM and her distal (L)PDA. On ASA and Brilinta. Added Ranexa and Imdur. Active Problems: 
  Coronary atherosclerosis of native coronary artery () Complex 3vCAD - extensive PCI in past 
 
  Essential hypertension, benign () This is stable Mixed hyperlipidemia () On therapy Acquired hypothyroidism () Chronic PAD (peripheral artery disease) ([facility] [address] (8/18/2016) Chronic Type 2 diabetes with nephropathy [facility] (5/2/2018) Chronic Severe obesity (BMI 35.0-39. 9) with comorbidity ([facility] (5/2/2018) Chronic Leg swelling (5/1/2019) Chronic CKD (chronic kidney disease) stage 3, GFR 30-59 ml/min ([facility]) (10/8/2019) Check labs in 1 week. Hold diovan and lasix until Monday.    
 
 
 
 
Maggie Navarro MD 
11/27/2019 8:14 AM

## 2019-11-27 NOTE — PROGRESS NOTES
Cardiac Rehab: Spoke with patient regarding referral to cardiac rehab. Patient meets admission criteria based on NSTEMI with PCI(10/07/19). Pt s/p intervention to subclavian artery. Written information about Cardiac Rehab given and reviewed with patient. Discussed lifestyle modifications to promote cardiac wellness. Patient indicated that she still can not  participate in the cardiac rehab program due to travel and cost. Her Cardiologist is Dr. Michelle Covington. 
 
 
Thank you, Nikki Villegas BSN, RN Cardiopulmonary Rehabilitation Nurse Liaison [facility]

## 2019-11-27 NOTE — PROGRESS NOTES
Bedside and verbal report received from Carlos Whatley RN. Assumed care of the patient. Right groin site visualized.

## 2019-11-27 NOTE — PROGRESS NOTES
Problem: Falls - Risk of 
Goal: *Absence of Falls Description Document Dallas Colindres Fall Risk and appropriate interventions in the flowsheet. Outcome: Progressing Towards Goal 
Note: Fall Risk Interventions: 
  
 
  
 
Medication Interventions: Evaluate medications/consider consulting pharmacy, Teach patient to arise slowly Problem: Cath Lab Procedures: Post-Cath Day of Procedure (Initiate SCIP Measures for Post-Op Care) Goal: Off Pathway (Use only if patient is Off Pathway) Outcome: Progressing Towards Goal 
Goal: Activity/Safety Outcome: Progressing Towards Goal 
Goal: Consults, if ordered Outcome: Progressing Towards Goal 
Goal: Diagnostic Test/Procedures Outcome: Progressing Towards Goal 
Goal: Nutrition/Diet Outcome: Progressing Towards Goal 
Goal: Discharge Planning Outcome: Progressing Towards Goal 
Goal: Medications Outcome: Progressing Towards Goal 
Goal: Respiratory Outcome: Progressing Towards Goal 
Goal: Treatments/Interventions/Procedures Outcome: Progressing Towards Goal 
Goal: Psychosocial 
Outcome: Progressing Towards Goal 
Goal: *Procedure site is without bleeding and signs of infection six hours post sheath removal 
Outcome: Progressing Towards Goal 
Goal: *Hemodynamically stable Outcome: Progressing Towards Goal 
Goal: *Optimal pain control at patient's stated goal 
Outcome: Progressing Towards Goal

## 2019-11-27 NOTE — PROGRESS NOTES
Bedside and Verbal shift change report given to self (oncoming nurse) by Jennifer Cooney RN (offgoing nurse). Report included the following information SBAR, Kardex, Procedure Summary, Intake/Output, MAR and Recent Results. R [facility] c/d/i.

## 2019-11-27 NOTE — PROGRESS NOTES
Bedside and Verbal shift change report given to  (oncoming nurse) by Sven Ch (offgoing nurse). Report included the following information SBAR, Kardex, Intake/Output, MAR and Recent Results. Right groin site with slight oozing. Bedrest until 2000

## 2019-11-27 NOTE — PROGRESS NOTES
Discharge instructions reviewed with pt. Prescriptions given and med info sheets provided for all new medications. Opportunity for questions provided. pt voiced understanding of all discharge instructions.

## 2019-11-27 NOTE — DISCHARGE INSTRUCTIONS
Restart lasix and diovan on Monday Patient Education     DISPOSITION: The patient is being discharged home in stable condition on a low saturated fat, low cholesterol and low salt diet. The patient is instructed to advance activities as tolerated to the limit of fatigue or shortness of breath. The patient is instructed to avoid all heavy lifting, straining, stooping or squatting for 3-5 days. The patient is instructed to watch the cath site for bleeding/oozing; if seen, the patient is instructed to apply firm pressure with a clean cloth and call [facility] Cardiology at [phone]. The patient is instructed to watch for signs of infection which include: increasing area of redness, fever/hot to touch or purulent drainage at the catheterization site. The patient is instructed not to soak in a bathtub for 7-10 days, but is cleared to shower. The patient is instructed to call the office or return to the ER for immediate evaluation for any shortness of breath or chest pain not relieved by NTG. Learning About Coronary Artery Disease (CAD)  What is coronary artery disease? Coronary artery disease (CAD) occurs when plaque builds up in the arteries that bring oxygen-rich blood to your heart. Plaque is a fatty substance made of cholesterol, calcium, and other substances in the blood. This process is called hardening of the arteries, or atherosclerosis. What happens when you have coronary artery disease? · Plaque may narrow the coronary arteries. Narrowed arteries cause poor blood flow. This can lead to angina symptoms such as chest pain or discomfort. If blood flow is completely blocked, you could have a heart attack. · You can slow CAD and reduce the risk of future problems by making changes in your lifestyle. These include quitting smoking and eating heart-healthy foods. · Treatments for CAD, along with changes in your lifestyle, can help you live a longer and healthier life.   How can you prevent coronary artery disease? · Do not smoke. It may be the best thing you can do to prevent heart disease. If you need help quitting, talk to your doctor about stop-smoking programs and medicines. These can increase your chances of quitting for good. · Be active. Get at least 30 minutes of exercise on most days of the week. Walking is a good choice. You also may want to do other activities, such as running, swimming, cycling, or playing tennis or team sports. · Eat heart-healthy foods. Eat more fruits and vegetables and less foods that contain saturated and trans fats. Limit alcohol, sodium, and sweets. · Stay at a healthy weight. Lose weight if you need to. · Manage other health problems such as diabetes, high blood pressure, and high cholesterol. How is coronary artery disease treated? · Your doctor will suggest that you make lifestyle changes. For example, your doctor may ask you to eat healthy foods, quit smoking, lose extra weight, and be more active. · You will have to take medicines. · Your doctor may suggest a procedure to open narrowed or blocked arteries. This is called angioplasty. Or your doctor may suggest using healthy blood vessels to create detours around narrowed or blocked arteries. This is called bypass surgery. Follow-up care is a key part of your treatment and safety. Be sure to make and go to all appointments, and call your doctor if you are having problems. It's also a good idea to know your test results and keep a list of the medicines you take. Where can you learn more? Go to http://bernardo-magaly.info/. Enter (28) [phone] in the search box to learn more about \"Learning About Coronary Artery Disease (CAD). \"  Current as of: April 9, 2019  Content Version: 12.2  © 0416-6753 [employer]. Care instructions adapted under license by [employer] (which disclaims liability or warranty for this information).  If you have questions about a medical condition or this instruction, always ask your healthcare professional. [place] [address] any warranty or liability for your use of this information.

## 2019-11-27 NOTE — PROGRESS NOTES
,Care Management Interventions Transition of Care Consult (CM Consult): Discharge Planning Discharge Durable Medical Equipment: No 
Physical Therapy Consult: No 
Occupational Therapy Consult: No 
Speech Therapy Consult: No 
Discharge Location Discharge Placement: Home DC home

## 2019-11-27 NOTE — DISCHARGE SUMMARY
[facility] Cardiology Discharge Summary Patient ID: 
Meghna Perdomo [id number] 
23 y.o. 
1960 Admit date: 11/25/2019 Discharge date:  11/27/2019 Admitting Physician: Vanessa Tran MD  
 
Discharge Physician: Dr. Zeyad Nation Admission Diagnoses: Chest pain [R07.9] Discharge Diagnoses:  
 Diagnosis  Chest pain  CKD (chronic kidney disease) stage 3, GFR 30-59 ml/min (HCC)  Leg swelling  Type 2 diabetes with nephropathy ([facility]  Severe obesity (BMI 35.0-39. 9) with comorbidity ([facility]  PAD (peripheral artery disease) ([facility]  Essential hypertension, benign  Mixed hyperlipidemia  Acquired hypothyroidism  Coronary atherosclerosis of native coronary artery Cardiology Procedures this admission:  subclavian artery stenting Consults: None Hospital Course: Patient presented to the ER with complaints of chest pain with associated shortness of breath. Serial cardiac enzymes were done and remained negative. Patient was admitted to telemetry for continued care. Patient underwent cardiac catheterization by Dr. Zeyad Nation. [facility] showed: 
1. Significant progression of small diagonal disease as well as thrombotic occlusion of the vein graft to the obtuse marginal in less than one month. 2.  Collateralization is seen to the chronically occluded left posterior descending as well as the now chronically occluded small first obtuse marginal.  The diagonal itself has had significant progressive small vessel disease in the last 30 days. 3.  Successful stenting of the left subclavian with continued patency of the left internal mammary artery to the LAD as well as patency of the vein graft to the small diagonal which retrograde fills the LAD. 4.  Widely patent stenting of the prox and mid left circumflex into the large left posterolateral branch with stable chronic total occlusion of the left posterior descending. Patient tolerated the procedure well and was taken to the telemetry floor for recovery. Ranexa was added. Her DM and htn were felt to need more aggressive management. The morning of discharge, patient was up feeling well without any complaints of chest pain or shortness of breath. Patient's right groin cath site was clean, dry and intact without hematoma or bruit. Patient's labs were WNL w hgb 9 (no acute bleeding, has been anemic in the past) and cr 2.34 (stable renal failure). Patient was seen and examined by Dr. Tay Gonzalez and determined stable and ready for discharge. Patient was instructed on the importance of medication compliance including taking aspirin and Brilinta everyday without missing a dose. After receiving drug eluting stents, the patient will remain on dual anti-platelet therapy for 1 year. For maximized medical therapy for CAD, patient will continue BB and statin as well. Restart ARB on Monday. The patient will follow up with [address] [place] [address] 121 Cardiology -- Dr. Juwan King Dec 11 at [address] [place] [address]. Patient has been referred to cardiac rehab. Check CBC and BMP in one week w anemia and CKD. Resume lasix and diovan on Monday. DISPOSITION: The patient is being discharged home in stable condition on a low saturated fat, low cholesterol and low salt diet. The patient is instructed to advance activities as tolerated to the limit of fatigue or shortness of breath. The patient is instructed to avoid all heavy lifting, straining, stooping or squatting for 3-5 days. The patient is instructed to watch the cath site for bleeding/oozing; if seen, the patient is instructed to apply firm pressure with a clean cloth and call [address] [place] [address] 121 Cardiology at [phone]. The patient is instructed to watch for signs of infection which include: increasing area of redness, fever/hot to touch or purulent drainage at the catheterization site.  The patient is instructed not to soak in a bathtub for 7-10 days, but is cleared to shower. The patient is instructed to call the office or return to the ER for immediate evaluation for any shortness of breath or chest pain not relieved by NTG. Discharge Exam: Patient has been seen by Dr. Ericka Martins: see his progress note for exam details. Visit Vitals /65 (BP 1 Location: Left arm, BP Patient Position: At rest) Pulse 82 Temp 98.6 °F (37 °C) Resp 17 Ht 5' 8\" (1.727 m) Wt 112.2 kg (247 lb 6.4 oz) SpO2 97% BMI 37.62 kg/m² Recent Results (from the past 24 hour(s)) GLUCOSE, POC Collection Time: 11/26/19 11:17 AM  
Result Value Ref Range Glucose (POC) 106 (H) 65 - 100 mg/dL EKG, 12 LEAD, INITIAL Collection Time: 11/26/19  4:00 PM  
Result Value Ref Range Ventricular Rate 62 BPM  
 Atrial Rate 62 BPM  
 P-R Interval 194 ms QRS Duration 96 ms  
 Q-T Interval 490 ms QTC Calculation (Bezet) 497 ms Calculated P Axis 63 degrees Calculated R Axis -60 degrees Calculated T Axis 91 degrees Diagnosis Normal sinus rhythm Left axis deviation Low voltage QRS Inferior infarct (cited on or before 10-AUG-2019) Cannot rule out Anterior infarct (cited on or before 10-AUG-2019) Abnormal ECG When compared with ECG of 25-NOV-2019 21:47, 
QRS axis Shifted left Questionable change in initial forces of Anterolateral leads T wave inversion no longer evident in Lateral leads Confirmed by Greg Dodd ([id number]) on 11/27/2019 6:19:40 AM 
  
GLUCOSE, POC Collection Time: 11/26/19  4:31 PM  
Result Value Ref Range Glucose (POC) 96 65 - 100 mg/dL GLUCOSE, POC Collection Time: 11/26/19  9:52 PM  
Result Value Ref Range Glucose (POC) 195 (H) 65 - 100 mg/dL METABOLIC PANEL, BASIC Collection Time: 11/27/19  5:00 AM  
Result Value Ref Range Sodium 146 (H) 136 - 145 mmol/L Potassium 4.5 3.5 - 5.1 mmol/L  Chloride 117 (H) 98 - 107 mmol/L  
 CO2 23 21 - 32 mmol/L  
 Anion gap 6 (L) 7 - 16 mmol/L Glucose 140 (H) 65 - 100 mg/dL BUN 37 (H) 8 - 23 MG/DL Creatinine 2.34 (H) 0.6 - 1.0 MG/DL  
 GFR est AA 27 (L) >60 ml/min/1.73m2 GFR est non-AA 22 (L) >60 ml/min/1.73m2 Calcium 8.2 (L) 8.3 - 10.4 MG/DL  
CBC WITH AUTOMATED DIFF Collection Time: 11/27/19  5:00 AM  
Result Value Ref Range WBC 7.5 4.3 - 11.1 K/uL  
 RBC 3.21 (L) 4.05 - 5.2 M/uL HGB 9.0 (L) 11.7 - 15.4 g/dL HCT 27.9 (L) 35.8 - 46.3 % MCV 86.9 79.6 - 97.8 FL  
 MCH 28.0 26.1 - 32.9 PG  
 MCHC 32.3 31.4 - 35.0 g/dL  
 RDW 14.6 11.9 - 14.6 % PLATELET 293 802 - 126 K/uL MPV 9.3 (L) 9.4 - 12.3 FL ABSOLUTE NRBC 0.00 0.0 - 0.2 K/uL  
 DF AUTOMATED NEUTROPHILS 74 43 - 78 % LYMPHOCYTES 16 13 - 44 % MONOCYTES 7 4.0 - 12.0 % EOSINOPHILS 2 0.5 - 7.8 % BASOPHILS 1 0.0 - 2.0 % IMMATURE GRANULOCYTES 0 0.0 - 5.0 %  
 ABS. NEUTROPHILS 5.5 1.7 - 8.2 K/UL  
 ABS. LYMPHOCYTES 1.2 0.5 - 4.6 K/UL  
 ABS. MONOCYTES 0.5 0.1 - 1.3 K/UL  
 ABS. EOSINOPHILS 0.2 0.0 - 0.8 K/UL  
 ABS. BASOPHILS 0.1 0.0 - 0.2 K/UL  
 ABS. IMM. GRANS. 0.0 0.0 - 0.5 K/UL GLUCOSE, POC Collection Time: 11/27/19  6:24 AM  
Result Value Ref Range Glucose (POC) 149 (H) 65 - 100 mg/dL EKG, 12 LEAD, INITIAL Collection Time: 11/27/19  7:04 AM  
Result Value Ref Range Ventricular Rate 81 BPM  
 Atrial Rate 81 BPM  
 P-R Interval 200 ms QRS Duration 94 ms Q-T Interval 428 ms QTC Calculation (Bezet) 497 ms Calculated P Axis 42 degrees Calculated R Axis -38 degrees Calculated T Axis 99 degrees Diagnosis Normal sinus rhythm Left axis deviation Inferior infarct (cited on or before 10-AUG-2019) Anterolateral infarct (cited on or before 10-AUG-2019) Abnormal ECG When compared with ECG of 26-NOV-2019 16:00, 
Questionable change in initial forces of Anterolateral leads Patient Instructions:  
Current Discharge Medication List  
  
START taking these medications Details  
isosorbide mononitrate ER (IMDUR) 120 mg CR tablet Take 1 Tab by mouth daily. Qty: 90 Tab, Refills: 3  
  
metoprolol succinate (TOPROL-XL) 50 mg XL tablet Take 1 Tab by mouth daily. Qty: 90 Tab, Refills: 3  
  
ranolazine ER (RANEXA) 500 mg SR tablet Take 1 Tab by mouth every twelve (12) hours. Qty: 60 Tab, Refills: 11 CONTINUE these medications which have CHANGED Details  
valsartan (DIOVAN) 80 mg tablet Restart on Monday Dec 2 Qty: 30 Tab, Refills: 3 CONTINUE these medications which have NOT CHANGED Details  
insulin degludec 100 unit/mL soln 70 Units by SubCUTAneous route daily. [facility]   Indications: type 2 diabetes mellitus  
  
atorvastatin (LIPITOR) 80 mg tablet Take 1 Tab by mouth nightly. Qty: 30 Tab, Refills: 11  
  
furosemide (LASIX) 40 mg tablet Take 1 Tab by mouth every other day. Qty: 30 Tab, Refills: 3  
  
ticagrelor (BRILINTA) 90 mg tablet Take 1 Tab by mouth every twelve (12) hours every twelve (12) hours. Qty: 60 Tab, Refills: 11 amLODIPine (NORVASC) 5 mg tablet Take 1 Tab by mouth daily. Qty: 90 Tab, Refills: 3 Associated Diagnoses: Essential hypertension  
  
!! levothyroxine (SYNTHROID) 200 mcg tablet Take 1 Tab by mouth Daily (before breakfast). Qty: 90 Tab, Refills: 3  
  
!! levothyroxine (SYNTHROID) 25 mcg tablet Take 1 Tab by mouth Daily (before breakfast). Qty: 90 Tab, Refills: 3  
  
aspirin delayed-release 81 mg tablet Take 81 mg by mouth every morning. acetaminophen (TYLENOL) 325 mg tablet Take 2 Tabs by mouth every six (6) hours as needed for Pain. nitroglycerin (NITROSTAT) 0.4 mg SL tablet 1 Tab by SubLINGual route every five (5) minutes as needed for Chest Pain. Up to 3 doses. Qty: 1 Bottle, Refills: 4  
  
escitalopram oxalate (LEXAPRO) 20 mg tablet Take 1 Tab by mouth two (2) times a day. Qty: 180 Tab, Refills: 3 Associated Diagnoses: Depression, unspecified depression type !! - Potential duplicate medications found. Please discuss with provider.   
  
STOP taking these medications  
  
 metoprolol tartrate (LOPRESSOR) 25 mg tablet Comments:  
Reason for Stopping:   
   
  
 
Nish Castaneda MD

## 2019-11-27 NOTE — PROGRESS NOTES
2 IVs removed. Heart monitor removed and returned to monitor room. Pt does not want her flu shot. Awaiting discharge RN AnnaRose.

## 2019-12-02 ENCOUNTER — HOSPITAL ENCOUNTER (OUTPATIENT)
Dept: CARDIAC CATH/INVASIVE PROCEDURES | Age: 59
Discharge: HOME | End: 2019-12-02

## 2019-12-05 ENCOUNTER — HOSPITAL ENCOUNTER (OUTPATIENT)
Dept: LAB | Age: 59
Discharge: HOME | End: 2019-12-05
Attending: PHYSICIAN ASSISTANT
Payer: COMMERCIAL

## 2019-12-05 LAB
ANION GAP SERPL CALC-SCNC: 7 MMOL/L (ref 7–16)
BUN SERPL-MCNC: 30 MG/DL (ref 8–23)
CALCIUM SERPL-MCNC: 8.8 MG/DL (ref 8.3–10.4)
CHLORIDE SERPL-SCNC: 115 MMOL/L (ref 98–107)
CO2 SERPL-SCNC: 23 MMOL/L (ref 21–32)
CREAT SERPL-MCNC: 2.6 MG/DL (ref 0.6–1)
GLUCOSE SERPL-MCNC: 119 MG/DL (ref 65–100)
POTASSIUM SERPL-SCNC: 4.6 MMOL/L (ref 3.5–5.1)
SODIUM SERPL-SCNC: 145 MMOL/L (ref 136–145)

## 2019-12-05 PROCEDURE — 36415 COLL VENOUS BLD VENIPUNCTURE: CPT

## 2019-12-05 PROCEDURE — 80048 BASIC METABOLIC PNL TOTAL CA: CPT

## 2019-12-17 ENCOUNTER — HOSPITAL ENCOUNTER (OUTPATIENT)
Dept: LAB | Age: 59
Discharge: HOME | End: 2019-12-17
Payer: COMMERCIAL

## 2019-12-17 DIAGNOSIS — I10 ESSENTIAL HYPERTENSION: ICD-10-CM

## 2019-12-17 LAB
ANION GAP SERPL CALC-SCNC: 6 MMOL/L (ref 7–16)
BUN SERPL-MCNC: 46 MG/DL (ref 8–23)
CALCIUM SERPL-MCNC: 8.5 MG/DL (ref 8.3–10.4)
CHLORIDE SERPL-SCNC: 110 MMOL/L (ref 98–107)
CO2 SERPL-SCNC: 26 MMOL/L (ref 21–32)
CREAT SERPL-MCNC: 2.8 MG/DL (ref 0.6–1)
GLUCOSE SERPL-MCNC: 150 MG/DL (ref 65–100)
POTASSIUM SERPL-SCNC: 4.2 MMOL/L (ref 3.5–5.1)
SODIUM SERPL-SCNC: 142 MMOL/L (ref 136–145)

## 2019-12-17 PROCEDURE — 36415 COLL VENOUS BLD VENIPUNCTURE: CPT

## 2019-12-17 PROCEDURE — 80048 BASIC METABOLIC PNL TOTAL CA: CPT

## 2020-01-01 ENCOUNTER — HOSPITAL ENCOUNTER (OUTPATIENT)
Age: 60
Setting detail: OUTPATIENT SURGERY
Discharge: HOME | End: 2020-10-21
Attending: SURGERY | Admitting: SURGERY
Payer: COMMERCIAL

## 2020-01-01 ENCOUNTER — HOSPITAL ENCOUNTER (OUTPATIENT)
Dept: INTERVENTIONAL RADIOLOGY/VASCULAR | Age: 60
Discharge: HOME | End: 2020-10-12
Attending: INTERNAL MEDICINE
Payer: COMMERCIAL

## 2020-01-01 ENCOUNTER — ANESTHESIA EVENT (OUTPATIENT)
Dept: SURGERY | Age: 60
End: 2020-01-01
Payer: COMMERCIAL

## 2020-01-01 ENCOUNTER — ANESTHESIA (OUTPATIENT)
Dept: SURGERY | Age: 60
End: 2020-01-01
Payer: COMMERCIAL

## 2020-01-01 ENCOUNTER — APPOINTMENT (OUTPATIENT)
Dept: INTERVENTIONAL RADIOLOGY/VASCULAR | Age: 60
End: 2020-01-01
Attending: SURGERY
Payer: COMMERCIAL

## 2020-01-01 ENCOUNTER — APPOINTMENT (OUTPATIENT)
Dept: GENERAL RADIOLOGY | Age: 60
End: 2020-01-01
Payer: COMMERCIAL

## 2020-01-01 ENCOUNTER — HOSPITAL ENCOUNTER (OUTPATIENT)
Dept: SURGERY | Age: 60
Discharge: HOME | End: 2020-10-15
Payer: COMMERCIAL

## 2020-01-01 ENCOUNTER — HOSPITAL ENCOUNTER (EMERGENCY)
Age: 60
Discharge: HOME | End: 2020-09-29
Payer: COMMERCIAL

## 2020-01-01 VITALS
HEART RATE: 59 BPM | HEIGHT: 68 IN | OXYGEN SATURATION: 96 % | RESPIRATION RATE: 16 BRPM | DIASTOLIC BLOOD PRESSURE: 62 MMHG | SYSTOLIC BLOOD PRESSURE: 125 MMHG | TEMPERATURE: 97.5 F | WEIGHT: 250 LBS | BODY MASS INDEX: 37.89 KG/M2

## 2020-01-01 VITALS
HEIGHT: 68 IN | HEART RATE: 61 BPM | TEMPERATURE: 98.5 F | OXYGEN SATURATION: 98 % | DIASTOLIC BLOOD PRESSURE: 74 MMHG | WEIGHT: 250 LBS | SYSTOLIC BLOOD PRESSURE: 157 MMHG | RESPIRATION RATE: 16 BRPM | BODY MASS INDEX: 37.89 KG/M2

## 2020-01-01 VITALS
HEIGHT: 68 IN | WEIGHT: 255 LBS | BODY MASS INDEX: 38.65 KG/M2 | SYSTOLIC BLOOD PRESSURE: 204 MMHG | HEART RATE: 80 BPM | TEMPERATURE: 98.8 F | OXYGEN SATURATION: 96 % | DIASTOLIC BLOOD PRESSURE: 81 MMHG | RESPIRATION RATE: 22 BRPM

## 2020-01-01 VITALS
DIASTOLIC BLOOD PRESSURE: 69 MMHG | TEMPERATURE: 97.3 F | HEART RATE: 98 BPM | SYSTOLIC BLOOD PRESSURE: 141 MMHG | RESPIRATION RATE: 18 BRPM | HEIGHT: 68 IN | WEIGHT: 248 LBS | BODY MASS INDEX: 37.59 KG/M2

## 2020-01-01 DIAGNOSIS — R07.89 ATYPICAL CHEST PAIN: Primary | ICD-10-CM

## 2020-01-01 DIAGNOSIS — N18.9 CKD (CHRONIC KIDNEY DISEASE): ICD-10-CM

## 2020-01-01 LAB
ALBUMIN SERPL-MCNC: 2.4 G/DL (ref 3.2–4.6)
ALBUMIN/GLOB SERPL: 0.5 {RATIO} (ref 1.2–3.5)
ALP SERPL-CCNC: 101 U/L (ref 50–136)
ALT SERPL-CCNC: 23 U/L (ref 12–65)
ANION GAP SERPL CALC-SCNC: 7 MMOL/L (ref 7–16)
ANION GAP SERPL CALC-SCNC: 7 MMOL/L (ref 7–16)
ANION GAP SERPL CALC-SCNC: 9 MMOL/L (ref 7–16)
AST SERPL-CCNC: 20 U/L (ref 15–37)
ATRIAL RATE: 78 BPM
BASOPHILS # BLD: 0.1 K/UL (ref 0–0.2)
BASOPHILS # BLD: 0.1 K/UL (ref 0–0.2)
BASOPHILS NFR BLD: 1 % (ref 0–2)
BASOPHILS NFR BLD: 1 % (ref 0–2)
BILIRUB SERPL-MCNC: 0.5 MG/DL (ref 0.2–1.1)
BUN SERPL-MCNC: 35 MG/DL (ref 8–23)
BUN SERPL-MCNC: 58 MG/DL (ref 8–23)
BUN SERPL-MCNC: 66 MG/DL (ref 8–23)
CALCIUM SERPL-MCNC: 8.4 MG/DL (ref 8.3–10.4)
CALCIUM SERPL-MCNC: 8.5 MG/DL (ref 8.3–10.4)
CALCIUM SERPL-MCNC: 8.8 MG/DL (ref 8.3–10.4)
CALCULATED P AXIS, ECG09: 54 DEGREES
CALCULATED R AXIS, ECG10: -51 DEGREES
CALCULATED T AXIS, ECG11: 82 DEGREES
CHLORIDE SERPL-SCNC: 107 MMOL/L (ref 98–107)
CHLORIDE SERPL-SCNC: 112 MMOL/L (ref 98–107)
CHLORIDE SERPL-SCNC: 114 MMOL/L (ref 98–107)
CO2 SERPL-SCNC: 19 MMOL/L (ref 21–32)
CO2 SERPL-SCNC: 23 MMOL/L (ref 21–32)
CO2 SERPL-SCNC: 28 MMOL/L (ref 21–32)
CREAT SERPL-MCNC: 3.72 MG/DL (ref 0.6–1)
CREAT SERPL-MCNC: 4.52 MG/DL (ref 0.6–1)
CREAT SERPL-MCNC: 4.81 MG/DL (ref 0.6–1)
DIAGNOSIS, 93000: NORMAL
DIFFERENTIAL METHOD BLD: ABNORMAL
DIFFERENTIAL METHOD BLD: ABNORMAL
EOSINOPHIL # BLD: 0.1 K/UL (ref 0–0.8)
EOSINOPHIL # BLD: 0.5 K/UL (ref 0–0.8)
EOSINOPHIL NFR BLD: 1 % (ref 0.5–7.8)
EOSINOPHIL NFR BLD: 5 % (ref 0.5–7.8)
ERYTHROCYTE [DISTWIDTH] IN BLOOD BY AUTOMATED COUNT: 13.8 % (ref 11.9–14.6)
ERYTHROCYTE [DISTWIDTH] IN BLOOD BY AUTOMATED COUNT: 14.2 % (ref 11.9–14.6)
GLOBULIN SER CALC-MCNC: 4.7 G/DL (ref 2.3–3.5)
GLUCOSE BLD STRIP.AUTO-MCNC: 77 MG/DL (ref 65–100)
GLUCOSE SERPL-MCNC: 108 MG/DL (ref 65–100)
GLUCOSE SERPL-MCNC: 133 MG/DL (ref 65–100)
GLUCOSE SERPL-MCNC: 83 MG/DL (ref 65–100)
HCT VFR BLD AUTO: 29.5 % (ref 35.8–46.3)
HCT VFR BLD AUTO: 31.7 % (ref 35.8–46.3)
HGB BLD-MCNC: 10.3 G/DL (ref 11.7–15.4)
HGB BLD-MCNC: 9.8 G/DL (ref 11.7–15.4)
IMM GRANULOCYTES # BLD AUTO: 0.1 K/UL (ref 0–0.5)
IMM GRANULOCYTES # BLD AUTO: 0.1 K/UL (ref 0–0.5)
IMM GRANULOCYTES NFR BLD AUTO: 1 % (ref 0–5)
IMM GRANULOCYTES NFR BLD AUTO: 1 % (ref 0–5)
LYMPHOCYTES # BLD: 0.7 K/UL (ref 0.5–4.6)
LYMPHOCYTES # BLD: 1.3 K/UL (ref 0.5–4.6)
LYMPHOCYTES NFR BLD: 15 % (ref 13–44)
LYMPHOCYTES NFR BLD: 6 % (ref 13–44)
MAGNESIUM SERPL-MCNC: 2 MG/DL (ref 1.8–2.4)
MCH RBC QN AUTO: 29.5 PG (ref 26.1–32.9)
MCH RBC QN AUTO: 30.1 PG (ref 26.1–32.9)
MCHC RBC AUTO-ENTMCNC: 32.5 G/DL (ref 31.4–35)
MCHC RBC AUTO-ENTMCNC: 33.2 G/DL (ref 31.4–35)
MCV RBC AUTO: 90.5 FL (ref 79.6–97.8)
MCV RBC AUTO: 90.8 FL (ref 79.6–97.8)
MONOCYTES # BLD: 0.8 K/UL (ref 0.1–1.3)
MONOCYTES # BLD: 0.8 K/UL (ref 0.1–1.3)
MONOCYTES NFR BLD: 7 % (ref 4–12)
MONOCYTES NFR BLD: 9 % (ref 4–12)
NEUTS SEG # BLD: 6 K/UL (ref 1.7–8.2)
NEUTS SEG # BLD: 9.2 K/UL (ref 1.7–8.2)
NEUTS SEG NFR BLD: 69 % (ref 43–78)
NEUTS SEG NFR BLD: 84 % (ref 43–78)
NRBC # BLD: 0 K/UL (ref 0–0.2)
NRBC # BLD: 0 K/UL (ref 0–0.2)
P-R INTERVAL, ECG05: 176 MS
PLATELET # BLD AUTO: 252 K/UL (ref 150–450)
PLATELET # BLD AUTO: 299 K/UL (ref 150–450)
PMV BLD AUTO: 10 FL (ref 9.4–12.3)
PMV BLD AUTO: 10.1 FL (ref 9.4–12.3)
POTASSIUM BLD-SCNC: 3.9 MMOL/L (ref 3.5–5.1)
POTASSIUM SERPL-SCNC: 3.9 MMOL/L (ref 3.5–5.1)
POTASSIUM SERPL-SCNC: 4.3 MMOL/L (ref 3.5–5.1)
POTASSIUM SERPL-SCNC: 4.5 MMOL/L (ref 3.5–5.1)
PROT SERPL-MCNC: 7.1 G/DL (ref 6.3–8.2)
Q-T INTERVAL, ECG07: 422 MS
QRS DURATION, ECG06: 112 MS
QTC CALCULATION (BEZET), ECG08: 481 MS
RBC # BLD AUTO: 3.26 M/UL (ref 4.05–5.2)
RBC # BLD AUTO: 3.49 M/UL (ref 4.05–5.2)
SODIUM SERPL-SCNC: 140 MMOL/L (ref 136–145)
SODIUM SERPL-SCNC: 142 MMOL/L (ref 136–145)
SODIUM SERPL-SCNC: 144 MMOL/L (ref 136–145)
TROPONIN-HIGH SENSITIVITY: 53.3 PG/ML (ref 0–14)
TROPONIN-HIGH SENSITIVITY: 53.6 PG/ML (ref 0–14)
VENTRICULAR RATE, ECG03: 78 BPM
WBC # BLD AUTO: 10.9 K/UL (ref 4.3–11.1)
WBC # BLD AUTO: 8.7 K/UL (ref 4.3–11.1)

## 2020-01-01 PROCEDURE — 77030002916 HC SUT ETHLN J&J -A

## 2020-01-01 PROCEDURE — 77030031131 HC SUT MXN P COVD -B

## 2020-01-01 PROCEDURE — 74011000250 HC RX REV CODE- 250: Performed by: REGISTERED NURSE

## 2020-01-01 PROCEDURE — 77030018719 HC DRSG PTCH ANTIMIC J&J -A

## 2020-01-01 PROCEDURE — 85025 COMPLETE CBC W/AUTO DIFF WBC: CPT

## 2020-01-01 PROCEDURE — C1725 CATH, TRANSLUMIN NON-LASER: HCPCS

## 2020-01-01 PROCEDURE — 84484 ASSAY OF TROPONIN QUANT: CPT

## 2020-01-01 PROCEDURE — 96375 TX/PRO/DX INJ NEW DRUG ADDON: CPT

## 2020-01-01 PROCEDURE — 74011250636 HC RX REV CODE- 250/636: Performed by: NURSE ANESTHETIST, CERTIFIED REGISTERED

## 2020-01-01 PROCEDURE — 76010000153 HC OR TIME 1.5 TO 2 HR: Performed by: SURGERY

## 2020-01-01 PROCEDURE — 80053 COMPREHEN METABOLIC PANEL: CPT

## 2020-01-01 PROCEDURE — 82962 GLUCOSE BLOOD TEST: CPT

## 2020-01-01 PROCEDURE — C1750 CATH, HEMODIALYSIS,LONG-TERM: HCPCS

## 2020-01-01 PROCEDURE — 36901 INTRO CATH DIALYSIS CIRCUIT: CPT

## 2020-01-01 PROCEDURE — 77030007181 HC COIL EMB TORN COOK -B

## 2020-01-01 PROCEDURE — 83735 ASSAY OF MAGNESIUM: CPT

## 2020-01-01 PROCEDURE — 74011250636 HC RX REV CODE- 250/636: Performed by: PHYSICIAN ASSISTANT

## 2020-01-01 PROCEDURE — 96374 THER/PROPH/DIAG INJ IV PUSH: CPT

## 2020-01-01 PROCEDURE — 74011250636 HC RX REV CODE- 250/636: Performed by: SURGERY

## 2020-01-01 PROCEDURE — C1894 INTRO/SHEATH, NON-LASER: HCPCS

## 2020-01-01 PROCEDURE — 93005 ELECTROCARDIOGRAM TRACING: CPT

## 2020-01-01 PROCEDURE — C1769 GUIDE WIRE: HCPCS

## 2020-01-01 PROCEDURE — 74011000250 HC RX REV CODE- 250: Performed by: NURSE ANESTHETIST, CERTIFIED REGISTERED

## 2020-01-01 PROCEDURE — 74011000250 HC RX REV CODE- 250: Performed by: PHYSICIAN ASSISTANT

## 2020-01-01 PROCEDURE — 36415 COLL VENOUS BLD VENIPUNCTURE: CPT

## 2020-01-01 PROCEDURE — 74011250637 HC RX REV CODE- 250/637: Performed by: PHYSICIAN ASSISTANT

## 2020-01-01 PROCEDURE — 80048 BASIC METABOLIC PNL TOTAL CA: CPT

## 2020-01-01 PROCEDURE — 74011250636 HC RX REV CODE- 250/636: Performed by: REGISTERED NURSE

## 2020-01-01 PROCEDURE — 36558 INSERT TUNNELED CV CATH: CPT

## 2020-01-01 PROCEDURE — 76937 US GUIDE VASCULAR ACCESS: CPT

## 2020-01-01 PROCEDURE — 84132 ASSAY OF SERUM POTASSIUM: CPT

## 2020-01-01 PROCEDURE — 99284 EMERGENCY DEPT VISIT MOD MDM: CPT

## 2020-01-01 PROCEDURE — 71046 X-RAY EXAM CHEST 2 VIEWS: CPT

## 2020-01-01 PROCEDURE — 77030018389 HC SPNG HEMSTAT1 J&J -B

## 2020-01-01 PROCEDURE — 76210000006 HC OR PH I REC 0.5 TO 1 HR: Performed by: SURGERY

## 2020-01-01 PROCEDURE — 74011250636 HC RX REV CODE- 250/636: Performed by: STUDENT IN AN ORGANIZED HEALTH CARE EDUCATION/TRAINING PROGRAM

## 2020-01-01 PROCEDURE — C1887 CATHETER, GUIDING: HCPCS

## 2020-01-01 PROCEDURE — 77030010507 HC ADH SKN DERMBND J&J -B

## 2020-01-01 PROCEDURE — 74011000636 HC RX REV CODE- 636: Performed by: SURGERY

## 2020-01-01 PROCEDURE — 76060000034 HC ANESTHESIA 1.5 TO 2 HR: Performed by: SURGERY

## 2020-01-01 PROCEDURE — 74011000250 HC RX REV CODE- 250

## 2020-01-01 PROCEDURE — 76210000021 HC REC RM PH II 0.5 TO 1 HR: Performed by: SURGERY

## 2020-01-01 PROCEDURE — 74011250636 HC RX REV CODE- 250/636

## 2020-01-01 DEVICE — TORNADO EMBOLIZATION COIL
Type: IMPLANTABLE DEVICE | Site: ARM | Status: FUNCTIONAL
Brand: TORNADO

## 2020-01-01 RX ORDER — SODIUM CHLORIDE 0.9 % (FLUSH) 0.9 %
5-40 SYRINGE (ML) INJECTION AS NEEDED
Status: DISCONTINUED | OUTPATIENT
Start: 2020-01-01 | End: 2020-01-01 | Stop reason: HOSPADM

## 2020-01-01 RX ORDER — LIDOCAINE HYDROCHLORIDE 20 MG/ML
2-10 INJECTION, SOLUTION INFILTRATION; PERINEURAL ONCE
Status: ACTIVE | OUTPATIENT
Start: 2020-01-01 | End: 2020-01-01

## 2020-01-01 RX ORDER — HEPARIN SODIUM 1000 [USP'U]/ML
5000 INJECTION, SOLUTION INTRAVENOUS; SUBCUTANEOUS ONCE
Status: COMPLETED | OUTPATIENT
Start: 2020-01-01 | End: 2020-01-01

## 2020-01-01 RX ORDER — FLUMAZENIL 0.1 MG/ML
0.2 INJECTION INTRAVENOUS
Status: DISCONTINUED | OUTPATIENT
Start: 2020-01-01 | End: 2020-01-01 | Stop reason: HOSPADM

## 2020-01-01 RX ORDER — FENTANYL CITRATE 50 UG/ML
25-50 INJECTION, SOLUTION INTRAMUSCULAR; INTRAVENOUS
Status: DISCONTINUED | OUTPATIENT
Start: 2020-01-01 | End: 2020-01-01 | Stop reason: HOSPADM

## 2020-01-01 RX ORDER — DIPHENHYDRAMINE HYDROCHLORIDE 50 MG/ML
12.5 INJECTION, SOLUTION INTRAMUSCULAR; INTRAVENOUS
Status: DISCONTINUED | OUTPATIENT
Start: 2020-01-01 | End: 2020-01-01 | Stop reason: HOSPADM

## 2020-01-01 RX ORDER — SODIUM CHLORIDE 0.9 % (FLUSH) 0.9 %
5-40 SYRINGE (ML) INJECTION EVERY 8 HOURS
Status: DISCONTINUED | OUTPATIENT
Start: 2020-01-01 | End: 2020-01-01 | Stop reason: HOSPADM

## 2020-01-01 RX ORDER — MIDAZOLAM HYDROCHLORIDE 1 MG/ML
.5-2 INJECTION, SOLUTION INTRAMUSCULAR; INTRAVENOUS
Status: DISCONTINUED | OUTPATIENT
Start: 2020-01-01 | End: 2020-01-01 | Stop reason: HOSPADM

## 2020-01-01 RX ORDER — ONDANSETRON 2 MG/ML
4 INJECTION INTRAMUSCULAR; INTRAVENOUS
Status: COMPLETED | OUTPATIENT
Start: 2020-01-01 | End: 2020-01-01

## 2020-01-01 RX ORDER — NALOXONE HYDROCHLORIDE 0.4 MG/ML
0.1 INJECTION, SOLUTION INTRAMUSCULAR; INTRAVENOUS; SUBCUTANEOUS AS NEEDED
Status: DISCONTINUED | OUTPATIENT
Start: 2020-01-01 | End: 2020-01-01 | Stop reason: HOSPADM

## 2020-01-01 RX ORDER — LIDOCAINE HYDROCHLORIDE 10 MG/ML
0.1 INJECTION INFILTRATION; PERINEURAL AS NEEDED
Status: DISCONTINUED | OUTPATIENT
Start: 2020-01-01 | End: 2020-01-01 | Stop reason: HOSPADM

## 2020-01-01 RX ORDER — SODIUM CHLORIDE, SODIUM LACTATE, POTASSIUM CHLORIDE, CALCIUM CHLORIDE 600; 310; 30; 20 MG/100ML; MG/100ML; MG/100ML; MG/100ML
100 INJECTION, SOLUTION INTRAVENOUS CONTINUOUS
Status: DISCONTINUED | OUTPATIENT
Start: 2020-01-01 | End: 2020-01-01 | Stop reason: HOSPADM

## 2020-01-01 RX ORDER — HYDROMORPHONE HYDROCHLORIDE 2 MG/ML
0.5 INJECTION, SOLUTION INTRAMUSCULAR; INTRAVENOUS; SUBCUTANEOUS
Status: DISCONTINUED | OUTPATIENT
Start: 2020-01-01 | End: 2020-01-01 | Stop reason: HOSPADM

## 2020-01-01 RX ORDER — LIDOCAINE HYDROCHLORIDE AND EPINEPHRINE 10; 10 MG/ML; UG/ML
2-100 INJECTION, SOLUTION INFILTRATION; PERINEURAL ONCE
Status: COMPLETED | OUTPATIENT
Start: 2020-01-01 | End: 2020-01-01

## 2020-01-01 RX ORDER — PROPOFOL 10 MG/ML
INJECTION, EMULSION INTRAVENOUS
Status: DISCONTINUED | OUTPATIENT
Start: 2020-01-01 | End: 2020-01-01 | Stop reason: HOSPADM

## 2020-01-01 RX ORDER — MIDAZOLAM HYDROCHLORIDE 1 MG/ML
2 INJECTION, SOLUTION INTRAMUSCULAR; INTRAVENOUS
Status: DISCONTINUED | OUTPATIENT
Start: 2020-01-01 | End: 2020-01-01 | Stop reason: HOSPADM

## 2020-01-01 RX ORDER — SODIUM CHLORIDE 9 MG/ML
25 INJECTION, SOLUTION INTRAVENOUS CONTINUOUS
Status: DISCONTINUED | OUTPATIENT
Start: 2020-01-01 | End: 2020-01-01 | Stop reason: HOSPADM

## 2020-01-01 RX ORDER — AMLODIPINE BESYLATE 10 MG/1
5 TABLET ORAL DAILY
Status: DISCONTINUED | OUTPATIENT
Start: 2020-01-01 | End: 2020-01-01 | Stop reason: HOSPADM

## 2020-01-01 RX ORDER — MORPHINE SULFATE 4 MG/ML
4 INJECTION INTRAVENOUS
Status: COMPLETED | OUTPATIENT
Start: 2020-01-01 | End: 2020-01-01

## 2020-01-01 RX ORDER — LIDOCAINE HYDROCHLORIDE 20 MG/ML
INJECTION, SOLUTION EPIDURAL; INFILTRATION; INTRACAUDAL; PERINEURAL AS NEEDED
Status: DISCONTINUED | OUTPATIENT
Start: 2020-01-01 | End: 2020-01-01 | Stop reason: HOSPADM

## 2020-01-01 RX ORDER — METOPROLOL TARTRATE 25 MG/1
25 TABLET, FILM COATED ORAL ONCE
Status: COMPLETED | OUTPATIENT
Start: 2020-01-01 | End: 2020-01-01

## 2020-01-01 RX ORDER — CEFAZOLIN SODIUM/WATER 2 G/20 ML
2 SYRINGE (ML) INTRAVENOUS ONCE
Status: COMPLETED | OUTPATIENT
Start: 2020-01-01 | End: 2020-01-01

## 2020-01-01 RX ORDER — PROPOFOL 10 MG/ML
INJECTION, EMULSION INTRAVENOUS AS NEEDED
Status: DISCONTINUED | OUTPATIENT
Start: 2020-01-01 | End: 2020-01-01 | Stop reason: HOSPADM

## 2020-01-01 RX ORDER — OXYCODONE HYDROCHLORIDE 5 MG/1
5 TABLET ORAL
Status: DISCONTINUED | OUTPATIENT
Start: 2020-01-01 | End: 2020-01-01 | Stop reason: HOSPADM

## 2020-01-01 RX ORDER — PANTOPRAZOLE SODIUM 40 MG/1
40 TABLET, DELAYED RELEASE ORAL DAILY
Qty: 20 TAB | Refills: 0 | Status: SHIPPED | OUTPATIENT
Start: 2020-01-01 | End: 2020-01-01 | Stop reason: CLARIF

## 2020-01-01 RX ORDER — EPHEDRINE SULFATE/0.9% NACL/PF 50 MG/5 ML
SYRINGE (ML) INTRAVENOUS AS NEEDED
Status: DISCONTINUED | OUTPATIENT
Start: 2020-01-01 | End: 2020-01-01 | Stop reason: HOSPADM

## 2020-01-01 RX ADMIN — Medication 10 MG: at 10:34

## 2020-01-01 RX ADMIN — METOPROLOL TARTRATE 25 MG: 25 TABLET, FILM COATED ORAL at 08:15

## 2020-01-01 RX ADMIN — Medication 10 MG: at 10:09

## 2020-01-01 RX ADMIN — ONDANSETRON 4 MG: 2 INJECTION INTRAMUSCULAR; INTRAVENOUS at 02:35

## 2020-01-01 RX ADMIN — Medication 10 MG: at 10:15

## 2020-01-01 RX ADMIN — Medication 10 MG: at 10:21

## 2020-01-01 RX ADMIN — PROPOFOL 160 MCG/KG/MIN: 10 INJECTION, EMULSION INTRAVENOUS at 09:37

## 2020-01-01 RX ADMIN — LIDOCAINE HYDROCHLORIDE 40 MG: 20 INJECTION, SOLUTION EPIDURAL; INFILTRATION; INTRACAUDAL; PERINEURAL at 09:34

## 2020-01-01 RX ADMIN — PHENYLEPHRINE HYDROCHLORIDE 120 MCG: 10 INJECTION INTRAVENOUS at 09:56

## 2020-01-01 RX ADMIN — PHENYLEPHRINE HYDROCHLORIDE 240 MCG: 10 INJECTION INTRAVENOUS at 10:34

## 2020-01-01 RX ADMIN — FAMOTIDINE 20 MG: 10 INJECTION INTRAVENOUS at 02:35

## 2020-01-01 RX ADMIN — PHENYLEPHRINE HYDROCHLORIDE 120 MCG: 10 INJECTION INTRAVENOUS at 09:54

## 2020-01-01 RX ADMIN — AMLODIPINE BESYLATE 5 MG: 10 TABLET ORAL at 08:15

## 2020-01-01 RX ADMIN — PHENYLEPHRINE HYDROCHLORIDE 120 MCG: 10 INJECTION INTRAVENOUS at 10:21

## 2020-01-01 RX ADMIN — PHENYLEPHRINE HYDROCHLORIDE 240 MCG: 10 INJECTION INTRAVENOUS at 10:51

## 2020-01-01 RX ADMIN — HEPARIN SODIUM 3200 UNITS: 1000 INJECTION, SOLUTION INTRAVENOUS; SUBCUTANEOUS at 09:00

## 2020-01-01 RX ADMIN — MORPHINE SULFATE 4 MG: 4 INJECTION INTRAVENOUS at 02:35

## 2020-01-01 RX ADMIN — PHENYLEPHRINE HYDROCHLORIDE 240 MCG: 10 INJECTION INTRAVENOUS at 10:15

## 2020-01-01 RX ADMIN — SODIUM CHLORIDE 25 ML/HR: 900 INJECTION, SOLUTION INTRAVENOUS at 08:07

## 2020-01-01 RX ADMIN — PHENYLEPHRINE HYDROCHLORIDE 120 MCG: 10 INJECTION INTRAVENOUS at 09:59

## 2020-01-01 RX ADMIN — PHENYLEPHRINE HYDROCHLORIDE 240 MCG: 10 INJECTION INTRAVENOUS at 10:26

## 2020-01-01 RX ADMIN — PHENYLEPHRINE HYDROCHLORIDE 120 MCG: 10 INJECTION INTRAVENOUS at 10:09

## 2020-01-01 RX ADMIN — PHENYLEPHRINE HYDROCHLORIDE 240 MCG: 10 INJECTION INTRAVENOUS at 10:05

## 2020-01-01 RX ADMIN — PHENYLEPHRINE HYDROCHLORIDE 240 MCG: 10 INJECTION INTRAVENOUS at 10:39

## 2020-01-01 RX ADMIN — Medication 10 MG: at 10:26

## 2020-01-01 RX ADMIN — LIDOCAINE HYDROCHLORIDE,EPINEPHRINE BITARTRATE 200 MG: 10; .01 INJECTION, SOLUTION INFILTRATION; PERINEURAL at 10:00

## 2020-01-01 RX ADMIN — PHENYLEPHRINE HYDROCHLORIDE 240 MCG: 10 INJECTION INTRAVENOUS at 10:45

## 2020-01-01 RX ADMIN — FENTANYL CITRATE 50 MCG: 50 INJECTION INTRAMUSCULAR; INTRAVENOUS at 09:21

## 2020-01-01 RX ADMIN — PROPOFOL 40 MG: 10 INJECTION, EMULSION INTRAVENOUS at 09:35

## 2020-01-01 RX ADMIN — MIDAZOLAM 1 MG: 1 INJECTION INTRAMUSCULAR; INTRAVENOUS at 09:21

## 2020-01-01 RX ADMIN — Medication 2 G: at 09:40

## 2020-02-03 PROBLEM — I25.118 CORONARY ARTERY DISEASE WITH STABLE ANGINA PECTORIS (HCC): Status: ACTIVE | Noted: 2020-02-03

## 2020-07-20 ENCOUNTER — ANESTHESIA EVENT (OUTPATIENT)
Dept: SURGERY | Age: 60
End: 2020-07-20
Payer: COMMERCIAL

## 2020-07-20 ENCOUNTER — HOSPITAL ENCOUNTER (OUTPATIENT)
Dept: SURGERY | Age: 60
Discharge: HOME | End: 2020-07-20
Payer: COMMERCIAL

## 2020-07-20 VITALS
OXYGEN SATURATION: 99 % | BODY MASS INDEX: 37.86 KG/M2 | HEIGHT: 68 IN | WEIGHT: 249.8 LBS | RESPIRATION RATE: 18 BRPM | DIASTOLIC BLOOD PRESSURE: 70 MMHG | HEART RATE: 68 BPM | TEMPERATURE: 98.1 F | SYSTOLIC BLOOD PRESSURE: 149 MMHG

## 2020-07-20 LAB
ANION GAP SERPL CALC-SCNC: 9 MMOL/L (ref 7–16)
BUN SERPL-MCNC: 59 MG/DL (ref 8–23)
CALCIUM SERPL-MCNC: 8.2 MG/DL (ref 8.3–10.4)
CHLORIDE SERPL-SCNC: 110 MMOL/L (ref 98–107)
CO2 SERPL-SCNC: 24 MMOL/L (ref 21–32)
CREAT SERPL-MCNC: 4.6 MG/DL (ref 0.6–1)
ERYTHROCYTE [DISTWIDTH] IN BLOOD BY AUTOMATED COUNT: 14.3 % (ref 11.9–14.6)
GLUCOSE BLD STRIP.AUTO-MCNC: 249 MG/DL (ref 65–100)
GLUCOSE SERPL-MCNC: 222 MG/DL (ref 65–100)
HCT VFR BLD AUTO: 33.9 % (ref 35.8–46.3)
HGB BLD-MCNC: 11.5 G/DL (ref 11.7–15.4)
MCH RBC QN AUTO: 29.9 PG (ref 26.1–32.9)
MCHC RBC AUTO-ENTMCNC: 33.9 G/DL (ref 31.4–35)
MCV RBC AUTO: 88.3 FL (ref 79.6–97.8)
NRBC # BLD: 0 K/UL (ref 0–0.2)
PLATELET # BLD AUTO: 298 K/UL (ref 150–450)
PMV BLD AUTO: 10.1 FL (ref 9.4–12.3)
POTASSIUM SERPL-SCNC: 5 MMOL/L (ref 3.5–5.1)
RBC # BLD AUTO: 3.84 M/UL (ref 4.05–5.2)
SODIUM SERPL-SCNC: 143 MMOL/L (ref 136–145)
WBC # BLD AUTO: 9.2 K/UL (ref 4.3–11.1)

## 2020-07-20 PROCEDURE — 82962 GLUCOSE BLOOD TEST: CPT

## 2020-07-20 PROCEDURE — 85027 COMPLETE CBC AUTOMATED: CPT

## 2020-07-20 PROCEDURE — 80048 BASIC METABOLIC PNL TOTAL CA: CPT

## 2020-07-20 NOTE — PERIOP NOTES
PLEASE CONTINUE TAKING ALL PRESCRIPTION MEDICATIONS UP TO THE DAY OF SURGERY UNLESS OTHERWISE DIRECTED BELOW. DISCONTINUE all vitamins and supplements 7 days prior to surgery. DISCONTINUE Non-Steriodal Anti-Inflammatory (NSAIDS) such as Advil and Aleve 5 days prior to surgery. Home Medications to take  the day of surgery   Amlodipine (Norvasc)  Aspirin 81mg    escitalopram (lexapro)  Take 56 units of Tresiba (80% of usual dose)     Isosorbide (Imdur)  levothryroxine  Metoprolol  Ranolazine (ranexa)       Home Medications   to Hold   Hold Lasix am of surgery only   Hold Brilinta for 5 days prior to surgery -last dose to be 7/22/2020     Comments          *Visitor policy of 1 visitor per patient discussed. Please do not bring home medications with you on the day of surgery unless otherwise directed by your nurse. If you are instructed to bring home medications, please give them to your nurse as they will be administered by the nursing staff. If you have any questions, please call [facility] [phone] or [facility] [phone]. A copy of this note was provided to the patient for reference.

## 2020-07-20 NOTE — PERIOP NOTES
Recent Results (from the past 12 hour(s))   EKG, 12 LEAD, INITIAL    Collection Time: 07/20/20  2:32 PM   Result Value Ref Range    Ventricular Rate 62 BPM    Atrial Rate 62 BPM    P-R Interval 180 ms    QRS Duration 116 ms    Q-T Interval 480 ms    QTC Calculation (Bezet) 487 ms    Calculated P Axis 42 degrees    Calculated R Axis -47 degrees    Calculated T Axis 90 degrees    Diagnosis       Normal sinus rhythm  Left axis deviation  Inferior infarct (cited on or before 10-AUG-2019)  Anterolateral infarct (cited on or before 10-AUG-2019)  Abnormal ECG  When compared with ECG of 27-NOV-2019 07:04,  No significant change was found     CBC W/O DIFF    Collection Time: 07/20/20  2:47 PM   Result Value Ref Range    WBC 9.2 4.3 - 11.1 K/uL    RBC 3.84 (L) 4.05 - 5.2 M/uL    HGB 11.5 (L) 11.7 - 15.4 g/dL    HCT 33.9 (L) 35.8 - 46.3 %    MCV 88.3 79.6 - 97.8 FL    MCH 29.9 26.1 - 32.9 PG    MCHC 33.9 31.4 - 35.0 g/dL    RDW 14.3 11.9 - 14.6 %    PLATELET 263 854 - 054 K/uL    MPV 10.1 9.4 - 12.3 FL    ABSOLUTE NRBC 0.00 0.0 - 0.2 K/uL   METABOLIC PANEL, BASIC    Collection Time: 07/20/20  2:47 PM   Result Value Ref Range    Sodium 143 136 - 145 mmol/L    Potassium 5.0 3.5 - 5.1 mmol/L    Chloride 110 (H) 98 - 107 mmol/L    CO2 24 21 - 32 mmol/L    Anion gap 9 7 - 16 mmol/L    Glucose 222 (H) 65 - 100 mg/dL    BUN 59 (H) 8 - 23 MG/DL    Creatinine 4.60 (H) 0.6 - 1.0 MG/DL    GFR est AA 12 (L) >60 ml/min/1.73m2    GFR est non-AA 10 (L) >60 ml/min/1.73m2    Calcium 8.2 (L) 8.3 - 10.4 MG/DL   GLUCOSE, POC    Collection Time: 07/20/20  3:07 PM   Result Value Ref Range    Glucose (POC) 249 (H) 65 - 100 mg/dL

## 2020-07-20 NOTE — PERIOP NOTES
Patient verified name and     Order for consent was found in EHR and matches case posting; patient verified. Type 1B surgery, walk-in assessment complete. Labs per surgeon:cbc, bmp, and on dos poc glucose, poc pt/inr, poc potassium  Labs per anesthesia protocol: no additional  EKG: completed today and no change noted from previous EKG on 2020    Case reviewed with Dr. Humza Mc. Called Jodi at Dr. Rosina Dumont office to see if they had copy of Brilinta hold from cardiology. Jodi called back and stated that Dr. Charbel Hernandez office said she could hold Charlie Anila for days and that he may want her on another blood thinner during that time. Informed me that they would call the patient with instructions. Chart  will follow up. POC glucose 249. Instructed  Patient that if blood sugar 300 or > , surgery may be cancelled. Hospital approved surgical skin cleanser and instructions given per hospital policy. Patient provided with and instructed on educational handouts including Guide to Surgery, Pain Management, Hand Hygiene, Blood Transfusion Education, and [facility] Brochure. Patient answered medical/surgical history questions at their best of ability. All prior to admission medications [facility] in [facility]. Original medication prescription bottle not visualized during patient appointment. Patient instructed to hold all vitamins 7 days prior to surgery and NSAIDS 5 days prior to surgery, patient verbalized understanding. Patient teach back successful and patient demonstrates knowledge of instructions.

## 2020-07-21 LAB
ATRIAL RATE: 62 BPM
CALCULATED P AXIS, ECG09: 42 DEGREES
CALCULATED R AXIS, ECG10: -47 DEGREES
CALCULATED T AXIS, ECG11: 90 DEGREES
DIAGNOSIS, 93000: NORMAL
P-R INTERVAL, ECG05: 180 MS
Q-T INTERVAL, ECG07: 480 MS
QRS DURATION, ECG06: 116 MS
QTC CALCULATION (BEZET), ECG08: 487 MS
VENTRICULAR RATE, ECG03: 62 BPM

## 2020-07-21 NOTE — PERIOP NOTES
Dr. Gonzalez Files reviewed anesthesia summary, Patient not currently on dialysis, lab results 7.20.20 (creatinine, BUN high) and previous 4 labs for comparison. No orders received.

## 2020-07-28 ENCOUNTER — HOSPITAL ENCOUNTER (OUTPATIENT)
Age: 60
Setting detail: OUTPATIENT SURGERY
Discharge: HOME | End: 2020-07-28
Attending: SURGERY | Admitting: SURGERY
Payer: COMMERCIAL

## 2020-07-28 ENCOUNTER — ANESTHESIA (OUTPATIENT)
Dept: SURGERY | Age: 60
End: 2020-07-28
Payer: COMMERCIAL

## 2020-07-28 VITALS
HEIGHT: 68 IN | HEART RATE: 53 BPM | WEIGHT: 249.2 LBS | SYSTOLIC BLOOD PRESSURE: 141 MMHG | RESPIRATION RATE: 14 BRPM | DIASTOLIC BLOOD PRESSURE: 64 MMHG | BODY MASS INDEX: 37.77 KG/M2 | TEMPERATURE: 97.8 F | OXYGEN SATURATION: 99 %

## 2020-07-28 DIAGNOSIS — N18.30 CKD (CHRONIC KIDNEY DISEASE) STAGE 3, GFR 30-59 ML/MIN (HCC): Primary | Chronic | ICD-10-CM

## 2020-07-28 LAB
GLUCOSE BLD STRIP.AUTO-MCNC: 105 MG/DL (ref 65–100)
POTASSIUM BLD-SCNC: 5.8 MMOL/L (ref 3.5–5.1)

## 2020-07-28 PROCEDURE — 74011250637 HC RX REV CODE- 250/637: Performed by: ANESTHESIOLOGY

## 2020-07-28 PROCEDURE — 74011000250 HC RX REV CODE- 250: Performed by: ANESTHESIOLOGY

## 2020-07-28 PROCEDURE — 76010010054 HC POST OP PAIN BLOCK: Performed by: SURGERY

## 2020-07-28 PROCEDURE — 74011250636 HC RX REV CODE- 250/636: Performed by: NURSE ANESTHETIST, CERTIFIED REGISTERED

## 2020-07-28 PROCEDURE — 77030002916 HC SUT ETHLN J&J -A: Performed by: SURGERY

## 2020-07-28 PROCEDURE — 74011250636 HC RX REV CODE- 250/636: Performed by: ANESTHESIOLOGY

## 2020-07-28 PROCEDURE — 74011250636 HC RX REV CODE- 250/636: Performed by: SURGERY

## 2020-07-28 PROCEDURE — 76942 ECHO GUIDE FOR BIOPSY: CPT | Performed by: SURGERY

## 2020-07-28 PROCEDURE — 82962 GLUCOSE BLOOD TEST: CPT

## 2020-07-28 PROCEDURE — 74011250636 HC RX REV CODE- 250/636: Performed by: NURSE PRACTITIONER

## 2020-07-28 PROCEDURE — 76060000034 HC ANESTHESIA 1.5 TO 2 HR: Performed by: SURGERY

## 2020-07-28 PROCEDURE — 77030002987 HC SUT PROL J&J -B: Performed by: SURGERY

## 2020-07-28 PROCEDURE — 76010000162 HC OR TIME 1.5 TO 2 HR INTENSV-TIER 1: Performed by: SURGERY

## 2020-07-28 PROCEDURE — 84132 ASSAY OF SERUM POTASSIUM: CPT

## 2020-07-28 PROCEDURE — 76210000021 HC REC RM PH II 0.5 TO 1 HR: Performed by: SURGERY

## 2020-07-28 PROCEDURE — 77030034888 HC SUT PROL 2 J&J -B: Performed by: SURGERY

## 2020-07-28 PROCEDURE — 76210000063 HC OR PH I REC FIRST 0.5 HR: Performed by: SURGERY

## 2020-07-28 PROCEDURE — 77030002996 HC SUT SLK J&J -A: Performed by: SURGERY

## 2020-07-28 PROCEDURE — 74011000250 HC RX REV CODE- 250: Performed by: NURSE ANESTHETIST, CERTIFIED REGISTERED

## 2020-07-28 PROCEDURE — 74011000250 HC RX REV CODE- 250: Performed by: SURGERY

## 2020-07-28 PROCEDURE — 77030031139 HC SUT VCRL2 J&J -A: Performed by: SURGERY

## 2020-07-28 PROCEDURE — 77030003602 HC NDL NRV BLK BBMI -B: Performed by: ANESTHESIOLOGY

## 2020-07-28 RX ORDER — MIDAZOLAM HYDROCHLORIDE 1 MG/ML
2 INJECTION, SOLUTION INTRAMUSCULAR; INTRAVENOUS
Status: COMPLETED | OUTPATIENT
Start: 2020-07-28 | End: 2020-07-28

## 2020-07-28 RX ORDER — HEPARIN SODIUM 5000 [USP'U]/ML
INJECTION, SOLUTION INTRAVENOUS; SUBCUTANEOUS AS NEEDED
Status: DISCONTINUED | OUTPATIENT
Start: 2020-07-28 | End: 2020-07-28 | Stop reason: HOSPADM

## 2020-07-28 RX ORDER — BUPIVACAINE HYDROCHLORIDE AND EPINEPHRINE 5; 5 MG/ML; UG/ML
INJECTION, SOLUTION EPIDURAL; INTRACAUDAL; PERINEURAL AS NEEDED
Status: DISCONTINUED | OUTPATIENT
Start: 2020-07-28 | End: 2020-07-28 | Stop reason: HOSPADM

## 2020-07-28 RX ORDER — LIDOCAINE HYDROCHLORIDE 10 MG/ML
0.1 INJECTION INFILTRATION; PERINEURAL AS NEEDED
Status: DISCONTINUED | OUTPATIENT
Start: 2020-07-28 | End: 2020-07-28 | Stop reason: HOSPADM

## 2020-07-28 RX ORDER — PROTAMINE SULFATE 10 MG/ML
INJECTION, SOLUTION INTRAVENOUS AS NEEDED
Status: DISCONTINUED | OUTPATIENT
Start: 2020-07-28 | End: 2020-07-28 | Stop reason: HOSPADM

## 2020-07-28 RX ORDER — OXYCODONE HYDROCHLORIDE 5 MG/1
10 TABLET ORAL
Status: DISCONTINUED | OUTPATIENT
Start: 2020-07-28 | End: 2020-07-28 | Stop reason: HOSPADM

## 2020-07-28 RX ORDER — LIDOCAINE HYDROCHLORIDE 10 MG/ML
INJECTION INFILTRATION; PERINEURAL AS NEEDED
Status: DISCONTINUED | OUTPATIENT
Start: 2020-07-28 | End: 2020-07-28 | Stop reason: HOSPADM

## 2020-07-28 RX ORDER — OXYCODONE AND ACETAMINOPHEN 5; 325 MG/1; MG/1
1 TABLET ORAL
Qty: 12 TAB | Refills: 0 | Status: SHIPPED | OUTPATIENT
Start: 2020-07-28 | End: 2020-07-31

## 2020-07-28 RX ORDER — PROPOFOL 10 MG/ML
INJECTION, EMULSION INTRAVENOUS
Status: DISCONTINUED | OUTPATIENT
Start: 2020-07-28 | End: 2020-07-28 | Stop reason: HOSPADM

## 2020-07-28 RX ORDER — SODIUM CHLORIDE 9 MG/ML
50 INJECTION, SOLUTION INTRAVENOUS CONTINUOUS
Status: DISCONTINUED | OUTPATIENT
Start: 2020-07-28 | End: 2020-07-28 | Stop reason: HOSPADM

## 2020-07-28 RX ORDER — HYDROMORPHONE HYDROCHLORIDE 2 MG/ML
0.5 INJECTION, SOLUTION INTRAMUSCULAR; INTRAVENOUS; SUBCUTANEOUS
Status: DISCONTINUED | OUTPATIENT
Start: 2020-07-28 | End: 2020-07-28 | Stop reason: HOSPADM

## 2020-07-28 RX ORDER — AMLODIPINE BESYLATE 5 MG/1
5 TABLET ORAL
Status: COMPLETED | OUTPATIENT
Start: 2020-07-28 | End: 2020-07-28

## 2020-07-28 RX ORDER — SODIUM CHLORIDE 0.9 % (FLUSH) 0.9 %
5-40 SYRINGE (ML) INJECTION EVERY 8 HOURS
Status: DISCONTINUED | OUTPATIENT
Start: 2020-07-28 | End: 2020-07-28 | Stop reason: HOSPADM

## 2020-07-28 RX ORDER — HEPARIN SODIUM 1000 [USP'U]/ML
INJECTION, SOLUTION INTRAVENOUS; SUBCUTANEOUS AS NEEDED
Status: DISCONTINUED | OUTPATIENT
Start: 2020-07-28 | End: 2020-07-28 | Stop reason: HOSPADM

## 2020-07-28 RX ORDER — SODIUM CHLORIDE 0.9 % (FLUSH) 0.9 %
5-40 SYRINGE (ML) INJECTION AS NEEDED
Status: DISCONTINUED | OUTPATIENT
Start: 2020-07-28 | End: 2020-07-28 | Stop reason: HOSPADM

## 2020-07-28 RX ORDER — ONDANSETRON 2 MG/ML
INJECTION INTRAMUSCULAR; INTRAVENOUS AS NEEDED
Status: DISCONTINUED | OUTPATIENT
Start: 2020-07-28 | End: 2020-07-28 | Stop reason: HOSPADM

## 2020-07-28 RX ORDER — BUPIVACAINE HYDROCHLORIDE 2.5 MG/ML
INJECTION, SOLUTION EPIDURAL; INFILTRATION; INTRACAUDAL AS NEEDED
Status: DISCONTINUED | OUTPATIENT
Start: 2020-07-28 | End: 2020-07-28 | Stop reason: HOSPADM

## 2020-07-28 RX ORDER — LIDOCAINE HYDROCHLORIDE 20 MG/ML
INJECTION, SOLUTION EPIDURAL; INFILTRATION; INTRACAUDAL; PERINEURAL AS NEEDED
Status: DISCONTINUED | OUTPATIENT
Start: 2020-07-28 | End: 2020-07-28 | Stop reason: HOSPADM

## 2020-07-28 RX ORDER — CEFAZOLIN SODIUM/WATER 2 G/20 ML
2 SYRINGE (ML) INTRAVENOUS ONCE
Status: COMPLETED | OUTPATIENT
Start: 2020-07-28 | End: 2020-07-28

## 2020-07-28 RX ORDER — METOPROLOL SUCCINATE 25 MG/1
50 TABLET, EXTENDED RELEASE ORAL
Status: COMPLETED | OUTPATIENT
Start: 2020-07-28 | End: 2020-07-28

## 2020-07-28 RX ORDER — NALOXONE HYDROCHLORIDE 0.4 MG/ML
0.1 INJECTION, SOLUTION INTRAMUSCULAR; INTRAVENOUS; SUBCUTANEOUS AS NEEDED
Status: DISCONTINUED | OUTPATIENT
Start: 2020-07-28 | End: 2020-07-28 | Stop reason: HOSPADM

## 2020-07-28 RX ADMIN — ONDANSETRON 4 MG: 2 INJECTION INTRAMUSCULAR; INTRAVENOUS at 13:36

## 2020-07-28 RX ADMIN — HEPARIN SODIUM 3000 UNITS: 1000 INJECTION, SOLUTION INTRAVENOUS; SUBCUTANEOUS at 13:34

## 2020-07-28 RX ADMIN — MIDAZOLAM 2 MG: 1 INJECTION INTRAMUSCULAR; INTRAVENOUS at 11:58

## 2020-07-28 RX ADMIN — PROTAMINE SULFATE 20 MG: 10 INJECTION, SOLUTION INTRAVENOUS at 13:58

## 2020-07-28 RX ADMIN — PROPOFOL 140 MCG/KG/MIN: 10 INJECTION, EMULSION INTRAVENOUS at 12:48

## 2020-07-28 RX ADMIN — METOPROLOL SUCCINATE 50 MG: 25 TABLET, EXTENDED RELEASE ORAL at 11:23

## 2020-07-28 RX ADMIN — Medication 2 G: at 12:50

## 2020-07-28 RX ADMIN — AMLODIPINE BESYLATE 5 MG: 5 TABLET ORAL at 11:23

## 2020-07-28 RX ADMIN — SODIUM CHLORIDE 50 ML/HR: 9 INJECTION, SOLUTION INTRAVENOUS at 10:18

## 2020-07-28 RX ADMIN — MEPIVACAINE HYDROCHLORIDE 12 ML: 20 INJECTION, SOLUTION EPIDURAL; INFILTRATION at 12:09

## 2020-07-28 RX ADMIN — BUPIVACAINE HYDROCHLORIDE AND EPINEPHRINE BITARTRATE 8 ML: 5; .005 INJECTION, SOLUTION EPIDURAL; INTRACAUDAL; PERINEURAL at 12:09

## 2020-07-28 RX ADMIN — SODIUM CHLORIDE: 9 INJECTION, SOLUTION INTRAVENOUS at 12:38

## 2020-07-28 RX ADMIN — LIDOCAINE HYDROCHLORIDE 50 MG: 20 INJECTION, SOLUTION EPIDURAL; INFILTRATION; INTRACAUDAL; PERINEURAL at 12:46

## 2020-07-28 NOTE — ANESTHESIA PROCEDURE NOTES
Peripheral Block Start time: 7/28/2020 11:59 AM 
End time: 7/28/2020 12:09 PM 
Performed by: Diane Rodas MD 
Authorized by: Diane Rodas MD  
 
 
Pre-procedure: Indications: at surgeon's request and post-op pain management Preanesthetic Checklist: patient identified, risks and benefits discussed, site marked, timeout performed, anesthesia consent given and patient being monitored Timeout Time: 11:58 Block Type:  
Block Type:  Supraclavicular Laterality:  Left Monitoring:  Standard ASA monitoring, responsive to questions, oxygen, continuous pulse ox, frequent vital sign checks and heart rate Injection Technique:  Single shot Procedures: ultrasound guided and nerve stimulator Patient Position: supine Prep: chlorhexidine Location:  Supraclavicular Needle Type:  Stimuplex Needle Gauge:  22 G Needle Localization:  Ultrasound guidance and nerve stimulator Motor Response: minimal motor response >0.4 mA Assessment: 
Number of attempts:  1 Injection Assessment:  Incremental injection every 5 mL, negative aspiration for CSF, no paresthesia, ultrasound image on chart, local visualized surrounding nerve on ultrasound, no intravascular symptoms and negative aspiration for blood Patient tolerance:  Patient tolerated the procedure well with no immediate complications

## 2020-07-28 NOTE — ANESTHESIA POSTPROCEDURE EVALUATION
Procedure(s): LEFT RADIOCEPHALIC ARTERIOVENOUS FISTULA PLACEMENT. total IV anesthesia, regional 
 
Anesthesia Post Evaluation Patient location during evaluation: PACU Patient participation: complete - patient participated Level of consciousness: responsive to verbal stimuli Pain management: adequate Airway patency: patent Anesthetic complications: no 
Cardiovascular status: acceptable Respiratory status: acceptable Hydration status: euvolemic INITIAL Post-op Vital signs:  
Vitals Value Taken Time /54 7/28/2020  2:33 PM  
Temp 36.6 °C (97.8 °F) 7/28/2020  2:28 PM  
Pulse 53 7/28/2020  2:46 PM  
Resp 16 7/28/2020  2:39 PM  
SpO2 99 % 7/28/2020  2:46 PM  
Vitals shown include unvalidated device data.

## 2020-07-28 NOTE — DISCHARGE INSTRUCTIONS
INSTRUCTIONS FOR A-V FISTULA, GRAFT ACCESS, REVISION OR DECLOT    ACTIVITY  · As tolerated and as directed by your doctor. · Keep arm straight and raised above heart level for the next 24 hours. DIET  · Clear liquids until no nausea or vomiting; then light diet for the first day. · Advance to regular diet on second day, unless your doctor orders otherwise. · If nausea and vomiting continues, call your doctor. PAIN  · Take pain medication as directed by your doctor. · Call your doctor if pain is NOT relieved by the medication. · DO NOT take aspirin or blood thinners until directed by your doctor. DRESSING CARE  · Keep your dressing clean and dry. · Leave the dressing on until the dialysis nurse or your doctor takes it off. CARE OF YOUR ACCESS  DO:  · Keep access area clean with soap and water daily after dressing has been removed. · Feel for the thrill daily. (by placing your fingers over the graft you will be able to feel a vibration (thrill) which means blood is flowing and the graft is working.)  DO NOT:  · Wear tight sleeves, watches, belts or bracelets over graft. · Carry heavy bags across the graft. · Sleep on graft side. · Let your blood pressure be taken on graft side. · Let blood be drawn from your graft side. CALL YOUR DOCTOR IF  · Excessive bleeding that does not stop after holding mild pressure over area. · Temperature of 101 degrees F or above. · Redness, excessive swelling or bruising, and/or green or yellow, smelly discharge from incision   · Loss of sensation-cold, white, or blue fingers or toes.     After general anesthesia or intravenous sedation, for 24 hours or while taking prescription Narcotics:  · Limit your activities  · A responsible adult needs to be with you for the next 24 hours  · Do not drive and operate hazardous machinery  · Do not make important personal or business decisions  · Do not drink alcoholic beverages  · If you have not urinated within 8 hours after discharge, please contact your surgeon on call. · If you have sleep apnea and have a CPAP machine, please use it for all naps and sleeping. · Please use caution when taking narcotics and any of your home medications that may cause drowsiness. *  Please give a list of your current medications to your Primary Care Provider. *  Please update this list whenever your medications are discontinued, doses are      changed, or new medications (including over-the-counter products) are added. *  Please carry medication information at all times in case of emergency situations. These are general instructions for a healthy lifestyle:  No smoking/ No tobacco products/ Avoid exposure to second hand smoke  Surgeon General's Warning:  Quitting smoking now greatly reduces serious risk to your health. Obesity, smoking, and sedentary lifestyle greatly increases your risk for illness  A healthy diet, regular physical exercise & weight monitoring are important for maintaining a healthy lifestyle  You may be retaining fluid if you have a history of heart failure or if you experience any of the following symptoms:  Weight gain of 3 pounds or more overnight or 5 pounds in a week, increased swelling in our hands or feet or shortness of breath while lying flat in bed. Please call your doctor as soon as you notice any of these symptoms; do not wait until your next office visit.

## 2020-07-28 NOTE — BRIEF OP NOTE
Brief Postoperative Note Patient: Ermias Harry YOB: 1960 MRN: [id number] Date of Procedure: 7/28/2020 Pre-Op Diagnosis: Chronic kidney disease (CKD), stage IV (severe) ([facility] [address] [N18.4] Post-Op Diagnosis: Same as preoperative diagnosis. Procedure(s): LEFT RADIOCEPHALIC ARTERIOVENOUS FISTULA PLACEMENT Surgeon(s): 
Cadence Garcia MD 
 
Surgical Assistant: None Anesthesia: General  
 
Estimated Blood Loss (mL): Minimal 
 
Complications: None Specimens: * No specimens in log * Implants: * No implants in log * Drains: * No LDAs found * Findings:  
 
Electronically Signed by Dorinda Stiles MD on 7/28/2020 at 2:08 PM

## 2020-07-28 NOTE — PROGRESS NOTES
's pre-procedure visit and prayer with patient as requested. Megan Clark MDiv, BS Board Certified [employer] coil clip seen on specimen image

## 2020-07-30 NOTE — OP NOTES
[facility] 
OPERATIVE REPORT Name:  Zachary Sheehan 
MR#:  [id number] :  1960 ACCOUNT #:  [de-identified] DATE OF SERVICE:  2020 PREOPERATIVE DIAGNOSIS:  End-stage renal disease. POSTOPERATIVE DIAGNOSIS:  End-stage renal disease. PROCEDURE PERFORMED:  Left radiocephalic arteriovenous fistula. SURGEON:  Raheem Dee MD 
 
ASSISTANT:  None. ANESTHESIA:  IV sedation plus 1% lidocaine as local. 
 
COMPLICATIONS:  None. SPECIMENS REMOVED:  None. IMPLANTS:  None. ESTIMATED BLOOD LOSS:  Minimal. 
 
DRAINS:  None. DESCRIPTION OF PROCEDURE:  The patient was brought to the operating room and placed on the operating room table in supine position. Following adequate IV sedation and time-out procedure, left arm was draped and prepped in sterile fashion. A small longitudinal incision was made on the left wrist just lateral to the radial artery. The wound was deepened using Bovie to control bleeding. Subcutaneous plaque was developed moving laterally where the cephalic vein was identified, mobilized proximally and distally. Next, the radial artery was exposed in the medial aspect of incision and encircled with vessel loops proximally and distally. The patient was heparinized. Next, the cephalic vein was then divided distally in the wound and then transposed to the right radial artery where end-to-side anastomosis was created with a running 7-0 Prolene suture. Prior to completion of anastomosis, the vein and artery were flushed and back bled. The anastomosis was then completed and flow restored. There was a good thrill in the fistula. Hemostasis was achieved. The wound was closed in layers with Vicryl suture. Sterile dry dressings were applied. The patient was awakened from anesthesia, transported to the recovery room stable condition. The patient tolerated the procedure well. No complications. All needle and sponge counts were correct. MD JOSE JUAN [email or web address] 
D:  07/30/2020 13:44 
T:  07/30/2020 15:44 JOB #:  [id number]

## 2020-08-24 PROBLEM — I77.0 ARTERIOVENOUS FISTULA (HCC): Status: ACTIVE | Noted: 2020-08-24

## 2020-09-09 ENCOUNTER — HOSPITAL ENCOUNTER (OUTPATIENT)
Dept: ULTRASOUND IMAGING | Age: 60
Discharge: HOME | End: 2020-09-09
Attending: INTERNAL MEDICINE

## 2020-09-09 DIAGNOSIS — R74.8 ABNORMAL LIVER ENZYMES: ICD-10-CM

## 2020-09-09 DIAGNOSIS — E11.21 TYPE 2 DIABETES WITH NEPHROPATHY (HCC): ICD-10-CM

## 2020-09-09 DIAGNOSIS — N18.4 STAGE 4 CHRONIC KIDNEY DISEASE (HCC): ICD-10-CM

## 2020-09-09 NOTE — PROGRESS NOTES
The patient was notified of abnormal Abd U/S results. Needs CT to look at possible right kidney mass. Then may needs gallbladder out per Dr Inge Zimmerman he thinks her LFT'S were elevated due to recently passing of gallstone.  Patient notified scheduling would call her to schedule CT

## 2020-09-16 ENCOUNTER — HOSPITAL ENCOUNTER (OUTPATIENT)
Dept: CT IMAGING | Age: 60
Discharge: HOME | End: 2020-09-16
Attending: INTERNAL MEDICINE
Payer: COMMERCIAL

## 2020-09-16 DIAGNOSIS — N28.89 KIDNEY MASS: ICD-10-CM

## 2020-09-16 DIAGNOSIS — R74.8 ABNORMAL LIVER ENZYMES: ICD-10-CM

## 2020-09-16 PROCEDURE — 74176 CT ABD & PELVIS W/O CONTRAST: CPT

## 2020-09-16 PROCEDURE — 74011636320 HC RX REV CODE- 636/320: Performed by: INTERNAL MEDICINE

## 2020-09-16 RX ADMIN — DIATRIZOATE MEGLUMINE AND DIATRIZOATE SODIUM 15 ML: 660; 100 LIQUID ORAL; RECTAL at 14:45

## 2020-09-29 NOTE — ED NOTES
I have reviewed discharge instructions with the patient. The patient verbalized understanding. Patient left ED via Discharge Method: ambulatory to Home with son. Opportunity for questions and clarification provided. Patient given 1 scripts. To continue your aftercare when you leave the hospital, you may receive an automated call from our care team to check in on how you are doing. This is a free service and part of our promise to provide the best care and service to meet your aftercare needs.  If you have questions, or wish to unsubscribe from this service please call [phone]. Thank you for Choosing our [facility] Emergency Department.

## 2020-09-29 NOTE — ED PROVIDER NOTES
68-year-old female with a history of CAD comes to the ED with chest pain for 1 week. Pain is been off and on although the pain tonight started yesterday morning. Been gone more than 12 hours. Patient has a history of non-STEMI had cath last year in August and multiple stents were placed. She also had a CABG done 2008. Patient describes the pain is similar to her previous chest pain. Some shortness of breath no fevers chills cough cold or flulike symptoms. The history is provided by the patient. Chest Pain (Angina) This is a recurrent problem. The current episode started 12 to 24 hours ago. The problem has not changed since onset. The problem occurs constantly. The pain is associated with normal activity. The pain is present in the substernal region. The pain is at a severity of 9/10. The pain is moderate. The quality of the pain is described as pressure-like. The pain does not radiate. Associated symptoms include shortness of breath. Pertinent negatives include no diaphoresis, no exertional chest pressure and no malaise/fatigue. She has tried nothing for the symptoms. Risk factors include cardiac disease, family history, obesity, a sendentary lifestyle and hypertension. Past Medical History:  
Diagnosis Date  Acute bronchitis  Acute pharyngitis  Allergic rhinitis due to other allergen  Chronic kidney disease ESRD  Coronary atherosclerosis of native coronary artery CABG x3  ~2006, 11/29/19- stent X1, 10/7/19 stent X3, 8/12/19 stent X2  
 Depressive disorder, not elsewhere classified   
 daily meds  Essential hypertension, benign   
 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 9)  Osteomyelitis of toe ([address] forth toe of left foot  Other specified acquired hypothyroidism  Thromboembolus ([address]   
 possible to back of left knee???, superficial  
  Type II or unspecified type diabetes mellitus without mention of complication, uncontrolled   
 type 2: oral/insulin: avg. 98: s/s hypo lorenzo @79: A1C 7.4 on 2019 Past Surgical History:  
Procedure Laterality Date  CARDIAC SURG PROCEDURE UNLIST  2006 CABG x3  
 HX BREAST BIOPSY Left 2017  
 stereo  HX CORONARY ARTERY BYPASS GRAFT  2006 Three  HX HEART CATHETERIZATION  2019  
 stent X1  
 HX HEART CATHETERIZATION  10/07/2019  
 stent X3  
 HX HEART CATHETERIZATION  2019  
 stents X2  
 VASCULAR SURGERY PROCEDURE UNLIST  08/10/2016  
 arteriogram of leg  VASCULAR SURGERY PROCEDURE UNLIST Left 2016  
  fem pop endarterectomy  VASCULAR SURGERY PROCEDURE UNLIST Left 2018  
 4th toe amp  VASCULAR SURGERY PROCEDURE UNLIST Right 2019 Right second toe amputation Family History:  
Problem Relation Age of Onset  Diabetes Other  Hypertension Other  Heart Disease Other  Diabetes Mother  Hypertension Mother  Cancer Father  Diabetes Sister  Heart Disease Sister  Diabetes Brother  Diabetes Sister  Hypertension Sister  Diabetes Sister  Hypertension Sister  Diabetes Sister  Hypertension Sister  Diabetes Brother Social History Socioeconomic History  Marital status:  Spouse name: Not on file  Number of children: Not on file  Years of education: Not on file  Highest education level: Not on file Occupational History  Not on file Social Needs  Financial resource strain: Not on file  Food insecurity Worry: Not on file Inability: Not on file  Transportation needs Medical: Not on file Non-medical: Not on file Tobacco Use  Smoking status: Former Smoker Packs/day: 2.00 Years: 15.00 Pack years: 30.00 Types: Cigarettes Last attempt to quit: 2/3/1992 Years since quittin.6  Smokeless tobacco: Never Used Substance and Sexual Activity  Alcohol use: No  
 Drug use: No  
 Sexual activity: Not on file Lifestyle  Physical activity Days per week: Not on file Minutes per session: Not on file  Stress: Not on file Relationships  Social connections Talks on phone: Not on file Gets together: Not on file Attends Jainism service: Not on file Active member of club or organization: Not on file Attends meetings of clubs or organizations: Not on file Relationship status: Not on file  Intimate partner violence Fear of current or ex partner: Not on file Emotionally abused: Not on file Physically abused: Not on file Forced sexual activity: Not on file Other Topics Concern  Not on file Social History Narrative  Not on file ALLERGIES: Patient has no known allergies. Review of Systems Constitutional: Negative. Negative for activity change, diaphoresis and malaise/fatigue. HENT: Negative. Eyes: Negative. Respiratory: Positive for shortness of breath. Cardiovascular: Positive for chest pain. Gastrointestinal: Negative. Genitourinary: Negative. Musculoskeletal: Negative. Skin: Negative. Neurological: Negative. Psychiatric/Behavioral: Negative. All other systems reviewed and are negative. There were no vitals filed for this visit. Physical Exam 
Vitals signs and nursing note reviewed. Constitutional:   
   General: She is not in acute distress. Appearance: She is well-developed. She is not diaphoretic. HENT:  
   Head: Normocephalic and atraumatic. Right Ear: External ear normal.  
   Left Ear: External ear normal.  
   Nose: Nose normal.  
   Mouth/Throat:  
   Pharynx: No oropharyngeal exudate. Eyes:  
   General: No scleral icterus. Right eye: No discharge. Left eye: No discharge.   
   Conjunctiva/sclera: Conjunctivae normal.  
 Pupils: Pupils are equal, round, and reactive to light. Neck: Musculoskeletal: Normal range of motion and neck supple. Vascular: No JVD. Trachea: No tracheal deviation. Cardiovascular:  
   Rate and Rhythm: Normal rate and regular rhythm. Pulmonary:  
   Effort: Pulmonary effort is normal. No respiratory distress. Breath sounds: Normal breath sounds. No stridor. No wheezing. Chest:  
   Chest wall: No tenderness. Abdominal:  
   General: Bowel sounds are normal. There is no distension. Palpations: Abdomen is soft. There is no mass. Tenderness: There is no abdominal tenderness. Musculoskeletal: Normal range of motion. General: No tenderness. Skin: 
   General: Skin is warm and dry. Coloration: Skin is not pale. Findings: No erythema or rash. Neurological:  
   Mental Status: She is alert and oriented to person, place, and time. Cranial Nerves: No cranial nerve deficit. Psychiatric:     
   Behavior: Behavior normal.     
   Thought Content: Thought content normal.  
 
  
 
MDM Number of Diagnoses or Management Options Atypical chest pain:  
Diagnosis management comments: Patient's chest pain resolved in the ED her laboratory data was normal EKG was normal chest x-ray was normal.  Serial enzymes also remain normal unclear of cause of pain however discharged to home with close follow-up PCP return for any problems. Amount and/or Complexity of Data Reviewed Clinical lab tests: ordered and reviewed Tests in the radiology section of CPT®: ordered and reviewed Tests in the medicine section of CPT®: ordered and reviewed Decide to obtain previous medical records or to obtain history from someone other than the patient: yes Review and summarize past medical records: yes Independent visualization of images, tracings, or specimens: yes Risk of Complications, Morbidity, and/or Mortality Presenting problems: high Diagnostic procedures: high Management options: high Procedures

## 2020-09-29 NOTE — ED TRIAGE NOTES
Arrives with face mask in place. Reports left sided chest pain, intermittent over last week with worsening today. States constant since this AM. Describes as \"dull\". Reports associated shortness of breath, n/v yesterday. Reports non-productive cough. Denies fever/chills. NSTEMI 10/2019 and reports as same pain experienced then. Denies smoker.  Followed by dr Agustin Correia.

## 2020-09-29 NOTE — DISCHARGE INSTRUCTIONS

## 2020-10-09 NOTE — PROGRESS NOTES
Spoke with patient and daughter about holding Brilinta. Told them IR was unable to get order to hold Brilinta on short notice and to contact on call Cardiology NP for permission to hold Brilinta. Total of 2 messages and a [facility] message sent Friday afternoon with no response.

## 2020-10-12 NOTE — H&P
Department of Interventional Radiology  [phone]    History and Physical    Patient:  Parveen Dyson MRN:  [id number]  SSN:  [id number]    YOB: 1960  Age:  56 y.o. Sex:  female      Primary Care Provider:  Nikki Garvey DO  Referring Physician:  Merly Chavez MD    Subjective:     Chief Complaint: CKD    History of the Present Illness: The patient is a 56 y.o. female with CKD who presents for placement of a tunneled hemodialysis catheter. LUE AV fistula not mature for use. NPO. Brilinta yesterday. Did not take medications today. 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 9)     Osteomyelitis of toe ([address]     forth toe of left foot    Other specified acquired hypothyroidism     Thromboembolus ([address]     possible to back of left knee???, superficial    Type II or unspecified type diabetes mellitus without mention of complication, uncontrolled     type 2: oral/insulin: avg. 98: s/s hypo shakes @79: A1C 7.4 on 07/2019     Past Surgical History:   Procedure Laterality Date    CARDIAC SURG PROCEDURE UNLIST  2006    CABG x3    HX BREAST BIOPSY Left 02/20/2017    stereo    HX CORONARY ARTERY BYPASS GRAFT  2006    Three    HX HEART CATHETERIZATION  11/29/2019    stent X1    HX HEART CATHETERIZATION  10/07/2019    stent X3    HX HEART CATHETERIZATION  08/12/2019    stents X2    VASCULAR SURGERY PROCEDURE UNLIST  08/10/2016    arteriogram of leg    VASCULAR SURGERY PROCEDURE UNLIST Left 08/26/2016     fem pop endarterectomy    VASCULAR SURGERY PROCEDURE UNLIST Left 03/12/2018    4th toe amp    VASCULAR SURGERY PROCEDURE UNLIST Right 08/01/2019     Right second toe amputation        Review of Systems:    Pertinent items are noted in the History of Present Illness. Prior to Admission medications    Medication Sig Start Date End Date Taking? Authorizing Provider   hydrALAZINE (APRESOLINE) 25 mg tablet Take 1 Tab by mouth three (3) times daily. 9/30/20  Yes Laney Thomas MD   furosemide (LASIX) 40 mg tablet Take 1.5 Tabs by mouth two (2) times a day. 9/30/20  Yes Laney Thomas MD   levothyroxine (SYNTHROID) 200 mcg tablet TAKE 1 TAB BY MOUTH DAILY (BEFORE BREAKFAST) FOR THYROID. 9/3/20  Yes Jayant Fuller,    levothyroxine (SYNTHROID) 25 mcg tablet TAKE 1 TAB BY MOUTH DAILY (BEFORE BREAKFAST) FOR THYROID. 9/3/20  Yes Jayant Fuller, DO   escitalopram oxalate (Lexapro) 20 mg tablet Take 1 Tab by mouth two (2) times a day. 9/3/20  Yes Jayant Fuller, DO   insulin degludec Jaz Moll FlexTouch U-100) 100 unit/mL (3 mL) inpn 70 Units by SubCUTAneous route daily for 90 days. 9/3/20 12/2/20 Yes Jayant Gasca, DO   amLODIPine (NORVASC) 5 mg tablet Take 1 Tab by mouth daily. 8/3/20  Yes Jayant Fuller, DO   ticagrelor (BRILINTA) 90 mg tablet Take 1 Tab by mouth every twelve (12) hours every twelve (12) hours. 6/24/20  Yes Laney Thomas MD   metOLazone (ZAROXOLYN) 5 mg tablet Take 1 Tab by mouth daily as needed for Other (weight gain). 12/11/19  Yes Laney Thomas MD   metoprolol succinate (TOPROL-XL) 50 mg XL tablet Take 1 Tab by mouth daily. 11/28/19  Yes JULIÁN Patel   ranolazine ER (RANEXA) 500 mg SR tablet Take 1 Tab by mouth every twelve (12) hours. 11/27/19  Yes JULIÁN Patel   isosorbide mononitrate ER (IMDUR) 120 mg CR tablet Take 1 Tab by mouth daily.  11/28/19  Yes JULIÁN Patel   acetaminophen (TYLENOL) 325 mg tablet Take 2 Tabs by mouth every six (6) hours as needed for Pain. 19  Yes Alex Camarena NP   aspirin delayed-release 81 mg tablet Take 81 mg by mouth every morning. Yes Provider, Historical   pantoprazole (Protonix) 40 mg tablet Take 1 Tab by mouth daily for 20 days. 9/29/20 10/19/20  Chelsea Root MD   evolocumab (REPATHA SURECLICK) pen injection 1 mL by SubCUTAneous route every fourteen (14) days. 9/15/20   Yareli Edmond MD   nitroglycerin (NITROSTAT) 0.4 mg SL tablet 1 Tab by SubLINGual route every five (5) minutes as needed for Chest Pain. Up to 3 doses.  19   Alex Camarena NP        No Known Allergies    Family History   Problem Relation Age of Onset    Diabetes Other     Hypertension Other     Heart Disease Other     Diabetes Mother     Hypertension Mother     Cancer Father     Diabetes Sister     Heart Disease Sister     Diabetes Brother     Diabetes Sister     Hypertension Sister     Diabetes Sister     Hypertension Sister     Diabetes Sister     Hypertension Sister     Diabetes Brother      Social History     Tobacco Use    Smoking status: Former Smoker     Packs/day: 2.00     Years: 15.00     Pack years: 30.00     Types: Cigarettes     Last attempt to quit: 2/3/1992     Years since quittin.7    Smokeless tobacco: Never Used   Substance Use Topics    Alcohol use: No        Objective:       Physical Examination:    Vitals:    10/12/20 0746   BP: (!) 191/77   Pulse: 62   Resp: 16   Temp: 98.5 °F (36.9 °C)   SpO2: 99%   Weight: 113.4 kg (250 lb)   Height: 5' 8\" (1.727 m)       Pain Assessment  Pain Intensity 1: 0 (10/12/20 0746)        Patient Stated Pain Goal: 0      HEART: regular rate and rhythm  LUNG: clear to auscultation bilaterally  ABDOMEN: normal findings: soft, non-tender  EXTREMITIES: warm, no edema    Laboratory:     Lab Results   Component Value Date/Time    Sodium 141 10/07/2020 08:18 AM    Sodium 140 2020 11:33 PM    Potassium 5.1 10/07/2020 08:18 AM    Potassium 3.9 09/28/2020 11:33 PM    Chloride 107 (H) 10/07/2020 08:18 AM    Chloride 112 (H) 09/28/2020 11:33 PM    CO2 19 (L) 10/07/2020 08:18 AM    CO2 19 (L) 09/28/2020 11:33 PM    Anion gap 9 09/28/2020 11:33 PM    Anion gap 9 07/20/2020 02:47 PM    Glucose 127 (H) 10/07/2020 08:18 AM    Glucose 133 (H) 09/28/2020 11:33 PM    BUN 69 (H) 10/07/2020 08:18 AM    BUN 58 (H) 09/28/2020 11:33 PM    Creatinine 4.45 (H) 10/07/2020 08:18 AM    Creatinine 4.52 (H) 09/28/2020 11:33 PM    GFR est AA 11 (L) 10/07/2020 08:18 AM    GFR est AA 13 (L) 09/28/2020 11:33 PM    GFR est non-AA 10 (L) 10/07/2020 08:18 AM    GFR est non-AA 10 (L) 09/28/2020 11:33 PM    Calcium 8.4 (L) 10/07/2020 08:18 AM    Calcium 8.5 09/28/2020 11:33 PM    Magnesium 2.0 09/28/2020 11:33 PM    Magnesium 1.9 08/13/2019 07:34 AM    Albumin 3.5 (L) 10/07/2020 08:18 AM    Albumin 2.4 (L) 09/28/2020 11:33 PM    Protein, total 6.2 10/07/2020 08:18 AM    Protein, total 7.1 09/28/2020 11:33 PM    Globulin 4.7 (H) 09/28/2020 11:33 PM    Globulin 4.3 (H) 11/25/2019 09:59 PM    A-G Ratio 1.3 10/07/2020 08:18 AM    A-G Ratio 0.5 (L) 09/28/2020 11:33 PM    ALT (SGPT) 19 10/07/2020 08:18 AM    ALT (SGPT) 23 09/28/2020 11:33 PM     Lab Results   Component Value Date/Time    WBC 8.7 10/07/2020 08:18 AM    WBC 10.9 09/28/2020 11:33 PM    HGB 10.2 (L) 10/07/2020 08:18 AM    HGB 9.8 (L) 09/28/2020 11:33 PM    HCT 31.2 (L) 10/07/2020 08:18 AM    HCT 29.5 (L) 09/28/2020 11:33 PM    PLATELET 362 07/98/9859 08:18 AM    PLATELET 610 51/39/6996 11:33 PM     Lab Results   Component Value Date/Time    aPTT 31.3 10/07/2019 07:53 AM    aPTT >200.0 (HH) 08/13/2019 12:59 AM    Prothrombin time 12.8 11/25/2019 09:55 AM    Prothrombin time 13.9 10/07/2019 07:53 AM    INR 0.9 11/25/2019 09:55 AM    INR 1.0 10/07/2019 07:53 AM       Assessment:     CKD, fistula not mature for use    Hospital Problems  Date Reviewed: 10/7/2020    None          Plan:     Planned Procedure:   Tunneled hemodialysis catheter placement    Risks, benefits, and alternatives reviewed with patient and she agrees to proceed with the procedure.       Signed By: Linda Cummings PA-C     October 12, 2020

## 2020-10-12 NOTE — PROGRESS NOTES
IR Nurse Pre-Procedure Checklist Part 2          Consent signed: Yes    H&P complete:  Yes    Antibiotics: No    Airway/Mallampati Done: Yes    Shaved: Yes    Pregnancy Form:No    Patient Position: Yes    MD Side: Yes     Biopsy Worksheet: No    Specimen Medium: No

## 2020-10-12 NOTE — PROGRESS NOTES
Recovery period without difficulty. Pt alert and oriented and denies pain. Dressing is clean, dry, and intact. Reviewed discharge instructions with patient and son, both verbalized understanding. [facility] via wheelchair. Vital signs and Stacey score completed.

## 2020-10-12 NOTE — PROGRESS NOTES
The patient was counseled at length about the risks of billie Covid-19 during their perioperative period and any recovery window from their procedure. The patient was made aware that billie Covid-19  may worsen their prognosis for recovering from their procedure and lend to a higher morbidity and/or mortality risk. All material risks, benefits, and reasonable alternatives including postponing the procedure were discussed. The patient does  wish to proceed with the procedure at this time.

## 2020-10-12 NOTE — DISCHARGE INSTRUCTIONS
[address]  Department of Interventional Radiology  [facility]  [phone] Office  [phone] Fax    Tunneled or Non Tunneled Catheter Discharge Instructions    General Information:   A catheter, either tunneled (permanent) or non-tunneled (temporary) catheter was inserted into your neck today for the purpose of Cancer treatment (apheresis) or dialysis. Your catheter will be used for the length of your apheresis, or until you get a fistula placed in surgery for dialysis. After this time, your catheter may be removed. You may return to our department for the catheter removal.  A non-tunneled catheter will exit at the neck. There will be three ports, two of which will be used for dialysis or apheresis. The one smaller port in the middle can be used like a regular IV. It ideally is used only for about two weeks. A tunneled catheter will exit lower down on the chest wall, and can be used for a longer period of time. There is no IV port on these. The tunneled catheter is used only for dialysis. In case of emergencies only can drugs be given through these catheters and only with written permission from your doctor. Home Care Instructions: You can resume your regular diet. Do not drink alcohol, drive, or make any important legal decisions in the next 24 hours. Watch the site carefully for signs of infection, like fever, drainage, redness, and/or swelling. Your catheter should be \"packed\" with heparin after each use or at least once a week if it is not being used. This \"packing\" should only be done by nurses experienced with caring for this type of catheter. You may shower in 24 hours, but you need to cover the catheter with plastic wrap and tape to keep it dry while you are showering. Keep the site clean, dry, and protected. Do not immerse yourself in water like in the case of tub baths or swimming as long as you have the catheter. Call If:   You should call your Physician and/or the Radiology Nurse if you have any signs of infection, shortness of breath, or if the dressing should come off or become moist.  You will be instructed on where to go for a new dressing. You should not have to change the dressings yourself, as that will be done by the nurses who access the catheter. Follow-Up Instructions:  Please see your ordering doctor as he/she has requested. To Reach Us: If you have any questions about your procedure, please call the Interventional Radiology department at [phone]. After business hours (5pm) and weekends, call the answering service at [phone] and ask for the Radiologist on call to be paged. Si tiene Preguntas acerca del procedimiento, por favor llame al departamento de Radiología Intervencional al [phone]. Después de horas de oficina (5 pm) y los fines de [place], llamar al Sy Nolen al [phone] y pregunte por el Radiologo de [facility]. Interventional Radiology General Nurse Discharge    After general anesthesia or intravenous sedation, for 24 hours or while taking prescription Narcotics:  · Limit your activities  · Do not drive and operate hazardous machinery  · Do not make important personal or business decisions  · Do  not drink alcoholic beverages  · If you have not urinated within 8 hours after discharge, please contact your surgeon on call. * Please give a list of your current medications to your Primary Care Provider. * Please update this list whenever your medications are discontinued, doses are     changed, or new medications (including over-the-counter products) are added. * Please carry medication information at all times in case of emergency situations.     These are general instructions for a healthy lifestyle:    No smoking/ No tobacco products/ Avoid exposure to second hand smoke  Surgeon General's Warning:  Quitting smoking now greatly reduces serious risk to your health. Obesity, smoking, and sedentary lifestyle greatly increases your risk for illness  A healthy diet, regular physical exercise & weight monitoring are important for maintaining a healthy lifestyle    You may be retaining fluid if you have a history of heart failure or if you experience any of the following symptoms:  Weight gain of 3 pounds or more overnight or 5 pounds in a week, increased swelling in our hands or feet or shortness of breath while lying flat in bed. Please call your doctor as soon as you notice any of these symptoms; do not wait until your next office visit. Recognize signs and symptoms of STROKE:  F-face looks uneven    A-arms unable to move or move unevenly    S-speech slurred or non-existent    T-time-call 911 as soon as signs and symptoms begin-DO NOT go       Back to bed or wait to see if you get better-TIME IS BRAIN.     Patient Signature:  Date: 10/12/2020  Discharging Nurse: Ksenia Hardin RN

## 2020-10-15 NOTE — PERIOP NOTES
Patient verified name and  Order for consent  found in EHR and matches case posting; patient verified. Type 1B surgery, phone assessment complete. Labs per surgeon: cbc,bmp; results pending Labs per anesthesia protocol: K+, Glucose ; results pending [id number] Margot Almanzar hold from dr Paula Luciano 10/8/20 in [address] [place] [address]. Patient aware that a negative Covid swab result is required to proceed with surgery;  appointments are made by the surgeon office and test should be collected 7 days prior to surgery. The testing center is located at the . [address], [place]. Hospital approved surgical skin cleanser and instructions given per hospital policy. Patient provided with and instructed on educational handouts including Guide to Surgery, Pain Management, Hand Hygiene, Blood Transfusion Education, and [place] [facility] Brochure. Patient answered medical/surgical history questions at their best of ability. All prior to admission medications documented in [facility]. Original medication prescription bottle not visualized during patient appointment. Patient instructed to hold all vitamins 7 days prior to surgery and NSAIDS 5 days prior to surgery, patient verbalized understanding. Patient teach back successful and patient demonstrates knowledge of instructions.

## 2020-10-15 NOTE — PERIOP NOTES
PLEASE CONTINUE TAKING ALL PRESCRIPTION MEDICATIONS UP TO THE DAY OF SURGERY UNLESS OTHERWISE DIRECTED BELOW. DISCONTINUE all vitamins and supplements 7 days prior to surgery. DISCONTINUE Non-Steriodal Anti-Inflammatory (NSAIDS) such as Advil and Aleve 5 days prior to surgery. Home Medications to take 
the day of surgery Amlodipine, Asprin 81 mg, Lexapro, Hydralazine, Isosorbide, Levothyroxine, Metolazone, Metoprolol, Ranexa Home Medications  
to Hold Brilinta hold 10/18/20 Comments 56 units of Tresiba morning of surgery *Visitor policy of 1 visitor per patient discussed. Please do not bring home medications with you on the day of surgery unless otherwise directed by your nurse. If you are instructed to bring home medications, please give them to your nurse as they will be administered by the nursing staff. If you have any questions, please call [facility] [phone] or [facility] [phone]. A copy of this note was provided to the patient for reference.

## 2020-10-15 NOTE — PERIOP NOTES
Recent Results (from the past 12 hour(s)) CBC WITH AUTOMATED DIFF Collection Time: 10/15/20 10:40 AM  
Result Value Ref Range WBC 8.7 4.3 - 11.1 K/uL  
 RBC 3.49 (L) 4.05 - 5.2 M/uL  
 HGB 10.3 (L) 11.7 - 15.4 g/dL HCT 31.7 (L) 35.8 - 46.3 % MCV 90.8 79.6 - 97.8 FL  
 MCH 29.5 26.1 - 32.9 PG  
 MCHC 32.5 31.4 - 35.0 g/dL  
 RDW 14.2 11.9 - 14.6 % PLATELET 613 970 - 103 K/uL MPV 10.1 9.4 - 12.3 FL ABSOLUTE NRBC 0.00 0.0 - 0.2 K/uL  
 DF AUTOMATED NEUTROPHILS 69 43 - 78 % LYMPHOCYTES 15 13 - 44 % MONOCYTES 9 4.0 - 12.0 % EOSINOPHILS 5 0.5 - 7.8 % BASOPHILS 1 0.0 - 2.0 % IMMATURE GRANULOCYTES 1 0.0 - 5.0 %  
 ABS. NEUTROPHILS 6.0 1.7 - 8.2 K/UL  
 ABS. LYMPHOCYTES 1.3 0.5 - 4.6 K/UL  
 ABS. MONOCYTES 0.8 0.1 - 1.3 K/UL  
 ABS. EOSINOPHILS 0.5 0.0 - 0.8 K/UL  
 ABS. BASOPHILS 0.1 0.0 - 0.2 K/UL  
 ABS. IMM. GRANS. 0.1 0.0 - 0.5 K/UL METABOLIC PANEL, BASIC Collection Time: 10/15/20 10:40 AM  
Result Value Ref Range Sodium 142 136 - 145 mmol/L Potassium 4.3 3.5 - 5.1 mmol/L Chloride 107 98 - 107 mmol/L  
 CO2 28 21 - 32 mmol/L Anion gap 7 7 - 16 mmol/L Glucose 83 65 - 100 mg/dL BUN 35 (H) 8 - 23 MG/DL Creatinine 3.72 (H) 0.6 - 1.0 MG/DL  
 GFR est AA 16 (L) >60 ml/min/1.73m2 GFR est non-AA 13 (L) >60 ml/min/1.73m2 Calcium 8.8 8.3 - 10.4 MG/DL Reviewed

## 2020-10-21 NOTE — DISCHARGE INSTRUCTIONS
INSTRUCTIONS FOR A-V FISTULA, GRAFT ACCESS, REVISION OR DECLOT    ACTIVITY  · As tolerated and as directed by your doctor. · Keep arm straight and raised above heart level for the next 24 hours. DIET  · Clear liquids until no nausea or vomiting; then light diet for the first day. · Advance to regular diet on second day, unless your doctor orders otherwise. · If nausea and vomiting continues, call your doctor. PAIN  · Take pain medication as directed by your doctor. · Call your doctor if pain is NOT relieved by the medication. · DO NOT take aspirin or blood thinners until directed by your doctor. DRESSING CARE  · Keep your dressing clean and dry. · Leave the dressing on until the dialysis nurse or your doctor takes it off. CARE OF YOUR ACCESS  DO:  · Keep access area clean with soap and water daily after dressing has been removed. · Feel for the thrill daily. (by placing your fingers over the graft you will be able to feel a vibration (thrill) which means blood is flowing and the graft is working.)  DO NOT:  · Wear tight sleeves, watches, belts or bracelets over graft. · Carry heavy bags across the graft. · Sleep on graft side. · Let your blood pressure be taken on graft side. · Let blood be drawn from your graft side. CALL YOUR DOCTOR IF  · Excessive bleeding that does not stop after holding mild pressure over area. · Temperature of 101 degrees F or above. · Redness, excessive swelling or bruising, and/or green or yellow, smelly discharge from incision   · Loss of sensation-cold, white, or blue fingers or toes.     After general anesthesia or intravenous sedation, for 24 hours or while taking prescription Narcotics:  · Limit your activities  · A responsible adult needs to be with you for the next 24 hours  · Do not drive and operate hazardous machinery  · Do not make important personal or business decisions  · Do not drink alcoholic beverages  · If you have not urinated within 8 hours after discharge, and you are experiencing discomfort from urinary retention, please go to the nearest ED. · If you have sleep apnea and have a CPAP machine, please use it for all naps and sleeping. · Please use caution when taking narcotics and any of your home medications that may cause drowsiness. *  Please give a list of your current medications to your Primary Care Provider. *  Please update this list whenever your medications are discontinued, doses are      changed, or new medications (including over-the-counter products) are added. *  Please carry medication information at all times in case of emergency situations. These are general instructions for a healthy lifestyle:  No smoking/ No tobacco products/ Avoid exposure to second hand smoke  Surgeon General's Warning:  Quitting smoking now greatly reduces serious risk to your health. Obesity, smoking, and sedentary lifestyle greatly increases your risk for illness  A healthy diet, regular physical exercise & weight monitoring are important for maintaining a healthy lifestyle    You may be retaining fluid if you have a history of heart failure or if you experience any of the following symptoms:  Weight gain of 3 pounds or more overnight or 5 pounds in a week, increased swelling in our hands or feet or shortness of breath while lying flat in bed. Please call your doctor as soon as you notice any of these symptoms; do not wait until your next office visit.

## 2020-10-21 NOTE — ANESTHESIA POSTPROCEDURE EVALUATION
Procedure(s): LEFT RADIALCEPHALIC AV FISTULAGRAM WITH ULTRASOUND GUIDED ACCESS. total IV anesthesia Anesthesia Post Evaluation Multimodal analgesia: multimodal analgesia used between 6 hours prior to anesthesia start to PACU discharge Patient location during evaluation: bedside Patient participation: complete - patient participated Level of consciousness: awake and alert Pain management: adequate Airway patency: patent Anesthetic complications: no 
Cardiovascular status: acceptable Respiratory status: acceptable Hydration status: acceptable Post anesthesia nausea and vomiting:  controlled Final Post Anesthesia Temperature Assessment:  Normothermia (36.0-37.5 degrees C) INITIAL Post-op Vital signs:  
Vitals Value Taken Time /63 10/21/2020 11:59 AM  
Temp 36.4 °C (97.5 °F) 10/21/2020 11:59 AM  
Pulse 59 10/21/2020 11:59 AM  
Resp 15 10/21/2020 11:59 AM  
SpO2 99 % 10/21/2020 11:59 AM

## 2020-10-21 NOTE — ANESTHESIA PREPROCEDURE EVALUATION
Relevant Problems No relevant active problems Anesthetic History No history of anesthetic complications Review of Systems / Medical History Patient summary reviewed, nursing notes reviewed and pertinent labs reviewed Pulmonary Within defined limits Pertinent negatives: No smoker Neuro/Psych Within defined limits Cardiovascular Hypertension CAD (15 yrs s/p CABG. Multiple subsequent PCIs/stents -- most recently Nov 2019) and hyperlipidemia Exercise tolerance: <4 METS Comments: TTE Oct 2019:  LVEF 55-60% GI/Hepatic/Renal 
  
 
 
Renal disease (dialysis M/W/F. full session Monday.): ESRD Endo/Other Diabetes: type 2, using insulin Hypothyroidism: well controlled Obesity Other Findings Physical Exam 
 
Airway Mallampati: II 
TM Distance: 4 - 6 cm Neck ROM: decreased range of motion Mouth opening: Normal 
 
 Cardiovascular Regular rate and rhythm,  S1 and S2 normal,  no murmur, click, rub, or gallop Dental 
No notable dental hx Pulmonary Breath sounds clear to auscultation Abdominal 
GI exam deferred Other Findings Anesthetic Plan ASA: 3 Anesthesia type: total IV anesthesia Induction: Intravenous Anesthetic plan and risks discussed with: Patient

## 2020-10-22 NOTE — PROCEDURES
[address] [facility] 
PROCEDURE NOTE Name:  Luis E Montgomery 
MR#:  [id number] :  1960 ACCOUNT #:  [de-identified] DATE OF SERVICE:  10/21/2020 PREOPERATIVE DIAGNOSIS:  Non-maturing left radiocephalic arteriovenous fistula. POSTOPERATIVE DIAGNOSIS:  Non-maturing left radiocephalic arteriovenous fistula. PROCEDURE PERFORMED: 1. Left radiocephalic arteriovenous fistulogram and central venogram. 
2.  Coil embolization of left radiocephalic arteriovenous fistula venous branch. 3.  Retrograde PTA of proximal radiocephalic arteriovenous fistula. 4.  Ultrasound-guided vascular access. SURGEON:  Homa aMgana MD 
 
ASSISTANT:  None. ANESTHESIA:  IV sedation plus 1% lidocaine as local. 
 
ESTIMATED BLOOD LOSS:  . SPECIMENS REMOVED:  None. COMPLICATIONS:  None. IMPLANTS:  See chart. TOTAL CONTRAST:  70 mL of Isovue 300. TOTAL FLUORO TIME:  17 minutes. DRAINS:  None. DESCRIPTION OF THE PROCEDURE:  The patient was brought to the hybrid OR and placed on the angio table in supine position. Following adequate IV sedation and a time-out procedure, the left arm was draped and prepped in sterile fashion. The left radiocephalic AV fistula was then percutaneously punctured just beyond its arterial origin at the level of the wrist.  A 4-Citizen of [place] micropuncture sheath was placed over a guidewire. Fistulogram was performed as well as a central venogram from this position. The sheath was then upsized to an 8-Citizen of [place] sheath and multiple attempts were made to cannulate the venous tributary; however, the angulation of the venous tributary was unaccessible from the antegrade approach. Similarly, with a reflux image of the arterial end of the AV fistula, there was evidence of stenosis at the inflow of the fistula. At this point, the 8-Citizen of [place] sheath was removed and the single suture was placed.   Next, the radiocephalic AV fistula was then cannulated in retrograde fashion in the more mid portion of the forearm under direct vision using ultrasound. A 5-[place]namese sheath was placed over guidewire. Next, an 0.014 guidewire was used to cannulate the large venous tributary in the proximal aspect of the fistula and two 4 x 3 Tornado coils were placed. Next, an 0.014 guidewire was used to cross the area of the inflow stenosis of the radiocephalic AV fistula. This area was then dilated with a 4 x 40 balloon. Completion imaging demonstrated excellent result. At this point, the second sheath was removed and a single stitch was placed and direct pressure was held. At this point, the patient was awakened from anesthesia and transported to the recovery room in stable condition. The patient tolerated the procedure well. No complication. All needle and sponge counts were correct. IMPRESSION:  Satisfactory coil embolization of large venous branch tributary of left radiocephalic arteriovenous fistula as well as percutaneous transluminal angioplasty of high-grade inflow stenosis of this fistula. Arlette Holden MD 
 
 
[id number] 
D:  10/21/2020 17:39 
T:  10/21/2020 21:47 JOB #:  [id number]

## 2021-01-01 ENCOUNTER — HOSPITAL ENCOUNTER (OUTPATIENT)
Dept: PHYSICAL THERAPY | Age: 61
Discharge: HOME | End: 2021-08-02
Attending: INTERNAL MEDICINE

## 2021-01-01 ENCOUNTER — APPOINTMENT (OUTPATIENT)
Dept: GENERAL RADIOLOGY | Age: 61
DRG: 207 | End: 2021-01-01
Attending: EMERGENCY MEDICINE
Payer: COMMERCIAL

## 2021-01-01 ENCOUNTER — APPOINTMENT (OUTPATIENT)
Dept: GENERAL RADIOLOGY | Age: 61
DRG: 207 | End: 2021-01-01
Attending: NURSE PRACTITIONER
Payer: COMMERCIAL

## 2021-01-01 ENCOUNTER — HOSPITAL ENCOUNTER (INPATIENT)
Age: 61
LOS: 15 days | DRG: 207 | End: 2021-09-17
Attending: EMERGENCY MEDICINE | Admitting: FAMILY MEDICINE
Payer: COMMERCIAL

## 2021-01-01 ENCOUNTER — HOSPITAL ENCOUNTER (OUTPATIENT)
Dept: INTERVENTIONAL RADIOLOGY/VASCULAR | Age: 61
Discharge: HOME | End: 2021-01-19
Attending: INTERNAL MEDICINE
Payer: COMMERCIAL

## 2021-01-01 ENCOUNTER — APPOINTMENT (OUTPATIENT)
Dept: ULTRASOUND IMAGING | Age: 61
DRG: 207 | End: 2021-01-01
Attending: INTERNAL MEDICINE
Payer: COMMERCIAL

## 2021-01-01 ENCOUNTER — HOSPITAL ENCOUNTER (OUTPATIENT)
Dept: PHYSICAL THERAPY | Age: 61
Discharge: HOME | End: 2021-07-28
Attending: INTERNAL MEDICINE
Payer: MEDICARE

## 2021-01-01 ENCOUNTER — HOSPITAL ENCOUNTER (INPATIENT)
Age: 61
LOS: 1 days | Discharge: HOME | DRG: 177 | End: 2021-08-29
Attending: EMERGENCY MEDICINE | Admitting: HOSPITALIST
Payer: COMMERCIAL

## 2021-01-01 ENCOUNTER — HOSPITAL ENCOUNTER (OUTPATIENT)
Dept: MAMMOGRAPHY | Age: 61
Discharge: HOME | End: 2021-04-26
Attending: INTERNAL MEDICINE

## 2021-01-01 ENCOUNTER — HOSPITAL ENCOUNTER (OUTPATIENT)
Dept: PHYSICAL THERAPY | Age: 61
Discharge: HOME | End: 2021-07-23
Attending: INTERNAL MEDICINE
Payer: MEDICARE

## 2021-01-01 ENCOUNTER — APPOINTMENT (OUTPATIENT)
Dept: PHYSICAL THERAPY | Age: 61
End: 2021-01-01
Attending: INTERNAL MEDICINE
Payer: MEDICARE

## 2021-01-01 ENCOUNTER — APPOINTMENT (OUTPATIENT)
Dept: GENERAL RADIOLOGY | Age: 61
DRG: 177 | End: 2021-01-01
Attending: EMERGENCY MEDICINE
Payer: COMMERCIAL

## 2021-01-01 ENCOUNTER — APPOINTMENT (OUTPATIENT)
Dept: GENERAL RADIOLOGY | Age: 61
DRG: 207 | End: 2021-01-01
Attending: INTERNAL MEDICINE
Payer: COMMERCIAL

## 2021-01-01 VITALS
TEMPERATURE: 98.2 F | SYSTOLIC BLOOD PRESSURE: 135 MMHG | RESPIRATION RATE: 16 BRPM | DIASTOLIC BLOOD PRESSURE: 61 MMHG | OXYGEN SATURATION: 100 % | HEART RATE: 62 BPM

## 2021-01-01 VITALS
TEMPERATURE: 98.1 F | WEIGHT: 261.4 LBS | SYSTOLIC BLOOD PRESSURE: 140 MMHG | DIASTOLIC BLOOD PRESSURE: 68 MMHG | BODY MASS INDEX: 39.62 KG/M2 | OXYGEN SATURATION: 96 % | RESPIRATION RATE: 19 BRPM | HEIGHT: 68 IN | HEART RATE: 83 BPM

## 2021-01-01 VITALS
HEIGHT: 68 IN | TEMPERATURE: 96.9 F | BODY MASS INDEX: 42.83 KG/M2 | WEIGHT: 282.63 LBS | SYSTOLIC BLOOD PRESSURE: 128 MMHG | OXYGEN SATURATION: 91 % | RESPIRATION RATE: 15 BRPM | DIASTOLIC BLOOD PRESSURE: 36 MMHG

## 2021-01-01 DIAGNOSIS — E11.21 TYPE 2 DIABETES WITH NEPHROPATHY (HCC): ICD-10-CM

## 2021-01-01 DIAGNOSIS — E03.9 ACQUIRED HYPOTHYROIDISM: ICD-10-CM

## 2021-01-01 DIAGNOSIS — E87.6 HYPOKALEMIA: ICD-10-CM

## 2021-01-01 DIAGNOSIS — E87.5 HYPERKALEMIA: ICD-10-CM

## 2021-01-01 DIAGNOSIS — R65.21 SEPTIC SHOCK (HCC): ICD-10-CM

## 2021-01-01 DIAGNOSIS — F17.211 CIGARETTE NICOTINE DEPENDENCE IN REMISSION: ICD-10-CM

## 2021-01-01 DIAGNOSIS — Z51.5 ENCOUNTER FOR PALLIATIVE CARE: ICD-10-CM

## 2021-01-01 DIAGNOSIS — N18.30 STAGE 3 CHRONIC KIDNEY DISEASE, UNSPECIFIED WHETHER STAGE 3A OR 3B CKD (HCC): Chronic | ICD-10-CM

## 2021-01-01 DIAGNOSIS — D64.9 ANEMIA, UNSPECIFIED TYPE: ICD-10-CM

## 2021-01-01 DIAGNOSIS — U07.1 COVID-19 VIRUS INFECTION: ICD-10-CM

## 2021-01-01 DIAGNOSIS — U07.1 COVID-19 IN IMMUNOCOMPROMISED PATIENT (HCC): ICD-10-CM

## 2021-01-01 DIAGNOSIS — Z99.2 PERITONEAL DIALYSIS CATHETER IN PLACE (HCC): ICD-10-CM

## 2021-01-01 DIAGNOSIS — Z71.89 ACP (ADVANCE CARE PLANNING): ICD-10-CM

## 2021-01-01 DIAGNOSIS — A41.9 SEPTIC SHOCK (HCC): ICD-10-CM

## 2021-01-01 DIAGNOSIS — N18.6 ESRD ON PERITONEAL DIALYSIS (HCC): Chronic | ICD-10-CM

## 2021-01-01 DIAGNOSIS — Z12.31 ENCOUNTER FOR SCREENING MAMMOGRAM FOR MALIGNANT NEOPLASM OF BREAST: ICD-10-CM

## 2021-01-01 DIAGNOSIS — J80 ACUTE RESPIRATORY DISTRESS SYNDROME (ARDS) DUE TO COVID-19 VIRUS (HCC): ICD-10-CM

## 2021-01-01 DIAGNOSIS — R53.81 DEBILITY: ICD-10-CM

## 2021-01-01 DIAGNOSIS — J12.82 PNEUMONIA DUE TO COVID-19 VIRUS: ICD-10-CM

## 2021-01-01 DIAGNOSIS — E87.20 ACIDOSIS: ICD-10-CM

## 2021-01-01 DIAGNOSIS — D84.9 COVID-19 IN IMMUNOCOMPROMISED PATIENT (HCC): ICD-10-CM

## 2021-01-01 DIAGNOSIS — U07.1 ACUTE RESPIRATORY DISTRESS SYNDROME (ARDS) DUE TO COVID-19 VIRUS (HCC): ICD-10-CM

## 2021-01-01 DIAGNOSIS — U07.1 PNEUMONIA DUE TO COVID-19 VIRUS: ICD-10-CM

## 2021-01-01 DIAGNOSIS — E66.01 CLASS 3 SEVERE OBESITY DUE TO EXCESS CALORIES WITH SERIOUS COMORBIDITY AND BODY MASS INDEX (BMI) OF 40.0 TO 44.9 IN ADULT (HCC): ICD-10-CM

## 2021-01-01 DIAGNOSIS — N18.6 ESRD (END STAGE RENAL DISEASE) (HCC): ICD-10-CM

## 2021-01-01 DIAGNOSIS — J12.82 PNEUMONIA DUE TO COVID-19 VIRUS: Primary | ICD-10-CM

## 2021-01-01 DIAGNOSIS — Z99.2 ESRD ON PERITONEAL DIALYSIS (HCC): Chronic | ICD-10-CM

## 2021-01-01 DIAGNOSIS — E78.2 MIXED HYPERLIPIDEMIA: ICD-10-CM

## 2021-01-01 DIAGNOSIS — Z28.9 COVID-19 VACCINATION NOT DONE: ICD-10-CM

## 2021-01-01 DIAGNOSIS — E87.6 ACUTE HYPOKALEMIA: ICD-10-CM

## 2021-01-01 DIAGNOSIS — J96.01 ACUTE RESPIRATORY FAILURE WITH HYPOXIA (HCC): Primary | ICD-10-CM

## 2021-01-01 DIAGNOSIS — U07.1 PNEUMONIA DUE TO COVID-19 VIRUS: Primary | ICD-10-CM

## 2021-01-01 LAB
25(OH)D3 SERPL-MCNC: 11.1 NG/ML (ref 30–100)
ABO + RH BLD: NORMAL
ADMINISTERED INITIALS, ADMINIT: NORMAL
ALBUMIN SERPL-MCNC: 1.9 G/DL (ref 3.2–4.6)
ALBUMIN SERPL-MCNC: 2 G/DL (ref 3.2–4.6)
ALBUMIN SERPL-MCNC: 2 G/DL (ref 3.2–4.6)
ALBUMIN SERPL-MCNC: 2.1 G/DL (ref 3.2–4.6)
ALBUMIN SERPL-MCNC: 2.4 G/DL (ref 3.2–4.6)
ALBUMIN SERPL-MCNC: 2.5 G/DL (ref 3.2–4.6)
ALBUMIN SERPL-MCNC: 2.5 G/DL (ref 3.2–4.6)
ALBUMIN SERPL-MCNC: 2.6 G/DL (ref 3.2–4.6)
ALBUMIN SERPL-MCNC: 2.8 G/DL (ref 3.2–4.6)
ALBUMIN SERPL-MCNC: 3 G/DL (ref 3.2–4.6)
ALBUMIN SERPL-MCNC: 3.5 G/DL (ref 3.2–4.6)
ALBUMIN/GLOB SERPL: 0.5 {RATIO} (ref 1.2–3.5)
ALBUMIN/GLOB SERPL: 0.6 {RATIO} (ref 1.2–3.5)
ALBUMIN/GLOB SERPL: 0.6 {RATIO} (ref 1.2–3.5)
ALP SERPL-CCNC: 70 U/L (ref 50–136)
ALP SERPL-CCNC: 85 U/L (ref 50–136)
ALP SERPL-CCNC: 98 U/L (ref 50–136)
ALT SERPL-CCNC: 31 U/L (ref 12–65)
ALT SERPL-CCNC: 31 U/L (ref 12–65)
ALT SERPL-CCNC: 32 U/L (ref 12–65)
ANION GAP SERPL CALC-SCNC: 10 MMOL/L (ref 7–16)
ANION GAP SERPL CALC-SCNC: 11 MMOL/L (ref 7–16)
ANION GAP SERPL CALC-SCNC: 11 MMOL/L (ref 7–16)
ANION GAP SERPL CALC-SCNC: 12 MMOL/L (ref 7–16)
ANION GAP SERPL CALC-SCNC: 14 MMOL/L (ref 7–16)
ANION GAP SERPL CALC-SCNC: 16 MMOL/L (ref 7–16)
ANION GAP SERPL CALC-SCNC: 17 MMOL/L (ref 7–16)
ANION GAP SERPL CALC-SCNC: 18 MMOL/L (ref 7–16)
ANION GAP SERPL CALC-SCNC: 19 MMOL/L (ref 7–16)
ANION GAP SERPL CALC-SCNC: 20 MMOL/L (ref 7–16)
ANION GAP SERPL CALC-SCNC: 21 MMOL/L (ref 7–16)
ANION GAP SERPL CALC-SCNC: 22 MMOL/L (ref 7–16)
ANION GAP SERPL CALC-SCNC: 23 MMOL/L (ref 7–16)
ANION GAP SERPL CALC-SCNC: 23 MMOL/L (ref 7–16)
APTT PPP: 31.4 SEC (ref 24.1–35.1)
ARTERIAL PATENCY WRIST A: ABNORMAL
ARTERIAL PATENCY WRIST A: NORMAL
ARTERIAL PATENCY WRIST A: POSITIVE
AST SERPL-CCNC: 28 U/L (ref 15–37)
AST SERPL-CCNC: 29 U/L (ref 15–37)
AST SERPL-CCNC: 35 U/L (ref 15–37)
ATRIAL RATE: 73 BPM
ATRIAL RATE: 77 BPM
ATRIAL RATE: 80 BPM
BACTERIA SPEC CULT: ABNORMAL
BACTERIA SPEC CULT: NORMAL
BASE DEFICIT BLD-SCNC: 0.4 MMOL/L
BASE DEFICIT BLD-SCNC: 0.7 MMOL/L
BASE DEFICIT BLD-SCNC: 0.9 MMOL/L
BASE DEFICIT BLD-SCNC: 10.3 MMOL/L
BASE DEFICIT BLD-SCNC: 11.8 MMOL/L
BASE DEFICIT BLD-SCNC: 12.7 MMOL/L
BASE DEFICIT BLD-SCNC: 12.9 MMOL/L
BASE DEFICIT BLD-SCNC: 2 MMOL/L
BASE DEFICIT BLD-SCNC: 2.7 MMOL/L
BASE DEFICIT BLD-SCNC: 3.4 MMOL/L
BASE DEFICIT BLD-SCNC: 3.9 MMOL/L
BASE DEFICIT BLD-SCNC: 4.4 MMOL/L
BASE EXCESS BLD CALC-SCNC: 2.1 MMOL/L
BASE EXCESS BLD CALC-SCNC: 2.1 MMOL/L
BASE EXCESS BLD CALC-SCNC: 2.5 MMOL/L
BASE EXCESS BLD CALC-SCNC: 3.3 MMOL/L
BASOPHILS # BLD: 0 K/UL (ref 0–0.2)
BASOPHILS # BLD: 0.1 K/UL (ref 0–0.2)
BASOPHILS NFR BLD: 0 % (ref 0–2)
BDY SITE: ABNORMAL
BDY SITE: NORMAL
BILIRUB SERPL-MCNC: 0.3 MG/DL (ref 0.2–1.1)
BILIRUB SERPL-MCNC: 0.4 MG/DL (ref 0.2–1.1)
BILIRUB SERPL-MCNC: 0.4 MG/DL (ref 0.2–1.1)
BLD PROD TYP BPU: NORMAL
BLOOD GROUP ANTIBODIES SERPL: NORMAL
BPU ID: NORMAL
BUN SERPL-MCNC: 103 MG/DL (ref 8–23)
BUN SERPL-MCNC: 106 MG/DL (ref 8–23)
BUN SERPL-MCNC: 107 MG/DL (ref 8–23)
BUN SERPL-MCNC: 109 MG/DL (ref 8–23)
BUN SERPL-MCNC: 110 MG/DL (ref 8–23)
BUN SERPL-MCNC: 110 MG/DL (ref 8–23)
BUN SERPL-MCNC: 111 MG/DL (ref 8–23)
BUN SERPL-MCNC: 111 MG/DL (ref 8–23)
BUN SERPL-MCNC: 112 MG/DL (ref 8–23)
BUN SERPL-MCNC: 113 MG/DL (ref 8–23)
BUN SERPL-MCNC: 114 MG/DL (ref 8–23)
BUN SERPL-MCNC: 115 MG/DL (ref 8–23)
BUN SERPL-MCNC: 115 MG/DL (ref 8–23)
BUN SERPL-MCNC: 116 MG/DL (ref 8–23)
BUN SERPL-MCNC: 117 MG/DL (ref 8–23)
BUN SERPL-MCNC: 117 MG/DL (ref 8–23)
BUN SERPL-MCNC: 118 MG/DL (ref 8–23)
BUN SERPL-MCNC: 118 MG/DL (ref 8–23)
BUN SERPL-MCNC: 119 MG/DL (ref 8–23)
BUN SERPL-MCNC: 119 MG/DL (ref 8–23)
BUN SERPL-MCNC: 120 MG/DL (ref 8–23)
BUN SERPL-MCNC: 121 MG/DL (ref 8–23)
BUN SERPL-MCNC: 123 MG/DL (ref 8–23)
BUN SERPL-MCNC: 130 MG/DL (ref 8–23)
BUN SERPL-MCNC: 135 MG/DL (ref 8–23)
BUN SERPL-MCNC: 140 MG/DL (ref 8–23)
BUN SERPL-MCNC: 142 MG/DL (ref 8–23)
BUN SERPL-MCNC: 144 MG/DL (ref 8–23)
BUN SERPL-MCNC: 69 MG/DL (ref 8–23)
BUN SERPL-MCNC: 72 MG/DL (ref 8–23)
BUN SERPL-MCNC: 81 MG/DL (ref 8–23)
BUN SERPL-MCNC: 82 MG/DL (ref 8–23)
BUN SERPL-MCNC: 90 MG/DL (ref 8–23)
CALCIUM SERPL-MCNC: 5.7 MG/DL (ref 8.3–10.4)
CALCIUM SERPL-MCNC: 5.8 MG/DL (ref 8.3–10.4)
CALCIUM SERPL-MCNC: 6 MG/DL (ref 8.3–10.4)
CALCIUM SERPL-MCNC: 6.1 MG/DL (ref 8.3–10.4)
CALCIUM SERPL-MCNC: 6.2 MG/DL (ref 8.3–10.4)
CALCIUM SERPL-MCNC: 6.2 MG/DL (ref 8.3–10.4)
CALCIUM SERPL-MCNC: 6.3 MG/DL (ref 8.3–10.4)
CALCIUM SERPL-MCNC: 6.4 MG/DL (ref 8.3–10.4)
CALCIUM SERPL-MCNC: 6.5 MG/DL (ref 8.3–10.4)
CALCIUM SERPL-MCNC: 6.6 MG/DL (ref 8.3–10.4)
CALCIUM SERPL-MCNC: 6.7 MG/DL (ref 8.3–10.4)
CALCIUM SERPL-MCNC: 6.8 MG/DL (ref 8.3–10.4)
CALCIUM SERPL-MCNC: 7 MG/DL (ref 8.3–10.4)
CALCIUM SERPL-MCNC: 7 MG/DL (ref 8.3–10.4)
CALCIUM SERPL-MCNC: 7.1 MG/DL (ref 8.3–10.4)
CALCIUM SERPL-MCNC: 7.1 MG/DL (ref 8.3–10.4)
CALCIUM SERPL-MCNC: 7.7 MG/DL (ref 8.3–10.4)
CALCIUM SERPL-MCNC: 7.8 MG/DL (ref 8.3–10.4)
CALCIUM SERPL-MCNC: 7.9 MG/DL (ref 8.3–10.4)
CALCIUM SERPL-MCNC: 8 MG/DL (ref 8.3–10.4)
CALCIUM SERPL-MCNC: 8.2 MG/DL (ref 8.3–10.4)
CALCIUM SERPL-MCNC: 8.3 MG/DL (ref 8.3–10.4)
CALCIUM SERPL-MCNC: 8.4 MG/DL (ref 8.3–10.4)
CALCIUM SERPL-MCNC: 8.5 MG/DL (ref 8.3–10.4)
CALCIUM SERPL-MCNC: 8.5 MG/DL (ref 8.3–10.4)
CALCULATED P AXIS, ECG09: 23 DEGREES
CALCULATED P AXIS, ECG09: 39 DEGREES
CALCULATED P AXIS, ECG09: 49 DEGREES
CALCULATED R AXIS, ECG10: -39 DEGREES
CALCULATED R AXIS, ECG10: -44 DEGREES
CALCULATED R AXIS, ECG10: -52 DEGREES
CALCULATED T AXIS, ECG11: 101 DEGREES
CALCULATED T AXIS, ECG11: 114 DEGREES
CALCULATED T AXIS, ECG11: 116 DEGREES
CHLORIDE SERPL-SCNC: 101 MMOL/L (ref 98–107)
CHLORIDE SERPL-SCNC: 101 MMOL/L (ref 98–107)
CHLORIDE SERPL-SCNC: 104 MMOL/L (ref 98–107)
CHLORIDE SERPL-SCNC: 110 MMOL/L (ref 98–107)
CHLORIDE SERPL-SCNC: 87 MMOL/L (ref 98–107)
CHLORIDE SERPL-SCNC: 88 MMOL/L (ref 98–107)
CHLORIDE SERPL-SCNC: 89 MMOL/L (ref 98–107)
CHLORIDE SERPL-SCNC: 90 MMOL/L (ref 98–107)
CHLORIDE SERPL-SCNC: 91 MMOL/L (ref 98–107)
CHLORIDE SERPL-SCNC: 92 MMOL/L (ref 98–107)
CHLORIDE SERPL-SCNC: 93 MMOL/L (ref 98–107)
CHLORIDE SERPL-SCNC: 93 MMOL/L (ref 98–107)
CHLORIDE SERPL-SCNC: 94 MMOL/L (ref 98–107)
CHLORIDE SERPL-SCNC: 94 MMOL/L (ref 98–107)
CHLORIDE SERPL-SCNC: 95 MMOL/L (ref 98–107)
CHLORIDE SERPL-SCNC: 96 MMOL/L (ref 98–107)
CHLORIDE SERPL-SCNC: 97 MMOL/L (ref 98–107)
CHLORIDE SERPL-SCNC: 99 MMOL/L (ref 98–107)
CO2 BLD-SCNC: 25 MMOL/L (ref 13–23)
CO2 BLD-SCNC: 26 MMOL/L (ref 13–23)
CO2 SERPL-SCNC: 15 MMOL/L (ref 21–32)
CO2 SERPL-SCNC: 16 MMOL/L (ref 21–32)
CO2 SERPL-SCNC: 17 MMOL/L (ref 21–32)
CO2 SERPL-SCNC: 19 MMOL/L (ref 21–32)
CO2 SERPL-SCNC: 21 MMOL/L (ref 21–32)
CO2 SERPL-SCNC: 21 MMOL/L (ref 21–32)
CO2 SERPL-SCNC: 22 MMOL/L (ref 21–32)
CO2 SERPL-SCNC: 23 MMOL/L (ref 21–32)
CO2 SERPL-SCNC: 24 MMOL/L (ref 21–32)
CO2 SERPL-SCNC: 25 MMOL/L (ref 21–32)
CO2 SERPL-SCNC: 26 MMOL/L (ref 21–32)
CO2 SERPL-SCNC: 27 MMOL/L (ref 21–32)
CO2 SERPL-SCNC: 28 MMOL/L (ref 21–32)
CO2 SERPL-SCNC: 29 MMOL/L (ref 21–32)
CO2 SERPL-SCNC: 30 MMOL/L (ref 21–32)
COVID-19 RAPID TEST, COVR: DETECTED
CREAT SERPL-MCNC: 10 MG/DL (ref 0.6–1)
CREAT SERPL-MCNC: 10 MG/DL (ref 0.6–1)
CREAT SERPL-MCNC: 10.1 MG/DL (ref 0.6–1)
CREAT SERPL-MCNC: 10.2 MG/DL (ref 0.6–1)
CREAT SERPL-MCNC: 10.3 MG/DL (ref 0.6–1)
CREAT SERPL-MCNC: 10.4 MG/DL (ref 0.6–1)
CREAT SERPL-MCNC: 10.4 MG/DL (ref 0.6–1)
CREAT SERPL-MCNC: 10.5 MG/DL (ref 0.6–1)
CREAT SERPL-MCNC: 10.6 MG/DL (ref 0.6–1)
CREAT SERPL-MCNC: 10.9 MG/DL (ref 0.6–1)
CREAT SERPL-MCNC: 11 MG/DL (ref 0.6–1)
CREAT SERPL-MCNC: 4.86 MG/DL (ref 0.6–1)
CREAT SERPL-MCNC: 6.21 MG/DL (ref 0.6–1)
CREAT SERPL-MCNC: 6.54 MG/DL (ref 0.6–1)
CREAT SERPL-MCNC: 8.19 MG/DL (ref 0.6–1)
CREAT SERPL-MCNC: 8.94 MG/DL (ref 0.6–1)
CREAT SERPL-MCNC: 9 MG/DL (ref 0.6–1)
CREAT SERPL-MCNC: 9.24 MG/DL (ref 0.6–1)
CREAT SERPL-MCNC: 9.39 MG/DL (ref 0.6–1)
CREAT SERPL-MCNC: 9.47 MG/DL (ref 0.6–1)
CREAT SERPL-MCNC: 9.54 MG/DL (ref 0.6–1)
CREAT SERPL-MCNC: 9.59 MG/DL (ref 0.6–1)
CREAT SERPL-MCNC: 9.61 MG/DL (ref 0.6–1)
CREAT SERPL-MCNC: 9.63 MG/DL (ref 0.6–1)
CREAT SERPL-MCNC: 9.64 MG/DL (ref 0.6–1)
CREAT SERPL-MCNC: 9.66 MG/DL (ref 0.6–1)
CREAT SERPL-MCNC: 9.71 MG/DL (ref 0.6–1)
CREAT SERPL-MCNC: 9.75 MG/DL (ref 0.6–1)
CREAT SERPL-MCNC: 9.78 MG/DL (ref 0.6–1)
CREAT SERPL-MCNC: 9.84 MG/DL (ref 0.6–1)
CREAT SERPL-MCNC: 9.86 MG/DL (ref 0.6–1)
CREAT SERPL-MCNC: 9.9 MG/DL (ref 0.6–1)
CREAT SERPL-MCNC: 9.91 MG/DL (ref 0.6–1)
CREAT SERPL-MCNC: 9.94 MG/DL (ref 0.6–1)
CREAT SERPL-MCNC: 9.95 MG/DL (ref 0.6–1)
CREAT SERPL-MCNC: 9.97 MG/DL (ref 0.6–1)
CROSSMATCH RESULT,%XM: NORMAL
CRP SERPL HS-MCNC: 26.5 MG/L
CRP SERPL-MCNC: 12.8 MG/DL (ref 0–0.9)
CRP SERPL-MCNC: 9.8 MG/DL (ref 0–0.9)
D DIMER PPP FEU-MCNC: 0.53 UG/ML(FEU)
D DIMER PPP FEU-MCNC: 0.85 UG/ML(FEU)
D DIMER PPP FEU-MCNC: 2.29 UG/ML(FEU)
D DIMER PPP FEU-MCNC: 3.55 UG/ML(FEU)
D50 ADMINISTERED, D50ADM: 0 ML
D50 ORDER, D50ORD: 0 ML
DIAGNOSIS, 93000: NORMAL
DIFFERENTIAL METHOD BLD: ABNORMAL
EOSINOPHIL # BLD: 0 K/UL (ref 0–0.8)
EOSINOPHIL NFR BLD: 0 % (ref 0.5–7.8)
ERYTHROCYTE [DISTWIDTH] IN BLOOD BY AUTOMATED COUNT: 12.3 % (ref 11.9–14.6)
ERYTHROCYTE [DISTWIDTH] IN BLOOD BY AUTOMATED COUNT: 12.3 % (ref 11.9–14.6)
ERYTHROCYTE [DISTWIDTH] IN BLOOD BY AUTOMATED COUNT: 12.4 % (ref 11.9–14.6)
ERYTHROCYTE [DISTWIDTH] IN BLOOD BY AUTOMATED COUNT: 12.5 % (ref 11.9–14.6)
ERYTHROCYTE [DISTWIDTH] IN BLOOD BY AUTOMATED COUNT: 12.9 % (ref 11.9–14.6)
ERYTHROCYTE [DISTWIDTH] IN BLOOD BY AUTOMATED COUNT: 12.9 % (ref 11.9–14.6)
ERYTHROCYTE [DISTWIDTH] IN BLOOD BY AUTOMATED COUNT: 13 % (ref 11.9–14.6)
ERYTHROCYTE [DISTWIDTH] IN BLOOD BY AUTOMATED COUNT: 13.2 % (ref 11.9–14.6)
ERYTHROCYTE [DISTWIDTH] IN BLOOD BY AUTOMATED COUNT: 13.4 % (ref 11.9–14.6)
ERYTHROCYTE [DISTWIDTH] IN BLOOD BY AUTOMATED COUNT: 13.4 % (ref 11.9–14.6)
ERYTHROCYTE [DISTWIDTH] IN BLOOD BY AUTOMATED COUNT: 13.5 % (ref 11.9–14.6)
ERYTHROCYTE [DISTWIDTH] IN BLOOD BY AUTOMATED COUNT: 13.9 % (ref 11.9–14.6)
ERYTHROCYTE [DISTWIDTH] IN BLOOD BY AUTOMATED COUNT: 15.4 % (ref 11.9–14.6)
ERYTHROCYTE [DISTWIDTH] IN BLOOD BY AUTOMATED COUNT: 16.2 % (ref 11.9–14.6)
ERYTHROCYTE [DISTWIDTH] IN BLOOD BY AUTOMATED COUNT: 16.5 % (ref 11.9–14.6)
EST. AVERAGE GLUCOSE BLD GHB EST-MCNC: 192 MG/DL
FERRITIN SERPL-MCNC: 1956 NG/ML (ref 8–388)
FERRITIN SERPL-MCNC: 2297 NG/ML (ref 8–388)
FIBRINOGEN PPP-MCNC: 746 MG/DL (ref 190–501)
FIO2, L/MIN - FIO2P: 15
GAS FLOW.O2 O2 DELIVERY SYS: ABNORMAL L/MIN
GAS FLOW.O2 O2 DELIVERY SYS: NORMAL L/MIN
GLOBULIN SER CALC-MCNC: 3.8 G/DL (ref 2.3–3.5)
GLOBULIN SER CALC-MCNC: 4.3 G/DL (ref 2.3–3.5)
GLOBULIN SER CALC-MCNC: 4.9 G/DL (ref 2.3–3.5)
GLSCOM COMMENTS: NORMAL
GLUCOSE BLD STRIP.AUTO-MCNC: 102 MG/DL (ref 65–100)
GLUCOSE BLD STRIP.AUTO-MCNC: 105 MG/DL (ref 65–100)
GLUCOSE BLD STRIP.AUTO-MCNC: 108 MG/DL (ref 65–100)
GLUCOSE BLD STRIP.AUTO-MCNC: 110 MG/DL (ref 65–100)
GLUCOSE BLD STRIP.AUTO-MCNC: 111 MG/DL (ref 65–100)
GLUCOSE BLD STRIP.AUTO-MCNC: 111 MG/DL (ref 65–100)
GLUCOSE BLD STRIP.AUTO-MCNC: 112 MG/DL (ref 65–100)
GLUCOSE BLD STRIP.AUTO-MCNC: 113 MG/DL (ref 65–100)
GLUCOSE BLD STRIP.AUTO-MCNC: 114 MG/DL (ref 65–100)
GLUCOSE BLD STRIP.AUTO-MCNC: 115 MG/DL (ref 65–100)
GLUCOSE BLD STRIP.AUTO-MCNC: 117 MG/DL (ref 65–100)
GLUCOSE BLD STRIP.AUTO-MCNC: 118 MG/DL (ref 65–100)
GLUCOSE BLD STRIP.AUTO-MCNC: 119 MG/DL (ref 65–100)
GLUCOSE BLD STRIP.AUTO-MCNC: 121 MG/DL (ref 65–100)
GLUCOSE BLD STRIP.AUTO-MCNC: 122 MG/DL (ref 65–100)
GLUCOSE BLD STRIP.AUTO-MCNC: 123 MG/DL (ref 65–100)
GLUCOSE BLD STRIP.AUTO-MCNC: 124 MG/DL (ref 65–100)
GLUCOSE BLD STRIP.AUTO-MCNC: 126 MG/DL (ref 65–100)
GLUCOSE BLD STRIP.AUTO-MCNC: 132 MG/DL (ref 65–100)
GLUCOSE BLD STRIP.AUTO-MCNC: 133 MG/DL (ref 65–100)
GLUCOSE BLD STRIP.AUTO-MCNC: 133 MG/DL (ref 65–100)
GLUCOSE BLD STRIP.AUTO-MCNC: 136 MG/DL (ref 65–100)
GLUCOSE BLD STRIP.AUTO-MCNC: 137 MG/DL (ref 65–100)
GLUCOSE BLD STRIP.AUTO-MCNC: 138 MG/DL (ref 65–100)
GLUCOSE BLD STRIP.AUTO-MCNC: 139 MG/DL (ref 65–100)
GLUCOSE BLD STRIP.AUTO-MCNC: 141 MG/DL (ref 65–100)
GLUCOSE BLD STRIP.AUTO-MCNC: 149 MG/DL (ref 65–100)
GLUCOSE BLD STRIP.AUTO-MCNC: 149 MG/DL (ref 65–100)
GLUCOSE BLD STRIP.AUTO-MCNC: 151 MG/DL (ref 65–100)
GLUCOSE BLD STRIP.AUTO-MCNC: 153 MG/DL (ref 65–100)
GLUCOSE BLD STRIP.AUTO-MCNC: 154 MG/DL (ref 65–100)
GLUCOSE BLD STRIP.AUTO-MCNC: 155 MG/DL (ref 65–100)
GLUCOSE BLD STRIP.AUTO-MCNC: 156 MG/DL (ref 65–100)
GLUCOSE BLD STRIP.AUTO-MCNC: 158 MG/DL (ref 65–100)
GLUCOSE BLD STRIP.AUTO-MCNC: 160 MG/DL (ref 65–100)
GLUCOSE BLD STRIP.AUTO-MCNC: 160 MG/DL (ref 65–100)
GLUCOSE BLD STRIP.AUTO-MCNC: 164 MG/DL (ref 65–100)
GLUCOSE BLD STRIP.AUTO-MCNC: 165 MG/DL (ref 65–100)
GLUCOSE BLD STRIP.AUTO-MCNC: 167 MG/DL (ref 65–100)
GLUCOSE BLD STRIP.AUTO-MCNC: 167 MG/DL (ref 65–100)
GLUCOSE BLD STRIP.AUTO-MCNC: 171 MG/DL (ref 65–100)
GLUCOSE BLD STRIP.AUTO-MCNC: 172 MG/DL (ref 65–100)
GLUCOSE BLD STRIP.AUTO-MCNC: 173 MG/DL (ref 65–100)
GLUCOSE BLD STRIP.AUTO-MCNC: 174 MG/DL (ref 65–100)
GLUCOSE BLD STRIP.AUTO-MCNC: 176 MG/DL (ref 65–100)
GLUCOSE BLD STRIP.AUTO-MCNC: 176 MG/DL (ref 65–100)
GLUCOSE BLD STRIP.AUTO-MCNC: 178 MG/DL (ref 65–100)
GLUCOSE BLD STRIP.AUTO-MCNC: 179 MG/DL (ref 65–100)
GLUCOSE BLD STRIP.AUTO-MCNC: 179 MG/DL (ref 65–100)
GLUCOSE BLD STRIP.AUTO-MCNC: 180 MG/DL (ref 65–100)
GLUCOSE BLD STRIP.AUTO-MCNC: 180 MG/DL (ref 65–100)
GLUCOSE BLD STRIP.AUTO-MCNC: 182 MG/DL (ref 65–100)
GLUCOSE BLD STRIP.AUTO-MCNC: 183 MG/DL (ref 65–100)
GLUCOSE BLD STRIP.AUTO-MCNC: 185 MG/DL (ref 65–100)
GLUCOSE BLD STRIP.AUTO-MCNC: 186 MG/DL (ref 65–100)
GLUCOSE BLD STRIP.AUTO-MCNC: 186 MG/DL (ref 65–100)
GLUCOSE BLD STRIP.AUTO-MCNC: 188 MG/DL (ref 65–100)
GLUCOSE BLD STRIP.AUTO-MCNC: 190 MG/DL (ref 65–100)
GLUCOSE BLD STRIP.AUTO-MCNC: 191 MG/DL (ref 65–100)
GLUCOSE BLD STRIP.AUTO-MCNC: 191 MG/DL (ref 65–100)
GLUCOSE BLD STRIP.AUTO-MCNC: 192 MG/DL (ref 65–100)
GLUCOSE BLD STRIP.AUTO-MCNC: 192 MG/DL (ref 65–100)
GLUCOSE BLD STRIP.AUTO-MCNC: 194 MG/DL (ref 65–100)
GLUCOSE BLD STRIP.AUTO-MCNC: 194 MG/DL (ref 65–100)
GLUCOSE BLD STRIP.AUTO-MCNC: 195 MG/DL (ref 65–100)
GLUCOSE BLD STRIP.AUTO-MCNC: 196 MG/DL (ref 65–100)
GLUCOSE BLD STRIP.AUTO-MCNC: 196 MG/DL (ref 65–100)
GLUCOSE BLD STRIP.AUTO-MCNC: 199 MG/DL (ref 65–100)
GLUCOSE BLD STRIP.AUTO-MCNC: 201 MG/DL (ref 65–100)
GLUCOSE BLD STRIP.AUTO-MCNC: 203 MG/DL (ref 65–100)
GLUCOSE BLD STRIP.AUTO-MCNC: 204 MG/DL (ref 65–100)
GLUCOSE BLD STRIP.AUTO-MCNC: 207 MG/DL (ref 65–100)
GLUCOSE BLD STRIP.AUTO-MCNC: 208 MG/DL (ref 65–100)
GLUCOSE BLD STRIP.AUTO-MCNC: 209 MG/DL (ref 65–100)
GLUCOSE BLD STRIP.AUTO-MCNC: 209 MG/DL (ref 65–100)
GLUCOSE BLD STRIP.AUTO-MCNC: 212 MG/DL (ref 65–100)
GLUCOSE BLD STRIP.AUTO-MCNC: 213 MG/DL (ref 65–100)
GLUCOSE BLD STRIP.AUTO-MCNC: 217 MG/DL (ref 65–100)
GLUCOSE BLD STRIP.AUTO-MCNC: 221 MG/DL (ref 65–100)
GLUCOSE BLD STRIP.AUTO-MCNC: 222 MG/DL (ref 65–100)
GLUCOSE BLD STRIP.AUTO-MCNC: 222 MG/DL (ref 65–100)
GLUCOSE BLD STRIP.AUTO-MCNC: 223 MG/DL (ref 65–100)
GLUCOSE BLD STRIP.AUTO-MCNC: 225 MG/DL (ref 65–100)
GLUCOSE BLD STRIP.AUTO-MCNC: 226 MG/DL (ref 65–100)
GLUCOSE BLD STRIP.AUTO-MCNC: 226 MG/DL (ref 65–100)
GLUCOSE BLD STRIP.AUTO-MCNC: 228 MG/DL (ref 65–100)
GLUCOSE BLD STRIP.AUTO-MCNC: 228 MG/DL (ref 65–100)
GLUCOSE BLD STRIP.AUTO-MCNC: 232 MG/DL (ref 65–100)
GLUCOSE BLD STRIP.AUTO-MCNC: 235 MG/DL (ref 65–100)
GLUCOSE BLD STRIP.AUTO-MCNC: 238 MG/DL (ref 65–100)
GLUCOSE BLD STRIP.AUTO-MCNC: 240 MG/DL (ref 65–100)
GLUCOSE BLD STRIP.AUTO-MCNC: 242 MG/DL (ref 65–100)
GLUCOSE BLD STRIP.AUTO-MCNC: 242 MG/DL (ref 65–100)
GLUCOSE BLD STRIP.AUTO-MCNC: 247 MG/DL (ref 65–100)
GLUCOSE BLD STRIP.AUTO-MCNC: 251 MG/DL (ref 65–100)
GLUCOSE BLD STRIP.AUTO-MCNC: 252 MG/DL (ref 65–100)
GLUCOSE BLD STRIP.AUTO-MCNC: 255 MG/DL (ref 65–100)
GLUCOSE BLD STRIP.AUTO-MCNC: 256 MG/DL (ref 65–100)
GLUCOSE BLD STRIP.AUTO-MCNC: 258 MG/DL (ref 65–100)
GLUCOSE BLD STRIP.AUTO-MCNC: 263 MG/DL (ref 65–100)
GLUCOSE BLD STRIP.AUTO-MCNC: 264 MG/DL (ref 65–100)
GLUCOSE BLD STRIP.AUTO-MCNC: 270 MG/DL (ref 65–100)
GLUCOSE BLD STRIP.AUTO-MCNC: 276 MG/DL (ref 65–100)
GLUCOSE BLD STRIP.AUTO-MCNC: 277 MG/DL (ref 65–100)
GLUCOSE BLD STRIP.AUTO-MCNC: 279 MG/DL (ref 65–100)
GLUCOSE BLD STRIP.AUTO-MCNC: 280 MG/DL (ref 65–100)
GLUCOSE BLD STRIP.AUTO-MCNC: 283 MG/DL (ref 65–100)
GLUCOSE BLD STRIP.AUTO-MCNC: 285 MG/DL (ref 65–100)
GLUCOSE BLD STRIP.AUTO-MCNC: 286 MG/DL (ref 65–100)
GLUCOSE BLD STRIP.AUTO-MCNC: 290 MG/DL (ref 65–100)
GLUCOSE BLD STRIP.AUTO-MCNC: 293 MG/DL (ref 65–100)
GLUCOSE BLD STRIP.AUTO-MCNC: 295 MG/DL (ref 65–100)
GLUCOSE BLD STRIP.AUTO-MCNC: 295 MG/DL (ref 65–100)
GLUCOSE BLD STRIP.AUTO-MCNC: 296 MG/DL (ref 65–100)
GLUCOSE BLD STRIP.AUTO-MCNC: 296 MG/DL (ref 65–100)
GLUCOSE BLD STRIP.AUTO-MCNC: 297 MG/DL (ref 65–100)
GLUCOSE BLD STRIP.AUTO-MCNC: 299 MG/DL (ref 65–100)
GLUCOSE BLD STRIP.AUTO-MCNC: 302 MG/DL (ref 65–100)
GLUCOSE BLD STRIP.AUTO-MCNC: 302 MG/DL (ref 65–100)
GLUCOSE BLD STRIP.AUTO-MCNC: 305 MG/DL (ref 65–100)
GLUCOSE BLD STRIP.AUTO-MCNC: 307 MG/DL (ref 65–100)
GLUCOSE BLD STRIP.AUTO-MCNC: 309 MG/DL (ref 65–100)
GLUCOSE BLD STRIP.AUTO-MCNC: 309 MG/DL (ref 65–100)
GLUCOSE BLD STRIP.AUTO-MCNC: 318 MG/DL (ref 65–100)
GLUCOSE BLD STRIP.AUTO-MCNC: 319 MG/DL (ref 65–100)
GLUCOSE BLD STRIP.AUTO-MCNC: 320 MG/DL (ref 65–100)
GLUCOSE BLD STRIP.AUTO-MCNC: 322 MG/DL (ref 65–100)
GLUCOSE BLD STRIP.AUTO-MCNC: 346 MG/DL (ref 65–100)
GLUCOSE BLD STRIP.AUTO-MCNC: 347 MG/DL (ref 65–100)
GLUCOSE BLD STRIP.AUTO-MCNC: 356 MG/DL (ref 65–100)
GLUCOSE BLD STRIP.AUTO-MCNC: 357 MG/DL (ref 65–100)
GLUCOSE BLD STRIP.AUTO-MCNC: 372 MG/DL (ref 65–100)
GLUCOSE BLD STRIP.AUTO-MCNC: 373 MG/DL (ref 65–100)
GLUCOSE BLD STRIP.AUTO-MCNC: 373 MG/DL (ref 65–100)
GLUCOSE BLD STRIP.AUTO-MCNC: 379 MG/DL (ref 65–100)
GLUCOSE BLD STRIP.AUTO-MCNC: 382 MG/DL (ref 65–100)
GLUCOSE BLD STRIP.AUTO-MCNC: 383 MG/DL (ref 65–100)
GLUCOSE BLD STRIP.AUTO-MCNC: 386 MG/DL (ref 65–100)
GLUCOSE BLD STRIP.AUTO-MCNC: 392 MG/DL (ref 65–100)
GLUCOSE BLD STRIP.AUTO-MCNC: 399 MG/DL (ref 65–100)
GLUCOSE BLD STRIP.AUTO-MCNC: 400 MG/DL (ref 65–100)
GLUCOSE BLD STRIP.AUTO-MCNC: 408 MG/DL (ref 65–100)
GLUCOSE BLD STRIP.AUTO-MCNC: 418 MG/DL (ref 65–100)
GLUCOSE BLD STRIP.AUTO-MCNC: 421 MG/DL (ref 65–100)
GLUCOSE BLD STRIP.AUTO-MCNC: 430 MG/DL (ref 65–100)
GLUCOSE BLD STRIP.AUTO-MCNC: 432 MG/DL (ref 65–100)
GLUCOSE BLD STRIP.AUTO-MCNC: 439 MG/DL (ref 65–100)
GLUCOSE BLD STRIP.AUTO-MCNC: 454 MG/DL (ref 65–100)
GLUCOSE BLD STRIP.AUTO-MCNC: 459 MG/DL (ref 65–100)
GLUCOSE BLD STRIP.AUTO-MCNC: 485 MG/DL (ref 65–100)
GLUCOSE BLD STRIP.AUTO-MCNC: 501 MG/DL (ref 65–100)
GLUCOSE BLD STRIP.AUTO-MCNC: 510 MG/DL (ref 65–100)
GLUCOSE BLD STRIP.AUTO-MCNC: 515 MG/DL (ref 65–100)
GLUCOSE BLD STRIP.AUTO-MCNC: 535 MG/DL (ref 65–100)
GLUCOSE BLD STRIP.AUTO-MCNC: 540 MG/DL (ref 65–100)
GLUCOSE BLD STRIP.AUTO-MCNC: 71 MG/DL (ref 65–100)
GLUCOSE BLD STRIP.AUTO-MCNC: 74 MG/DL (ref 65–100)
GLUCOSE BLD STRIP.AUTO-MCNC: 77 MG/DL (ref 65–100)
GLUCOSE BLD STRIP.AUTO-MCNC: 91 MG/DL (ref 65–100)
GLUCOSE BLD STRIP.AUTO-MCNC: >600 MG/DL (ref 65–100)
GLUCOSE SERPL-MCNC: 111 MG/DL (ref 65–100)
GLUCOSE SERPL-MCNC: 118 MG/DL (ref 65–100)
GLUCOSE SERPL-MCNC: 119 MG/DL (ref 65–100)
GLUCOSE SERPL-MCNC: 120 MG/DL (ref 65–100)
GLUCOSE SERPL-MCNC: 123 MG/DL (ref 65–100)
GLUCOSE SERPL-MCNC: 125 MG/DL (ref 65–100)
GLUCOSE SERPL-MCNC: 125 MG/DL (ref 65–100)
GLUCOSE SERPL-MCNC: 147 MG/DL (ref 65–100)
GLUCOSE SERPL-MCNC: 178 MG/DL (ref 65–100)
GLUCOSE SERPL-MCNC: 184 MG/DL (ref 65–100)
GLUCOSE SERPL-MCNC: 190 MG/DL (ref 65–100)
GLUCOSE SERPL-MCNC: 195 MG/DL (ref 65–100)
GLUCOSE SERPL-MCNC: 196 MG/DL (ref 65–100)
GLUCOSE SERPL-MCNC: 197 MG/DL (ref 65–100)
GLUCOSE SERPL-MCNC: 200 MG/DL (ref 65–100)
GLUCOSE SERPL-MCNC: 202 MG/DL (ref 65–100)
GLUCOSE SERPL-MCNC: 213 MG/DL (ref 65–100)
GLUCOSE SERPL-MCNC: 214 MG/DL (ref 65–100)
GLUCOSE SERPL-MCNC: 215 MG/DL (ref 65–100)
GLUCOSE SERPL-MCNC: 239 MG/DL (ref 65–100)
GLUCOSE SERPL-MCNC: 244 MG/DL (ref 65–100)
GLUCOSE SERPL-MCNC: 248 MG/DL (ref 65–100)
GLUCOSE SERPL-MCNC: 253 MG/DL (ref 65–100)
GLUCOSE SERPL-MCNC: 253 MG/DL (ref 65–100)
GLUCOSE SERPL-MCNC: 277 MG/DL (ref 65–100)
GLUCOSE SERPL-MCNC: 277 MG/DL (ref 65–100)
GLUCOSE SERPL-MCNC: 289 MG/DL (ref 65–100)
GLUCOSE SERPL-MCNC: 303 MG/DL (ref 65–100)
GLUCOSE SERPL-MCNC: 366 MG/DL (ref 65–100)
GLUCOSE SERPL-MCNC: 383 MG/DL (ref 65–100)
GLUCOSE SERPL-MCNC: 385 MG/DL (ref 65–100)
GLUCOSE SERPL-MCNC: 388 MG/DL (ref 65–100)
GLUCOSE SERPL-MCNC: 452 MG/DL (ref 65–100)
GLUCOSE SERPL-MCNC: 454 MG/DL (ref 65–100)
GLUCOSE SERPL-MCNC: 497 MG/DL (ref 65–100)
GLUCOSE SERPL-MCNC: 541 MG/DL (ref 65–100)
GLUCOSE SERPL-MCNC: 75 MG/DL (ref 65–100)
GLUCOSE SERPL-MCNC: 86 MG/DL (ref 65–100)
GLUCOSE, GLC: 102 MG/DL
GLUCOSE, GLC: 105 MG/DL
GLUCOSE, GLC: 108 MG/DL
GLUCOSE, GLC: 111 MG/DL
GLUCOSE, GLC: 111 MG/DL
GLUCOSE, GLC: 112 MG/DL
GLUCOSE, GLC: 113 MG/DL
GLUCOSE, GLC: 114 MG/DL
GLUCOSE, GLC: 117 MG/DL
GLUCOSE, GLC: 119 MG/DL
GLUCOSE, GLC: 121 MG/DL
GLUCOSE, GLC: 122 MG/DL
GLUCOSE, GLC: 123 MG/DL
GLUCOSE, GLC: 124 MG/DL
GLUCOSE, GLC: 126 MG/DL
GLUCOSE, GLC: 132 MG/DL
GLUCOSE, GLC: 133 MG/DL
GLUCOSE, GLC: 133 MG/DL
GLUCOSE, GLC: 138 MG/DL
GLUCOSE, GLC: 139 MG/DL
GLUCOSE, GLC: 141 MG/DL
GLUCOSE, GLC: 149 MG/DL
GLUCOSE, GLC: 151 MG/DL
GLUCOSE, GLC: 153 MG/DL
GLUCOSE, GLC: 154 MG/DL
GLUCOSE, GLC: 155 MG/DL
GLUCOSE, GLC: 156 MG/DL
GLUCOSE, GLC: 158 MG/DL
GLUCOSE, GLC: 160 MG/DL
GLUCOSE, GLC: 160 MG/DL
GLUCOSE, GLC: 164 MG/DL
GLUCOSE, GLC: 167 MG/DL
GLUCOSE, GLC: 167 MG/DL
GLUCOSE, GLC: 172 MG/DL
GLUCOSE, GLC: 176 MG/DL
GLUCOSE, GLC: 176 MG/DL
GLUCOSE, GLC: 178 MG/DL
GLUCOSE, GLC: 179 MG/DL
GLUCOSE, GLC: 180 MG/DL
GLUCOSE, GLC: 180 MG/DL
GLUCOSE, GLC: 186 MG/DL
GLUCOSE, GLC: 188 MG/DL
GLUCOSE, GLC: 191 MG/DL
GLUCOSE, GLC: 191 MG/DL
GLUCOSE, GLC: 192 MG/DL
GLUCOSE, GLC: 195 MG/DL
GLUCOSE, GLC: 196 MG/DL
GLUCOSE, GLC: 201 MG/DL
GLUCOSE, GLC: 203 MG/DL
GLUCOSE, GLC: 204 MG/DL
GLUCOSE, GLC: 208 MG/DL
GLUCOSE, GLC: 209 MG/DL
GLUCOSE, GLC: 212 MG/DL
GLUCOSE, GLC: 213 MG/DL
GLUCOSE, GLC: 217 MG/DL
GLUCOSE, GLC: 221 MG/DL
GLUCOSE, GLC: 222 MG/DL
GLUCOSE, GLC: 222 MG/DL
GLUCOSE, GLC: 223 MG/DL
GLUCOSE, GLC: 225 MG/DL
GLUCOSE, GLC: 226 MG/DL
GLUCOSE, GLC: 235 MG/DL
GLUCOSE, GLC: 238 MG/DL
GLUCOSE, GLC: 242 MG/DL
GLUCOSE, GLC: 247 MG/DL
GLUCOSE, GLC: 251 MG/DL
GLUCOSE, GLC: 252 MG/DL
GLUCOSE, GLC: 255 MG/DL
GLUCOSE, GLC: 256 MG/DL
GLUCOSE, GLC: 258 MG/DL
GLUCOSE, GLC: 264 MG/DL
GLUCOSE, GLC: 270 MG/DL
GLUCOSE, GLC: 276 MG/DL
GLUCOSE, GLC: 277 MG/DL
GLUCOSE, GLC: 280 MG/DL
GLUCOSE, GLC: 283 MG/DL
GLUCOSE, GLC: 285 MG/DL
GLUCOSE, GLC: 286 MG/DL
GLUCOSE, GLC: 290 MG/DL
GLUCOSE, GLC: 295 MG/DL
GLUCOSE, GLC: 295 MG/DL
GLUCOSE, GLC: 296 MG/DL
GLUCOSE, GLC: 296 MG/DL
GLUCOSE, GLC: 302 MG/DL
GLUCOSE, GLC: 305 MG/DL
GLUCOSE, GLC: 307 MG/DL
GLUCOSE, GLC: 309 MG/DL
GLUCOSE, GLC: 318 MG/DL
GLUCOSE, GLC: 320 MG/DL
GLUCOSE, GLC: 322 MG/DL
GLUCOSE, GLC: 357 MG/DL
GLUCOSE, GLC: 373 MG/DL
GLUCOSE, GLC: 382 MG/DL
GLUCOSE, GLC: 383 MG/DL
GLUCOSE, GLC: 386 MG/DL
GLUCOSE, GLC: 421 MG/DL
GLUCOSE, GLC: 430 MG/DL
GLUCOSE, GLC: 439 MG/DL
GLUCOSE, GLC: 454 MG/DL
GLUCOSE, GLC: 501 MG/DL
GLUCOSE, GLC: 510 MG/DL
GLUCOSE, GLC: 515 MG/DL
GLUCOSE, GLC: 535 MG/DL
GLUCOSE, GLC: 541 MG/DL
GLUCOSE, GLC: 71 MG/DL
GLUCOSE, GLC: 91 MG/DL
GRAM STN SPEC: ABNORMAL
HBA1C MFR BLD: 8.3 % (ref 4.2–6.3)
HBV CORE AB SERPL QL IA: NEGATIVE
HBV SURFACE AB SERPL IA-ACNC: 368.27 MIU/ML
HBV SURFACE AG SER QL: NONREACTIVE
HCO3 BLD-SCNC: 14.3 MMOL/L (ref 22–26)
HCO3 BLD-SCNC: 14.3 MMOL/L (ref 22–26)
HCO3 BLD-SCNC: 15 MMOL/L (ref 22–26)
HCO3 BLD-SCNC: 15.8 MMOL/L (ref 22–26)
HCO3 BLD-SCNC: 21.2 MMOL/L (ref 22–26)
HCO3 BLD-SCNC: 21.5 MMOL/L (ref 22–26)
HCO3 BLD-SCNC: 22.1 MMOL/L (ref 22–26)
HCO3 BLD-SCNC: 24.1 MMOL/L (ref 22–26)
HCO3 BLD-SCNC: 24.2 MMOL/L (ref 22–26)
HCO3 BLD-SCNC: 24.4 MMOL/L (ref 22–26)
HCO3 BLD-SCNC: 24.9 MMOL/L (ref 22–26)
HCO3 BLD-SCNC: 26.7 MMOL/L (ref 22–26)
HCO3 BLD-SCNC: 27.3 MMOL/L (ref 22–26)
HCO3 BLD-SCNC: 28.5 MMOL/L (ref 22–26)
HCO3 BLD-SCNC: 29 MMOL/L (ref 22–26)
HCO3 BLD-SCNC: 29.2 MMOL/L (ref 22–26)
HCT VFR BLD AUTO: 20.1 % (ref 35.8–46.3)
HCT VFR BLD AUTO: 21.5 % (ref 35.8–46.3)
HCT VFR BLD AUTO: 23.8 % (ref 35.8–46.3)
HCT VFR BLD AUTO: 24.1 % (ref 35.8–46.3)
HCT VFR BLD AUTO: 24.2 % (ref 35.8–46.3)
HCT VFR BLD AUTO: 24.8 % (ref 35.8–46.3)
HCT VFR BLD AUTO: 27 % (ref 35.8–46.3)
HCT VFR BLD AUTO: 33.8 % (ref 35.8–46.3)
HCT VFR BLD AUTO: 35.8 % (ref 35.8–46.3)
HCT VFR BLD AUTO: 36.8 % (ref 35.8–46.3)
HCT VFR BLD AUTO: 39.2 % (ref 35.8–46.3)
HCT VFR BLD AUTO: 39.3 % (ref 35.8–46.3)
HCT VFR BLD AUTO: 40.4 % (ref 35.8–46.3)
HCT VFR BLD AUTO: 42.7 % (ref 35.8–46.3)
HCT VFR BLD AUTO: 43.6 % (ref 35.8–46.3)
HCT VFR BLD AUTO: 43.6 % (ref 35.8–46.3)
HCV GENOTYPE: NORMAL
HCV RNA SERPL NAA+PROBE-ACNC: NORMAL IU/ML
HCV RNA SERPL NAA+PROBE-LOG IU: NORMAL LOG10 IU/ML
HGB BLD-MCNC: 11.2 G/DL (ref 11.7–15.4)
HGB BLD-MCNC: 12 G/DL (ref 11.7–15.4)
HGB BLD-MCNC: 12.1 G/DL (ref 11.7–15.4)
HGB BLD-MCNC: 12.8 G/DL (ref 11.7–15.4)
HGB BLD-MCNC: 13.3 G/DL (ref 11.7–15.4)
HGB BLD-MCNC: 13.4 G/DL (ref 11.7–15.4)
HGB BLD-MCNC: 14.4 G/DL (ref 11.7–15.4)
HGB BLD-MCNC: 14.4 G/DL (ref 11.7–15.4)
HGB BLD-MCNC: 14.8 G/DL (ref 11.7–15.4)
HGB BLD-MCNC: 6.4 G/DL (ref 11.7–15.4)
HGB BLD-MCNC: 7.1 G/DL (ref 11.7–15.4)
HGB BLD-MCNC: 7.9 G/DL (ref 11.7–15.4)
HGB BLD-MCNC: 7.9 G/DL (ref 11.7–15.4)
HGB BLD-MCNC: 8 G/DL (ref 11.7–15.4)
HGB BLD-MCNC: 8.3 G/DL (ref 11.7–15.4)
HGB BLD-MCNC: 9 G/DL (ref 11.7–15.4)
HIGH TARGET, HITG: 180 MG/DL
HIGH TARGET, HITG: 250 MG/DL
HISTORY CHECKED?,CKHIST: NORMAL
IMM GRANULOCYTES # BLD AUTO: 0.1 K/UL (ref 0–0.5)
IMM GRANULOCYTES # BLD AUTO: 0.1 K/UL (ref 0–0.5)
IMM GRANULOCYTES # BLD AUTO: 0.2 K/UL (ref 0–0.5)
IMM GRANULOCYTES # BLD AUTO: 0.2 K/UL (ref 0–0.5)
IMM GRANULOCYTES # BLD AUTO: 1 K/UL (ref 0–0.5)
IMM GRANULOCYTES NFR BLD AUTO: 1 % (ref 0–5)
IMM GRANULOCYTES NFR BLD AUTO: 2 % (ref 0–5)
IMM GRANULOCYTES NFR BLD AUTO: 3 % (ref 0–5)
INR PPP: 1
INR PPP: 1.1
INSPIRATION.DURATION SETTING TIME VENT: 0.76 SEC
INSPIRATION.DURATION SETTING TIME VENT: 0.83 SEC
INSPIRATION.DURATION SETTING TIME VENT: 0.83 SEC
INSPIRATION.DURATION SETTING TIME VENT: 0.91 SEC
INSPIRATION.DURATION SETTING TIME VENT: 1 SEC
INSULIN ADMINSTERED, INSADM: 0 UNITS/HOUR
INSULIN ADMINSTERED, INSADM: 0 UNITS/HOUR
INSULIN ADMINSTERED, INSADM: 0.1 UNITS/HOUR
INSULIN ADMINSTERED, INSADM: 0.7 UNITS/HOUR
INSULIN ADMINSTERED, INSADM: 0.7 UNITS/HOUR
INSULIN ADMINSTERED, INSADM: 1 UNITS/HOUR
INSULIN ADMINSTERED, INSADM: 1.3 UNITS/HOUR
INSULIN ADMINSTERED, INSADM: 1.3 UNITS/HOUR
INSULIN ADMINSTERED, INSADM: 1.5 UNITS/HOUR
INSULIN ADMINSTERED, INSADM: 1.6 UNITS/HOUR
INSULIN ADMINSTERED, INSADM: 1.7 UNITS/HOUR
INSULIN ADMINSTERED, INSADM: 10.4 UNITS/HOUR
INSULIN ADMINSTERED, INSADM: 10.6 UNITS/HOUR
INSULIN ADMINSTERED, INSADM: 10.8 UNITS/HOUR
INSULIN ADMINSTERED, INSADM: 11.3 UNITS/HOUR
INSULIN ADMINSTERED, INSADM: 11.4 UNITS/HOUR
INSULIN ADMINSTERED, INSADM: 11.6 UNITS/HOUR
INSULIN ADMINSTERED, INSADM: 11.6 UNITS/HOUR
INSULIN ADMINSTERED, INSADM: 12 UNITS/HOUR
INSULIN ADMINSTERED, INSADM: 12.1 UNITS/HOUR
INSULIN ADMINSTERED, INSADM: 12.9 UNITS/HOUR
INSULIN ADMINSTERED, INSADM: 12.9 UNITS/HOUR
INSULIN ADMINSTERED, INSADM: 13 UNITS/HOUR
INSULIN ADMINSTERED, INSADM: 13.2 UNITS/HOUR
INSULIN ADMINSTERED, INSADM: 13.4 UNITS/HOUR
INSULIN ADMINSTERED, INSADM: 13.5 UNITS/HOUR
INSULIN ADMINSTERED, INSADM: 13.7 UNITS/HOUR
INSULIN ADMINSTERED, INSADM: 13.8 UNITS/HOUR
INSULIN ADMINSTERED, INSADM: 13.9 UNITS/HOUR
INSULIN ADMINSTERED, INSADM: 14.1 UNITS/HOUR
INSULIN ADMINSTERED, INSADM: 14.6 UNITS/HOUR
INSULIN ADMINSTERED, INSADM: 14.9 UNITS/HOUR
INSULIN ADMINSTERED, INSADM: 15.3 UNITS/HOUR
INSULIN ADMINSTERED, INSADM: 15.3 UNITS/HOUR
INSULIN ADMINSTERED, INSADM: 16.1 UNITS/HOUR
INSULIN ADMINSTERED, INSADM: 17.7 UNITS/HOUR
INSULIN ADMINSTERED, INSADM: 17.7 UNITS/HOUR
INSULIN ADMINSTERED, INSADM: 18.2 UNITS/HOUR
INSULIN ADMINSTERED, INSADM: 19.7 UNITS/HOUR
INSULIN ADMINSTERED, INSADM: 2.1 UNITS/HOUR
INSULIN ADMINSTERED, INSADM: 2.4 UNITS/HOUR
INSULIN ADMINSTERED, INSADM: 2.4 UNITS/HOUR
INSULIN ADMINSTERED, INSADM: 2.6 UNITS/HOUR
INSULIN ADMINSTERED, INSADM: 2.6 UNITS/HOUR
INSULIN ADMINSTERED, INSADM: 2.7 UNITS/HOUR
INSULIN ADMINSTERED, INSADM: 2.7 UNITS/HOUR
INSULIN ADMINSTERED, INSADM: 2.9 UNITS/HOUR
INSULIN ADMINSTERED, INSADM: 21 UNITS/HOUR
INSULIN ADMINSTERED, INSADM: 21.2 UNITS/HOUR
INSULIN ADMINSTERED, INSADM: 21.8 UNITS/HOUR
INSULIN ADMINSTERED, INSADM: 22.2 UNITS/HOUR
INSULIN ADMINSTERED, INSADM: 22.7 UNITS/HOUR
INSULIN ADMINSTERED, INSADM: 22.7 UNITS/HOUR
INSULIN ADMINSTERED, INSADM: 22.8 UNITS/HOUR
INSULIN ADMINSTERED, INSADM: 23.7 UNITS/HOUR
INSULIN ADMINSTERED, INSADM: 23.8 UNITS/HOUR
INSULIN ADMINSTERED, INSADM: 25 UNITS/HOUR
INSULIN ADMINSTERED, INSADM: 25.1 UNITS/HOUR
INSULIN ADMINSTERED, INSADM: 25.3 UNITS/HOUR
INSULIN ADMINSTERED, INSADM: 26 UNITS/HOUR
INSULIN ADMINSTERED, INSADM: 26.2 UNITS/HOUR
INSULIN ADMINSTERED, INSADM: 26.7 UNITS/HOUR
INSULIN ADMINSTERED, INSADM: 27.3 UNITS/HOUR
INSULIN ADMINSTERED, INSADM: 28.6 UNITS/HOUR
INSULIN ADMINSTERED, INSADM: 3 UNITS/HOUR
INSULIN ADMINSTERED, INSADM: 3.3 UNITS/HOUR
INSULIN ADMINSTERED, INSADM: 3.4 UNITS/HOUR
INSULIN ADMINSTERED, INSADM: 3.6 UNITS/HOUR
INSULIN ADMINSTERED, INSADM: 3.7 UNITS/HOUR
INSULIN ADMINSTERED, INSADM: 3.7 UNITS/HOUR
INSULIN ADMINSTERED, INSADM: 30.8 UNITS/HOUR
INSULIN ADMINSTERED, INSADM: 31.2 UNITS/HOUR
INSULIN ADMINSTERED, INSADM: 32.2 UNITS/HOUR
INSULIN ADMINSTERED, INSADM: 32.4 UNITS/HOUR
INSULIN ADMINSTERED, INSADM: 34.8 UNITS/HOUR
INSULIN ADMINSTERED, INSADM: 35.2 UNITS/HOUR
INSULIN ADMINSTERED, INSADM: 35.2 UNITS/HOUR
INSULIN ADMINSTERED, INSADM: 37 UNITS/HOUR
INSULIN ADMINSTERED, INSADM: 4.3 UNITS/HOUR
INSULIN ADMINSTERED, INSADM: 4.5 UNITS/HOUR
INSULIN ADMINSTERED, INSADM: 4.5 UNITS/HOUR
INSULIN ADMINSTERED, INSADM: 4.9 UNITS/HOUR
INSULIN ADMINSTERED, INSADM: 5 UNITS/HOUR
INSULIN ADMINSTERED, INSADM: 5 UNITS/HOUR
INSULIN ADMINSTERED, INSADM: 5.7 UNITS/HOUR
INSULIN ADMINSTERED, INSADM: 5.9 UNITS/HOUR
INSULIN ADMINSTERED, INSADM: 6 UNITS/HOUR
INSULIN ADMINSTERED, INSADM: 6.1 UNITS/HOUR
INSULIN ADMINSTERED, INSADM: 6.3 UNITS/HOUR
INSULIN ADMINSTERED, INSADM: 6.4 UNITS/HOUR
INSULIN ADMINSTERED, INSADM: 6.7 UNITS/HOUR
INSULIN ADMINSTERED, INSADM: 7 UNITS/HOUR
INSULIN ADMINSTERED, INSADM: 7.1 UNITS/HOUR
INSULIN ADMINSTERED, INSADM: 7.2 UNITS/HOUR
INSULIN ADMINSTERED, INSADM: 7.2 UNITS/HOUR
INSULIN ADMINSTERED, INSADM: 7.4 UNITS/HOUR
INSULIN ADMINSTERED, INSADM: 7.5 UNITS/HOUR
INSULIN ADMINSTERED, INSADM: 7.6 UNITS/HOUR
INSULIN ADMINSTERED, INSADM: 7.7 UNITS/HOUR
INSULIN ADMINSTERED, INSADM: 7.9 UNITS/HOUR
INSULIN ADMINSTERED, INSADM: 8.1 UNITS/HOUR
INSULIN ADMINSTERED, INSADM: 8.3 UNITS/HOUR
INSULIN ADMINSTERED, INSADM: 8.4 UNITS/HOUR
INSULIN ADMINSTERED, INSADM: 8.7 UNITS/HOUR
INSULIN ADMINSTERED, INSADM: 9 UNITS/HOUR
INSULIN ADMINSTERED, INSADM: 9.1 UNITS/HOUR
INSULIN ADMINSTERED, INSADM: 9.1 UNITS/HOUR
INSULIN ADMINSTERED, INSADM: 9.4 UNITS/HOUR
INSULIN ADMINSTERED, INSADM: 9.6 UNITS/HOUR
INSULIN ADMINSTERED, INSADM: 9.7 UNITS/HOUR
INSULIN ORDER, INSORD: 0 UNITS/HOUR
INSULIN ORDER, INSORD: 0 UNITS/HOUR
INSULIN ORDER, INSORD: 0.1 UNITS/HOUR
INSULIN ORDER, INSORD: 0.7 UNITS/HOUR
INSULIN ORDER, INSORD: 0.7 UNITS/HOUR
INSULIN ORDER, INSORD: 1 UNITS/HOUR
INSULIN ORDER, INSORD: 1.3 UNITS/HOUR
INSULIN ORDER, INSORD: 1.3 UNITS/HOUR
INSULIN ORDER, INSORD: 1.5 UNITS/HOUR
INSULIN ORDER, INSORD: 1.6 UNITS/HOUR
INSULIN ORDER, INSORD: 1.7 UNITS/HOUR
INSULIN ORDER, INSORD: 10.4 UNITS/HOUR
INSULIN ORDER, INSORD: 10.6 UNITS/HOUR
INSULIN ORDER, INSORD: 10.8 UNITS/HOUR
INSULIN ORDER, INSORD: 11.3 UNITS/HOUR
INSULIN ORDER, INSORD: 11.4 UNITS/HOUR
INSULIN ORDER, INSORD: 11.6 UNITS/HOUR
INSULIN ORDER, INSORD: 11.6 UNITS/HOUR
INSULIN ORDER, INSORD: 12 UNITS/HOUR
INSULIN ORDER, INSORD: 12.1 UNITS/HOUR
INSULIN ORDER, INSORD: 12.9 UNITS/HOUR
INSULIN ORDER, INSORD: 12.9 UNITS/HOUR
INSULIN ORDER, INSORD: 13 UNITS/HOUR
INSULIN ORDER, INSORD: 13.2 UNITS/HOUR
INSULIN ORDER, INSORD: 13.4 UNITS/HOUR
INSULIN ORDER, INSORD: 13.5 UNITS/HOUR
INSULIN ORDER, INSORD: 13.7 UNITS/HOUR
INSULIN ORDER, INSORD: 13.8 UNITS/HOUR
INSULIN ORDER, INSORD: 13.9 UNITS/HOUR
INSULIN ORDER, INSORD: 14.1 UNITS/HOUR
INSULIN ORDER, INSORD: 14.6 UNITS/HOUR
INSULIN ORDER, INSORD: 14.9 UNITS/HOUR
INSULIN ORDER, INSORD: 15.3 UNITS/HOUR
INSULIN ORDER, INSORD: 15.3 UNITS/HOUR
INSULIN ORDER, INSORD: 16.1 UNITS/HOUR
INSULIN ORDER, INSORD: 17.7 UNITS/HOUR
INSULIN ORDER, INSORD: 17.7 UNITS/HOUR
INSULIN ORDER, INSORD: 18.2 UNITS/HOUR
INSULIN ORDER, INSORD: 19.7 UNITS/HOUR
INSULIN ORDER, INSORD: 2.1 UNITS/HOUR
INSULIN ORDER, INSORD: 2.4 UNITS/HOUR
INSULIN ORDER, INSORD: 2.4 UNITS/HOUR
INSULIN ORDER, INSORD: 2.6 UNITS/HOUR
INSULIN ORDER, INSORD: 2.6 UNITS/HOUR
INSULIN ORDER, INSORD: 2.7 UNITS/HOUR
INSULIN ORDER, INSORD: 2.7 UNITS/HOUR
INSULIN ORDER, INSORD: 2.9 UNITS/HOUR
INSULIN ORDER, INSORD: 21 UNITS/HOUR
INSULIN ORDER, INSORD: 21.2 UNITS/HOUR
INSULIN ORDER, INSORD: 21.8 UNITS/HOUR
INSULIN ORDER, INSORD: 22.2 UNITS/HOUR
INSULIN ORDER, INSORD: 22.7 UNITS/HOUR
INSULIN ORDER, INSORD: 22.7 UNITS/HOUR
INSULIN ORDER, INSORD: 22.8 UNITS/HOUR
INSULIN ORDER, INSORD: 23.7 UNITS/HOUR
INSULIN ORDER, INSORD: 23.8 UNITS/HOUR
INSULIN ORDER, INSORD: 25 UNITS/HOUR
INSULIN ORDER, INSORD: 25.1 UNITS/HOUR
INSULIN ORDER, INSORD: 25.3 UNITS/HOUR
INSULIN ORDER, INSORD: 26 UNITS/HOUR
INSULIN ORDER, INSORD: 26.2 UNITS/HOUR
INSULIN ORDER, INSORD: 26.7 UNITS/HOUR
INSULIN ORDER, INSORD: 27.3 UNITS/HOUR
INSULIN ORDER, INSORD: 28.6 UNITS/HOUR
INSULIN ORDER, INSORD: 3 UNITS/HOUR
INSULIN ORDER, INSORD: 3.3 UNITS/HOUR
INSULIN ORDER, INSORD: 3.4 UNITS/HOUR
INSULIN ORDER, INSORD: 3.6 UNITS/HOUR
INSULIN ORDER, INSORD: 3.7 UNITS/HOUR
INSULIN ORDER, INSORD: 3.7 UNITS/HOUR
INSULIN ORDER, INSORD: 30.8 UNITS/HOUR
INSULIN ORDER, INSORD: 31.2 UNITS/HOUR
INSULIN ORDER, INSORD: 32.2 UNITS/HOUR
INSULIN ORDER, INSORD: 32.4 UNITS/HOUR
INSULIN ORDER, INSORD: 34.8 UNITS/HOUR
INSULIN ORDER, INSORD: 35.2 UNITS/HOUR
INSULIN ORDER, INSORD: 35.2 UNITS/HOUR
INSULIN ORDER, INSORD: 37 UNITS/HOUR
INSULIN ORDER, INSORD: 4.3 UNITS/HOUR
INSULIN ORDER, INSORD: 4.5 UNITS/HOUR
INSULIN ORDER, INSORD: 4.5 UNITS/HOUR
INSULIN ORDER, INSORD: 4.9 UNITS/HOUR
INSULIN ORDER, INSORD: 5 UNITS/HOUR
INSULIN ORDER, INSORD: 5 UNITS/HOUR
INSULIN ORDER, INSORD: 5.7 UNITS/HOUR
INSULIN ORDER, INSORD: 5.9 UNITS/HOUR
INSULIN ORDER, INSORD: 6 UNITS/HOUR
INSULIN ORDER, INSORD: 6.1 UNITS/HOUR
INSULIN ORDER, INSORD: 6.3 UNITS/HOUR
INSULIN ORDER, INSORD: 6.4 UNITS/HOUR
INSULIN ORDER, INSORD: 6.7 UNITS/HOUR
INSULIN ORDER, INSORD: 7 UNITS/HOUR
INSULIN ORDER, INSORD: 7.1 UNITS/HOUR
INSULIN ORDER, INSORD: 7.2 UNITS/HOUR
INSULIN ORDER, INSORD: 7.2 UNITS/HOUR
INSULIN ORDER, INSORD: 7.4 UNITS/HOUR
INSULIN ORDER, INSORD: 7.5 UNITS/HOUR
INSULIN ORDER, INSORD: 7.6 UNITS/HOUR
INSULIN ORDER, INSORD: 7.7 UNITS/HOUR
INSULIN ORDER, INSORD: 7.9 UNITS/HOUR
INSULIN ORDER, INSORD: 8.1 UNITS/HOUR
INSULIN ORDER, INSORD: 8.3 UNITS/HOUR
INSULIN ORDER, INSORD: 8.4 UNITS/HOUR
INSULIN ORDER, INSORD: 8.7 UNITS/HOUR
INSULIN ORDER, INSORD: 9 UNITS/HOUR
INSULIN ORDER, INSORD: 9.1 UNITS/HOUR
INSULIN ORDER, INSORD: 9.1 UNITS/HOUR
INSULIN ORDER, INSORD: 9.4 UNITS/HOUR
INSULIN ORDER, INSORD: 9.6 UNITS/HOUR
INSULIN ORDER, INSORD: 9.7 UNITS/HOUR
LACTATE SERPL-SCNC: 1.9 MMOL/L (ref 0.4–2)
LACTATE SERPL-SCNC: 2.1 MMOL/L (ref 0.4–2)
LACTATE SERPL-SCNC: 2.9 MMOL/L (ref 0.4–2)
LDH SERPL L TO P-CCNC: 470 U/L (ref 110–210)
LDH SERPL L TO P-CCNC: 472 U/L (ref 110–210)
LOW TARGET, LOT: 140 MG/DL
LOW TARGET, LOT: 150 MG/DL
LYMPHOCYTES # BLD: 0.4 K/UL (ref 0.5–4.6)
LYMPHOCYTES # BLD: 0.5 K/UL (ref 0.5–4.6)
LYMPHOCYTES # BLD: 0.7 K/UL (ref 0.5–4.6)
LYMPHOCYTES # BLD: 0.7 K/UL (ref 0.5–4.6)
LYMPHOCYTES # BLD: 1.3 K/UL (ref 0.5–4.6)
LYMPHOCYTES NFR BLD: 18 % (ref 13–44)
LYMPHOCYTES NFR BLD: 2 % (ref 13–44)
LYMPHOCYTES NFR BLD: 4 % (ref 13–44)
LYMPHOCYTES NFR BLD: 5 % (ref 13–44)
LYMPHOCYTES NFR BLD: 6 % (ref 13–44)
MAGNESIUM SERPL-MCNC: 2 MG/DL (ref 1.8–2.4)
MAGNESIUM SERPL-MCNC: 2.8 MG/DL (ref 1.8–2.4)
MAGNESIUM SERPL-MCNC: 2.9 MG/DL (ref 1.8–2.4)
MAGNESIUM SERPL-MCNC: 2.9 MG/DL (ref 1.8–2.4)
MAGNESIUM SERPL-MCNC: 3 MG/DL (ref 1.8–2.4)
MAGNESIUM SERPL-MCNC: 3.1 MG/DL (ref 1.8–2.4)
MAGNESIUM SERPL-MCNC: 3.2 MG/DL (ref 1.8–2.4)
MAGNESIUM SERPL-MCNC: 3.3 MG/DL (ref 1.8–2.4)
MAGNESIUM SERPL-MCNC: 3.4 MG/DL (ref 1.8–2.4)
MAGNESIUM SERPL-MCNC: 3.6 MG/DL (ref 1.8–2.4)
MCH RBC QN AUTO: 30.1 PG (ref 26.1–32.9)
MCH RBC QN AUTO: 30.4 PG (ref 26.1–32.9)
MCH RBC QN AUTO: 30.4 PG (ref 26.1–32.9)
MCH RBC QN AUTO: 30.8 PG (ref 26.1–32.9)
MCH RBC QN AUTO: 30.9 PG (ref 26.1–32.9)
MCH RBC QN AUTO: 31.3 PG (ref 26.1–32.9)
MCH RBC QN AUTO: 31.4 PG (ref 26.1–32.9)
MCH RBC QN AUTO: 31.4 PG (ref 26.1–32.9)
MCH RBC QN AUTO: 31.5 PG (ref 26.1–32.9)
MCH RBC QN AUTO: 31.5 PG (ref 26.1–32.9)
MCH RBC QN AUTO: 31.6 PG (ref 26.1–32.9)
MCH RBC QN AUTO: 31.6 PG (ref 26.1–32.9)
MCH RBC QN AUTO: 31.7 PG (ref 26.1–32.9)
MCH RBC QN AUTO: 31.8 PG (ref 26.1–32.9)
MCH RBC QN AUTO: 32 PG (ref 26.1–32.9)
MCHC RBC AUTO-ENTMCNC: 31.8 G/DL (ref 31.4–35)
MCHC RBC AUTO-ENTMCNC: 32.3 G/DL (ref 31.4–35)
MCHC RBC AUTO-ENTMCNC: 32.7 G/DL (ref 31.4–35)
MCHC RBC AUTO-ENTMCNC: 32.9 G/DL (ref 31.4–35)
MCHC RBC AUTO-ENTMCNC: 33 G/DL (ref 31.4–35)
MCHC RBC AUTO-ENTMCNC: 33 G/DL (ref 31.4–35)
MCHC RBC AUTO-ENTMCNC: 33.1 G/DL (ref 31.4–35)
MCHC RBC AUTO-ENTMCNC: 33.2 G/DL (ref 31.4–35)
MCHC RBC AUTO-ENTMCNC: 33.2 G/DL (ref 31.4–35)
MCHC RBC AUTO-ENTMCNC: 33.3 G/DL (ref 31.4–35)
MCHC RBC AUTO-ENTMCNC: 33.5 G/DL (ref 31.4–35)
MCHC RBC AUTO-ENTMCNC: 33.7 G/DL (ref 31.4–35)
MCHC RBC AUTO-ENTMCNC: 33.8 G/DL (ref 31.4–35)
MCHC RBC AUTO-ENTMCNC: 33.9 G/DL (ref 31.4–35)
MCHC RBC AUTO-ENTMCNC: 34.3 G/DL (ref 31.4–35)
MCV RBC AUTO: 90.1 FL (ref 79.6–97.8)
MCV RBC AUTO: 90.6 FL (ref 79.6–97.8)
MCV RBC AUTO: 92.2 FL (ref 79.6–97.8)
MCV RBC AUTO: 92.4 FL (ref 79.6–97.8)
MCV RBC AUTO: 92.8 FL (ref 79.6–97.8)
MCV RBC AUTO: 92.9 FL (ref 79.6–97.8)
MCV RBC AUTO: 93 FL (ref 79.6–97.8)
MCV RBC AUTO: 93.4 FL (ref 79.6–97.8)
MCV RBC AUTO: 94.4 FL (ref 79.6–97.8)
MCV RBC AUTO: 95.1 FL (ref 79.6–97.8)
MCV RBC AUTO: 95.1 FL (ref 79.6–97.8)
MCV RBC AUTO: 96 FL (ref 79.6–97.8)
MCV RBC AUTO: 96.1 FL (ref 79.6–97.8)
MCV RBC AUTO: 99 FL (ref 79.6–97.8)
MCV RBC AUTO: 99.2 FL (ref 79.6–97.8)
MINUTES UNTIL NEXT BG, NBG: 60 MIN
MM INDURATION POC: 0 MM (ref 0–5)
MONOCYTES # BLD: 0.2 K/UL (ref 0.1–1.3)
MONOCYTES # BLD: 0.4 K/UL (ref 0.1–1.3)
MONOCYTES # BLD: 0.5 K/UL (ref 0.1–1.3)
MONOCYTES # BLD: 0.6 K/UL (ref 0.1–1.3)
MONOCYTES # BLD: 0.8 K/UL (ref 0.1–1.3)
MONOCYTES NFR BLD: 2 % (ref 4–12)
MONOCYTES NFR BLD: 3 % (ref 4–12)
MONOCYTES NFR BLD: 3 % (ref 4–12)
MONOCYTES NFR BLD: 5 % (ref 4–12)
MONOCYTES NFR BLD: 6 % (ref 4–12)
MULTIPLIER, MUL: 0
MULTIPLIER, MUL: 0.01
MULTIPLIER, MUL: 0.02
MULTIPLIER, MUL: 0.03
MULTIPLIER, MUL: 0.04
MULTIPLIER, MUL: 0.05
MULTIPLIER, MUL: 0.06
MULTIPLIER, MUL: 0.07
MULTIPLIER, MUL: 0.08
MULTIPLIER, MUL: 0.09
MULTIPLIER, MUL: 0.1
MULTIPLIER, MUL: 0.11
MULTIPLIER, MUL: 0.12
MULTIPLIER, MUL: 0.12
MULTIPLIER, MUL: 0.13
MULTIPLIER, MUL: 0.14
MULTIPLIER, MUL: 0.14
MULTIPLIER, MUL: 0.15
MULTIPLIER, MUL: 0.16
MULTIPLIER, MUL: 0.17
MULTIPLIER, MUL: 0.17
MULTIPLIER, MUL: 0.18
MULTIPLIER, MUL: 0.19
MULTIPLIER, MUL: 0.2
MULTIPLIER, MUL: 0.21
MULTIPLIER, MUL: 0.22
NEUTS SEG # BLD: 10.2 K/UL (ref 1.7–8.2)
NEUTS SEG # BLD: 10.7 K/UL (ref 1.7–8.2)
NEUTS SEG # BLD: 26.7 K/UL (ref 1.7–8.2)
NEUTS SEG # BLD: 5.3 K/UL (ref 1.7–8.2)
NEUTS SEG # BLD: 9.6 K/UL (ref 1.7–8.2)
NEUTS SEG NFR BLD: 75 % (ref 43–78)
NEUTS SEG NFR BLD: 89 % (ref 43–78)
NEUTS SEG NFR BLD: 91 % (ref 43–78)
NEUTS SEG NFR BLD: 91 % (ref 43–78)
NEUTS SEG NFR BLD: 93 % (ref 43–78)
NRBC # BLD: 0 K/UL (ref 0–0.2)
NRBC # BLD: 0.04 K/UL (ref 0–0.2)
NRBC # BLD: 0.07 K/UL (ref 0–0.2)
NRBC # BLD: 0.08 K/UL (ref 0–0.2)
NRBC # BLD: 0.09 K/UL (ref 0–0.2)
NRBC # BLD: 0.18 K/UL (ref 0–0.2)
NRBC # BLD: 0.25 K/UL (ref 0–0.2)
NRBC # BLD: 0.27 K/UL (ref 0–0.2)
NRBC # BLD: 0.28 K/UL (ref 0–0.2)
NRBC # BLD: 0.34 K/UL (ref 0–0.2)
NRBC # BLD: 0.49 K/UL (ref 0–0.2)
O2/TOTAL GAS SETTING VFR VENT: 100 %
O2/TOTAL GAS SETTING VFR VENT: 60 %
O2/TOTAL GAS SETTING VFR VENT: 70 %
O2/TOTAL GAS SETTING VFR VENT: 70 %
O2/TOTAL GAS SETTING VFR VENT: 75 %
O2/TOTAL GAS SETTING VFR VENT: 75 %
O2/TOTAL GAS SETTING VFR VENT: 80 %
O2/TOTAL GAS SETTING VFR VENT: 85 %
ORDER INITIALS, ORDINIT: NORMAL
P-R INTERVAL, ECG05: 176 MS
P-R INTERVAL, ECG05: 186 MS
P-R INTERVAL, ECG05: 196 MS
PAW @ MEAN EXP FLOW ON VENT: 17 CMH2O
PAW @ MEAN EXP FLOW ON VENT: 19 CMH2O
PAW @ MEAN EXP FLOW ON VENT: 19 CMH2O
PAW @ MEAN EXP FLOW ON VENT: 20 CMH2O
PAW @ MEAN EXP FLOW ON VENT: 21 CMH2O
PAW @ MEAN EXP FLOW ON VENT: 21 CMH2O
PAW @ MEAN EXP FLOW ON VENT: 23 CMH2O
PCO2 BLD: 27.4 MMHG (ref 35–45)
PCO2 BLD: 28.8 MMHG (ref 35–45)
PCO2 BLD: 35.8 MMHG (ref 35–45)
PCO2 BLD: 36.7 MMHG (ref 35–45)
PCO2 BLD: 40.5 MMHG (ref 35–45)
PCO2 BLD: 40.7 MMHG (ref 35–45)
PCO2 BLD: 41.1 MMHG (ref 35–45)
PCO2 BLD: 41.5 MMHG (ref 35–45)
PCO2 BLD: 44.9 MMHG (ref 35–45)
PCO2 BLD: 47.6 MMHG (ref 35–45)
PCO2 BLD: 49.4 MMHG (ref 35–45)
PCO2 BLD: 51.2 MMHG (ref 35–45)
PCO2 BLD: 56.1 MMHG (ref 35–45)
PCO2 BLD: 57.1 MMHG (ref 35–45)
PCO2 BLD: 60.7 MMHG (ref 35–45)
PCO2 BLD: 67.7 MMHG (ref 35–45)
PEEP RESPIRATORY: 10 CMH2O
PEEP RESPIRATORY: 12 CMH2O
PEEP RESPIRATORY: 14 CMH2O
PEEP RESPIRATORY: 15 CMH2O
PH BLD: 7.17 [PH] (ref 7.35–7.45)
PH BLD: 7.17 [PH] (ref 7.35–7.45)
PH BLD: 7.19 [PH] (ref 7.35–7.45)
PH BLD: 7.21 [PH] (ref 7.35–7.45)
PH BLD: 7.24 [PH] (ref 7.35–7.45)
PH BLD: 7.29 [PH] (ref 7.35–7.45)
PH BLD: 7.29 [PH] (ref 7.35–7.45)
PH BLD: 7.31 [PH] (ref 7.35–7.45)
PH BLD: 7.33 [PH] (ref 7.35–7.45)
PH BLD: 7.33 [PH] (ref 7.35–7.45)
PH BLD: 7.35 [PH] (ref 7.35–7.45)
PH BLD: 7.38 [PH] (ref 7.35–7.45)
PH BLD: 7.38 [PH] (ref 7.35–7.45)
PH BLD: 7.39 [PH] (ref 7.35–7.45)
PH BLD: 7.39 [PH] (ref 7.35–7.45)
PH BLD: 7.48 [PH] (ref 7.35–7.45)
PHOSPHATE SERPL-MCNC: 10.5 MG/DL (ref 2.3–3.7)
PHOSPHATE SERPL-MCNC: 10.6 MG/DL (ref 2.3–3.7)
PHOSPHATE SERPL-MCNC: 12.6 MG/DL (ref 2.3–3.7)
PHOSPHATE SERPL-MCNC: 15.9 MG/DL (ref 2.3–3.7)
PHOSPHATE SERPL-MCNC: 16.5 MG/DL (ref 2.3–3.7)
PHOSPHATE SERPL-MCNC: 9.9 MG/DL (ref 2.3–3.7)
PHOSPHATE SERPL-MCNC: >9 MG/DL (ref 2.3–3.7)
PHOSPHATE SERPL-MCNC: >9 MG/DL (ref 2.3–3.7)
PIP ISTAT,IPIP: 11
PIP ISTAT,IPIP: 29
PIP ISTAT,IPIP: 30
PIP ISTAT,IPIP: 31
PIP ISTAT,IPIP: 34
PLATELET # BLD AUTO: 102 K/UL (ref 150–450)
PLATELET # BLD AUTO: 119 K/UL (ref 150–450)
PLATELET # BLD AUTO: 121 K/UL (ref 150–450)
PLATELET # BLD AUTO: 145 K/UL (ref 150–450)
PLATELET # BLD AUTO: 151 K/UL (ref 150–450)
PLATELET # BLD AUTO: 153 K/UL (ref 150–450)
PLATELET # BLD AUTO: 175 K/UL (ref 150–450)
PLATELET # BLD AUTO: 251 K/UL (ref 150–450)
PLATELET # BLD AUTO: 270 K/UL (ref 150–450)
PLATELET # BLD AUTO: 280 K/UL (ref 150–450)
PLATELET # BLD AUTO: 288 K/UL (ref 150–450)
PLATELET # BLD AUTO: 288 K/UL (ref 150–450)
PLATELET # BLD AUTO: 334 K/UL (ref 150–450)
PLATELET # BLD AUTO: 66 K/UL (ref 150–450)
PLATELET # BLD AUTO: 85 K/UL (ref 150–450)
PMV BLD AUTO: 10.5 FL (ref 9.4–12.3)
PMV BLD AUTO: 11 FL (ref 9.4–12.3)
PMV BLD AUTO: 11.1 FL (ref 9.4–12.3)
PMV BLD AUTO: 11.2 FL (ref 9.4–12.3)
PMV BLD AUTO: 11.3 FL (ref 9.4–12.3)
PMV BLD AUTO: 11.3 FL (ref 9.4–12.3)
PMV BLD AUTO: 11.4 FL (ref 9.4–12.3)
PMV BLD AUTO: 11.6 FL (ref 9.4–12.3)
PMV BLD AUTO: 11.7 FL (ref 9.4–12.3)
PMV BLD AUTO: 11.7 FL (ref 9.4–12.3)
PMV BLD AUTO: 11.8 FL (ref 9.4–12.3)
PMV BLD AUTO: 11.8 FL (ref 9.4–12.3)
PO2 BLD: 106 MMHG (ref 75–100)
PO2 BLD: 162 MMHG (ref 75–100)
PO2 BLD: 53 MMHG (ref 75–100)
PO2 BLD: 57 MMHG (ref 75–100)
PO2 BLD: 70 MMHG (ref 75–100)
PO2 BLD: 70 MMHG (ref 75–100)
PO2 BLD: 72 MMHG (ref 75–100)
PO2 BLD: 73 MMHG (ref 75–100)
PO2 BLD: 75 MMHG (ref 75–100)
PO2 BLD: 81 MMHG (ref 75–100)
PO2 BLD: 82 MMHG (ref 75–100)
PO2 BLD: 82 MMHG (ref 75–100)
PO2 BLD: 84 MMHG (ref 75–100)
PO2 BLD: 89 MMHG (ref 75–100)
PO2 BLD: 90 MMHG (ref 75–100)
PO2 BLD: 91 MMHG (ref 75–100)
POTASSIUM SERPL-SCNC: 2.8 MMOL/L (ref 3.5–5.1)
POTASSIUM SERPL-SCNC: 3.2 MMOL/L (ref 3.5–5.1)
POTASSIUM SERPL-SCNC: 3.4 MMOL/L (ref 3.5–5.1)
POTASSIUM SERPL-SCNC: 3.6 MMOL/L (ref 3.5–5.1)
POTASSIUM SERPL-SCNC: 3.7 MMOL/L (ref 3.5–5.1)
POTASSIUM SERPL-SCNC: 3.8 MMOL/L (ref 3.5–5.1)
POTASSIUM SERPL-SCNC: 4 MMOL/L (ref 3.5–5.1)
POTASSIUM SERPL-SCNC: 4.1 MMOL/L (ref 3.5–5.1)
POTASSIUM SERPL-SCNC: 4.2 MMOL/L (ref 3.5–5.1)
POTASSIUM SERPL-SCNC: 4.2 MMOL/L (ref 3.5–5.1)
POTASSIUM SERPL-SCNC: 4.3 MMOL/L (ref 3.5–5.1)
POTASSIUM SERPL-SCNC: 4.4 MMOL/L (ref 3.5–5.1)
POTASSIUM SERPL-SCNC: 4.5 MMOL/L (ref 3.5–5.1)
POTASSIUM SERPL-SCNC: 4.6 MMOL/L (ref 3.5–5.1)
POTASSIUM SERPL-SCNC: 4.7 MMOL/L (ref 3.5–5.1)
POTASSIUM SERPL-SCNC: 4.9 MMOL/L (ref 3.5–5.1)
POTASSIUM SERPL-SCNC: 5 MMOL/L (ref 3.5–5.1)
POTASSIUM SERPL-SCNC: 5.1 MMOL/L (ref 3.5–5.1)
POTASSIUM SERPL-SCNC: 5.1 MMOL/L (ref 3.5–5.1)
POTASSIUM SERPL-SCNC: 5.3 MMOL/L (ref 3.5–5.1)
POTASSIUM SERPL-SCNC: 5.3 MMOL/L (ref 3.5–5.1)
POTASSIUM SERPL-SCNC: 6 MMOL/L (ref 3.5–5.1)
POTASSIUM SERPL-SCNC: 6.3 MMOL/L (ref 3.5–5.1)
POTASSIUM SERPL-SCNC: 6.4 MMOL/L (ref 3.5–5.1)
PPD POC: NEGATIVE NEGATIVE
PROCALCITONIN SERPL-MCNC: 0.19 NG/ML
PROCALCITONIN SERPL-MCNC: 0.19 NG/ML
PROCALCITONIN SERPL-MCNC: 0.29 NG/ML
PROCALCITONIN SERPL-MCNC: 0.29 NG/ML
PROCALCITONIN SERPL-MCNC: 0.38 NG/ML
PROCALCITONIN SERPL-MCNC: 1 NG/ML
PROT SERPL-MCNC: 5.7 G/DL (ref 6.3–8.2)
PROT SERPL-MCNC: 6.9 G/DL (ref 6.3–8.2)
PROT SERPL-MCNC: 7.7 G/DL (ref 6.3–8.2)
PROTHROMBIN TIME: 13.5 SEC (ref 12.6–14.5)
PROTHROMBIN TIME: 14.7 SEC (ref 12.6–14.5)
Q-T INTERVAL, ECG07: 422 MS
Q-T INTERVAL, ECG07: 442 MS
Q-T INTERVAL, ECG07: 458 MS
QRS DURATION, ECG06: 106 MS
QRS DURATION, ECG06: 118 MS
QRS DURATION, ECG06: 88 MS
QTC CALCULATION (BEZET), ECG08: 477 MS
QTC CALCULATION (BEZET), ECG08: 504 MS
QTC CALCULATION (BEZET), ECG08: 509 MS
RBC # BLD AUTO: 2.03 M/UL (ref 4.05–5.2)
RBC # BLD AUTO: 2.26 M/UL (ref 4.05–5.2)
RBC # BLD AUTO: 2.5 M/UL (ref 4.05–5.2)
RBC # BLD AUTO: 2.52 M/UL (ref 4.05–5.2)
RBC # BLD AUTO: 2.62 M/UL (ref 4.05–5.2)
RBC # BLD AUTO: 2.91 M/UL (ref 4.05–5.2)
RBC # BLD AUTO: 3.52 M/UL (ref 4.05–5.2)
RBC # BLD AUTO: 3.83 M/UL (ref 4.05–5.2)
RBC # BLD AUTO: 3.95 M/UL (ref 4.05–5.2)
RBC # BLD AUTO: 4.23 M/UL (ref 4.05–5.2)
RBC # BLD AUTO: 4.25 M/UL (ref 4.05–5.2)
RBC # BLD AUTO: 4.25 M/UL (ref 4.05–5.2)
RBC # BLD AUTO: 4.67 M/UL (ref 4.05–5.2)
RBC # BLD AUTO: 4.69 M/UL (ref 4.05–5.2)
RBC # BLD AUTO: 4.74 M/UL (ref 4.05–5.2)
SAO2 % BLD: 85.5 % (ref 95–98)
SAO2 % BLD: 89.9 % (ref 95–98)
SAO2 % BLD: 91 % (ref 95–98)
SAO2 % BLD: 91.6 % (ref 95–98)
SAO2 % BLD: 92 % (ref 95–98)
SAO2 % BLD: 92.5 % (ref 95–98)
SAO2 % BLD: 92.6 % (ref 95–98)
SAO2 % BLD: 93 % (ref 95–98)
SAO2 % BLD: 94.2 % (ref 95–98)
SAO2 % BLD: 94.2 % (ref 95–98)
SAO2 % BLD: 95.7 % (ref 95–98)
SAO2 % BLD: 95.8 % (ref 95–98)
SAO2 % BLD: 96.4 % (ref 95–98)
SAO2 % BLD: 96.5 % (ref 95–98)
SAO2 % BLD: 97.2 % (ref 95–98)
SAO2 % BLD: 99.3 % (ref 95–98)
SERVICE CMNT-IMP: 10
SERVICE CMNT-IMP: ABNORMAL
SERVICE CMNT-IMP: NORMAL
SODIUM SERPL-SCNC: 129 MMOL/L (ref 136–145)
SODIUM SERPL-SCNC: 130 MMOL/L (ref 136–145)
SODIUM SERPL-SCNC: 130 MMOL/L (ref 136–145)
SODIUM SERPL-SCNC: 131 MMOL/L (ref 136–145)
SODIUM SERPL-SCNC: 132 MMOL/L (ref 136–145)
SODIUM SERPL-SCNC: 133 MMOL/L (ref 136–145)
SODIUM SERPL-SCNC: 134 MMOL/L (ref 136–145)
SODIUM SERPL-SCNC: 135 MMOL/L (ref 136–145)
SODIUM SERPL-SCNC: 136 MMOL/L (ref 136–145)
SODIUM SERPL-SCNC: 137 MMOL/L (ref 136–145)
SODIUM SERPL-SCNC: 138 MMOL/L (ref 136–145)
SODIUM SERPL-SCNC: 139 MMOL/L (ref 136–145)
SODIUM SERPL-SCNC: 142 MMOL/L (ref 136–145)
SOURCE, COVRS: ABNORMAL
SPECIMEN EXP DATE BLD: NORMAL
SPECIMEN TYPE: ABNORMAL
SPECIMEN TYPE: NORMAL
STATUS OF UNIT,%ST: NORMAL
TEST INFORMATION, 550045: NORMAL
TOTAL RESP. RATE, ITRR: 24
TOTAL RESP. RATE, ITRR: 26
TOTAL RESP. RATE, ITRR: 26
TOTAL RESP. RATE, ITRR: 32
TOTAL RESP. RATE, ITRR: 34
TRIGL SERPL-MCNC: 258 MG/DL (ref 35–150)
TROPONIN-HIGH SENSITIVITY: 76.4 PG/ML (ref 0–14)
TSH SERPL DL<=0.005 MIU/L-ACNC: 0.34 UIU/ML (ref 0.36–3.74)
UNIT DIVISION, %UDIV: 0
VENTILATION MODE VENT: ABNORMAL
VENTILATION MODE VENT: NORMAL
VENTRICULAR RATE, ECG03: 73 BPM
VENTRICULAR RATE, ECG03: 77 BPM
VENTRICULAR RATE, ECG03: 80 BPM
VT SETTING VENT: 380 ML
VT SETTING VENT: 430 ML
VT SETTING VENT: 450 ML
WBC # BLD AUTO: 10.3 K/UL (ref 4.3–11.1)
WBC # BLD AUTO: 11.2 K/UL (ref 4.3–11.1)
WBC # BLD AUTO: 12 K/UL (ref 4.3–11.1)
WBC # BLD AUTO: 12.7 K/UL (ref 4.3–11.1)
WBC # BLD AUTO: 13.1 K/UL (ref 4.3–11.1)
WBC # BLD AUTO: 14.6 K/UL (ref 4.3–11.1)
WBC # BLD AUTO: 15 K/UL (ref 4.3–11.1)
WBC # BLD AUTO: 17 K/UL (ref 4.3–11.1)
WBC # BLD AUTO: 17.7 K/UL (ref 4.3–11.1)
WBC # BLD AUTO: 22.3 K/UL (ref 4.3–11.1)
WBC # BLD AUTO: 22.5 K/UL (ref 4.3–11.1)
WBC # BLD AUTO: 26.8 K/UL (ref 4.3–11.1)
WBC # BLD AUTO: 29.2 K/UL (ref 4.3–11.1)
WBC # BLD AUTO: 30.5 K/UL (ref 4.3–11.1)
WBC # BLD AUTO: 7.1 K/UL (ref 4.3–11.1)

## 2021-01-01 PROCEDURE — 74011000250 HC RX REV CODE- 250: Performed by: INTERNAL MEDICINE

## 2021-01-01 PROCEDURE — 3E0333Z INTRODUCTION OF ANTI-INFLAMMATORY INTO PERIPHERAL VEIN, PERCUTANEOUS APPROACH: ICD-10-PCS | Performed by: EMERGENCY MEDICINE

## 2021-01-01 PROCEDURE — 74011000258 HC RX REV CODE- 258: Performed by: INTERNAL MEDICINE

## 2021-01-01 PROCEDURE — 85025 COMPLETE CBC W/AUTO DIFF WBC: CPT

## 2021-01-01 PROCEDURE — 74011250636 HC RX REV CODE- 250/636: Performed by: INTERNAL MEDICINE

## 2021-01-01 PROCEDURE — 83605 ASSAY OF LACTIC ACID: CPT

## 2021-01-01 PROCEDURE — 36600 WITHDRAWAL OF ARTERIAL BLOOD: CPT

## 2021-01-01 PROCEDURE — 74011636637 HC RX REV CODE- 636/637: Performed by: INTERNAL MEDICINE

## 2021-01-01 PROCEDURE — 74011000258 HC RX REV CODE- 258

## 2021-01-01 PROCEDURE — 85379 FIBRIN DEGRADATION QUANT: CPT

## 2021-01-01 PROCEDURE — 74011250637 HC RX REV CODE- 250/637: Performed by: FAMILY MEDICINE

## 2021-01-01 PROCEDURE — 82803 BLOOD GASES ANY COMBINATION: CPT

## 2021-01-01 PROCEDURE — 74011250637 HC RX REV CODE- 250/637: Performed by: INTERNAL MEDICINE

## 2021-01-01 PROCEDURE — 80048 BASIC METABOLIC PNL TOTAL CA: CPT

## 2021-01-01 PROCEDURE — 74011250636 HC RX REV CODE- 250/636: Performed by: FAMILY MEDICINE

## 2021-01-01 PROCEDURE — 87522 HEPATITIS C REVRS TRNSCRPJ: CPT

## 2021-01-01 PROCEDURE — 82962 GLUCOSE BLOOD TEST: CPT

## 2021-01-01 PROCEDURE — 2709999900 HC NON-CHARGEABLE SUPPLY

## 2021-01-01 PROCEDURE — 94640 AIRWAY INHALATION TREATMENT: CPT

## 2021-01-01 PROCEDURE — 77030005402 HC CATH RAD ART LN KT TELE -B

## 2021-01-01 PROCEDURE — 74011000250 HC RX REV CODE- 250: Performed by: NURSE PRACTITIONER

## 2021-01-01 PROCEDURE — 77010033711 HC HIGH FLOW OXYGEN

## 2021-01-01 PROCEDURE — 97161 PT EVAL LOW COMPLEX 20 MIN: CPT

## 2021-01-01 PROCEDURE — 36620 INSERTION CATHETER ARTERY: CPT

## 2021-01-01 PROCEDURE — 71045 X-RAY EXAM CHEST 1 VIEW: CPT

## 2021-01-01 PROCEDURE — 94760 N-INVAS EAR/PLS OXIMETRY 1: CPT

## 2021-01-01 PROCEDURE — 84100 ASSAY OF PHOSPHORUS: CPT

## 2021-01-01 PROCEDURE — 94003 VENT MGMT INPAT SUBQ DAY: CPT

## 2021-01-01 PROCEDURE — 83735 ASSAY OF MAGNESIUM: CPT

## 2021-01-01 PROCEDURE — 99291 CRITICAL CARE FIRST HOUR: CPT | Performed by: INTERNAL MEDICINE

## 2021-01-01 PROCEDURE — 36415 COLL VENOUS BLD VENIPUNCTURE: CPT

## 2021-01-01 PROCEDURE — 74011250636 HC RX REV CODE- 250/636: Performed by: HOSPITALIST

## 2021-01-01 PROCEDURE — 85027 COMPLETE CBC AUTOMATED: CPT

## 2021-01-01 PROCEDURE — 82040 ASSAY OF SERUM ALBUMIN: CPT

## 2021-01-01 PROCEDURE — 87106 FUNGI IDENTIFICATION YEAST: CPT

## 2021-01-01 PROCEDURE — 85730 THROMBOPLASTIN TIME PARTIAL: CPT

## 2021-01-01 PROCEDURE — 90945 DIALYSIS ONE EVALUATION: CPT

## 2021-01-01 PROCEDURE — 86901 BLOOD TYPING SEROLOGIC RH(D): CPT

## 2021-01-01 PROCEDURE — 85384 FIBRINOGEN ACTIVITY: CPT

## 2021-01-01 PROCEDURE — 74011250636 HC RX REV CODE- 250/636

## 2021-01-01 PROCEDURE — 94660 CPAP INITIATION&MGMT: CPT

## 2021-01-01 PROCEDURE — 82728 ASSAY OF FERRITIN: CPT

## 2021-01-01 PROCEDURE — 94002 VENT MGMT INPAT INIT DAY: CPT

## 2021-01-01 PROCEDURE — C1751 CATH, INF, PER/CENT/MIDLINE: HCPCS

## 2021-01-01 PROCEDURE — 84145 PROCALCITONIN (PCT): CPT

## 2021-01-01 PROCEDURE — 99285 EMERGENCY DEPT VISIT HI MDM: CPT

## 2021-01-01 PROCEDURE — 86704 HEP B CORE ANTIBODY TOTAL: CPT

## 2021-01-01 PROCEDURE — 93005 ELECTROCARDIOGRAM TRACING: CPT | Performed by: NURSE PRACTITIONER

## 2021-01-01 PROCEDURE — 65610000001 HC ROOM ICU GENERAL

## 2021-01-01 PROCEDURE — 74011250636 HC RX REV CODE- 250/636: Performed by: NURSE PRACTITIONER

## 2021-01-01 PROCEDURE — 03HY32Z INSERTION OF MONITORING DEVICE INTO UPPER ARTERY, PERCUTANEOUS APPROACH: ICD-10-PCS | Performed by: NURSE PRACTITIONER

## 2021-01-01 PROCEDURE — 85018 HEMOGLOBIN: CPT

## 2021-01-01 PROCEDURE — 74011250636 HC RX REV CODE- 250/636: Performed by: EMERGENCY MEDICINE

## 2021-01-01 PROCEDURE — 74011636637 HC RX REV CODE- 636/637: Performed by: FAMILY MEDICINE

## 2021-01-01 PROCEDURE — 80069 RENAL FUNCTION PANEL: CPT

## 2021-01-01 PROCEDURE — P9016 RBC LEUKOCYTES REDUCED: HCPCS

## 2021-01-01 PROCEDURE — 77030008771 HC TU NG SALEM SUMP -A

## 2021-01-01 PROCEDURE — 36556 INSERT NON-TUNNEL CV CATH: CPT

## 2021-01-01 PROCEDURE — 74011636637 HC RX REV CODE- 636/637: Performed by: HOSPITALIST

## 2021-01-01 PROCEDURE — 77030018798 HC PMP KT ENTRL FED COVD -A

## 2021-01-01 PROCEDURE — 94762 N-INVAS EAR/PLS OXIMTRY CONT: CPT

## 2021-01-01 PROCEDURE — 74011000258 HC RX REV CODE- 258: Performed by: NURSE PRACTITIONER

## 2021-01-01 PROCEDURE — 86140 C-REACTIVE PROTEIN: CPT

## 2021-01-01 PROCEDURE — 96374 THER/PROPH/DIAG INJ IV PUSH: CPT

## 2021-01-01 PROCEDURE — 87635 SARS-COV-2 COVID-19 AMP PRB: CPT

## 2021-01-01 PROCEDURE — 74011250637 HC RX REV CODE- 250/637: Performed by: NURSE PRACTITIONER

## 2021-01-01 PROCEDURE — 3E1M39Z IRRIGATION OF PERITONEAL CAVITY USING DIALYSATE, PERCUTANEOUS APPROACH: ICD-10-PCS | Performed by: INTERNAL MEDICINE

## 2021-01-01 PROCEDURE — P9047 ALBUMIN (HUMAN), 25%, 50ML: HCPCS | Performed by: INTERNAL MEDICINE

## 2021-01-01 PROCEDURE — 77030040393 HC DRSG OPTIFOAM GENT MDII -B

## 2021-01-01 PROCEDURE — 87070 CULTURE OTHR SPECIMN AEROBIC: CPT

## 2021-01-01 PROCEDURE — 74011000250 HC RX REV CODE- 250: Performed by: EMERGENCY MEDICINE

## 2021-01-01 PROCEDURE — 99233 SBSQ HOSP IP/OBS HIGH 50: CPT | Performed by: NURSE PRACTITIONER

## 2021-01-01 PROCEDURE — 74011000250 HC RX REV CODE- 250

## 2021-01-01 PROCEDURE — 77030013794 HC KT TRNSDUC BLD EDWD -B

## 2021-01-01 PROCEDURE — 3E0436Z INTRODUCTION OF NUTRITIONAL SUBSTANCE INTO CENTRAL VEIN, PERCUTANEOUS APPROACH: ICD-10-PCS | Performed by: INTERNAL MEDICINE

## 2021-01-01 PROCEDURE — 74011250637 HC RX REV CODE- 250/637: Performed by: HOSPITALIST

## 2021-01-01 PROCEDURE — 74011636637 HC RX REV CODE- 636/637: Performed by: EMERGENCY MEDICINE

## 2021-01-01 PROCEDURE — 97110 THERAPEUTIC EXERCISES: CPT

## 2021-01-01 PROCEDURE — 65270000029 HC RM PRIVATE

## 2021-01-01 PROCEDURE — 87040 BLOOD CULTURE FOR BACTERIA: CPT

## 2021-01-01 PROCEDURE — 94664 DEMO&/EVAL PT USE INHALER: CPT

## 2021-01-01 PROCEDURE — 76450000000

## 2021-01-01 PROCEDURE — 80053 COMPREHEN METABOLIC PANEL: CPT

## 2021-01-01 PROCEDURE — 77030034112 HC PRB ESOPH/RET TEMP CSZ -A

## 2021-01-01 PROCEDURE — 99239 HOSP IP/OBS DSCHRG MGMT >30: CPT | Performed by: INTERNAL MEDICINE

## 2021-01-01 PROCEDURE — 74011000258 HC RX REV CODE- 258: Performed by: EMERGENCY MEDICINE

## 2021-01-01 PROCEDURE — 74011000258 HC RX REV CODE- 258: Performed by: FAMILY MEDICINE

## 2021-01-01 PROCEDURE — 84443 ASSAY THYROID STIM HORMONE: CPT

## 2021-01-01 PROCEDURE — 5A09457 ASSISTANCE WITH RESPIRATORY VENTILATION, 24-96 CONSECUTIVE HOURS, CONTINUOUS POSITIVE AIRWAY PRESSURE: ICD-10-PCS | Performed by: INTERNAL MEDICINE

## 2021-01-01 PROCEDURE — 83615 LACTATE (LD) (LDH) ENZYME: CPT

## 2021-01-01 PROCEDURE — 93970 EXTREMITY STUDY: CPT

## 2021-01-01 PROCEDURE — 36620 INSERTION CATHETER ARTERY: CPT | Performed by: NURSE PRACTITIONER

## 2021-01-01 PROCEDURE — 86141 C-REACTIVE PROTEIN HS: CPT

## 2021-01-01 PROCEDURE — 74011250637 HC RX REV CODE- 250/637: Performed by: EMERGENCY MEDICINE

## 2021-01-01 PROCEDURE — 86923 COMPATIBILITY TEST ELECTRIC: CPT

## 2021-01-01 PROCEDURE — P9047 ALBUMIN (HUMAN), 25%, 50ML: HCPCS

## 2021-01-01 PROCEDURE — 3E0G76Z INTRODUCTION OF NUTRITIONAL SUBSTANCE INTO UPPER GI, VIA NATURAL OR ARTIFICIAL OPENING: ICD-10-PCS | Performed by: INTERNAL MEDICINE

## 2021-01-01 PROCEDURE — 36430 TRANSFUSION BLD/BLD COMPNT: CPT

## 2021-01-01 PROCEDURE — 82306 VITAMIN D 25 HYDROXY: CPT

## 2021-01-01 PROCEDURE — 30233N1 TRANSFUSION OF NONAUTOLOGOUS RED BLOOD CELLS INTO PERIPHERAL VEIN, PERCUTANEOUS APPROACH: ICD-10-PCS | Performed by: EMERGENCY MEDICINE

## 2021-01-01 PROCEDURE — 77030041247 HC PROTECTOR HEEL HEELMEDIX MDII -B

## 2021-01-01 PROCEDURE — 31500 INSERT EMERGENCY AIRWAY: CPT | Performed by: NURSE PRACTITIONER

## 2021-01-01 PROCEDURE — 36592 COLLECT BLOOD FROM PICC: CPT

## 2021-01-01 PROCEDURE — 36000 PLACE NEEDLE IN VEIN: CPT | Performed by: NURSE PRACTITIONER

## 2021-01-01 PROCEDURE — 85610 PROTHROMBIN TIME: CPT

## 2021-01-01 PROCEDURE — 84478 ASSAY OF TRIGLYCERIDES: CPT

## 2021-01-01 PROCEDURE — 93005 ELECTROCARDIOGRAM TRACING: CPT | Performed by: INTERNAL MEDICINE

## 2021-01-01 PROCEDURE — 36589 REMOVAL TUNNELED CV CATH: CPT

## 2021-01-01 PROCEDURE — 86706 HEP B SURFACE ANTIBODY: CPT

## 2021-01-01 PROCEDURE — 77010033678 HC OXYGEN DAILY

## 2021-01-01 PROCEDURE — 99223 1ST HOSP IP/OBS HIGH 75: CPT | Performed by: NURSE PRACTITIONER

## 2021-01-01 PROCEDURE — 74011636637 HC RX REV CODE- 636/637: Performed by: NURSE PRACTITIONER

## 2021-01-01 PROCEDURE — 02HV33Z INSERTION OF INFUSION DEVICE INTO SUPERIOR VENA CAVA, PERCUTANEOUS APPROACH: ICD-10-PCS | Performed by: NURSE PRACTITIONER

## 2021-01-01 PROCEDURE — 93005 ELECTROCARDIOGRAM TRACING: CPT | Performed by: EMERGENCY MEDICINE

## 2021-01-01 PROCEDURE — 5A1955Z RESPIRATORY VENTILATION, GREATER THAN 96 CONSECUTIVE HOURS: ICD-10-PCS | Performed by: NURSE PRACTITIONER

## 2021-01-01 PROCEDURE — 87205 SMEAR GRAM STAIN: CPT

## 2021-01-01 PROCEDURE — 86580 TB INTRADERMAL TEST: CPT | Performed by: INTERNAL MEDICINE

## 2021-01-01 PROCEDURE — 74018 RADEX ABDOMEN 1 VIEW: CPT

## 2021-01-01 PROCEDURE — 84484 ASSAY OF TROPONIN QUANT: CPT

## 2021-01-01 PROCEDURE — 83036 HEMOGLOBIN GLYCOSYLATED A1C: CPT

## 2021-01-01 PROCEDURE — 87340 HEPATITIS B SURFACE AG IA: CPT

## 2021-01-01 PROCEDURE — 0BH17EZ INSERTION OF ENDOTRACHEAL AIRWAY INTO TRACHEA, VIA NATURAL OR ARTIFICIAL OPENING: ICD-10-PCS | Performed by: NURSE PRACTITIONER

## 2021-01-01 PROCEDURE — XW043H5 INTRODUCTION OF TOCILIZUMAB INTO CENTRAL VEIN, PERCUTANEOUS APPROACH, NEW TECHNOLOGY GROUP 5: ICD-10-PCS | Performed by: FAMILY MEDICINE

## 2021-01-01 PROCEDURE — 87641 MR-STAPH DNA AMP PROBE: CPT

## 2021-01-01 PROCEDURE — 92610 EVALUATE SWALLOWING FUNCTION: CPT

## 2021-01-01 RX ORDER — KETAMINE HCL IN 0.9 % NACL 2 MG/ML
.05-3 PLASTIC BAG, INJECTION (ML) INTRAVENOUS
Status: DISCONTINUED | OUTPATIENT
Start: 2021-01-01 | End: 2021-01-01 | Stop reason: HOSPADM

## 2021-01-01 RX ORDER — NITROGLYCERIN 0.4 MG/1
0.4 TABLET SUBLINGUAL
Status: DISCONTINUED | OUTPATIENT
Start: 2021-01-01 | End: 2021-01-01 | Stop reason: HOSPADM

## 2021-01-01 RX ORDER — ERGOCALCIFEROL 1.25 MG/1
50000 CAPSULE ORAL
Status: DISCONTINUED | OUTPATIENT
Start: 2021-01-01 | End: 2021-01-01

## 2021-01-01 RX ORDER — FUROSEMIDE 40 MG/1
60 TABLET ORAL 2 TIMES DAILY
Status: DISCONTINUED | OUTPATIENT
Start: 2021-01-01 | End: 2021-01-01

## 2021-01-01 RX ORDER — ALBUTEROL SULFATE 0.83 MG/ML
2.5 SOLUTION RESPIRATORY (INHALATION)
Status: DISCONTINUED | OUTPATIENT
Start: 2021-01-01 | End: 2021-01-01

## 2021-01-01 RX ORDER — GUAIFENESIN 100 MG/5ML
81 LIQUID (ML) ORAL DAILY
Status: DISCONTINUED | OUTPATIENT
Start: 2021-01-01 | End: 2021-01-01

## 2021-01-01 RX ORDER — FENTANYL CITRATE-0.9 % NACL/PF 25 MCG/ML
0-200 PLASTIC BAG, INJECTION (ML) INJECTION
Status: DISCONTINUED | OUTPATIENT
Start: 2021-01-01 | End: 2021-01-01 | Stop reason: HOSPADM

## 2021-01-01 RX ORDER — ACETAMINOPHEN 650 MG/1
650 SUPPOSITORY RECTAL
Status: DISCONTINUED | OUTPATIENT
Start: 2021-01-01 | End: 2021-01-01 | Stop reason: HOSPADM

## 2021-01-01 RX ORDER — ROCURONIUM BROMIDE 10 MG/ML
INJECTION, SOLUTION INTRAVENOUS
Status: COMPLETED
Start: 2021-01-01 | End: 2021-01-01

## 2021-01-01 RX ORDER — INSULIN GLARGINE 100 [IU]/ML
30 INJECTION, SOLUTION SUBCUTANEOUS EVERY 12 HOURS
Status: DISCONTINUED | OUTPATIENT
Start: 2021-01-01 | End: 2021-01-01

## 2021-01-01 RX ORDER — GUAIFENESIN/DEXTROMETHORPHAN 100-10MG/5
5 SYRUP ORAL
Status: DISCONTINUED | OUTPATIENT
Start: 2021-01-01 | End: 2021-01-01 | Stop reason: HOSPADM

## 2021-01-01 RX ORDER — INSULIN GLARGINE 100 [IU]/ML
0.4 INJECTION, SOLUTION SUBCUTANEOUS DAILY
Status: DISCONTINUED | OUTPATIENT
Start: 2021-01-01 | End: 2021-01-01

## 2021-01-01 RX ORDER — ETOMIDATE 2 MG/ML
INJECTION INTRAVENOUS
Status: DISCONTINUED
Start: 2021-01-01 | End: 2021-01-01 | Stop reason: WASHOUT

## 2021-01-01 RX ORDER — HYDRALAZINE HYDROCHLORIDE 25 MG/1
25 TABLET, FILM COATED ORAL 3 TIMES DAILY
Status: DISCONTINUED | OUTPATIENT
Start: 2021-01-01 | End: 2021-01-01

## 2021-01-01 RX ORDER — MORPHINE SULFATE 2 MG/ML
2 INJECTION, SOLUTION INTRAMUSCULAR; INTRAVENOUS ONCE
Status: COMPLETED | OUTPATIENT
Start: 2021-01-01 | End: 2021-01-01

## 2021-01-01 RX ORDER — MORPHINE SULFATE 4 MG/ML
4 INJECTION INTRAVENOUS
Status: DISCONTINUED | OUTPATIENT
Start: 2021-01-01 | End: 2021-01-01 | Stop reason: HOSPADM

## 2021-01-01 RX ORDER — ASPIRIN 81 MG/1
81 TABLET ORAL
Status: DISCONTINUED | OUTPATIENT
Start: 2021-01-01 | End: 2021-01-01

## 2021-01-01 RX ORDER — BUDESONIDE AND FORMOTEROL FUMARATE DIHYDRATE 160; 4.5 UG/1; UG/1
2 AEROSOL RESPIRATORY (INHALATION)
Status: DISCONTINUED | OUTPATIENT
Start: 2021-01-01 | End: 2021-01-01

## 2021-01-01 RX ORDER — ETOMIDATE 2 MG/ML
20 INJECTION INTRAVENOUS ONCE
Status: COMPLETED | OUTPATIENT
Start: 2021-01-01 | End: 2021-01-01

## 2021-01-01 RX ORDER — SODIUM CHLORIDE 0.9 % (FLUSH) 0.9 %
5-40 SYRINGE (ML) INJECTION AS NEEDED
Status: DISCONTINUED | OUTPATIENT
Start: 2021-01-01 | End: 2021-01-01 | Stop reason: HOSPADM

## 2021-01-01 RX ORDER — ISOSORBIDE MONONITRATE 60 MG/1
120 TABLET, EXTENDED RELEASE ORAL DAILY
Status: DISCONTINUED | OUTPATIENT
Start: 2021-01-01 | End: 2021-01-01

## 2021-01-01 RX ORDER — CLOPIDOGREL BISULFATE 75 MG/1
75 TABLET ORAL DAILY
Status: DISCONTINUED | OUTPATIENT
Start: 2021-01-01 | End: 2021-01-01

## 2021-01-01 RX ORDER — KETAMINE HYDROCHLORIDE 50 MG/ML
0.5 INJECTION, SOLUTION INTRAMUSCULAR; INTRAVENOUS ONCE
Status: DISCONTINUED | OUTPATIENT
Start: 2021-01-01 | End: 2021-01-01

## 2021-01-01 RX ORDER — MORPHINE SULFATE 2 MG/ML
2 INJECTION, SOLUTION INTRAMUSCULAR; INTRAVENOUS
Status: DISCONTINUED | OUTPATIENT
Start: 2021-01-01 | End: 2021-01-01 | Stop reason: HOSPADM

## 2021-01-01 RX ORDER — DEXTROSE 50 % IN WATER (D50W) INTRAVENOUS SYRINGE
25 ONCE
Status: COMPLETED | OUTPATIENT
Start: 2021-01-01 | End: 2021-01-01

## 2021-01-01 RX ORDER — HEPARIN SODIUM 5000 [USP'U]/ML
5000 INJECTION, SOLUTION INTRAVENOUS; SUBCUTANEOUS EVERY 8 HOURS
Status: DISCONTINUED | OUTPATIENT
Start: 2021-01-01 | End: 2021-01-01 | Stop reason: HOSPADM

## 2021-01-01 RX ORDER — ESCITALOPRAM OXALATE 10 MG/1
20 TABLET ORAL 2 TIMES DAILY
Status: DISCONTINUED | OUTPATIENT
Start: 2021-01-01 | End: 2021-01-01

## 2021-01-01 RX ORDER — FLUTICASONE PROPIONATE 50 MCG
2 SPRAY, SUSPENSION (ML) NASAL DAILY
Status: DISCONTINUED | OUTPATIENT
Start: 2021-01-01 | End: 2021-01-01

## 2021-01-01 RX ORDER — INSULIN LISPRO 100 [IU]/ML
5 INJECTION, SOLUTION INTRAVENOUS; SUBCUTANEOUS ONCE
Status: COMPLETED | OUTPATIENT
Start: 2021-01-01 | End: 2021-01-01

## 2021-01-01 RX ORDER — SODIUM BICARBONATE 84 MG/ML
50 INJECTION, SOLUTION INTRAVENOUS ONCE
Status: COMPLETED | OUTPATIENT
Start: 2021-01-01 | End: 2021-01-01

## 2021-01-01 RX ORDER — POLYETHYLENE GLYCOL 3350 17 G/17G
17 POWDER, FOR SOLUTION ORAL DAILY PRN
Status: DISCONTINUED | OUTPATIENT
Start: 2021-01-01 | End: 2021-01-01 | Stop reason: HOSPADM

## 2021-01-01 RX ORDER — INSULIN LISPRO 100 [IU]/ML
0.1 INJECTION, SOLUTION INTRAVENOUS; SUBCUTANEOUS
Status: DISCONTINUED | OUTPATIENT
Start: 2021-01-01 | End: 2021-01-01

## 2021-01-01 RX ORDER — HYDRALAZINE HYDROCHLORIDE 20 MG/ML
20 INJECTION INTRAMUSCULAR; INTRAVENOUS ONCE
Status: DISCONTINUED | OUTPATIENT
Start: 2021-01-01 | End: 2021-01-01

## 2021-01-01 RX ORDER — CETIRIZINE HCL 10 MG
10 TABLET ORAL DAILY
Status: DISCONTINUED | OUTPATIENT
Start: 2021-01-01 | End: 2021-01-01

## 2021-01-01 RX ORDER — DEXAMETHASONE 6 MG/1
6 TABLET ORAL
Qty: 7 TABLET | Refills: 0 | Status: SHIPPED | OUTPATIENT
Start: 2021-01-01

## 2021-01-01 RX ORDER — ONDANSETRON 2 MG/ML
4 INJECTION INTRAMUSCULAR; INTRAVENOUS
Status: DISCONTINUED | OUTPATIENT
Start: 2021-01-01 | End: 2021-01-01 | Stop reason: HOSPADM

## 2021-01-01 RX ORDER — INSULIN LISPRO 100 [IU]/ML
INJECTION, SOLUTION INTRAVENOUS; SUBCUTANEOUS EVERY 4 HOURS
Status: DISCONTINUED | OUTPATIENT
Start: 2021-01-01 | End: 2021-01-01 | Stop reason: ALTCHOICE

## 2021-01-01 RX ORDER — ACETAMINOPHEN 325 MG/1
650 TABLET ORAL
Status: DISCONTINUED | OUTPATIENT
Start: 2021-01-01 | End: 2021-01-01 | Stop reason: HOSPADM

## 2021-01-01 RX ORDER — MIDAZOLAM IN NACL,ISO-OSMOT/PF 100MG/0.1L
0-10 VIAL (ML) INTRAVENOUS
Status: DISCONTINUED | OUTPATIENT
Start: 2021-01-01 | End: 2021-01-01

## 2021-01-01 RX ORDER — MAG HYDROX/ALUMINUM HYD/SIMETH 200-200-20
15 SUSPENSION, ORAL (FINAL DOSE FORM) ORAL
Status: DISCONTINUED | OUTPATIENT
Start: 2021-01-01 | End: 2021-01-01 | Stop reason: HOSPADM

## 2021-01-01 RX ORDER — POLYETHYLENE GLYCOL 3350 17 G/17G
17 POWDER, FOR SOLUTION ORAL DAILY PRN
Status: DISCONTINUED | OUTPATIENT
Start: 2021-01-01 | End: 2021-01-01

## 2021-01-01 RX ORDER — MORPHINE SULFATE 2 MG/ML
2 INJECTION, SOLUTION INTRAMUSCULAR; INTRAVENOUS
Status: DISCONTINUED | OUTPATIENT
Start: 2021-01-01 | End: 2021-01-01

## 2021-01-01 RX ORDER — DEXTROSE MONOHYDRATE 100 MG/ML
100 INJECTION, SOLUTION INTRAVENOUS CONTINUOUS
Status: DISPENSED | OUTPATIENT
Start: 2021-01-01 | End: 2021-01-01

## 2021-01-01 RX ORDER — POTASSIUM CHLORIDE 20 MEQ/1
40 TABLET, EXTENDED RELEASE ORAL EVERY 6 HOURS
Status: COMPLETED | OUTPATIENT
Start: 2021-01-01 | End: 2021-01-01

## 2021-01-01 RX ORDER — DEXAMETHASONE SODIUM PHOSPHATE 100 MG/10ML
6 INJECTION INTRAMUSCULAR; INTRAVENOUS
Status: COMPLETED | OUTPATIENT
Start: 2021-01-01 | End: 2021-01-01

## 2021-01-01 RX ORDER — MELATONIN
2000 DAILY
Status: DISCONTINUED | OUTPATIENT
Start: 2021-01-01 | End: 2021-01-01 | Stop reason: HOSPADM

## 2021-01-01 RX ORDER — POTASSIUM CHLORIDE 14.9 MG/ML
20 INJECTION INTRAVENOUS
Status: DISPENSED | OUTPATIENT
Start: 2021-01-01 | End: 2021-01-01

## 2021-01-01 RX ORDER — ROCURONIUM BROMIDE 10 MG/ML
50 INJECTION, SOLUTION INTRAVENOUS
Status: COMPLETED | OUTPATIENT
Start: 2021-01-01 | End: 2021-01-01

## 2021-01-01 RX ORDER — SODIUM CHLORIDE 0.9 % (FLUSH) 0.9 %
5-40 SYRINGE (ML) INJECTION EVERY 8 HOURS
Status: DISCONTINUED | OUTPATIENT
Start: 2021-01-01 | End: 2021-01-01

## 2021-01-01 RX ORDER — INSULIN GLARGINE 100 [IU]/ML
60 INJECTION, SOLUTION SUBCUTANEOUS DAILY
Status: DISCONTINUED | OUTPATIENT
Start: 2021-01-01 | End: 2021-01-01

## 2021-01-01 RX ORDER — ACETAMINOPHEN 325 MG/1
650 TABLET ORAL
Status: DISCONTINUED | OUTPATIENT
Start: 2021-01-01 | End: 2021-01-01

## 2021-01-01 RX ORDER — SODIUM CHLORIDE 0.9 % (FLUSH) 0.9 %
5-10 SYRINGE (ML) INJECTION AS NEEDED
Status: DISCONTINUED | OUTPATIENT
Start: 2021-01-01 | End: 2021-01-01 | Stop reason: SDUPTHER

## 2021-01-01 RX ORDER — DEXTROSE 50 % IN WATER (D50W) INTRAVENOUS SYRINGE
25-50 AS NEEDED
Status: DISCONTINUED | OUTPATIENT
Start: 2021-01-01 | End: 2021-01-01 | Stop reason: SDUPTHER

## 2021-01-01 RX ORDER — DEXTROSE 50 % IN WATER (D50W) INTRAVENOUS SYRINGE
25-50 AS NEEDED
Status: DISCONTINUED | OUTPATIENT
Start: 2021-01-01 | End: 2021-01-01

## 2021-01-01 RX ORDER — KETAMINE HYDROCHLORIDE 10 MG/ML
0.5 INJECTION, SOLUTION INTRAMUSCULAR; INTRAVENOUS ONCE
Status: COMPLETED | OUTPATIENT
Start: 2021-01-01 | End: 2021-01-01

## 2021-01-01 RX ORDER — ALBUTEROL SULFATE 90 UG/1
2 AEROSOL, METERED RESPIRATORY (INHALATION)
Status: DISCONTINUED | OUTPATIENT
Start: 2021-01-01 | End: 2021-01-01

## 2021-01-01 RX ORDER — DEXTROSE MONOHYDRATE 100 MG/ML
100 INJECTION, SOLUTION INTRAVENOUS CONTINUOUS
Status: DISCONTINUED | OUTPATIENT
Start: 2021-01-01 | End: 2021-01-01

## 2021-01-01 RX ORDER — FENTANYL CITRATE 50 UG/ML
INJECTION, SOLUTION INTRAMUSCULAR; INTRAVENOUS
Status: COMPLETED
Start: 2021-01-01 | End: 2021-01-01

## 2021-01-01 RX ORDER — DEXTROSE 40 %
15 GEL (GRAM) ORAL AS NEEDED
Status: DISCONTINUED | OUTPATIENT
Start: 2021-01-01 | End: 2021-01-01

## 2021-01-01 RX ORDER — ONDANSETRON 4 MG/1
4 TABLET, ORALLY DISINTEGRATING ORAL
Status: DISCONTINUED | OUTPATIENT
Start: 2021-01-01 | End: 2021-01-01 | Stop reason: HOSPADM

## 2021-01-01 RX ORDER — SODIUM CHLORIDE 9 MG/ML
250 INJECTION, SOLUTION INTRAVENOUS AS NEEDED
Status: DISCONTINUED | OUTPATIENT
Start: 2021-01-01 | End: 2021-01-01 | Stop reason: HOSPADM

## 2021-01-01 RX ORDER — CALCIUM ACETATE 667 MG/1
1 TABLET ORAL
Status: DISCONTINUED | OUTPATIENT
Start: 2021-01-01 | End: 2021-01-01

## 2021-01-01 RX ORDER — INSULIN GLARGINE 100 [IU]/ML
30 INJECTION, SOLUTION SUBCUTANEOUS EVERY 12 HOURS
Status: DISCONTINUED | OUTPATIENT
Start: 2021-01-01 | End: 2021-01-01 | Stop reason: SDUPTHER

## 2021-01-01 RX ORDER — LORAZEPAM 2 MG/ML
2 INJECTION INTRAMUSCULAR
Status: DISCONTINUED | OUTPATIENT
Start: 2021-01-01 | End: 2021-01-01 | Stop reason: HOSPADM

## 2021-01-01 RX ORDER — MORPHINE SULFATE 2 MG/ML
1 INJECTION, SOLUTION INTRAMUSCULAR; INTRAVENOUS
Status: DISCONTINUED | OUTPATIENT
Start: 2021-01-01 | End: 2021-01-01 | Stop reason: HOSPADM

## 2021-01-01 RX ORDER — RANOLAZINE 500 MG/1
500 TABLET, EXTENDED RELEASE ORAL EVERY 12 HOURS
Status: DISCONTINUED | OUTPATIENT
Start: 2021-01-01 | End: 2021-01-01

## 2021-01-01 RX ORDER — ALBUMIN HUMAN 250 G/1000ML
25 SOLUTION INTRAVENOUS ONCE
Status: COMPLETED | OUTPATIENT
Start: 2021-01-01 | End: 2021-01-01

## 2021-01-01 RX ORDER — NOREPINEPHRINE BITARTRATE/D5W 4MG/250ML
.5-3 PLASTIC BAG, INJECTION (ML) INTRAVENOUS
Status: DISCONTINUED | OUTPATIENT
Start: 2021-01-01 | End: 2021-01-01

## 2021-01-01 RX ORDER — SUCCINYLCHOLINE CHLORIDE 20 MG/ML INJECTION SOLUTION
SOLUTION
Status: DISCONTINUED
Start: 2021-01-01 | End: 2021-01-01 | Stop reason: WASHOUT

## 2021-01-01 RX ORDER — ISOSORBIDE MONONITRATE 30 MG/1
120 TABLET, EXTENDED RELEASE ORAL DAILY
Status: CANCELLED | OUTPATIENT
Start: 2021-01-01

## 2021-01-01 RX ORDER — HEPARIN SODIUM 5000 [USP'U]/ML
7500 INJECTION, SOLUTION INTRAVENOUS; SUBCUTANEOUS EVERY 8 HOURS
Status: DISCONTINUED | OUTPATIENT
Start: 2021-01-01 | End: 2021-01-01

## 2021-01-01 RX ORDER — NOREPINEPHRINE BIT/0.9 % NACL 4MG/250ML
.5-3 PLASTIC BAG, INJECTION (ML) INTRAVENOUS
Status: DISCONTINUED | OUTPATIENT
Start: 2021-01-01 | End: 2021-01-01 | Stop reason: CLARIF

## 2021-01-01 RX ORDER — RANOLAZINE 500 MG/1
500 TABLET, EXTENDED RELEASE ORAL EVERY 12 HOURS
Status: CANCELLED | OUTPATIENT
Start: 2021-01-01

## 2021-01-01 RX ORDER — DEXAMETHASONE SODIUM PHOSPHATE 100 MG/10ML
6 INJECTION INTRAMUSCULAR; INTRAVENOUS EVERY 24 HOURS
Status: DISCONTINUED | OUTPATIENT
Start: 2021-01-01 | End: 2021-01-01

## 2021-01-01 RX ORDER — INSULIN LISPRO 100 [IU]/ML
INJECTION, SOLUTION INTRAVENOUS; SUBCUTANEOUS
Status: DISCONTINUED | OUTPATIENT
Start: 2021-01-01 | End: 2021-01-01

## 2021-01-01 RX ORDER — DEXTROSE MONOHYDRATE 50 MG/ML
100 INJECTION, SOLUTION INTRAVENOUS CONTINUOUS
Status: DISCONTINUED | OUTPATIENT
Start: 2021-01-01 | End: 2021-01-01

## 2021-01-01 RX ORDER — INSULIN GLARGINE 100 [IU]/ML
15 INJECTION, SOLUTION SUBCUTANEOUS 2 TIMES DAILY
Status: DISCONTINUED | OUTPATIENT
Start: 2021-01-01 | End: 2021-01-01

## 2021-01-01 RX ORDER — INSULIN LISPRO 100 [IU]/ML
INJECTION, SOLUTION INTRAVENOUS; SUBCUTANEOUS
Status: DISCONTINUED | OUTPATIENT
Start: 2021-01-01 | End: 2021-01-01 | Stop reason: HOSPADM

## 2021-01-01 RX ORDER — DEXAMETHASONE SODIUM PHOSPHATE 100 MG/10ML
10 INJECTION INTRAMUSCULAR; INTRAVENOUS
Status: COMPLETED | OUTPATIENT
Start: 2021-01-01 | End: 2021-01-01

## 2021-01-01 RX ORDER — METOPROLOL SUCCINATE 50 MG/1
50 TABLET, EXTENDED RELEASE ORAL DAILY
Status: DISCONTINUED | OUTPATIENT
Start: 2021-01-01 | End: 2021-01-01

## 2021-01-01 RX ORDER — SODIUM CHLORIDE 0.9 % (FLUSH) 0.9 %
5-40 SYRINGE (ML) INJECTION EVERY 8 HOURS
Status: DISCONTINUED | OUTPATIENT
Start: 2021-01-01 | End: 2021-01-01 | Stop reason: HOSPADM

## 2021-01-01 RX ORDER — SODIUM CHLORIDE 0.9 % (FLUSH) 0.9 %
5-10 SYRINGE (ML) INJECTION EVERY 8 HOURS
Status: DISCONTINUED | OUTPATIENT
Start: 2021-01-01 | End: 2021-01-01 | Stop reason: SDUPTHER

## 2021-01-01 RX ORDER — POLYETHYLENE GLYCOL 3350 17 G/17G
17 POWDER, FOR SOLUTION ORAL DAILY
Status: DISCONTINUED | OUTPATIENT
Start: 2021-01-01 | End: 2021-01-01

## 2021-01-01 RX ORDER — SODIUM CHLORIDE 0.9 % (FLUSH) 0.9 %
5-10 SYRINGE (ML) INJECTION EVERY 8 HOURS
Status: DISCONTINUED | OUTPATIENT
Start: 2021-01-01 | End: 2021-01-01

## 2021-01-01 RX ORDER — FENTANYL CITRATE 50 UG/ML
100 INJECTION, SOLUTION INTRAMUSCULAR; INTRAVENOUS ONCE
Status: COMPLETED | OUTPATIENT
Start: 2021-01-01 | End: 2021-01-01

## 2021-01-01 RX ORDER — DEXTROSE 40 %
15 GEL (GRAM) ORAL AS NEEDED
Status: DISCONTINUED | OUTPATIENT
Start: 2021-01-01 | End: 2021-01-01 | Stop reason: SDUPTHER

## 2021-01-01 RX ORDER — HYDRALAZINE HYDROCHLORIDE 20 MG/ML
20 INJECTION INTRAMUSCULAR; INTRAVENOUS
Status: DISCONTINUED | OUTPATIENT
Start: 2021-01-01 | End: 2021-01-01 | Stop reason: HOSPADM

## 2021-01-01 RX ORDER — ESCITALOPRAM OXALATE 10 MG/1
20 TABLET ORAL 2 TIMES DAILY
Status: CANCELLED | OUTPATIENT
Start: 2021-01-01

## 2021-01-01 RX ORDER — AMOXICILLIN 250 MG
2 CAPSULE ORAL
Status: DISCONTINUED | OUTPATIENT
Start: 2021-01-01 | End: 2021-01-01

## 2021-01-01 RX ORDER — CALCIUM GLUCONATE 20 MG/ML
1 INJECTION, SOLUTION INTRAVENOUS ONCE
Status: COMPLETED | OUTPATIENT
Start: 2021-01-01 | End: 2021-01-01

## 2021-01-01 RX ORDER — POTASSIUM CHLORIDE 20 MEQ/1
40 TABLET, EXTENDED RELEASE ORAL
Status: COMPLETED | OUTPATIENT
Start: 2021-01-01 | End: 2021-01-01

## 2021-01-01 RX ORDER — FENTANYL CITRATE-0.9 % NACL/PF 25 MCG/ML
0-200 PLASTIC BAG, INJECTION (ML) INJECTION
Status: DISCONTINUED | OUTPATIENT
Start: 2021-01-01 | End: 2021-01-01

## 2021-01-01 RX ORDER — AZITHROMYCIN 250 MG/1
500 TABLET, FILM COATED ORAL DAILY
Status: DISCONTINUED | OUTPATIENT
Start: 2021-01-01 | End: 2021-01-01

## 2021-01-01 RX ORDER — CIPROFLOXACIN 0.5 MG/.25ML
0.25 SOLUTION/ DROPS AURICULAR (OTIC) EVERY 12 HOURS
Status: DISCONTINUED | OUTPATIENT
Start: 2021-01-01 | End: 2021-01-01

## 2021-01-01 RX ORDER — LORAZEPAM 0.5 MG/1
0.5 TABLET ORAL
Status: DISCONTINUED | OUTPATIENT
Start: 2021-01-01 | End: 2021-01-01

## 2021-01-01 RX ORDER — TRAZODONE HYDROCHLORIDE 50 MG/1
100 TABLET ORAL
Status: DISCONTINUED | OUTPATIENT
Start: 2021-01-01 | End: 2021-01-01

## 2021-01-01 RX ORDER — ATRACURIUM BESYLATE 10 MG/ML
0.4 INJECTION, SOLUTION INTRAVENOUS ONCE
Status: COMPLETED | OUTPATIENT
Start: 2021-01-01 | End: 2021-01-01

## 2021-01-01 RX ORDER — AMLODIPINE BESYLATE 5 MG/1
5 TABLET ORAL DAILY
Status: DISCONTINUED | OUTPATIENT
Start: 2021-01-01 | End: 2021-01-01

## 2021-01-01 RX ORDER — DEXAMETHASONE SODIUM PHOSPHATE 100 MG/10ML
6 INJECTION INTRAMUSCULAR; INTRAVENOUS EVERY 24 HOURS
Status: DISCONTINUED | OUTPATIENT
Start: 2021-01-01 | End: 2021-01-01 | Stop reason: HOSPADM

## 2021-01-01 RX ORDER — ETOMIDATE 2 MG/ML
INJECTION INTRAVENOUS
Status: COMPLETED
Start: 2021-01-01 | End: 2021-01-01

## 2021-01-01 RX ORDER — NOREPINEPHRINE BITARTRATE/D5W 4MG/250ML
PLASTIC BAG, INJECTION (ML) INTRAVENOUS
Status: COMPLETED
Start: 2021-01-01 | End: 2021-01-01

## 2021-01-01 RX ORDER — INSULIN GLARGINE 100 [IU]/ML
40 INJECTION, SOLUTION SUBCUTANEOUS 2 TIMES DAILY
Status: DISCONTINUED | OUTPATIENT
Start: 2021-01-01 | End: 2021-01-01 | Stop reason: ALTCHOICE

## 2021-01-01 RX ORDER — SODIUM CHLORIDE 0.9 % (FLUSH) 0.9 %
5-40 SYRINGE (ML) INJECTION AS NEEDED
Status: DISCONTINUED | OUTPATIENT
Start: 2021-01-01 | End: 2021-01-01

## 2021-01-01 RX ORDER — INSULIN LISPRO 100 [IU]/ML
0.2 INJECTION, SOLUTION INTRAVENOUS; SUBCUTANEOUS
Status: DISCONTINUED | OUTPATIENT
Start: 2021-01-01 | End: 2021-01-01

## 2021-01-01 RX ORDER — DEXAMETHASONE 4 MG/1
4 TABLET ORAL EVERY 12 HOURS
Status: DISCONTINUED | OUTPATIENT
Start: 2021-01-01 | End: 2021-01-01

## 2021-01-01 RX ORDER — INSULIN GLARGINE 100 [IU]/ML
10 INJECTION, SOLUTION SUBCUTANEOUS DAILY
Status: DISCONTINUED | OUTPATIENT
Start: 2021-01-01 | End: 2021-01-01 | Stop reason: HOSPADM

## 2021-01-01 RX ORDER — CLOPIDOGREL BISULFATE 75 MG/1
75 TABLET ORAL DAILY
Status: CANCELLED | OUTPATIENT
Start: 2021-01-01

## 2021-01-01 RX ORDER — ASCORBIC ACID 500 MG
1000 TABLET ORAL 2 TIMES DAILY
Status: DISCONTINUED | OUTPATIENT
Start: 2021-01-01 | End: 2021-01-01 | Stop reason: HOSPADM

## 2021-01-01 RX ORDER — SODIUM CHLORIDE 0.9 % (FLUSH) 0.9 %
5-10 SYRINGE (ML) INJECTION AS NEEDED
Status: DISCONTINUED | OUTPATIENT
Start: 2021-01-01 | End: 2021-01-01 | Stop reason: HOSPADM

## 2021-01-01 RX ORDER — DEXTROSE MONOHYDRATE 100 MG/ML
100 INJECTION, SOLUTION INTRAVENOUS CONTINUOUS
Status: DISCONTINUED | OUTPATIENT
Start: 2021-01-01 | End: 2021-01-01 | Stop reason: HOSPADM

## 2021-01-01 RX ORDER — FENTANYL CITRATE 50 UG/ML
INJECTION, SOLUTION INTRAMUSCULAR; INTRAVENOUS
Status: DISCONTINUED
Start: 2021-01-01 | End: 2021-01-01 | Stop reason: WASHOUT

## 2021-01-01 RX ORDER — ACETAMINOPHEN 650 MG/1
650 SUPPOSITORY RECTAL
Status: DISCONTINUED | OUTPATIENT
Start: 2021-01-01 | End: 2021-01-01

## 2021-01-01 RX ORDER — MELATONIN
2000 DAILY
Status: DISCONTINUED | OUTPATIENT
Start: 2021-01-01 | End: 2021-01-01

## 2021-01-01 RX ORDER — DEXTROSE 50 % IN WATER (D50W) INTRAVENOUS SYRINGE
25-50 AS NEEDED
Status: DISCONTINUED | OUTPATIENT
Start: 2021-01-01 | End: 2021-01-01 | Stop reason: HOSPADM

## 2021-01-01 RX ORDER — ALBUMIN HUMAN 250 G/1000ML
25 SOLUTION INTRAVENOUS EVERY 6 HOURS
Status: COMPLETED | OUTPATIENT
Start: 2021-01-01 | End: 2021-01-01

## 2021-01-01 RX ADMIN — HEPARIN SODIUM 7500 UNITS: 5000 INJECTION INTRAVENOUS; SUBCUTANEOUS at 05:16

## 2021-01-01 RX ADMIN — HEPARIN SODIUM 7500 UNITS: 5000 INJECTION INTRAVENOUS; SUBCUTANEOUS at 12:41

## 2021-01-01 RX ADMIN — BUDESONIDE AND FORMOTEROL FUMARATE DIHYDRATE 2 PUFF: 160; 4.5 AEROSOL RESPIRATORY (INHALATION) at 19:58

## 2021-01-01 RX ADMIN — POTASSIUM CHLORIDE 40 MEQ: 20 TABLET, EXTENDED RELEASE ORAL at 17:19

## 2021-01-01 RX ADMIN — CETIRIZINE HYDROCHLORIDE 10 MG: 10 TABLET, FILM COATED ORAL at 08:41

## 2021-01-01 RX ADMIN — SODIUM CHLORIDE 6 MCG/KG/MIN: 900 INJECTION, SOLUTION INTRAVENOUS at 11:47

## 2021-01-01 RX ADMIN — FLUTICASONE PROPIONATE 2 SPRAY: 50 SPRAY, METERED NASAL at 09:00

## 2021-01-01 RX ADMIN — DEXTROSE MONOHYDRATE 100 ML/HR: 10 INJECTION, SOLUTION INTRAVENOUS at 18:12

## 2021-01-01 RX ADMIN — RANOLAZINE 500 MG: 500 TABLET, FILM COATED, EXTENDED RELEASE ORAL at 20:36

## 2021-01-01 RX ADMIN — Medication 10 ML: at 13:26

## 2021-01-01 RX ADMIN — INSULIN LISPRO 12 UNITS: 100 INJECTION, SOLUTION INTRAVENOUS; SUBCUTANEOUS at 12:00

## 2021-01-01 RX ADMIN — CALCIUM GLUCONATE: 98 INJECTION, SOLUTION INTRAVENOUS at 18:18

## 2021-01-01 RX ADMIN — INSULIN LISPRO 15 UNITS: 100 INJECTION, SOLUTION INTRAVENOUS; SUBCUTANEOUS at 13:32

## 2021-01-01 RX ADMIN — ALBUTEROL SULFATE 2 PUFF: 90 AEROSOL, METERED RESPIRATORY (INHALATION) at 14:56

## 2021-01-01 RX ADMIN — ALBUTEROL SULFATE 2 PUFF: 90 AEROSOL, METERED RESPIRATORY (INHALATION) at 16:57

## 2021-01-01 RX ADMIN — CLOPIDOGREL BISULFATE 75 MG: 75 TABLET ORAL at 09:09

## 2021-01-01 RX ADMIN — INSULIN GLARGINE 48 UNITS: 100 INJECTION, SOLUTION SUBCUTANEOUS at 08:53

## 2021-01-01 RX ADMIN — HEPARIN SODIUM 7500 UNITS: 5000 INJECTION INTRAVENOUS; SUBCUTANEOUS at 20:17

## 2021-01-01 RX ADMIN — INSULIN GLARGINE 20 UNITS: 100 INJECTION, SOLUTION SUBCUTANEOUS at 09:49

## 2021-01-01 RX ADMIN — ALBUTEROL SULFATE 2 PUFF: 90 AEROSOL, METERED RESPIRATORY (INHALATION) at 20:00

## 2021-01-01 RX ADMIN — INSULIN LISPRO 24 UNITS: 100 INJECTION, SOLUTION INTRAVENOUS; SUBCUTANEOUS at 17:11

## 2021-01-01 RX ADMIN — LEVOTHYROXINE SODIUM 125 MCG: 75 TABLET ORAL at 07:30

## 2021-01-01 RX ADMIN — HEPARIN SODIUM 5000 UNITS: 5000 INJECTION INTRAVENOUS; SUBCUTANEOUS at 06:12

## 2021-01-01 RX ADMIN — ALBUTEROL SULFATE 2.5 MG: 2.5 SOLUTION RESPIRATORY (INHALATION) at 19:48

## 2021-01-01 RX ADMIN — ERGOCALCIFEROL 50000 UNITS: 1.25 CAPSULE ORAL at 10:51

## 2021-01-01 RX ADMIN — CIPROFLOXACIN 0.25 ML: 0.5 SOLUTION/ DROPS AURICULAR (OTIC) at 09:28

## 2021-01-01 RX ADMIN — INSULIN GLARGINE 30 UNITS: 100 INJECTION, SOLUTION SUBCUTANEOUS at 00:36

## 2021-01-01 RX ADMIN — DEXAMETHASONE 4 MG: 4 TABLET ORAL at 08:51

## 2021-01-01 RX ADMIN — ALBUTEROL SULFATE 2 PUFF: 90 AEROSOL, METERED RESPIRATORY (INHALATION) at 13:20

## 2021-01-01 RX ADMIN — TRAZODONE HYDROCHLORIDE 100 MG: 50 TABLET ORAL at 21:50

## 2021-01-01 RX ADMIN — INSULIN GLARGINE 10 UNITS: 100 INJECTION, SOLUTION SUBCUTANEOUS at 08:07

## 2021-01-01 RX ADMIN — INSULIN LISPRO 12 UNITS: 100 INJECTION, SOLUTION INTRAVENOUS; SUBCUTANEOUS at 05:07

## 2021-01-01 RX ADMIN — OXYCODONE HYDROCHLORIDE AND ACETAMINOPHEN 1000 MG: 500 TABLET ORAL at 17:12

## 2021-01-01 RX ADMIN — DEXAMETHASONE SODIUM PHOSPHATE 6 MG: 10 INJECTION INTRAMUSCULAR; INTRAVENOUS at 14:56

## 2021-01-01 RX ADMIN — ACETAMINOPHEN 650 MG: 325 TABLET ORAL at 00:53

## 2021-01-01 RX ADMIN — Medication 6 MG/HR: at 06:45

## 2021-01-01 RX ADMIN — NOREPINEPHRINE BITARTRATE 6 MCG/MIN: 1 INJECTION, SOLUTION, CONCENTRATE INTRAVENOUS at 22:11

## 2021-01-01 RX ADMIN — FENTANYL CITRATE 50 MCG/HR: 50 INJECTION INTRAVENOUS at 21:57

## 2021-01-01 RX ADMIN — Medication 5 ML: at 13:07

## 2021-01-01 RX ADMIN — INSULIN LISPRO 24 UNITS: 100 INJECTION, SOLUTION INTRAVENOUS; SUBCUTANEOUS at 08:08

## 2021-01-01 RX ADMIN — SODIUM CHLORIDE 28.6 UNITS/HR: 9 INJECTION, SOLUTION INTRAVENOUS at 05:09

## 2021-01-01 RX ADMIN — METOPROLOL SUCCINATE 50 MG: 50 TABLET, EXTENDED RELEASE ORAL at 09:44

## 2021-01-01 RX ADMIN — HEPARIN SODIUM 7500 UNITS: 5000 INJECTION INTRAVENOUS; SUBCUTANEOUS at 13:30

## 2021-01-01 RX ADMIN — ESCITALOPRAM OXALATE 20 MG: 10 TABLET ORAL at 08:57

## 2021-01-01 RX ADMIN — INSULIN GLARGINE 40 UNITS: 100 INJECTION, SOLUTION SUBCUTANEOUS at 17:09

## 2021-01-01 RX ADMIN — ESCITALOPRAM OXALATE 20 MG: 10 TABLET ORAL at 08:40

## 2021-01-01 RX ADMIN — FENTANYL CITRATE 100 MCG: 50 INJECTION, SOLUTION INTRAMUSCULAR; INTRAVENOUS at 21:47

## 2021-01-01 RX ADMIN — ESCITALOPRAM OXALATE 20 MG: 10 TABLET ORAL at 08:58

## 2021-01-01 RX ADMIN — LEVOTHYROXINE SODIUM 125 MCG: 0.07 TABLET ORAL at 06:23

## 2021-01-01 RX ADMIN — INSULIN HUMAN 2 UNITS: 100 INJECTION, SOLUTION PARENTERAL at 15:52

## 2021-01-01 RX ADMIN — INSULIN LISPRO 3 UNITS: 100 INJECTION, SOLUTION INTRAVENOUS; SUBCUTANEOUS at 17:29

## 2021-01-01 RX ADMIN — CETIRIZINE HYDROCHLORIDE 10 MG: 10 TABLET, FILM COATED ORAL at 09:45

## 2021-01-01 RX ADMIN — HEPARIN SODIUM 7500 UNITS: 5000 INJECTION INTRAVENOUS; SUBCUTANEOUS at 05:22

## 2021-01-01 RX ADMIN — NOREPINEPHRINE BITARTRATE 8 MCG/MIN: 1 INJECTION, SOLUTION, CONCENTRATE INTRAVENOUS at 21:45

## 2021-01-01 RX ADMIN — CIPROFLOXACIN 0.25 ML: 0.5 SOLUTION/ DROPS AURICULAR (OTIC) at 09:00

## 2021-01-01 RX ADMIN — ALBUMIN (HUMAN) 25 G: 0.25 INJECTION, SOLUTION INTRAVENOUS at 05:14

## 2021-01-01 RX ADMIN — SODIUM CHLORIDE 9.6 UNITS/HR: 9 INJECTION, SOLUTION INTRAVENOUS at 16:00

## 2021-01-01 RX ADMIN — INSULIN LISPRO 3 UNITS: 100 INJECTION, SOLUTION INTRAVENOUS; SUBCUTANEOUS at 08:07

## 2021-01-01 RX ADMIN — FUROSEMIDE 60 MG: 40 TABLET ORAL at 08:42

## 2021-01-01 RX ADMIN — CETIRIZINE HYDROCHLORIDE 10 MG: 10 TABLET, FILM COATED ORAL at 08:55

## 2021-01-01 RX ADMIN — Medication 10 ML: at 13:34

## 2021-01-01 RX ADMIN — INSULIN GLARGINE 15 UNITS: 100 INJECTION, SOLUTION SUBCUTANEOUS at 09:00

## 2021-01-01 RX ADMIN — SODIUM CHLORIDE 22.7 UNITS/HR: 9 INJECTION, SOLUTION INTRAVENOUS at 18:10

## 2021-01-01 RX ADMIN — ASPIRIN 81 MG: 81 TABLET ORAL at 05:59

## 2021-01-01 RX ADMIN — ROCURONIUM BROMIDE 50 MG: 10 INJECTION, SOLUTION INTRAVENOUS at 21:47

## 2021-01-01 RX ADMIN — RANOLAZINE 500 MG: 500 TABLET, FILM COATED, EXTENDED RELEASE ORAL at 09:12

## 2021-01-01 RX ADMIN — Medication 10 ML: at 13:29

## 2021-01-01 RX ADMIN — RANOLAZINE 500 MG: 500 TABLET, FILM COATED, EXTENDED RELEASE ORAL at 20:55

## 2021-01-01 RX ADMIN — ALBUTEROL SULFATE 2.5 MG: 2.5 SOLUTION RESPIRATORY (INHALATION) at 02:00

## 2021-01-01 RX ADMIN — AZITHROMYCIN MONOHYDRATE 500 MG: 250 TABLET ORAL at 08:42

## 2021-01-01 RX ADMIN — ISOSORBIDE MONONITRATE 120 MG: 30 TABLET, EXTENDED RELEASE ORAL at 08:57

## 2021-01-01 RX ADMIN — CETIRIZINE HYDROCHLORIDE 10 MG: 10 TABLET, FILM COATED ORAL at 09:13

## 2021-01-01 RX ADMIN — MINERAL OIL, PETROLATUM: 425; 568 OINTMENT OPHTHALMIC at 00:36

## 2021-01-01 RX ADMIN — Medication 5 ML: at 13:06

## 2021-01-01 RX ADMIN — Medication 5 ML: at 05:04

## 2021-01-01 RX ADMIN — INSULIN HUMAN 10 UNITS: 100 INJECTION, SOLUTION PARENTERAL at 00:27

## 2021-01-01 RX ADMIN — HEPARIN SODIUM 7500 UNITS: 5000 INJECTION INTRAVENOUS; SUBCUTANEOUS at 05:05

## 2021-01-01 RX ADMIN — HEPARIN SODIUM 7500 UNITS: 5000 INJECTION INTRAVENOUS; SUBCUTANEOUS at 20:38

## 2021-01-01 RX ADMIN — INSULIN LISPRO 24 UNITS: 100 INJECTION, SOLUTION INTRAVENOUS; SUBCUTANEOUS at 12:53

## 2021-01-01 RX ADMIN — BUDESONIDE AND FORMOTEROL FUMARATE DIHYDRATE 2 PUFF: 160; 4.5 AEROSOL RESPIRATORY (INHALATION) at 08:00

## 2021-01-01 RX ADMIN — POLYETHYLENE GLYCOL 3350 17 G: 17 POWDER, FOR SOLUTION ORAL at 08:22

## 2021-01-01 RX ADMIN — INSULIN LISPRO 3 UNITS: 100 INJECTION, SOLUTION INTRAVENOUS; SUBCUTANEOUS at 09:47

## 2021-01-01 RX ADMIN — LEVOTHYROXINE SODIUM 125 MCG: 0.07 TABLET ORAL at 07:09

## 2021-01-01 RX ADMIN — HEPARIN SODIUM 7500 UNITS: 5000 INJECTION INTRAVENOUS; SUBCUTANEOUS at 21:10

## 2021-01-01 RX ADMIN — Medication 5 ML: at 05:28

## 2021-01-01 RX ADMIN — INSULIN LISPRO 15 UNITS: 100 INJECTION, SOLUTION INTRAVENOUS; SUBCUTANEOUS at 22:00

## 2021-01-01 RX ADMIN — Medication 10 ML: at 05:37

## 2021-01-01 RX ADMIN — ESCITALOPRAM OXALATE 20 MG: 10 TABLET ORAL at 17:34

## 2021-01-01 RX ADMIN — CIPROFLOXACIN 0.25 ML: 0.5 SOLUTION/ DROPS AURICULAR (OTIC) at 22:08

## 2021-01-01 RX ADMIN — Medication 10 ML: at 22:45

## 2021-01-01 RX ADMIN — Medication 2 MG/HR: at 22:12

## 2021-01-01 RX ADMIN — ALBUTEROL SULFATE 2 PUFF: 90 AEROSOL, METERED RESPIRATORY (INHALATION) at 20:40

## 2021-01-01 RX ADMIN — AZITHROMYCIN MONOHYDRATE 500 MG: 250 TABLET ORAL at 09:26

## 2021-01-01 RX ADMIN — LEVOTHYROXINE SODIUM 125 MCG: 0.07 TABLET ORAL at 06:35

## 2021-01-01 RX ADMIN — ISOSORBIDE MONONITRATE 120 MG: 30 TABLET, EXTENDED RELEASE ORAL at 09:26

## 2021-01-01 RX ADMIN — ISOSORBIDE MONONITRATE 120 MG: 30 TABLET, EXTENDED RELEASE ORAL at 08:55

## 2021-01-01 RX ADMIN — RANOLAZINE 500 MG: 500 TABLET, FILM COATED, EXTENDED RELEASE ORAL at 08:51

## 2021-01-01 RX ADMIN — FENTANYL CITRATE 200 MCG/HR: 50 INJECTION INTRAVENOUS at 22:47

## 2021-01-01 RX ADMIN — DEXAMETHASONE 4 MG: 4 TABLET ORAL at 20:57

## 2021-01-01 RX ADMIN — Medication 10 ML: at 22:01

## 2021-01-01 RX ADMIN — FENTANYL CITRATE 200 MCG/HR: 50 INJECTION INTRAVENOUS at 23:02

## 2021-01-01 RX ADMIN — FLUTICASONE PROPIONATE 2 SPRAY: 50 SPRAY, METERED NASAL at 08:17

## 2021-01-01 RX ADMIN — ASPIRIN 81 MG: 81 TABLET ORAL at 07:09

## 2021-01-01 RX ADMIN — ETOMIDATE 20 MG: 2 INJECTION, SOLUTION INTRAVENOUS at 21:47

## 2021-01-01 RX ADMIN — ESCITALOPRAM OXALATE 20 MG: 10 TABLET ORAL at 17:57

## 2021-01-01 RX ADMIN — HEPARIN SODIUM 7500 UNITS: 5000 INJECTION INTRAVENOUS; SUBCUTANEOUS at 20:44

## 2021-01-01 RX ADMIN — Medication 6000 UNITS: at 09:43

## 2021-01-01 RX ADMIN — Medication 5 ML: at 06:12

## 2021-01-01 RX ADMIN — PIPERACILLIN SODIUM AND TAZOBACTAM SODIUM 3.38 G: 3; .375 INJECTION, POWDER, LYOPHILIZED, FOR SOLUTION INTRAVENOUS at 01:03

## 2021-01-01 RX ADMIN — PIPERACILLIN SODIUM AND TAZOBACTAM SODIUM 3.38 G: 3; .375 INJECTION, POWDER, LYOPHILIZED, FOR SOLUTION INTRAVENOUS at 12:54

## 2021-01-01 RX ADMIN — AZITHROMYCIN MONOHYDRATE 500 MG: 250 TABLET ORAL at 09:45

## 2021-01-01 RX ADMIN — Medication 10 ML: at 05:26

## 2021-01-01 RX ADMIN — Medication 10 ML: at 21:20

## 2021-01-01 RX ADMIN — Medication 10 ML: at 22:06

## 2021-01-01 RX ADMIN — ALBUMIN (HUMAN) 25 G: 0.25 INJECTION, SOLUTION INTRAVENOUS at 03:45

## 2021-01-01 RX ADMIN — Medication 10 ML: at 21:01

## 2021-01-01 RX ADMIN — FENTANYL CITRATE 200 MCG/HR: 50 INJECTION INTRAVENOUS at 18:10

## 2021-01-01 RX ADMIN — INSULIN LISPRO 12 UNITS: 100 INJECTION, SOLUTION INTRAVENOUS; SUBCUTANEOUS at 08:43

## 2021-01-01 RX ADMIN — Medication 10 ML: at 05:00

## 2021-01-01 RX ADMIN — DEXAMETHASONE 4 MG: 4 TABLET ORAL at 21:10

## 2021-01-01 RX ADMIN — INSULIN LISPRO 15 UNITS: 100 INJECTION, SOLUTION INTRAVENOUS; SUBCUTANEOUS at 13:23

## 2021-01-01 RX ADMIN — ACETAMINOPHEN 650 MG: 325 TABLET ORAL at 17:13

## 2021-01-01 RX ADMIN — ESCITALOPRAM OXALATE 20 MG: 10 TABLET ORAL at 17:32

## 2021-01-01 RX ADMIN — Medication 10 ML: at 05:08

## 2021-01-01 RX ADMIN — BUDESONIDE AND FORMOTEROL FUMARATE DIHYDRATE 2 PUFF: 160; 4.5 AEROSOL RESPIRATORY (INHALATION) at 20:45

## 2021-01-01 RX ADMIN — Medication 6000 UNITS: at 08:55

## 2021-01-01 RX ADMIN — ESCITALOPRAM OXALATE 20 MG: 10 TABLET ORAL at 18:00

## 2021-01-01 RX ADMIN — CLOPIDOGREL BISULFATE 75 MG: 75 TABLET ORAL at 08:22

## 2021-01-01 RX ADMIN — INSULIN GLARGINE 48 UNITS: 100 INJECTION, SOLUTION SUBCUTANEOUS at 09:39

## 2021-01-01 RX ADMIN — INSULIN LISPRO 24 UNITS: 100 INJECTION, SOLUTION INTRAVENOUS; SUBCUTANEOUS at 08:56

## 2021-01-01 RX ADMIN — DEXAMETHASONE SODIUM PHOSPHATE 6 MG: 10 INJECTION, SOLUTION INTRAMUSCULAR; INTRAVENOUS at 08:07

## 2021-01-01 RX ADMIN — ALBUMIN (HUMAN) 25 G: 0.25 INJECTION, SOLUTION INTRAVENOUS at 13:01

## 2021-01-01 RX ADMIN — INSULIN LISPRO 15 UNITS: 100 INJECTION, SOLUTION INTRAVENOUS; SUBCUTANEOUS at 15:54

## 2021-01-01 RX ADMIN — SODIUM CHLORIDE 27.3 UNITS/HR: 900 INJECTION, SOLUTION INTRAVENOUS at 12:03

## 2021-01-01 RX ADMIN — TRAZODONE HYDROCHLORIDE 100 MG: 50 TABLET ORAL at 22:00

## 2021-01-01 RX ADMIN — OXYCODONE HYDROCHLORIDE AND ACETAMINOPHEN 1000 MG: 500 TABLET ORAL at 08:07

## 2021-01-01 RX ADMIN — CLOPIDOGREL BISULFATE 75 MG: 75 TABLET ORAL at 08:57

## 2021-01-01 RX ADMIN — RANOLAZINE 500 MG: 500 TABLET, FILM COATED, EXTENDED RELEASE ORAL at 09:43

## 2021-01-01 RX ADMIN — ESCITALOPRAM OXALATE 20 MG: 10 TABLET ORAL at 18:38

## 2021-01-01 RX ADMIN — Medication 10 ML: at 05:55

## 2021-01-01 RX ADMIN — Medication 5 ML: at 21:26

## 2021-01-01 RX ADMIN — DEXAMETHASONE SODIUM PHOSPHATE 6 MG: 10 INJECTION, SOLUTION INTRAMUSCULAR; INTRAVENOUS at 08:43

## 2021-01-01 RX ADMIN — RANOLAZINE 500 MG: 500 TABLET, FILM COATED, EXTENDED RELEASE ORAL at 09:46

## 2021-01-01 RX ADMIN — INSULIN GLARGINE 40 UNITS: 100 INJECTION, SOLUTION SUBCUTANEOUS at 09:27

## 2021-01-01 RX ADMIN — Medication 10 ML: at 05:43

## 2021-01-01 RX ADMIN — INSULIN LISPRO 24 UNITS: 100 INJECTION, SOLUTION INTRAVENOUS; SUBCUTANEOUS at 17:10

## 2021-01-01 RX ADMIN — ONDANSETRON 4 MG: 2 INJECTION INTRAMUSCULAR; INTRAVENOUS at 10:59

## 2021-01-01 RX ADMIN — Medication 6000 UNITS: at 13:30

## 2021-01-01 RX ADMIN — TUBERCULIN PURIFIED PROTEIN DERIVATIVE 5 UNITS: 5 INJECTION, SOLUTION INTRADERMAL at 12:23

## 2021-01-01 RX ADMIN — CIPROFLOXACIN 0.25 ML: 0.5 SOLUTION/ DROPS AURICULAR (OTIC) at 09:08

## 2021-01-01 RX ADMIN — CIPROFLOXACIN 0.25 ML: 0.5 SOLUTION/ DROPS AURICULAR (OTIC) at 21:33

## 2021-01-01 RX ADMIN — AMLODIPINE BESYLATE 5 MG: 10 TABLET ORAL at 09:05

## 2021-01-01 RX ADMIN — HEPARIN SODIUM 5000 UNITS: 5000 INJECTION INTRAVENOUS; SUBCUTANEOUS at 06:25

## 2021-01-01 RX ADMIN — CETIRIZINE HYDROCHLORIDE 10 MG: 10 TABLET, FILM COATED ORAL at 09:08

## 2021-01-01 RX ADMIN — TOCILIZUMAB 810 MG: 180 INJECTION, SOLUTION SUBCUTANEOUS at 16:37

## 2021-01-01 RX ADMIN — MORPHINE SULFATE 2 MG: 2 INJECTION, SOLUTION INTRAMUSCULAR; INTRAVENOUS at 09:29

## 2021-01-01 RX ADMIN — POLYETHYLENE GLYCOL 3350 17 G: 17 POWDER, FOR SOLUTION ORAL at 08:21

## 2021-01-01 RX ADMIN — ASPIRIN 81 MG: 81 TABLET ORAL at 06:04

## 2021-01-01 RX ADMIN — ESCITALOPRAM OXALATE 20 MG: 10 TABLET ORAL at 09:26

## 2021-01-01 RX ADMIN — DEXAMETHASONE 4 MG: 4 TABLET ORAL at 08:06

## 2021-01-01 RX ADMIN — ALBUTEROL SULFATE 2 PUFF: 90 AEROSOL, METERED RESPIRATORY (INHALATION) at 16:00

## 2021-01-01 RX ADMIN — ALBUTEROL SULFATE 2 PUFF: 90 AEROSOL, METERED RESPIRATORY (INHALATION) at 17:00

## 2021-01-01 RX ADMIN — AZITHROMYCIN MONOHYDRATE 500 MG: 250 TABLET ORAL at 08:48

## 2021-01-01 RX ADMIN — DEXTROSE MONOHYDRATE 100 ML/HR: 10 INJECTION, SOLUTION INTRAVENOUS at 00:20

## 2021-01-01 RX ADMIN — Medication 5 ML: at 22:11

## 2021-01-01 RX ADMIN — FLUTICASONE PROPIONATE 2 SPRAY: 50 SPRAY, METERED NASAL at 09:15

## 2021-01-01 RX ADMIN — HEPARIN SODIUM 7500 UNITS: 5000 INJECTION INTRAVENOUS; SUBCUTANEOUS at 20:57

## 2021-01-01 RX ADMIN — ALBUTEROL SULFATE 2 PUFF: 90 AEROSOL, METERED RESPIRATORY (INHALATION) at 08:28

## 2021-01-01 RX ADMIN — SODIUM CHLORIDE 5.9 UNITS/HR: 9 INJECTION, SOLUTION INTRAVENOUS at 05:29

## 2021-01-01 RX ADMIN — INSULIN LISPRO 9 UNITS: 100 INJECTION, SOLUTION INTRAVENOUS; SUBCUTANEOUS at 08:06

## 2021-01-01 RX ADMIN — ALBUTEROL SULFATE 2 PUFF: 90 AEROSOL, METERED RESPIRATORY (INHALATION) at 20:45

## 2021-01-01 RX ADMIN — ESCITALOPRAM OXALATE 20 MG: 10 TABLET ORAL at 08:17

## 2021-01-01 RX ADMIN — POTASSIUM CHLORIDE 40 MEQ: 20 TABLET, EXTENDED RELEASE ORAL at 14:03

## 2021-01-01 RX ADMIN — SODIUM BICARBONATE: 84 INJECTION, SOLUTION INTRAVENOUS at 13:41

## 2021-01-01 RX ADMIN — DEXAMETHASONE 4 MG: 4 TABLET ORAL at 21:19

## 2021-01-01 RX ADMIN — SODIUM CHLORIDE 10.4 UNITS/HR: 900 INJECTION, SOLUTION INTRAVENOUS at 07:03

## 2021-01-01 RX ADMIN — ESCITALOPRAM OXALATE 20 MG: 10 TABLET ORAL at 17:14

## 2021-01-01 RX ADMIN — FENTANYL CITRATE 200 MCG/HR: 50 INJECTION INTRAVENOUS at 13:10

## 2021-01-01 RX ADMIN — Medication 5 ML: at 17:11

## 2021-01-01 RX ADMIN — ESCITALOPRAM OXALATE 20 MG: 10 TABLET ORAL at 18:08

## 2021-01-01 RX ADMIN — CETIRIZINE HYDROCHLORIDE 10 MG: 10 TABLET, FILM COATED ORAL at 08:07

## 2021-01-01 RX ADMIN — Medication 10 ML: at 21:47

## 2021-01-01 RX ADMIN — ERGOCALCIFEROL 50000 UNITS: 1.25 CAPSULE ORAL at 09:14

## 2021-01-01 RX ADMIN — DEXAMETHASONE SODIUM PHOSPHATE 6 MG: 10 INJECTION INTRAMUSCULAR; INTRAVENOUS at 22:21

## 2021-01-01 RX ADMIN — CLOPIDOGREL BISULFATE 75 MG: 75 TABLET ORAL at 08:56

## 2021-01-01 RX ADMIN — TRAZODONE HYDROCHLORIDE 100 MG: 50 TABLET ORAL at 21:19

## 2021-01-01 RX ADMIN — FUROSEMIDE 60 MG: 40 TABLET ORAL at 17:07

## 2021-01-01 RX ADMIN — SODIUM CHLORIDE 11.6 UNITS/HR: 900 INJECTION, SOLUTION INTRAVENOUS at 05:35

## 2021-01-01 RX ADMIN — INSULIN GLARGINE 10 UNITS: 100 INJECTION, SOLUTION SUBCUTANEOUS at 08:44

## 2021-01-01 RX ADMIN — CETIRIZINE HYDROCHLORIDE 10 MG: 10 TABLET, FILM COATED ORAL at 08:52

## 2021-01-01 RX ADMIN — INSULIN LISPRO 3 UNITS: 100 INJECTION, SOLUTION INTRAVENOUS; SUBCUTANEOUS at 12:57

## 2021-01-01 RX ADMIN — HEPARIN SODIUM 7500 UNITS: 5000 INJECTION INTRAVENOUS; SUBCUTANEOUS at 21:00

## 2021-01-01 RX ADMIN — INSULIN HUMAN 15 UNITS: 100 INJECTION, SOLUTION PARENTERAL at 00:36

## 2021-01-01 RX ADMIN — BUDESONIDE AND FORMOTEROL FUMARATE DIHYDRATE 2 PUFF: 160; 4.5 AEROSOL RESPIRATORY (INHALATION) at 21:48

## 2021-01-01 RX ADMIN — CETIRIZINE HYDROCHLORIDE 10 MG: 10 TABLET, FILM COATED ORAL at 09:00

## 2021-01-01 RX ADMIN — FUROSEMIDE 60 MG: 40 TABLET ORAL at 09:43

## 2021-01-01 RX ADMIN — ESCITALOPRAM OXALATE 20 MG: 10 TABLET ORAL at 17:09

## 2021-01-01 RX ADMIN — ASPIRIN 81 MG: 81 TABLET ORAL at 06:35

## 2021-01-01 RX ADMIN — INSULIN LISPRO 24 UNITS: 100 INJECTION, SOLUTION INTRAVENOUS; SUBCUTANEOUS at 17:45

## 2021-01-01 RX ADMIN — METOPROLOL SUCCINATE 50 MG: 50 TABLET, EXTENDED RELEASE ORAL at 08:05

## 2021-01-01 RX ADMIN — Medication 10 ML: at 05:30

## 2021-01-01 RX ADMIN — TRAZODONE HYDROCHLORIDE 100 MG: 50 TABLET ORAL at 21:10

## 2021-01-01 RX ADMIN — INSULIN LISPRO 15 UNITS: 100 INJECTION, SOLUTION INTRAVENOUS; SUBCUTANEOUS at 21:18

## 2021-01-01 RX ADMIN — CLOPIDOGREL BISULFATE 75 MG: 75 TABLET ORAL at 08:40

## 2021-01-01 RX ADMIN — HEPARIN SODIUM 7500 UNITS: 5000 INJECTION INTRAVENOUS; SUBCUTANEOUS at 05:23

## 2021-01-01 RX ADMIN — Medication 5 ML: at 13:38

## 2021-01-01 RX ADMIN — ESCITALOPRAM OXALATE 20 MG: 10 TABLET ORAL at 08:21

## 2021-01-01 RX ADMIN — DEXTROSE MONOHYDRATE 100 ML/HR: 10 INJECTION, SOLUTION INTRAVENOUS at 20:31

## 2021-01-01 RX ADMIN — Medication 10 ML: at 21:15

## 2021-01-01 RX ADMIN — HEPARIN SODIUM 7500 UNITS: 5000 INJECTION INTRAVENOUS; SUBCUTANEOUS at 20:35

## 2021-01-01 RX ADMIN — CETIRIZINE HYDROCHLORIDE 10 MG: 10 TABLET, FILM COATED ORAL at 08:58

## 2021-01-01 RX ADMIN — AMLODIPINE BESYLATE 5 MG: 10 TABLET ORAL at 08:52

## 2021-01-01 RX ADMIN — HEPARIN SODIUM 7500 UNITS: 5000 INJECTION INTRAVENOUS; SUBCUTANEOUS at 05:41

## 2021-01-01 RX ADMIN — LEVOTHYROXINE SODIUM 125 MCG: 75 TABLET ORAL at 08:49

## 2021-01-01 RX ADMIN — FLUTICASONE PROPIONATE 2 SPRAY: 50 SPRAY, METERED NASAL at 09:46

## 2021-01-01 RX ADMIN — PIPERACILLIN SODIUM AND TAZOBACTAM SODIUM 3.38 G: 3; .375 INJECTION, POWDER, LYOPHILIZED, FOR SOLUTION INTRAVENOUS at 13:02

## 2021-01-01 RX ADMIN — INSULIN LISPRO 24 UNITS: 100 INJECTION, SOLUTION INTRAVENOUS; SUBCUTANEOUS at 17:21

## 2021-01-01 RX ADMIN — Medication 5 ML: at 13:51

## 2021-01-01 RX ADMIN — ESCITALOPRAM OXALATE 20 MG: 10 TABLET ORAL at 17:08

## 2021-01-01 RX ADMIN — MORPHINE SULFATE 2 MG: 2 INJECTION, SOLUTION INTRAMUSCULAR; INTRAVENOUS at 19:47

## 2021-01-01 RX ADMIN — Medication 10 ML: at 13:33

## 2021-01-01 RX ADMIN — LEVOTHYROXINE SODIUM 125 MCG: 0.07 TABLET ORAL at 07:22

## 2021-01-01 RX ADMIN — PIPERACILLIN SODIUM AND TAZOBACTAM SODIUM 3.38 G: 3; .375 INJECTION, POWDER, LYOPHILIZED, FOR SOLUTION INTRAVENOUS at 01:45

## 2021-01-01 RX ADMIN — ERGOCALCIFEROL 50000 UNITS: 1.25 CAPSULE ORAL at 08:21

## 2021-01-01 RX ADMIN — LEVOTHYROXINE SODIUM 125 MCG: 0.07 TABLET ORAL at 06:25

## 2021-01-01 RX ADMIN — FUROSEMIDE 60 MG: 40 TABLET ORAL at 18:08

## 2021-01-01 RX ADMIN — DEXTROSE MONOHYDRATE 100 ML/HR: 10 INJECTION, SOLUTION INTRAVENOUS at 14:13

## 2021-01-01 RX ADMIN — AZITHROMYCIN MONOHYDRATE 500 MG: 250 TABLET ORAL at 08:58

## 2021-01-01 RX ADMIN — INSULIN LISPRO 15 UNITS: 100 INJECTION, SOLUTION INTRAVENOUS; SUBCUTANEOUS at 17:12

## 2021-01-01 RX ADMIN — SODIUM CHLORIDE 6 MCG/KG/MIN: 900 INJECTION, SOLUTION INTRAVENOUS at 09:39

## 2021-01-01 RX ADMIN — HEPARIN SODIUM 7500 UNITS: 5000 INJECTION INTRAVENOUS; SUBCUTANEOUS at 05:50

## 2021-01-01 RX ADMIN — SODIUM CHLORIDE 35.2 UNITS/HR: 9 INJECTION, SOLUTION INTRAVENOUS at 01:21

## 2021-01-01 RX ADMIN — FLUTICASONE PROPIONATE 2 SPRAY: 50 SPRAY, METERED NASAL at 08:24

## 2021-01-01 RX ADMIN — HEPARIN SODIUM 7500 UNITS: 5000 INJECTION INTRAVENOUS; SUBCUTANEOUS at 12:14

## 2021-01-01 RX ADMIN — Medication 10 ML: at 05:39

## 2021-01-01 RX ADMIN — ALBUTEROL SULFATE 2 PUFF: 90 AEROSOL, METERED RESPIRATORY (INHALATION) at 08:53

## 2021-01-01 RX ADMIN — LEVOTHYROXINE SODIUM 125 MCG: 75 TABLET ORAL at 06:17

## 2021-01-01 RX ADMIN — INSULIN LISPRO 24 UNITS: 100 INJECTION, SOLUTION INTRAVENOUS; SUBCUTANEOUS at 17:32

## 2021-01-01 RX ADMIN — ACETAMINOPHEN 650 MG: 325 TABLET ORAL at 06:18

## 2021-01-01 RX ADMIN — SODIUM CHLORIDE 13 UNITS/HR: 900 INJECTION, SOLUTION INTRAVENOUS at 12:38

## 2021-01-01 RX ADMIN — KETAMINE HYDROCHLORIDE 56.4 MG: 10 INJECTION, SOLUTION INTRAMUSCULAR; INTRAVENOUS at 09:44

## 2021-01-01 RX ADMIN — ESCITALOPRAM OXALATE 20 MG: 10 TABLET ORAL at 17:22

## 2021-01-01 RX ADMIN — INSULIN LISPRO 6 UNITS: 100 INJECTION, SOLUTION INTRAVENOUS; SUBCUTANEOUS at 22:06

## 2021-01-01 RX ADMIN — INSULIN HUMAN 6 UNITS: 100 INJECTION, SOLUTION PARENTERAL at 19:46

## 2021-01-01 RX ADMIN — DEXTROSE MONOHYDRATE 100 ML/HR: 10 INJECTION, SOLUTION INTRAVENOUS at 09:55

## 2021-01-01 RX ADMIN — INSULIN LISPRO 24 UNITS: 100 INJECTION, SOLUTION INTRAVENOUS; SUBCUTANEOUS at 08:54

## 2021-01-01 RX ADMIN — DEXAMETHASONE 4 MG: 4 TABLET ORAL at 09:45

## 2021-01-01 RX ADMIN — HYDRALAZINE HYDROCHLORIDE 25 MG: 50 TABLET, FILM COATED ORAL at 22:06

## 2021-01-01 RX ADMIN — INSULIN LISPRO 6 UNITS: 100 INJECTION, SOLUTION INTRAVENOUS; SUBCUTANEOUS at 12:41

## 2021-01-01 RX ADMIN — FUROSEMIDE 60 MG: 40 TABLET ORAL at 17:32

## 2021-01-01 RX ADMIN — RANOLAZINE 500 MG: 500 TABLET, FILM COATED, EXTENDED RELEASE ORAL at 22:10

## 2021-01-01 RX ADMIN — Medication 5 ML: at 21:19

## 2021-01-01 RX ADMIN — ESCITALOPRAM OXALATE 20 MG: 10 TABLET ORAL at 08:47

## 2021-01-01 RX ADMIN — SODIUM BICARBONATE 50 MEQ: 84 INJECTION, SOLUTION INTRAVENOUS at 05:06

## 2021-01-01 RX ADMIN — HYDRALAZINE HYDROCHLORIDE 25 MG: 50 TABLET, FILM COATED ORAL at 08:57

## 2021-01-01 RX ADMIN — ALBUTEROL SULFATE 2.5 MG: 2.5 SOLUTION RESPIRATORY (INHALATION) at 08:26

## 2021-01-01 RX ADMIN — ESCITALOPRAM OXALATE 20 MG: 10 TABLET ORAL at 09:45

## 2021-01-01 RX ADMIN — RANOLAZINE 500 MG: 500 TABLET, FILM COATED, EXTENDED RELEASE ORAL at 08:55

## 2021-01-01 RX ADMIN — ESCITALOPRAM OXALATE 20 MG: 10 TABLET ORAL at 08:22

## 2021-01-01 RX ADMIN — Medication 5 ML: at 05:02

## 2021-01-01 RX ADMIN — ESCITALOPRAM OXALATE 20 MG: 10 TABLET ORAL at 17:39

## 2021-01-01 RX ADMIN — DEXAMETHASONE SODIUM PHOSPHATE 6 MG: 10 INJECTION INTRAMUSCULAR; INTRAVENOUS at 14:30

## 2021-01-01 RX ADMIN — HEPARIN SODIUM 7500 UNITS: 5000 INJECTION INTRAVENOUS; SUBCUTANEOUS at 05:29

## 2021-01-01 RX ADMIN — TRAZODONE HYDROCHLORIDE 100 MG: 50 TABLET ORAL at 22:18

## 2021-01-01 RX ADMIN — Medication 10 ML: at 13:06

## 2021-01-01 RX ADMIN — NOREPINEPHRINE BITARTRATE 8 MCG/MIN: 1 INJECTION, SOLUTION INTRAVENOUS at 21:45

## 2021-01-01 RX ADMIN — KETAMINE HYDROCHLORIDE 0.05 MG/KG/HR: 50 INJECTION, SOLUTION INTRAMUSCULAR; INTRAVENOUS at 14:14

## 2021-01-01 RX ADMIN — ALBUTEROL SULFATE 2 PUFF: 90 AEROSOL, METERED RESPIRATORY (INHALATION) at 17:55

## 2021-01-01 RX ADMIN — ALBUTEROL SULFATE 2 PUFF: 90 AEROSOL, METERED RESPIRATORY (INHALATION) at 15:18

## 2021-01-01 RX ADMIN — FLUTICASONE PROPIONATE 2 SPRAY: 50 SPRAY, METERED NASAL at 09:29

## 2021-01-01 RX ADMIN — HEPARIN SODIUM 7500 UNITS: 5000 INJECTION INTRAVENOUS; SUBCUTANEOUS at 05:39

## 2021-01-01 RX ADMIN — DEXTROSE MONOHYDRATE 25 G: 25 INJECTION, SOLUTION INTRAVENOUS at 10:42

## 2021-01-01 RX ADMIN — AZITHROMYCIN MONOHYDRATE 500 MG: 250 TABLET ORAL at 08:41

## 2021-01-01 RX ADMIN — INSULIN LISPRO 6 UNITS: 100 INJECTION, SOLUTION INTRAVENOUS; SUBCUTANEOUS at 17:21

## 2021-01-01 RX ADMIN — ALBUTEROL SULFATE 2 PUFF: 90 AEROSOL, METERED RESPIRATORY (INHALATION) at 11:40

## 2021-01-01 RX ADMIN — HEPARIN SODIUM 7500 UNITS: 5000 INJECTION INTRAVENOUS; SUBCUTANEOUS at 12:49

## 2021-01-01 RX ADMIN — HYDRALAZINE HYDROCHLORIDE 25 MG: 50 TABLET, FILM COATED ORAL at 17:34

## 2021-01-01 RX ADMIN — PIPERACILLIN SODIUM AND TAZOBACTAM SODIUM 3.38 G: 3; .375 INJECTION, POWDER, LYOPHILIZED, FOR SOLUTION INTRAVENOUS at 12:22

## 2021-01-01 RX ADMIN — DEXAMETHASONE 4 MG: 4 TABLET ORAL at 20:36

## 2021-01-01 RX ADMIN — Medication 10 ML: at 05:16

## 2021-01-01 RX ADMIN — CIPROFLOXACIN 0.25 ML: 0.5 SOLUTION/ DROPS AURICULAR (OTIC) at 08:26

## 2021-01-01 RX ADMIN — Medication 5 ML: at 17:34

## 2021-01-01 RX ADMIN — HEPARIN SODIUM 7500 UNITS: 5000 INJECTION INTRAVENOUS; SUBCUTANEOUS at 12:21

## 2021-01-01 RX ADMIN — HEPARIN SODIUM 7500 UNITS: 5000 INJECTION INTRAVENOUS; SUBCUTANEOUS at 04:58

## 2021-01-01 RX ADMIN — ASPIRIN 81 MG: 81 TABLET ORAL at 08:55

## 2021-01-01 RX ADMIN — Medication 5 ML: at 13:39

## 2021-01-01 RX ADMIN — Medication 10 ML: at 21:34

## 2021-01-01 RX ADMIN — DEXAMETHASONE SODIUM PHOSPHATE 6 MG: 10 INJECTION INTRAMUSCULAR; INTRAVENOUS at 09:00

## 2021-01-01 RX ADMIN — Medication 10 ML: at 21:02

## 2021-01-01 RX ADMIN — INSULIN LISPRO 5 UNITS: 100 INJECTION, SOLUTION INTRAVENOUS; SUBCUTANEOUS at 22:41

## 2021-01-01 RX ADMIN — INSULIN LISPRO 24 UNITS: 100 INJECTION, SOLUTION INTRAVENOUS; SUBCUTANEOUS at 17:29

## 2021-01-01 RX ADMIN — SODIUM CHLORIDE 6 MCG/KG/MIN: 900 INJECTION, SOLUTION INTRAVENOUS at 11:31

## 2021-01-01 RX ADMIN — HEPARIN SODIUM 7500 UNITS: 5000 INJECTION INTRAVENOUS; SUBCUTANEOUS at 12:25

## 2021-01-01 RX ADMIN — LEVOTHYROXINE SODIUM 125 MCG: 0.07 TABLET ORAL at 05:18

## 2021-01-01 RX ADMIN — INSULIN GLARGINE 15 UNITS: 100 INJECTION, SOLUTION SUBCUTANEOUS at 09:54

## 2021-01-01 RX ADMIN — NOREPINEPHRINE BITARTRATE 13 MCG/MIN: 1 INJECTION, SOLUTION INTRAVENOUS at 02:35

## 2021-01-01 RX ADMIN — MORPHINE SULFATE 4 MG: 4 INJECTION INTRAVENOUS at 16:58

## 2021-01-01 RX ADMIN — HEPARIN SODIUM 7500 UNITS: 5000 INJECTION INTRAVENOUS; SUBCUTANEOUS at 12:18

## 2021-01-01 RX ADMIN — CIPROFLOXACIN 0.25 ML: 0.5 SOLUTION/ DROPS AURICULAR (OTIC) at 20:39

## 2021-01-01 RX ADMIN — INSULIN GLARGINE 40 UNITS: 100 INJECTION, SOLUTION SUBCUTANEOUS at 05:06

## 2021-01-01 RX ADMIN — SODIUM CHLORIDE 6 MCG/KG/MIN: 900 INJECTION, SOLUTION INTRAVENOUS at 14:14

## 2021-01-01 RX ADMIN — ATRACURIUM BESYLATE 45.1 MG: 10 INJECTION, SOLUTION INTRAVENOUS at 09:52

## 2021-01-01 RX ADMIN — RANOLAZINE 500 MG: 500 TABLET, FILM COATED, EXTENDED RELEASE ORAL at 21:19

## 2021-01-01 RX ADMIN — FUROSEMIDE 60 MG: 40 TABLET ORAL at 17:22

## 2021-01-01 RX ADMIN — LEVOTHYROXINE SODIUM 125 MCG: 0.07 TABLET ORAL at 05:16

## 2021-01-01 RX ADMIN — ALBUTEROL SULFATE 2 PUFF: 90 AEROSOL, METERED RESPIRATORY (INHALATION) at 08:00

## 2021-01-01 RX ADMIN — SENNOSIDES AND DOCUSATE SODIUM 2 TABLET: 8.6; 5 TABLET ORAL at 21:01

## 2021-01-01 RX ADMIN — ALBUTEROL SULFATE 2 PUFF: 90 AEROSOL, METERED RESPIRATORY (INHALATION) at 08:32

## 2021-01-01 RX ADMIN — ISOSORBIDE MONONITRATE 120 MG: 30 TABLET, EXTENDED RELEASE ORAL at 08:44

## 2021-01-01 RX ADMIN — CIPROFLOXACIN 0.25 ML: 0.5 SOLUTION/ DROPS AURICULAR (OTIC) at 08:58

## 2021-01-01 RX ADMIN — INSULIN LISPRO 12 UNITS: 100 INJECTION, SOLUTION INTRAVENOUS; SUBCUTANEOUS at 21:15

## 2021-01-01 RX ADMIN — Medication 667 MG: at 09:00

## 2021-01-01 RX ADMIN — INSULIN LISPRO 12 UNITS: 100 INJECTION, SOLUTION INTRAVENOUS; SUBCUTANEOUS at 22:18

## 2021-01-01 RX ADMIN — DEXAMETHASONE SODIUM PHOSPHATE 6 MG: 10 INJECTION INTRAMUSCULAR; INTRAVENOUS at 15:03

## 2021-01-01 RX ADMIN — SODIUM CHLORIDE 32.2 UNITS/HR: 9 INJECTION, SOLUTION INTRAVENOUS at 22:12

## 2021-01-01 RX ADMIN — NOREPINEPHRINE BITARTRATE 14 MCG/MIN: 1 INJECTION, SOLUTION, CONCENTRATE INTRAVENOUS at 07:50

## 2021-01-01 RX ADMIN — SODIUM CHLORIDE 19.7 UNITS/HR: 9 INJECTION, SOLUTION INTRAVENOUS at 22:01

## 2021-01-01 RX ADMIN — HEPARIN SODIUM 7500 UNITS: 5000 INJECTION INTRAVENOUS; SUBCUTANEOUS at 21:01

## 2021-01-01 RX ADMIN — BUDESONIDE AND FORMOTEROL FUMARATE DIHYDRATE 2 PUFF: 160; 4.5 AEROSOL RESPIRATORY (INHALATION) at 21:50

## 2021-01-01 RX ADMIN — SODIUM BICARBONATE: 84 INJECTION, SOLUTION INTRAVENOUS at 00:05

## 2021-01-01 RX ADMIN — ALBUTEROL SULFATE 2.5 MG: 2.5 SOLUTION RESPIRATORY (INHALATION) at 14:28

## 2021-01-01 RX ADMIN — INSULIN LISPRO 12 UNITS: 100 INJECTION, SOLUTION INTRAVENOUS; SUBCUTANEOUS at 13:31

## 2021-01-01 RX ADMIN — POTASSIUM CHLORIDE 40 MEQ: 20 TABLET, EXTENDED RELEASE ORAL at 00:51

## 2021-01-01 RX ADMIN — FENTANYL CITRATE 200 MCG/HR: 50 INJECTION INTRAVENOUS at 07:34

## 2021-01-01 RX ADMIN — CIPROFLOXACIN 0.25 ML: 0.5 SOLUTION/ DROPS AURICULAR (OTIC) at 20:44

## 2021-01-01 RX ADMIN — ALBUTEROL SULFATE 2 PUFF: 90 AEROSOL, METERED RESPIRATORY (INHALATION) at 20:32

## 2021-01-01 RX ADMIN — FENTANYL CITRATE 200 MCG/HR: 50 INJECTION INTRAVENOUS at 05:29

## 2021-01-01 RX ADMIN — FUROSEMIDE 60 MG: 40 TABLET ORAL at 08:51

## 2021-01-01 RX ADMIN — ALBUTEROL SULFATE 2 PUFF: 90 AEROSOL, METERED RESPIRATORY (INHALATION) at 21:50

## 2021-01-01 RX ADMIN — SODIUM CHLORIDE 21.2 UNITS/HR: 9 INJECTION, SOLUTION INTRAVENOUS at 06:45

## 2021-01-01 RX ADMIN — FUROSEMIDE 60 MG: 40 TABLET ORAL at 17:13

## 2021-01-01 RX ADMIN — FENTANYL CITRATE 200 MCG/HR: 50 INJECTION INTRAVENOUS at 01:55

## 2021-01-01 RX ADMIN — FENTANYL CITRATE 200 MCG/HR: 50 INJECTION INTRAVENOUS at 03:51

## 2021-01-01 RX ADMIN — GUAIFENESIN AND DEXTROMETHORPHAN 5 ML: 100; 10 SYRUP ORAL at 08:01

## 2021-01-01 RX ADMIN — INSULIN LISPRO 15 UNITS: 100 INJECTION, SOLUTION INTRAVENOUS; SUBCUTANEOUS at 17:14

## 2021-01-01 RX ADMIN — INSULIN LISPRO 24 UNITS: 100 INJECTION, SOLUTION INTRAVENOUS; SUBCUTANEOUS at 17:14

## 2021-01-01 RX ADMIN — MINERAL OIL, PETROLATUM: 425; 568 OINTMENT OPHTHALMIC at 12:19

## 2021-01-01 RX ADMIN — HEPARIN SODIUM 7500 UNITS: 5000 INJECTION INTRAVENOUS; SUBCUTANEOUS at 13:03

## 2021-01-01 RX ADMIN — INSULIN LISPRO 15 UNITS: 100 INJECTION, SOLUTION INTRAVENOUS; SUBCUTANEOUS at 22:45

## 2021-01-01 RX ADMIN — HEPARIN SODIUM 7500 UNITS: 5000 INJECTION INTRAVENOUS; SUBCUTANEOUS at 21:33

## 2021-01-01 RX ADMIN — ALBUTEROL SULFATE 2.5 MG: 2.5 SOLUTION RESPIRATORY (INHALATION) at 19:22

## 2021-01-01 RX ADMIN — Medication 10 ML: at 22:19

## 2021-01-01 RX ADMIN — FUROSEMIDE 60 MG: 40 TABLET ORAL at 17:08

## 2021-01-01 RX ADMIN — DEXAMETHASONE 4 MG: 4 TABLET ORAL at 09:27

## 2021-01-01 RX ADMIN — ASPIRIN 81 MG: 81 TABLET, CHEWABLE ORAL at 08:22

## 2021-01-01 RX ADMIN — Medication 10 ML: at 22:07

## 2021-01-01 RX ADMIN — HEPARIN SODIUM 7500 UNITS: 5000 INJECTION INTRAVENOUS; SUBCUTANEOUS at 06:25

## 2021-01-01 RX ADMIN — SODIUM BICARBONATE: 84 INJECTION, SOLUTION INTRAVENOUS at 12:25

## 2021-01-01 RX ADMIN — LORAZEPAM 2 MG: 2 INJECTION, SOLUTION INTRAMUSCULAR; INTRAVENOUS at 17:03

## 2021-01-01 RX ADMIN — BUDESONIDE AND FORMOTEROL FUMARATE DIHYDRATE 2 PUFF: 160; 4.5 AEROSOL RESPIRATORY (INHALATION) at 20:20

## 2021-01-01 RX ADMIN — Medication 5 ML: at 12:35

## 2021-01-01 RX ADMIN — ALBUTEROL SULFATE 2.5 MG: 2.5 SOLUTION RESPIRATORY (INHALATION) at 19:32

## 2021-01-01 RX ADMIN — HEPARIN SODIUM 7500 UNITS: 5000 INJECTION INTRAVENOUS; SUBCUTANEOUS at 21:19

## 2021-01-01 RX ADMIN — SODIUM BICARBONATE: 84 INJECTION, SOLUTION INTRAVENOUS at 01:45

## 2021-01-01 RX ADMIN — Medication 6000 UNITS: at 09:25

## 2021-01-01 RX ADMIN — AZITHROMYCIN MONOHYDRATE 500 MG: 250 TABLET ORAL at 09:08

## 2021-01-01 RX ADMIN — BUDESONIDE AND FORMOTEROL FUMARATE DIHYDRATE 2 PUFF: 160; 4.5 AEROSOL RESPIRATORY (INHALATION) at 09:41

## 2021-01-01 RX ADMIN — ESCITALOPRAM OXALATE 20 MG: 10 TABLET ORAL at 09:08

## 2021-01-01 RX ADMIN — INSULIN LISPRO 3 UNITS: 100 INJECTION, SOLUTION INTRAVENOUS; SUBCUTANEOUS at 12:50

## 2021-01-01 RX ADMIN — CIPROFLOXACIN 0.25 ML: 0.5 SOLUTION/ DROPS AURICULAR (OTIC) at 20:36

## 2021-01-01 RX ADMIN — HEPARIN SODIUM 7500 UNITS: 5000 INJECTION INTRAVENOUS; SUBCUTANEOUS at 12:05

## 2021-01-01 RX ADMIN — ASPIRIN 81 MG: 81 TABLET, CHEWABLE ORAL at 08:17

## 2021-01-01 RX ADMIN — ASPIRIN 81 MG: 81 TABLET, CHEWABLE ORAL at 08:21

## 2021-01-01 RX ADMIN — CIPROFLOXACIN 0.25 ML: 0.5 SOLUTION/ DROPS AURICULAR (OTIC) at 08:52

## 2021-01-01 RX ADMIN — HEPARIN SODIUM 7500 UNITS: 5000 INJECTION INTRAVENOUS; SUBCUTANEOUS at 13:38

## 2021-01-01 RX ADMIN — INSULIN LISPRO 6 UNITS: 100 INJECTION, SOLUTION INTRAVENOUS; SUBCUTANEOUS at 08:53

## 2021-01-01 RX ADMIN — CIPROFLOXACIN 0.25 ML: 0.5 SOLUTION/ DROPS AURICULAR (OTIC) at 09:14

## 2021-01-01 RX ADMIN — ALBUTEROL SULFATE 2 PUFF: 90 AEROSOL, METERED RESPIRATORY (INHALATION) at 21:48

## 2021-01-01 RX ADMIN — INSULIN LISPRO 6 UNITS: 100 INJECTION, SOLUTION INTRAVENOUS; SUBCUTANEOUS at 08:38

## 2021-01-01 RX ADMIN — CETIRIZINE HYDROCHLORIDE 10 MG: 10 TABLET, FILM COATED ORAL at 09:39

## 2021-01-01 RX ADMIN — CLOPIDOGREL BISULFATE 75 MG: 75 TABLET ORAL at 08:43

## 2021-01-01 RX ADMIN — Medication 10 ML: at 05:54

## 2021-01-01 RX ADMIN — ALBUTEROL SULFATE 2 PUFF: 90 AEROSOL, METERED RESPIRATORY (INHALATION) at 09:41

## 2021-01-01 RX ADMIN — LEVOTHYROXINE SODIUM 125 MCG: 75 TABLET ORAL at 08:55

## 2021-01-01 RX ADMIN — AMLODIPINE BESYLATE 5 MG: 10 TABLET ORAL at 08:56

## 2021-01-01 RX ADMIN — ESCITALOPRAM OXALATE 20 MG: 10 TABLET ORAL at 17:13

## 2021-01-01 RX ADMIN — ALBUTEROL SULFATE 2.5 MG: 2.5 SOLUTION RESPIRATORY (INHALATION) at 09:48

## 2021-01-01 RX ADMIN — DEXAMETHASONE SODIUM PHOSPHATE 6 MG: 10 INJECTION INTRAMUSCULAR; INTRAVENOUS at 14:15

## 2021-01-01 RX ADMIN — METOPROLOL SUCCINATE 50 MG: 50 TABLET, EXTENDED RELEASE ORAL at 08:57

## 2021-01-01 RX ADMIN — PIPERACILLIN SODIUM AND TAZOBACTAM SODIUM 3.38 G: 3; .375 INJECTION, POWDER, LYOPHILIZED, FOR SOLUTION INTRAVENOUS at 01:43

## 2021-01-01 RX ADMIN — INSULIN GLARGINE 30 UNITS: 100 INJECTION, SOLUTION SUBCUTANEOUS at 12:17

## 2021-01-01 RX ADMIN — CETIRIZINE HYDROCHLORIDE 10 MG: 10 TABLET, FILM COATED ORAL at 08:48

## 2021-01-01 RX ADMIN — Medication 6000 UNITS: at 08:46

## 2021-01-01 RX ADMIN — INSULIN LISPRO 3 UNITS: 100 INJECTION, SOLUTION INTRAVENOUS; SUBCUTANEOUS at 17:48

## 2021-01-01 RX ADMIN — AZITHROMYCIN MONOHYDRATE 500 MG: 250 TABLET ORAL at 08:06

## 2021-01-01 RX ADMIN — METOPROLOL SUCCINATE 50 MG: 50 TABLET, EXTENDED RELEASE ORAL at 09:26

## 2021-01-01 RX ADMIN — METOPROLOL SUCCINATE 50 MG: 50 TABLET, EXTENDED RELEASE ORAL at 14:03

## 2021-01-01 RX ADMIN — NOREPINEPHRINE BITARTRATE 24 MCG/MIN: 1 INJECTION, SOLUTION, CONCENTRATE INTRAVENOUS at 10:48

## 2021-01-01 RX ADMIN — HEPARIN SODIUM 7500 UNITS: 5000 INJECTION INTRAVENOUS; SUBCUTANEOUS at 22:05

## 2021-01-01 RX ADMIN — ALBUTEROL SULFATE 2.5 MG: 2.5 SOLUTION RESPIRATORY (INHALATION) at 01:39

## 2021-01-01 RX ADMIN — ALBUMIN (HUMAN) 25 G: 0.25 INJECTION, SOLUTION INTRAVENOUS at 18:00

## 2021-01-01 RX ADMIN — SODIUM CHLORIDE 500 ML: 900 INJECTION, SOLUTION INTRAVENOUS at 20:17

## 2021-01-01 RX ADMIN — INSULIN GLARGINE 15 UNITS: 100 INJECTION, SOLUTION SUBCUTANEOUS at 18:00

## 2021-01-01 RX ADMIN — MORPHINE SULFATE 2 MG: 2 INJECTION, SOLUTION INTRAMUSCULAR; INTRAVENOUS at 06:34

## 2021-01-01 RX ADMIN — INSULIN LISPRO 24 UNITS: 100 INJECTION, SOLUTION INTRAVENOUS; SUBCUTANEOUS at 09:48

## 2021-01-01 RX ADMIN — HEPARIN SODIUM 7500 UNITS: 5000 INJECTION INTRAVENOUS; SUBCUTANEOUS at 05:17

## 2021-01-01 RX ADMIN — ALBUTEROL SULFATE 2.5 MG: 2.5 SOLUTION RESPIRATORY (INHALATION) at 14:04

## 2021-01-01 RX ADMIN — RANOLAZINE 500 MG: 500 TABLET, FILM COATED, EXTENDED RELEASE ORAL at 21:10

## 2021-01-01 RX ADMIN — FLUTICASONE PROPIONATE 2 SPRAY: 50 SPRAY, METERED NASAL at 08:41

## 2021-01-01 RX ADMIN — ALBUTEROL SULFATE 2.5 MG: 2.5 SOLUTION RESPIRATORY (INHALATION) at 15:19

## 2021-01-01 RX ADMIN — HEPARIN SODIUM 7500 UNITS: 5000 INJECTION INTRAVENOUS; SUBCUTANEOUS at 04:03

## 2021-01-01 RX ADMIN — CIPROFLOXACIN 0.25 ML: 0.5 SOLUTION/ DROPS AURICULAR (OTIC) at 21:01

## 2021-01-01 RX ADMIN — Medication 10 ML: at 20:39

## 2021-01-01 RX ADMIN — ALBUMIN (HUMAN) 25 G: 0.25 INJECTION, SOLUTION INTRAVENOUS at 17:15

## 2021-01-01 RX ADMIN — Medication 10 ML: at 06:26

## 2021-01-01 RX ADMIN — BUDESONIDE AND FORMOTEROL FUMARATE DIHYDRATE 2 PUFF: 160; 4.5 AEROSOL RESPIRATORY (INHALATION) at 20:40

## 2021-01-01 RX ADMIN — RANOLAZINE 500 MG: 500 TABLET, FILM COATED, EXTENDED RELEASE ORAL at 08:42

## 2021-01-01 RX ADMIN — HEPARIN SODIUM 5000 UNITS: 5000 INJECTION INTRAVENOUS; SUBCUTANEOUS at 22:44

## 2021-01-01 RX ADMIN — VITAMIN D, TAB 1000IU (100/BT) 2000 UNITS: 25 TAB at 08:07

## 2021-01-01 RX ADMIN — SODIUM CHLORIDE 26.2 UNITS/HR: 9 INJECTION, SOLUTION INTRAVENOUS at 03:02

## 2021-01-01 RX ADMIN — MINERAL OIL, PETROLATUM: 425; 568 OINTMENT OPHTHALMIC at 06:26

## 2021-01-01 RX ADMIN — AMLODIPINE BESYLATE 5 MG: 10 TABLET ORAL at 08:06

## 2021-01-01 RX ADMIN — CIPROFLOXACIN 0.25 ML: 0.5 SOLUTION/ DROPS AURICULAR (OTIC) at 21:19

## 2021-01-01 RX ADMIN — ESCITALOPRAM OXALATE 20 MG: 10 TABLET ORAL at 08:07

## 2021-01-01 RX ADMIN — FLUTICASONE PROPIONATE 2 SPRAY: 50 SPRAY, METERED NASAL at 12:23

## 2021-01-01 RX ADMIN — DEXAMETHASONE 4 MG: 4 TABLET ORAL at 09:13

## 2021-01-01 RX ADMIN — NOREPINEPHRINE BITARTRATE 13 MCG/MIN: 1 INJECTION, SOLUTION INTRAVENOUS at 08:53

## 2021-01-01 RX ADMIN — METOPROLOL SUCCINATE 50 MG: 50 TABLET, EXTENDED RELEASE ORAL at 08:53

## 2021-01-01 RX ADMIN — MORPHINE SULFATE 2 MG: 2 INJECTION, SOLUTION INTRAMUSCULAR; INTRAVENOUS at 22:07

## 2021-01-01 RX ADMIN — CLOPIDOGREL BISULFATE 75 MG: 75 TABLET ORAL at 08:21

## 2021-01-01 RX ADMIN — MINERAL OIL, PETROLATUM: 425; 568 OINTMENT OPHTHALMIC at 17:00

## 2021-01-01 RX ADMIN — SODIUM CHLORIDE 6 MCG/KG/MIN: 900 INJECTION, SOLUTION INTRAVENOUS at 11:55

## 2021-01-01 RX ADMIN — ASPIRIN 81 MG: 81 TABLET ORAL at 06:13

## 2021-01-01 RX ADMIN — CLOPIDOGREL BISULFATE 75 MG: 75 TABLET ORAL at 08:17

## 2021-01-01 RX ADMIN — LEVOTHYROXINE SODIUM 125 MCG: 75 TABLET ORAL at 06:34

## 2021-01-01 RX ADMIN — RANOLAZINE 500 MG: 500 TABLET, FILM COATED, EXTENDED RELEASE ORAL at 22:06

## 2021-01-01 RX ADMIN — BUDESONIDE AND FORMOTEROL FUMARATE DIHYDRATE 2 PUFF: 160; 4.5 AEROSOL RESPIRATORY (INHALATION) at 08:53

## 2021-01-01 RX ADMIN — Medication 10 ML: at 15:02

## 2021-01-01 RX ADMIN — Medication 7 MG/HR: at 14:56

## 2021-01-01 RX ADMIN — ROCURONIUM BROMIDE 50 MG: 50 INJECTION, SOLUTION INTRAVENOUS at 21:47

## 2021-01-01 RX ADMIN — FUROSEMIDE 60 MG: 40 TABLET ORAL at 08:56

## 2021-01-01 RX ADMIN — ESCITALOPRAM OXALATE 20 MG: 10 TABLET ORAL at 08:06

## 2021-01-01 RX ADMIN — DEXAMETHASONE SODIUM PHOSPHATE 6 MG: 10 INJECTION INTRAMUSCULAR; INTRAVENOUS at 16:26

## 2021-01-01 RX ADMIN — ALBUTEROL SULFATE 2 PUFF: 90 AEROSOL, METERED RESPIRATORY (INHALATION) at 20:30

## 2021-01-01 RX ADMIN — ASPIRIN 81 MG: 81 TABLET ORAL at 06:34

## 2021-01-01 RX ADMIN — ESCITALOPRAM OXALATE 20 MG: 10 TABLET ORAL at 08:42

## 2021-01-01 RX ADMIN — HEPARIN SODIUM 7500 UNITS: 5000 INJECTION INTRAVENOUS; SUBCUTANEOUS at 05:42

## 2021-01-01 RX ADMIN — BUDESONIDE AND FORMOTEROL FUMARATE DIHYDRATE 2 PUFF: 160; 4.5 AEROSOL RESPIRATORY (INHALATION) at 20:00

## 2021-01-01 RX ADMIN — HEPARIN SODIUM 7500 UNITS: 5000 INJECTION INTRAVENOUS; SUBCUTANEOUS at 12:58

## 2021-01-01 RX ADMIN — HEPARIN SODIUM 7500 UNITS: 5000 INJECTION INTRAVENOUS; SUBCUTANEOUS at 13:00

## 2021-01-01 RX ADMIN — CLOPIDOGREL BISULFATE 75 MG: 75 TABLET ORAL at 08:58

## 2021-01-01 RX ADMIN — MORPHINE SULFATE 2 MG: 2 INJECTION, SOLUTION INTRAMUSCULAR; INTRAVENOUS at 17:45

## 2021-01-01 RX ADMIN — INSULIN GLARGINE 60 UNITS: 100 INJECTION, SOLUTION SUBCUTANEOUS at 08:57

## 2021-01-01 RX ADMIN — INSULIN GLARGINE 40 UNITS: 100 INJECTION, SOLUTION SUBCUTANEOUS at 17:32

## 2021-01-01 RX ADMIN — HEPARIN SODIUM 7500 UNITS: 5000 INJECTION INTRAVENOUS; SUBCUTANEOUS at 13:25

## 2021-01-01 RX ADMIN — SODIUM CHLORIDE 13 UNITS/HR: 900 INJECTION, SOLUTION INTRAVENOUS at 23:05

## 2021-01-01 RX ADMIN — ALBUTEROL SULFATE 2.5 MG: 2.5 SOLUTION RESPIRATORY (INHALATION) at 19:13

## 2021-01-01 RX ADMIN — FLUTICASONE PROPIONATE 2 SPRAY: 50 SPRAY, METERED NASAL at 09:13

## 2021-01-01 RX ADMIN — INSULIN LISPRO 3 UNITS: 100 INJECTION, SOLUTION INTRAVENOUS; SUBCUTANEOUS at 12:55

## 2021-01-01 RX ADMIN — ALBUTEROL SULFATE 2.5 MG: 2.5 SOLUTION RESPIRATORY (INHALATION) at 08:44

## 2021-01-01 RX ADMIN — AMLODIPINE BESYLATE 5 MG: 10 TABLET ORAL at 09:27

## 2021-01-01 RX ADMIN — DEXAMETHASONE 4 MG: 4 TABLET ORAL at 21:50

## 2021-01-01 RX ADMIN — INSULIN GLARGINE 40 UNITS: 100 INJECTION, SOLUTION SUBCUTANEOUS at 09:56

## 2021-01-01 RX ADMIN — ALBUTEROL SULFATE 2.5 MG: 2.5 SOLUTION RESPIRATORY (INHALATION) at 14:35

## 2021-01-01 RX ADMIN — DEXAMETHASONE 4 MG: 4 TABLET ORAL at 20:45

## 2021-01-01 RX ADMIN — CIPROFLOXACIN 0.25 ML: 0.5 SOLUTION/ DROPS AURICULAR (OTIC) at 21:12

## 2021-01-01 RX ADMIN — ALBUTEROL SULFATE 2.5 MG: 2.5 SOLUTION RESPIRATORY (INHALATION) at 16:31

## 2021-01-01 RX ADMIN — Medication 10 ML: at 06:25

## 2021-01-01 RX ADMIN — Medication 10 ML: at 14:00

## 2021-01-01 RX ADMIN — ALBUTEROL SULFATE 2.5 MG: 2.5 SOLUTION RESPIRATORY (INHALATION) at 20:25

## 2021-01-01 RX ADMIN — HEPARIN SODIUM 7500 UNITS: 5000 INJECTION INTRAVENOUS; SUBCUTANEOUS at 06:34

## 2021-01-01 RX ADMIN — DEXAMETHASONE 4 MG: 4 TABLET ORAL at 08:43

## 2021-01-01 RX ADMIN — INSULIN LISPRO 24 UNITS: 100 INJECTION, SOLUTION INTRAVENOUS; SUBCUTANEOUS at 11:37

## 2021-01-01 RX ADMIN — DEXTROSE MONOHYDRATE 25 G: 25 INJECTION, SOLUTION INTRAVENOUS at 00:32

## 2021-01-01 RX ADMIN — INSULIN LISPRO 9 UNITS: 100 INJECTION, SOLUTION INTRAVENOUS; SUBCUTANEOUS at 22:00

## 2021-01-01 RX ADMIN — CIPROFLOXACIN 0.25 ML: 0.5 SOLUTION/ DROPS AURICULAR (OTIC) at 10:54

## 2021-01-01 RX ADMIN — LEVOTHYROXINE SODIUM 125 MCG: 75 TABLET ORAL at 05:58

## 2021-01-01 RX ADMIN — BUDESONIDE AND FORMOTEROL FUMARATE DIHYDRATE 2 PUFF: 160; 4.5 AEROSOL RESPIRATORY (INHALATION) at 08:32

## 2021-01-01 RX ADMIN — Medication 6000 UNITS: at 09:39

## 2021-01-01 RX ADMIN — FENTANYL CITRATE 200 MCG/HR: 50 INJECTION INTRAVENOUS at 13:49

## 2021-01-01 RX ADMIN — HEPARIN SODIUM 5000 UNITS: 5000 INJECTION INTRAVENOUS; SUBCUTANEOUS at 14:05

## 2021-01-01 RX ADMIN — Medication 10 ML: at 13:48

## 2021-01-01 RX ADMIN — Medication 20 ML: at 22:06

## 2021-01-01 RX ADMIN — INSULIN LISPRO 3 UNITS: 100 INJECTION, SOLUTION INTRAVENOUS; SUBCUTANEOUS at 11:59

## 2021-01-01 RX ADMIN — ASPIRIN 81 MG: 81 TABLET ORAL at 06:17

## 2021-01-01 RX ADMIN — Medication 10 ML: at 21:21

## 2021-01-01 RX ADMIN — MINERAL OIL, PETROLATUM: 425; 568 OINTMENT OPHTHALMIC at 18:00

## 2021-01-01 RX ADMIN — SODIUM BICARBONATE 50 MEQ: 84 INJECTION, SOLUTION INTRAVENOUS at 00:33

## 2021-01-01 RX ADMIN — MINERAL OIL, PETROLATUM: 425; 568 OINTMENT OPHTHALMIC at 12:00

## 2021-01-01 RX ADMIN — DEXAMETHASONE SODIUM PHOSPHATE 6 MG: 10 INJECTION INTRAMUSCULAR; INTRAVENOUS at 15:02

## 2021-01-01 RX ADMIN — AZITHROMYCIN MONOHYDRATE 500 MG: 250 TABLET ORAL at 09:12

## 2021-01-01 RX ADMIN — AMLODIPINE BESYLATE 5 MG: 10 TABLET ORAL at 09:45

## 2021-01-01 RX ADMIN — INSULIN LISPRO 24 UNITS: 100 INJECTION, SOLUTION INTRAVENOUS; SUBCUTANEOUS at 12:00

## 2021-01-01 RX ADMIN — DEXAMETHASONE SODIUM PHOSPHATE 10 MG: 10 INJECTION INTRAMUSCULAR; INTRAVENOUS at 08:22

## 2021-01-01 RX ADMIN — ALBUMIN (HUMAN) 25 G: 0.25 INJECTION, SOLUTION INTRAVENOUS at 00:54

## 2021-01-01 RX ADMIN — SODIUM CHLORIDE 13.4 UNITS/HR: 900 INJECTION, SOLUTION INTRAVENOUS at 10:49

## 2021-01-01 RX ADMIN — ISOSORBIDE MONONITRATE 120 MG: 30 TABLET, EXTENDED RELEASE ORAL at 09:45

## 2021-01-01 RX ADMIN — RANOLAZINE 500 MG: 500 TABLET, FILM COATED, EXTENDED RELEASE ORAL at 08:06

## 2021-01-01 RX ADMIN — INSULIN LISPRO 24 UNITS: 100 INJECTION, SOLUTION INTRAVENOUS; SUBCUTANEOUS at 12:58

## 2021-01-01 RX ADMIN — Medication 6000 UNITS: at 08:03

## 2021-01-01 RX ADMIN — ALBUTEROL SULFATE 2.5 MG: 2.5 SOLUTION RESPIRATORY (INHALATION) at 02:24

## 2021-01-01 RX ADMIN — ALBUTEROL SULFATE 2.5 MG: 2.5 SOLUTION RESPIRATORY (INHALATION) at 01:56

## 2021-01-01 RX ADMIN — FENTANYL CITRATE 8 MCG/HR: 50 INJECTION INTRAVENOUS at 09:53

## 2021-01-01 RX ADMIN — RANOLAZINE 500 MG: 500 TABLET, FILM COATED, EXTENDED RELEASE ORAL at 09:27

## 2021-01-01 RX ADMIN — RANOLAZINE 500 MG: 500 TABLET, FILM COATED, EXTENDED RELEASE ORAL at 20:57

## 2021-01-01 RX ADMIN — INSULIN LISPRO 6 UNITS: 100 INJECTION, SOLUTION INTRAVENOUS; SUBCUTANEOUS at 08:08

## 2021-01-01 RX ADMIN — CETIRIZINE HYDROCHLORIDE 10 MG: 10 TABLET, FILM COATED ORAL at 09:26

## 2021-01-01 RX ADMIN — ESCITALOPRAM OXALATE 20 MG: 10 TABLET ORAL at 17:00

## 2021-01-01 RX ADMIN — INSULIN LISPRO 24 UNITS: 100 INJECTION, SOLUTION INTRAVENOUS; SUBCUTANEOUS at 09:25

## 2021-01-01 RX ADMIN — INSULIN GLARGINE 40 UNITS: 100 INJECTION, SOLUTION SUBCUTANEOUS at 18:18

## 2021-01-01 RX ADMIN — Medication 5 ML: at 08:08

## 2021-01-01 RX ADMIN — ALBUTEROL SULFATE 2.5 MG: 2.5 SOLUTION RESPIRATORY (INHALATION) at 08:00

## 2021-01-01 RX ADMIN — NOREPINEPHRINE BITARTRATE 12 MCG/MIN: 1 INJECTION, SOLUTION, CONCENTRATE INTRAVENOUS at 11:55

## 2021-01-01 RX ADMIN — CETIRIZINE HYDROCHLORIDE 10 MG: 10 TABLET, FILM COATED ORAL at 08:17

## 2021-01-01 RX ADMIN — SODIUM CHLORIDE 5 MCG/KG/MIN: 900 INJECTION, SOLUTION INTRAVENOUS at 09:52

## 2021-01-01 RX ADMIN — INSULIN GLARGINE 60 UNITS: 100 INJECTION, SOLUTION SUBCUTANEOUS at 08:07

## 2021-01-01 RX ADMIN — BUDESONIDE AND FORMOTEROL FUMARATE DIHYDRATE 2 PUFF: 160; 4.5 AEROSOL RESPIRATORY (INHALATION) at 17:55

## 2021-01-01 RX ADMIN — ISOSORBIDE MONONITRATE 120 MG: 30 TABLET, EXTENDED RELEASE ORAL at 08:03

## 2021-01-01 RX ADMIN — MINERAL OIL, PETROLATUM: 425; 568 OINTMENT OPHTHALMIC at 03:30

## 2021-01-01 RX ADMIN — FUROSEMIDE 60 MG: 40 TABLET ORAL at 08:55

## 2021-01-01 RX ADMIN — Medication 10 ML: at 05:27

## 2021-01-01 RX ADMIN — HYDRALAZINE HYDROCHLORIDE 25 MG: 50 TABLET, FILM COATED ORAL at 22:10

## 2021-01-01 RX ADMIN — DEXAMETHASONE 4 MG: 4 TABLET ORAL at 22:07

## 2021-01-01 RX ADMIN — Medication 10 ML: at 13:44

## 2021-01-01 RX ADMIN — ALBUMIN (HUMAN) 25 G: 0.25 INJECTION, SOLUTION INTRAVENOUS at 01:02

## 2021-01-01 RX ADMIN — DEXTROSE MONOHYDRATE 100 ML/HR: 5 INJECTION, SOLUTION INTRAVENOUS at 09:26

## 2021-01-01 RX ADMIN — FLUTICASONE PROPIONATE 2 SPRAY: 50 SPRAY, METERED NASAL at 08:22

## 2021-01-01 RX ADMIN — INSULIN LISPRO 5 UNITS: 100 INJECTION, SOLUTION INTRAVENOUS; SUBCUTANEOUS at 21:18

## 2021-01-01 RX ADMIN — Medication 10 ML: at 22:02

## 2021-01-01 RX ADMIN — CLOPIDOGREL BISULFATE 75 MG: 75 TABLET ORAL at 08:06

## 2021-01-01 RX ADMIN — FUROSEMIDE 60 MG: 40 TABLET ORAL at 09:27

## 2021-01-01 RX ADMIN — KETAMINE HYDROCHLORIDE 0.05 MG/KG/HR: 50 INJECTION, SOLUTION INTRAMUSCULAR; INTRAVENOUS at 09:41

## 2021-01-01 RX ADMIN — FENTANYL CITRATE 200 MCG/HR: 50 INJECTION INTRAVENOUS at 15:37

## 2021-01-01 RX ADMIN — TRAZODONE HYDROCHLORIDE 100 MG: 50 TABLET ORAL at 22:06

## 2021-01-01 RX ADMIN — ASPIRIN 81 MG: 81 TABLET ORAL at 06:23

## 2021-01-01 RX ADMIN — INSULIN GLARGINE 48 UNITS: 100 INJECTION, SOLUTION SUBCUTANEOUS at 14:10

## 2021-01-01 RX ADMIN — HEPARIN SODIUM 7500 UNITS: 5000 INJECTION INTRAVENOUS; SUBCUTANEOUS at 21:17

## 2021-01-01 RX ADMIN — HYDRALAZINE HYDROCHLORIDE 25 MG: 50 TABLET, FILM COATED ORAL at 14:04

## 2021-01-01 RX ADMIN — NOREPINEPHRINE BITARTRATE 4 MCG/MIN: 1 INJECTION, SOLUTION, CONCENTRATE INTRAVENOUS at 16:44

## 2021-01-01 RX ADMIN — CLOPIDOGREL BISULFATE 75 MG: 75 TABLET ORAL at 09:12

## 2021-01-01 RX ADMIN — INSULIN LISPRO 9 UNITS: 100 INJECTION, SOLUTION INTRAVENOUS; SUBCUTANEOUS at 17:09

## 2021-01-01 RX ADMIN — RANOLAZINE 500 MG: 500 TABLET, FILM COATED, EXTENDED RELEASE ORAL at 22:07

## 2021-01-01 RX ADMIN — MINERAL OIL, PETROLATUM: 425; 568 OINTMENT OPHTHALMIC at 05:16

## 2021-01-01 RX ADMIN — FLUTICASONE PROPIONATE 2 SPRAY: 50 SPRAY, METERED NASAL at 08:54

## 2021-01-01 RX ADMIN — CLOPIDOGREL BISULFATE 75 MG: 75 TABLET ORAL at 09:45

## 2021-01-01 RX ADMIN — CIPROFLOXACIN 0.25 ML: 0.5 SOLUTION/ DROPS AURICULAR (OTIC) at 20:18

## 2021-01-01 RX ADMIN — NOREPINEPHRINE BITARTRATE 24 MCG/MIN: 1 INJECTION, SOLUTION, CONCENTRATE INTRAVENOUS at 20:40

## 2021-01-01 RX ADMIN — Medication 6 MG/HR: at 01:07

## 2021-01-01 RX ADMIN — Medication 10 ML: at 08:44

## 2021-01-01 RX ADMIN — CIPROFLOXACIN 0.25 ML: 0.5 SOLUTION/ DROPS AURICULAR (OTIC) at 08:42

## 2021-01-01 RX ADMIN — INSULIN LISPRO 15 UNITS: 100 INJECTION, SOLUTION INTRAVENOUS; SUBCUTANEOUS at 09:06

## 2021-01-01 RX ADMIN — DEXAMETHASONE 4 MG: 4 TABLET ORAL at 08:56

## 2021-01-01 RX ADMIN — Medication 10 ML: at 05:17

## 2021-01-01 RX ADMIN — TRAZODONE HYDROCHLORIDE 100 MG: 50 TABLET ORAL at 22:07

## 2021-01-01 RX ADMIN — CETIRIZINE HYDROCHLORIDE 10 MG: 10 TABLET, FILM COATED ORAL at 08:05

## 2021-01-01 RX ADMIN — INSULIN LISPRO 24 UNITS: 100 INJECTION, SOLUTION INTRAVENOUS; SUBCUTANEOUS at 12:41

## 2021-01-01 RX ADMIN — POLYETHYLENE GLYCOL 3350 17 G: 17 POWDER, FOR SOLUTION ORAL at 08:08

## 2021-01-01 RX ADMIN — HEPARIN SODIUM 7500 UNITS: 5000 INJECTION INTRAVENOUS; SUBCUTANEOUS at 04:07

## 2021-01-01 RX ADMIN — CALCIUM GLUCONATE 1000 MG: 20 INJECTION, SOLUTION INTRAVENOUS at 00:48

## 2021-01-01 RX ADMIN — METOPROLOL SUCCINATE 50 MG: 50 TABLET, EXTENDED RELEASE ORAL at 08:56

## 2021-01-01 RX ADMIN — SODIUM BICARBONATE: 84 INJECTION, SOLUTION INTRAVENOUS at 01:02

## 2021-01-01 RX ADMIN — ESCITALOPRAM OXALATE 20 MG: 10 TABLET ORAL at 09:12

## 2021-01-01 RX ADMIN — Medication 5 ML: at 06:35

## 2021-01-01 RX ADMIN — PIPERACILLIN SODIUM AND TAZOBACTAM SODIUM 3.38 G: 3; .375 INJECTION, POWDER, LYOPHILIZED, FOR SOLUTION INTRAVENOUS at 12:25

## 2021-01-01 RX ADMIN — SENNOSIDES AND DOCUSATE SODIUM 2 TABLET: 8.6; 5 TABLET ORAL at 21:15

## 2021-01-01 RX ADMIN — Medication 667 MG: at 12:45

## 2021-01-01 RX ADMIN — BUDESONIDE AND FORMOTEROL FUMARATE DIHYDRATE 2 PUFF: 160; 4.5 AEROSOL RESPIRATORY (INHALATION) at 08:28

## 2021-01-01 RX ADMIN — SODIUM CHLORIDE, SODIUM LACTATE, POTASSIUM CHLORIDE, AND CALCIUM CHLORIDE 1000 ML: 600; 310; 30; 20 INJECTION, SOLUTION INTRAVENOUS at 22:20

## 2021-01-01 RX ADMIN — ETOMIDATE 20 MG: 2 INJECTION INTRAVENOUS at 21:47

## 2021-01-01 RX ADMIN — MINERAL OIL, PETROLATUM: 425; 568 OINTMENT OPHTHALMIC at 05:18

## 2021-01-01 RX ADMIN — BUDESONIDE AND FORMOTEROL FUMARATE DIHYDRATE 2 PUFF: 160; 4.5 AEROSOL RESPIRATORY (INHALATION) at 20:32

## 2021-01-01 RX ADMIN — INSULIN LISPRO 15 UNITS: 100 INJECTION, SOLUTION INTRAVENOUS; SUBCUTANEOUS at 11:37

## 2021-01-01 RX ADMIN — VITAMIN D, TAB 1000IU (100/BT) 2000 UNITS: 25 TAB at 08:43

## 2021-01-01 RX ADMIN — POTASSIUM CHLORIDE 40 MEQ: 20 TABLET, EXTENDED RELEASE ORAL at 08:22

## 2021-01-01 RX ADMIN — SODIUM CHLORIDE 9 UNITS/HR: 9 INJECTION, SOLUTION INTRAVENOUS at 04:49

## 2021-01-01 RX ADMIN — INSULIN LISPRO 3 UNITS: 100 INJECTION, SOLUTION INTRAVENOUS; SUBCUTANEOUS at 08:55

## 2021-01-01 RX ADMIN — CLOPIDOGREL BISULFATE 75 MG: 75 TABLET ORAL at 09:26

## 2021-01-01 RX ADMIN — Medication 667 MG: at 16:25

## 2021-01-01 RX ADMIN — HEPARIN SODIUM 7500 UNITS: 5000 INJECTION INTRAVENOUS; SUBCUTANEOUS at 12:04

## 2021-01-01 RX ADMIN — ALBUTEROL SULFATE 2 PUFF: 90 AEROSOL, METERED RESPIRATORY (INHALATION) at 19:58

## 2021-01-01 RX ADMIN — DEXAMETHASONE SODIUM PHOSPHATE 6 MG: 10 INJECTION INTRAMUSCULAR; INTRAVENOUS at 15:42

## 2021-01-01 RX ADMIN — INSULIN LISPRO 3 UNITS: 100 INJECTION, SOLUTION INTRAVENOUS; SUBCUTANEOUS at 22:00

## 2021-01-01 RX ADMIN — FUROSEMIDE 60 MG: 40 TABLET ORAL at 17:34

## 2021-01-01 RX ADMIN — Medication 5 ML: at 06:34

## 2021-01-01 RX ADMIN — HEPARIN SODIUM 7500 UNITS: 5000 INJECTION INTRAVENOUS; SUBCUTANEOUS at 12:50

## 2021-01-01 RX ADMIN — FUROSEMIDE 60 MG: 40 TABLET ORAL at 09:12

## 2021-01-01 RX ADMIN — ALBUTEROL SULFATE 2.5 MG: 2.5 SOLUTION RESPIRATORY (INHALATION) at 08:18

## 2021-01-01 RX ADMIN — AZITHROMYCIN MONOHYDRATE 500 MG: 250 TABLET ORAL at 08:56

## 2021-01-01 RX ADMIN — HEPARIN SODIUM 7500 UNITS: 5000 INJECTION INTRAVENOUS; SUBCUTANEOUS at 22:06

## 2021-01-01 RX ADMIN — POTASSIUM CHLORIDE 20 MEQ: 14.9 INJECTION, SOLUTION INTRAVENOUS at 09:05

## 2021-01-01 RX ADMIN — AMLODIPINE BESYLATE 5 MG: 10 TABLET ORAL at 08:55

## 2021-01-01 RX ADMIN — FUROSEMIDE 60 MG: 40 TABLET ORAL at 08:04

## 2021-01-01 RX ADMIN — OXYCODONE HYDROCHLORIDE AND ACETAMINOPHEN 1000 MG: 500 TABLET ORAL at 08:43

## 2021-01-01 RX ADMIN — HYDRALAZINE HYDROCHLORIDE 25 MG: 50 TABLET, FILM COATED ORAL at 08:48

## 2021-01-01 RX ADMIN — CLOPIDOGREL BISULFATE 75 MG: 75 TABLET ORAL at 08:48

## 2021-01-19 NOTE — DISCHARGE INSTRUCTIONS
[address]  Department of Interventional Radiology  [facility]  [phone] Office  [phone] Fax    DRAIN/PORT/CATHETER REMOVAL  DISCHARGE INSTRUCTIONS    General Information:     Your doctor has ordered for us to remove your drain, port, or catheter. This could be that you do not need it anymore, it is not doing its job, your physician has decided on another plan for your treatment and/or it may need replacing. Home Care Instructions: You can resume your regular diet and medication regimen. Do not drink alcohol, drive, or make any important legal decisions in the next 24 hours. Do not lift anything heavier than a gallon of milk, or do anything strenuous for the next 24 hours. You will notice a dressing over the site of the removal. This dressing should stay in place until the site is healed. The dressing should be changed at least every 48 hours. You should change the dressing sooner if it becomes soiled or wet. The nurse who discharges you to home should review with you any wound care instructions. Resume your normal level of activity slowly. You may shower after 24 hours, but do not take a bath, swim or immerse yourself in water until the site has healed, and keep the dressing dry with plastic wrap while showering. The site may ooze for a couple of days. This drainage should lessen with each passing day. Call If:     You should call your Physician and/or the Radiology Nurse if you have any bleeding other than a small spot on your bandage. Call if you have any signs of infection, fever, or increased pain at the site of the tube. Call if the oozing increases, if it changes color, or begins to have an odor. Follow-Up Instructions: Please see your ordering doctor as he/she has requested. To Reach Us: If you have any questions about your procedure, please call the Interventional Radiology department at [phone]. After business hours (5pm) and weekends, call the answering service at [phone] and ask for the Radiologist on call to be paged. Si tiene Preguntas acerca del procedimiento, por favor llame al departamento de Radiología Intervencional al [phone]. Después de horas de oficina (5 pm) y los fines de [place], llamar al Aiyana Nolen al [phone] y pregunte por el Radiologo de [facility]. Interventional Radiology General Nurse Discharge    After general anesthesia or intravenous sedation, for 24 hours or while taking prescription Narcotics:  · Limit your activities  · Do not drive and operate hazardous machinery  · Do not make important personal or business decisions  · Do  not drink alcoholic beverages  · If you have not urinated within 8 hours after discharge, please contact your surgeon on call. * Please give a list of your current medications to your Primary Care Provider. * Please update this list whenever your medications are discontinued, doses are     changed, or new medications (including over-the-counter products) are added. * Please carry medication information at all times in case of emergency situations. These are general instructions for a healthy lifestyle:    No smoking/ No tobacco products/ Avoid exposure to second hand smoke  Surgeon General's Warning:  Quitting smoking now greatly reduces serious risk to your health. Obesity, smoking, and sedentary lifestyle greatly increases your risk for illness  A healthy diet, regular physical exercise & weight monitoring are important for maintaining a healthy lifestyle    You may be retaining fluid if you have a history of heart failure or if you experience any of the following symptoms:  Weight gain of 3 pounds or more overnight or 5 pounds in a week, increased swelling in our hands or feet or shortness of breath while lying flat in bed.   Please call your doctor as soon as you notice any of these symptoms; do not wait until your next office visit. Recognize signs and symptoms of STROKE:  F-face looks uneven    A-arms unable to move or move unevenly    S-speech slurred or non-existent    T-time-call 911 as soon as signs and symptoms begin-DO NOT go       Back to bed or wait to see if you get better-TIME IS BRAIN.       Patient Signature:  Date: 1/19/2021  Discharging Nurse: Bahman Santizo RN

## 2021-07-23 NOTE — THERAPY EVALUATION
Chao Marte  : 1960  [place]  Secondary: [facility] at [facility] [address] [facility], [place] [address] [address] [facility]  [phone]   [phone]       OUTPATIENT PHYSICAL THERAPY:Initial Assessment 2021   ICD-10: Treatment Diagnosis: Chronic right-sided low back pain without sciatica [M54.5, G89.29]   Precautions/Allergies:   Patient has no known allergies. TREATMENT PLAN:  Effective Dates: 2021 TO 10/21/2021 (90 days). Frequency/Duration: 2 times a week for 90 Day(s) MEDICAL/REFERRING DIAGNOSIS:  Chronic right-sided low back pain without sciatica [M54.5, G89.29]   DATE OF ONSET: 21  REFERRING PHYSICIAN: Gypsy Schafer DO  MD Orders: PT eval and treat   Return MD Appointment: 21     INITIAL ASSESSMENT:  Ms. Sofia Grande presents to the OPPT w/ r sided LBP. Pt ambulated into the clinic w/ lumbar sway back posture w/ L rotation of the trunk when advancing through her gait cycle. Pt required no assistance and is currently not using an AD. Pt has not received any Dx imaging. Pt denies parathesia's and numbness however intermittent neurogenic shooting pain is present when standing for long periods of time. The pt's s/s are consistent w/ L3-L5 posterior derangement secondary to lumbar L3-L5 segmental dysfunction. Pt would benefit to continue w/ skilled PT in order for the pt to perform household duties safely. PROBLEM LIST (Impacting functional limitations):  1. Decreased Strength  2. Decreased ADL/Functional Activities  3. Decreased Transfer Abilities  4. Increased Pain  5. Decreased Activity Tolerance  6. Decreased Flexibility/Joint Mobility INTERVENTIONS PLANNED: (Treatment may consist of any combination of the following)  1. Balance Exercise  2. Bed Mobility  3. Cold  4. Continuous Passive Motion (CPM)  5. Electrical Stimulation  6. Gait Training  7. Home Exercise Program (HEP)  8.  Manual Therapy  9. Neuromuscular Re-education/Strengthening  10. Range of Motion (ROM)  11. Therapeutic Activites     GOALS: (Goals have been discussed and agreed upon with patient.)  Short-Term Functional Goals: Time Frame: 6 weeks  1. Pt will achieve hip extension/glute strength by 1/2 MMT grade in order to functionally perform household duties and to promote independence   2. Pt will achieve a 28/50 modified oswestry LBP score in order to functionally perform household duties and to promote independence   3. Pt will achieve a VAS pain scale of 2/10 LBP in order to functionally perform household duties and to promote independence   Discharge Goals: Time Frame: 12 weeks   7. Pt will achieve hip extension/glute strength by 1 MMT grade in order to functionally perform household duties and to promote independence   8. Pt will achieve a 20/50 modified oswestry LBP score in order to functionally perform household duties and to promote independence   9. Pt will achieve a VAS pain scale of 0/10 LBP in order to functionally perform household duties and to promote independence    OUTCOME MEASURE:   Tool Used: Modified Oswestry Low Back Pain Questionnaire  Score:  Initial: 35/50  Most Recent: X/50 (Date: -- )   Interpretation of Score: Each section is scored on a 0-5 scale, 5 representing the greatest disability. The scores of each section are added together for a total score of 50. MEDICAL NECESSITY:   · Skilled intervention continues to be required due to the listed impairments . REASON FOR SERVICES/OTHER COMMENTS:  · Patient continues to require skilled intervention due to the listed impairments . Total Duration:  PT Patient Time In/Time Out  Time In: 1253  Time Out: 1330    Rehabilitation Potential For Stated Goals: Excellent  Regarding Ana María Germánjesusita Marte's therapy, I certify that the treatment plan above will be carried out by a therapist or under their direction.   Thank you for this referral,  Mamadou Rodriguez, SPT Referring Physician Signature: Marcio Carbajal DO No Signature is Required for this note. PAIN/SUBJECTIVE:   Initial:   5/10 R sided LBP  Post Session:  5/10 R sided LBP    HISTORY:   History of Injury/Illness (Reason for Referral): About a month ago noticed that there was R sided LBP present while moving around her house. Pt states that she does not recall any HEATH or trauma. Pt states that there is intermittent shooting pain that sometimes travels down to the RLE. Pt currently is not taking any NSAIDs and has not received any Dx imaging on the lumbar spine. Pt is wanting to try conservative therapy to see if the pain will improve. Past Medical History/Comorbidities:   Ms. 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 She also has no past medical history of Adverse effect of anesthesia, Difficult intubation, Malignant hyperthermia due to anesthesia, Nausea & vomiting, or Pseudocholinesterase deficiency.   Ms. Xochitl Morton  has a past surgical history that includes hx breast biopsy (Left, 02/20/2017); pr cardiac surg procedure unlist (2006); hx heart catheterization (11/29/2019); hx heart catheterization (10/07/2019); hx heart catheterization (08/12/2019); ir insert tunl cvc w/o port over 5 yr (10/12/2020); ir plc cath av shunt int w si lt (10/21/2020); hx coronary artery bypass graft (2006); vascular surgery procedure unlist (08/10/2016); vascular surgery procedure unlist (Left, 08/26/2016); vascular surgery procedure unlist (Left, 03/12/2018); vascular surgery procedure unlist (Right, 08/01/2019); vascular surgery procedure unlist (Left, 10/21/2020); and ir remove tunl cvad w/o port / pump (1/19/2021). Social History/Living Environment:     , lives in single story home   Prior Level of Function/Work/Activity:  Low-moderate PLOF, w/ LBP now present cannot do things around the house or participate in her community w/o the presence of pain   Dominant Side:         BILATERAL   Ambulatory/Rehab Services H2 Model Falls Risk Assessment   Risk Factors:       No Risk Factors Identified Ability to Rise from Chair:       (0)  Ability to rise in a single movement   Falls Prevention Plan:       No modifications necessary   Total: (5 or greater = High Risk): 0   ©2010 [facility] [address] [place] [address] [place] Patent #[id number]. Federal Law prohibits the replication, distribution or use without written permission from [facility] [place]   Current Medications:       Current Outpatient Medications:     levothyroxine (SYNTHROID) 25 mcg tablet, TAKE 1 TAB BY MOUTH DAILY (BEFORE BREAKFAST) FOR THYROID., Disp: 90 Tablet, Rfl: 3    escitalopram oxalate (Lexapro) 20 mg tablet, Take 1 Tablet by mouth two (2) times a day., Disp: 180 Tablet, Rfl: 3    evolocumab (REPATHA SURECLICK) pen injection, 1 mL by SubCUTAneous route every fourteen (14) days. , Disp: 4 Pen, Rfl: 4    hydrALAZINE (APRESOLINE) 25 mg tablet, Take 1 Tablet by mouth three (3) times daily. , Disp: 90 Tablet, Rfl: 3    furosemide (LASIX) 40 mg tablet, Take 1.5 Tablets by mouth two (2) times a day., Disp: 45 Tablet, Rfl: 11    amLODIPine (NORVASC) 5 mg tablet, Take 1 Tablet by mouth daily. , Disp: 90 Tablet, Rfl: 3    clopidogreL (Plavix) 75 mg tab, Take 1 Tablet by mouth daily. , Disp: 90 Tablet, Rfl: 3    Blood-Glucose Transmitter (Dexcom G6 Transmitter) jazmyn, CGM, Disp: 1 Device, Rfl: 3    Blood-Glucose Meter,Continuous (Dexcom G6 ) misc, As dir, Disp: 1 Each, Rfl: 11    Blood-Glucose Meter,Continuous (Dexcom G6 ) misc, 1 Units by Does Not Apply route two (2) times a day., Disp: 3 Each, Rfl: 11    Blood-Glucose Sensor (Dexcom G6 Sensor) jazmyn, Change sensor every 10 days, Disp: 3 Device, Rfl: 11    ranolazine ER (RANEXA) 500 mg SR tablet, Take 1 Tab by mouth every twelve (12) hours. , Disp: 180 Tab, Rfl: 3    isosorbide mononitrate ER (IMDUR) 120 mg CR tablet, Take 1 Tab by mouth daily. , Disp: 90 Tab, Rfl: 3    levothyroxine (SYNTHROID) 200 mcg tablet, TAKE 1 TABLET BY MOUTH DAILY BEFORE BREAKFAST FOR THYROID, Disp: 90 Tab, Rfl: 3    Tresiba FlexTouch U-100 100 unit/mL (3 mL) inpn, INJECT 70 UNITS UNDER THE SKIN DAILY FOR 90 DAYS. (Patient taking differently: 100 Units.), Disp: 63 mL, Rfl: 3    metOLazone (ZAROXOLYN) 5 mg tablet, Take 1 Tab by mouth daily as needed for Other (weight gain). , Disp: 20 Tab, Rfl: 3    metoprolol succinate (TOPROL-XL) 50 mg XL tablet, Take 1 Tab by mouth daily. , Disp: 90 Tab, Rfl: 3    acetaminophen (TYLENOL) 325 mg tablet, Take 2 Tabs by mouth every six (6) hours as needed for Pain., Disp: , Rfl:     nitroglycerin (NITROSTAT) 0.4 mg SL tablet, 1 Tab by SubLINGual route every five (5) minutes as needed for Chest Pain. Up to 3 doses. , Disp: 1 Bottle, Rfl: 4    aspirin delayed-release 81 mg tablet, Take 81 mg by mouth every morning., Disp: , Rfl:    Date Last Reviewed:  7/23/2021   Number of Personal Factors/Comorbidities that affect the Plan of Care: 0: LOW COMPLEXITY   EXAMINATION:   Palpation:          TTP L3-L5 paraspinals   TTP QL   TTP thoracolumbar fascia   ROM:          WNL for LE and Lumbar/Thoracic ROM   Strength:          Hip MMT   Right  Left   Extension 3-/5  Extension 3-/5    Flexion 4-/5 Flexion 4-/5   ABD 4-/5 ABD 4-/5    IR/ER WNL  IR/ER WNL                              Special Tests:          SLR (-)   Vanessa Extension (+)        Neurological Screen:        Myotomes:  Intact        Dermatomes:  Intact        Reflexes:  Intact brisk bilaterally    Body Structures Involved:  1. Bones  2. Joints  3. Muscles  4. Ligaments Body Functions Affected:  1. Neuromusculoskeletal  2. Movement Related Activities and Participation Affected:  1. General Tasks and Demands  2. Mobility  3. Self Care  4. Domestic Life  5.  Community, Social and [place]   Number of elements (examined above) that affect the Plan of Care: 1-2: LOW COMPLEXITY   CLINICAL PRESENTATION:   Presentation: Stable and uncomplicated: LOW COMPLEXITY   CLINICAL DECISION MAKING:   Use of outcome tool(s) and clinical judgement create a POC that gives a: Clear prediction of patient's progress: LOW COMPLEXITY

## 2021-07-23 NOTE — PROGRESS NOTES
Clemencia Marte  : 1960  [place]  Secondary: [address] at [facility] [address], [address] [facility], [place] [address] [address] [facility]  [phone]   [phone]      OUTPATIENT PHYSICAL THERAPY: Daily Treatment Note 2021  Visit Count:  1    ICD-10: Treatment Diagnosis: Chronic right-sided low back pain without sciatica [M54.5, G89.29]   Precautions/Allergies:   Patient has no known allergies. TREATMENT PLAN:  Effective Dates: 2021 TO 10/21/2021 (90 days). Frequency/Duration: 2 times a week for 90 Day(s) MEDICAL/REFERRING DIAGNOSIS:  Chronic right-sided low back pain without sciatica [M54.5, G89.29]   DATE OF ONSET: 21  REFERRING PHYSICIAN: Kavitha Mejia DO MD Orders: PT eval and treat   Return MD Appointment: 21         Pre-treatment Symptoms/Complaints:  Pt reports R sided LBP that intermittently has shooting pain that travels down the RLE when standing for long periods of time. Pain: Initial:   6/10 R sided LBP Post Session:  5/10 R sided LBP    Medications Last Reviewed:  2021  Updated Objective Findings:  See evaluation note from today  TREATMENT:     THERAPEUTIC ACTIVITY: ( 0 minutes): Therapeutic activities per grid below to improve mobility, strength, balance and coordination. Required no visual, verbal, manual and tactile cues to promote motor control of bilateral, lower extremity(s). THERAPEUTIC EXERCISE: (25 minutes):  Exercises per grid below to improve mobility, strength, balance and coordination. Required no visual, verbal, manual and tactile cues to promote proper body alignment, promote proper body posture and promote proper body mechanics. Progressed resistance, range, repetitions and complexity of movement as indicated. Pt was instructed to perform there-ex while lying supine on cold pack.  There-ex was performed CONCURRENTLY w/ cold pack  NEUROMUSCULAR RE-EDUCATION: (0 minutes): Exercise/activities per grid below to improve balance, coordination, kinesthetic sense and posture. Required no visual, verbal, manual and tactile cues to promote motor control of bilateral, lower extremity(s). MANUAL THERAPY: (0 minutes): Joint mobilization, Soft tissue mobilization and Manipulation was utilized and necessary because of the patient's restricted joint motion, painful spasm, loss of articular motion and restricted motion of soft tissue. MODALITIES: (10 minutes):      *  Cold Pack Therapy in order to provide analgesia, relieve muscle spasm and reduce inflammation and edema. Pt was supine, w/ cold pack placed on the thoracolumbar area. Pt was instructed to perform there-ex while lying supine on cold pack. There-ex was performed CONCURRENTLY w/ cold pack       7/23/21   Pt education LBP pathology  10 mins    Glute sets  2  X 20 5 sec holds   Supine glute bridge  2 x 20 5 sec hold    Ambulation  250 feet 5 mins                              [employer] Portal  Treatment/Session Summary:    · Response to Treatment:  Pt reported that activating her glutes and abdominals helped alleviate the R sided LBP when ambulating . · Communication/Consultation:  HEP and POC  · Equipment provided today:  None today  · Recommendations/Intent for next treatment session: Next visit will focus on core strengthening and gluteal activiation. Pt is currently in an inhibition state of the hip extensors which causes an increased amount of pressure in the lumbar spine. Pt would benefit to continue w/ skilled PT in order to address the remaining impairments. Will continue to monitor s/s.      Total Treatment Billable Duration:  25  Minutes w/ cold pack + eval   PT Patient Time In/Time Out  Time In: 1253  Time Out: 1330  LATESHA Lambert    Future Appointments   Date Time Provider Rafa Davis   7/28/2021  2:30 PM Nael Corona, [facility] SFALFRED   7/30/2021  2:30 PM Home Blackburn PT [facility]   8/2/2021  2:30 PM Camron Citron, PT [facility] SFD   8/5/2021  2:30 PM [facility]   8/9/2021  2:30 PM [facility]   8/12/2021  2:30 PM Camron Citron, PT [facility]   9/7/2021  2:30 PM Flaquita Menon MD [facility] UCDG D   11/22/2021  3:00 PM DO OMAR Kelley TRIM TRIM     Visit Approval Visit # Therapist initials Date A NS / Cx < 24 hr >24 hr Cx Comments    1 GP/Melo 7/23/21 [x]  [] [] Initial evaluation + Tx        [] [] []        [] [] []        [] [] []        [] [] []        [] [] []        [] [] []        [] [] []        [] [] []        [] [] []        [] [] []        [] [] []        [] [] []        [] [] []        [] [] []        [] [] []        [] [] []        [] [] []

## 2021-07-28 NOTE — PROGRESS NOTES
Ewa Marte  : 1960  [place]  Secondary: [address] [facility] [address] at [facility]  [address] [facility], [address] [place] [address], [place] [address] [address] [facility]  [phone]   [phone]      OUTPATIENT PHYSICAL THERAPY: Daily Treatment Note 2021  Visit Count:  2    ICD-10: Treatment Diagnosis: Chronic right-sided low back pain without sciatica [M54.5, G89.29]   Precautions/Allergies:   Patient has no known allergies. TREATMENT PLAN:  Effective Dates: 2021 TO 10/21/2021 (90 days). Frequency/Duration: 2 times a week for 90 Day(s) MEDICAL/REFERRING DIAGNOSIS:  Chronic right-sided low back pain without sciatica [M54.5, G89.29]   DATE OF ONSET: 21  REFERRING PHYSICIAN: Anthony Luciano DO MD Orders: PT eval and treat   Return MD Appointment: 21         Pre-treatment Symptoms/Complaints:  Pt reports she is feeling better from last visit. Pain: Initial:   210 all across the back. Post Session:  4/10  LBP    Medications Last Reviewed:  2021  Updated Objective Findings:  None Today  TREATMENT:     THERAPEUTIC ACTIVITY: ( 0 minutes): Therapeutic activities per grid below to improve mobility, strength, balance and coordination. Required no visual, verbal, manual and tactile cues to promote motor control of bilateral, lower extremity(s). THERAPEUTIC EXERCISE: (38 minutes):  Exercises per grid below to improve mobility, strength, balance and coordination. Required no visual, verbal, manual and tactile cues to promote proper body alignment, promote proper body posture and promote proper body mechanics. Progressed resistance, range, repetitions and complexity of movement as indicated. NEUROMUSCULAR RE-EDUCATION: (0 minutes):  Exercise/activities per grid below to improve balance, coordination, kinesthetic sense and posture.   Required no visual, verbal, manual and tactile cues to promote motor control of bilateral, lower extremity(s). MANUAL THERAPY: (0 minutes): Joint mobilization, Soft tissue mobilization and Manipulation was utilized and necessary because of the patient's restricted joint motion, painful spasm, loss of articular motion and restricted motion of soft tissue. MODALITIES: (10 minutes):      *  Cold Pack Therapy in order to provide analgesia, relieve muscle spasm and reduce inflammation and edema. Pt was supine, w/ cold pack placed on the thoracolumbar area. Skin was clear and intact. 7/23/21 Date:  7-   Pt education LBP pathology  10 mins     Glute sets  2  X 20 5 sec holds 20 x 3-5 sec hold    Supine glute bridge  2 x 20 5 sec hold  X 20     Ambulation  250 feet 5 mins     Nustep   4 minutes   Level 1    pelvic tilt    10 x 5 sec hold    Straight leg raise    X 10 B     side lying - clams   2 x 10 B    Hamstring stretch   With strap   3 x 30 sec hold B                                     [employer] Portal  Treatment/Session Summary:    · Response to Treatment:  Patient reports her pain is slightly up, but knows she did well today. She reports doing more wihtout excrutiating pain in head . .  · Communication/Consultation:  HEP and POC  · Equipment provided today:  None today  · Recommendations/Intent for next treatment session: Next visit will focus on core strengthening and gluteal activiation. Pt is currently in an inhibition state of the hip extensors which causes an increased amount of pressure in the lumbar spine. Pt would benefit to continue w/ skilled PT in order to address the remaining impairments. Will continue to monitor s/s. Total Treatment Billable Duration:  38 minutes + 10 minutes cold pack.     PT Patient Time In/Time Out  Time In: 1430  Time Out: [facility] [facility], [facility]    Future Appointments   Date Time Provider Rafa Davis   7/30/2021  2:30 PM Anthony Robledo [facility]   8/2/2021  2:30 PM Catia Figueroa PT [facility]   8/5/2021  2:30 PM Tod Slater Grant Ogden, PT [facility] SFD   8/9/2021  2[facility]   8/12/2021  2:30 PM Mitchjanis Metcalf, PT [facility]   9/7/2021  2:30 PM MD OMAR Schumacher UCDG UCD   11/22/2021  3:00 PM DO OMAR Kong TRIM TRIM     Visit Approval Visit # Therapist initials Date A NS / Cx < 24 hr >24 hr Cx Comments    1 GP/Melo 7/23/21 [x]  [] [] Initial evaluation + Tx     2 PDE 7- [x] [] []        [] [] []        [] [] []        [] [] []        [] [] []        [] [] []        [] [] []        [] [] []        [] [] []        [] [] []        [] [] []        [] [] []        [] [] []        [] [] []        [] [] []        [] [] []        [] [] []

## 2021-08-03 NOTE — PROGRESS NOTES
CANCELLATION NOTE  Pt was a cancellation for physical therapy appointment today (8/2/2021) due to not feeling well.    # Recent No Shows/Same-Day Cancellations: 2

## 2021-08-28 PROBLEM — U07.1 COVID-19 VIRUS INFECTION: Status: ACTIVE | Noted: 2021-01-01

## 2021-08-28 PROBLEM — Z99.2 ESRD ON PERITONEAL DIALYSIS (HCC): Chronic | Status: ACTIVE | Noted: 2021-01-01

## 2021-08-28 PROBLEM — J96.01 ACUTE RESPIRATORY FAILURE WITH HYPOXIA (HCC): Status: ACTIVE | Noted: 2021-01-01

## 2021-08-28 PROBLEM — E87.20 LACTIC ACIDOSIS: Status: RESOLVED | Noted: 2021-01-01 | Resolved: 2021-01-01

## 2021-08-28 PROBLEM — E87.20 LACTIC ACIDOSIS: Status: ACTIVE | Noted: 2021-01-01

## 2021-08-28 PROBLEM — E87.1 HYPONATREMIA: Status: ACTIVE | Noted: 2021-01-01

## 2021-08-28 PROBLEM — E87.6 ACUTE HYPOKALEMIA: Status: ACTIVE | Noted: 2021-01-01

## 2021-08-28 PROBLEM — N18.6 ESRD ON PERITONEAL DIALYSIS (HCC): Chronic | Status: ACTIVE | Noted: 2021-01-01

## 2021-08-28 NOTE — PROGRESS NOTES
TRANSFER - IN REPORT:    Verbal report received from Janice Carvajal RN on Domingo Villanueva  being received from Emergency Department for routine progression of care. Report consisted of patients Situation, Background, Assessment and Recommendations(SBAR). Information from the following report(s) SBAR, Kardex, ED Summary, [facility] and Recent Results was reviewed with the receiving nurse. Opportunity for questions and clarification was provided. Assessment will be completed upon patients arrival to unit and care assumed.

## 2021-08-28 NOTE — PROGRESS NOTES
Resting quietly in bed  On right side  Oxygen at 2l via cannula  Saturation is 96%  Says feels much better this morning   Coughing less

## 2021-08-28 NOTE — ED PROVIDER NOTES
49-year-old lady who gets peritoneal dialysis at home presents with concerns about body aches, fevers, cough, and a positive Covid test from this past Monday. She says she started to get sick last Saturday. She says that she was then tested on Monday and found to be positive. She notes that her nephrologist tried to get her set up for monoclonal antibodies but could not find a location that could do her. Patient says since then she started feel worse and has had very little in the way of oral intake due to her body aches and weakness. She has not been vaccinated. No other associated symptoms. Elements of this note were created using speech recognition software. As such, errors of speech recognition may be present. 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 9)     Osteomyelitis of toe ([address]     forth toe of left foot    Other specified acquired hypothyroidism     Thromboembolus ([address]     possible to back of left knee???, superficial    Type II or unspecified type diabetes mellitus without mention of complication, uncontrolled     checks QD, normal 100, hyposymptoms at 70       Past Surgical History:   Procedure Laterality Date    HX BREAST BIOPSY Left 02/20/2017    stereo    HX CORONARY ARTERY BYPASS GRAFT  2006    Three    HX HEART CATHETERIZATION  11/29/2019    stent X1    HX HEART CATHETERIZATION  10/07/2019    stent X3    HX HEART CATHETERIZATION  08/12/2019    stents X2    IR INSERT TUNL CVC W/O PORT OVER 5 YR  10/12/2020    IR PLC CATH AV SHUNT INT W SI LT  10/21/2020    IR REMOVE TUNL CVAD W/O PORT / PUMP  2021    FL CARDIAC SURG PROCEDURE UNLIST  2006    CABG x3    VASCULAR SURGERY PROCEDURE UNLIST  08/10/2016    arteriogram of leg    VASCULAR SURGERY PROCEDURE UNLIST Left 2016     fem pop endarterectomy    VASCULAR SURGERY PROCEDURE UNLIST Left 2018    4th toe amp    VASCULAR SURGERY PROCEDURE UNLIST Right 2019     Right second toe amputation    VASCULAR SURGERY PROCEDURE UNLIST Left 10/21/2020     Left radiocephalic arteriovenous fistulogram and central venogram.Coil embolization of left radiocephalic arteriovenous fistula venous branch. Retrograde PTA of proximal radiocephalic arteriovenous fistula.          Family History:   Problem Relation Age of Onset    Diabetes Other     Hypertension Other     Heart Disease Other     Diabetes Mother     Hypertension Mother     Cancer Father     Diabetes Sister     Heart Disease Sister     Diabetes Brother     Diabetes Sister     Hypertension Sister     Diabetes Sister     Hypertension Sister     Diabetes Sister     Hypertension Sister     Diabetes Brother        Social History     Socioeconomic History    Marital status:      Spouse name: Not on file    Number of children: Not on file    Years of education: Not on file    Highest education level: Not on file   Occupational History    Not on file   Tobacco Use    Smoking status: Former Smoker     Packs/day: 2.00     Years: 15.00     Pack years: 30.00     Types: Cigarettes     Quit date: 2/3/1992     Years since quittin.5    Smokeless tobacco: Never Used   Substance and Sexual Activity    Alcohol use: No    Drug use: No    Sexual activity: Not on file   Other Topics Concern    Not on file   Social History Narrative    Not on file     Social Determinants of Health     Financial Resource Strain:     Difficulty of Paying Living Expenses:    Food Insecurity:     Worried About Running Out of Food in the Last Year:     [address] in the Last Year:    Transportation Needs:     Lack of Transportation (Medical):  Lack of Transportation (Non-Medical):    Physical Activity:     Days of Exercise per Week:     Minutes of Exercise per Session:    Stress:     Feeling of Stress :    Social Connections:     Frequency of Communication with Friends and Family:     Frequency of Social Gatherings with Friends and Family:     Attends Hindu Services:     Active Member of Clubs or Organizations:     Attends Club or Organization Meetings:     Marital Status:    Intimate Partner Violence:     Fear of Current or Ex-Partner:     Emotionally Abused:     Physically Abused:     Sexually Abused: ALLERGIES: Patient has no known allergies. Review of Systems   Constitutional: Positive for activity change, chills, fatigue and fever. HENT: Negative for congestion, rhinorrhea and sore throat. Eyes: Negative for discharge and redness. Respiratory: Positive for cough and shortness of breath. Negative for wheezing. Cardiovascular: Negative for chest pain and palpitations. Gastrointestinal: Negative for diarrhea, nausea and vomiting. Genitourinary: Negative for dysuria and frequency. Musculoskeletal: Positive for myalgias. Negative for gait problem and joint swelling. Skin: Negative for color change and wound. Allergic/Immunologic: Negative for environmental allergies and food allergies. Neurological: Positive for headaches. Negative for seizures and speech difficulty. Hematological: Negative for adenopathy. Psychiatric/Behavioral: Negative for confusion and dysphoric mood. Vitals:    08/27/21 2115   BP: (!) 83/39   Pulse: 72   Resp: 20   Temp: 98.9 °F (37.2 °C)   SpO2: 94%   Weight: 121.6 kg (268 lb)   Height: 5' 8\" (1.727 m)            Physical Exam  Vitals and nursing note reviewed.    Constitutional: Appearance: Normal appearance. HENT:      Head: Normocephalic and atraumatic. Mouth/Throat:      Mouth: Mucous membranes are dry. Pharynx: Oropharynx is clear. Eyes:      Extraocular Movements: Extraocular movements intact. Pupils: Pupils are equal, round, and reactive to light. Cardiovascular:      Rate and Rhythm: Normal rate and regular rhythm. Pulmonary:      Effort: Pulmonary effort is normal.      Breath sounds: Normal breath sounds. Musculoskeletal:         General: No swelling. Right lower leg: No edema. Left lower leg: No edema. Neurological:      General: No focal deficit present. Mental Status: She is alert and oriented to person, place, and time. MDM  Number of Diagnoses or Management Options  Diagnosis management comments: Patient was hypoxic on room air with her O2 sats in the 87-88 range. I will treat with some IV Decadron and oxygen.          Procedures

## 2021-08-28 NOTE — ED TRIAGE NOTES
Pt arrives from home with [facility] with mask in place. Tested on Monday for Covid and received positive result on Thursday. C/O weakness, SOB, nausea. Denies v/d, CP and abd pain.

## 2021-08-28 NOTE — ED NOTES
TRANSFER - OUT REPORT:    Verbal report given to Tasha BARRETT(name) on Jenelle Romero  being transferred to [facility](unit) for routine progression of care       Report consisted of patients Situation, Background, Assessment and   Recommendations(SBAR). Information from the following report(s) SBAR, ED Summary, [facility] and Recent Results was reviewed with the receiving nurse. Lines:   Peripheral IV 08/27/21 Right Antecubital (Active)       Peripheral IV 08/27/21 Posterior;Right Hand (Active)   Phlebitis Assessment 0 08/27/21 2332   Infiltration Assessment 0 08/27/21 2332   Dressing Status Clean, dry, & intact 08/27/21 2332   Dressing Type Transparent 08/27/21 2332   Hub Color/Line Status Pink 08/27/21 2332   Action Taken Blood drawn 08/27/21 2332        Opportunity for questions and clarification was provided.       Patient transported with:   O2 @ 2 liters

## 2021-08-28 NOTE — PROGRESS NOTES
Hospitalist Progress Note   Admit Date:  2021  9:27 PM   LOS: 0 days   Name:  Jenelle Romero   Age:  59 y.o. Sex:  female  :  1960   MRN:  [id number]     Chief Complaint:   Reason(s) for Admission: COVID-19 virus infection [U07.1]     Hospital Course & Interval History:   59year old CF with a PMH of ESRD on PD, DM2, CAD, and HTN admitted on 21 with acute respiratory failure due to COVID and associated with body aches, fevers, cough. She was initially hypotensive with EMS which resolved with 1L normal saline. Subjective (21): She feels much improved today. Says yesterday was a \"terrible day\". Thinks she is doing well today, almost back to baseline. Body aches resolved. Still with dry cough. Denies F/C. Denies CP/SOB. Denies N/V/D. Review of systems negative except stated above. Assessment & Plan:     Principal Problem:    Acute respiratory failure with hypoxia ([address] (2021)  - Satting 87% on room air in the ER  - Currently satting 93% on 2LNC  - Continue Decadron  - Not a candidate for Remdesivir due to >7 days and on PD  - Does not meet criteria for Actemra  - Wean oxygen as appropriate    Active Problems:    COVID-19 virus infection (2021)  - First symptoms , posiutive test   - Continue Decadron  - Not a candidate for Remdesivir due to >7 days and on PD  - Does not meet criteria for Actemra  - Continue Vitamin D  - Continue Vitamin C  - Check CRP      Acute hypokalemia (2021)  - Per Nephrology with PD      Hyponatremia (2021)  - Per Nephrology with PD      Essential hypertension, benign ()  - Stable      Type 2 diabetes with nephropathy ([address] (2018)  - Start Lantus 10U  - Start Humalog SSI      ESRD on peritoneal dialysis ([address] (2021)  - PD nightly  - Appreciate Nephrology's input and assistance      Severe obesity (BMI 35.0-39. 9) with comorbidity ([address] (2018)    Diet:  ADULT DIET Regular; 4 carb choices (60 gm/meal)  DVT PPx: Heparin  Code status: Full Code    Hospital Problems as of 8/28/2021 Date Reviewed: 7/20/2021        Codes Class Noted - Resolved POA    * (Principal) Acute respiratory failure with hypoxia ([facility] ICD-10-CM: J96.01  ICD-9-CM: 518.81  8/28/2021 - Present Yes        COVID-19 virus infection ICD-10-CM: U07.1  ICD-9-CM: 079.89  8/28/2021 - Present Yes        Acute hypokalemia ICD-10-CM: E87.6  ICD-9-CM: 276.8  8/28/2021 - Present Yes        Hyponatremia ICD-10-CM: E87.1  ICD-9-CM: 276.1  8/28/2021 - Present Yes        Type 2 diabetes with nephropathy ([facility] ICD-10-CM: E11.21  ICD-9-CM: 250.40, 583.81  5/2/2018 - Present Yes        Essential hypertension, benign ICD-10-CM: I10  ICD-9-CM: 401.1  Unknown - Present Yes        ESRD on peritoneal dialysis ([facility] (Chronic) ICD-10-CM: N18.6, Z99.2  ICD-9-CM: 585.6, V45.11  8/28/2021 - Present Yes        Severe obesity (BMI 35.0-39. 9) with comorbidity [facility]) ICD-10-CM: E66.01  ICD-9-CM: 278.01  5/2/2018 - Present Yes        Mixed hyperlipidemia ICD-10-CM: E78.2  ICD-9-CM: 272.2  Unknown - Present Yes        Acquired hypothyroidism ICD-10-CM: E03.9  ICD-9-CM: 244.9  Unknown - Present Yes        RESOLVED: Lactic acidosis ICD-10-CM: E87.2  ICD-9-CM: 276.2  8/28/2021 - 8/28/2021 Yes              Objective:     Patient Vitals for the past 24 hrs:   Temp Pulse Resp BP SpO2   08/28/21 1137 98.7 °F (37.1 °C) 64 18 (!) 129/57 95 %   08/28/21 0733 98.6 °F (37 °C) 61 20 (!) 114/57 96 %   08/28/21 0342 97.9 °F (36.6 °C) 67 22 112/65 95 %   08/28/21 0135 98 °F (36.7 °C) 68 22 (!) 104/49 92 %   08/28/21 0052  66 23  95 %   08/28/21 0041  62 20 137/63 (!) 75 %   08/28/21 0021  63 24 (!) 124/58 100 %   08/28/21 0002  63 22 (!) 144/64 96 %   08/27/21 2341  62 23 (!) 126/58    08/27/21 2322  62 23 130/61 97 %   08/27/21 2302  63 23 (!) 103/53 96 %   08/27/21 2241  63  (!) 92/55 97 %   08/27/21 2227  62 18  96 %   08/27/21 2221  63 25 (!) 86/45    08/27/21 2217  63 25 (!) 92/48  08/27/21 2136  65 20  (!) 87 %   08/27/21 2135  65   (!) 87 %   08/27/21 2134  65  (!) 91/47 (!) 87 %   08/27/21 2133  65   (!) 89 %   08/27/21 2115 98.9 °F (37.2 °C) 72 20 (!) 83/39 94 %     Oxygen Therapy  O2 Sat (%): 95 % (08/28/21 1137)  Pulse via Oximetry: 66 beats per minute (08/28/21 0052)  O2 Device: Nasal cannula (08/28/21 0235)  O2 Flow Rate (L/min): 2 l/min (08/28/21 0733)    Estimated body mass index is 39.79 kg/m² as calculated from the following:    Height as of this encounter: 5' 8\" (1.727 m). Weight as of this encounter: 118.7 kg (261 lb 11.2 oz). Intake/Output Summary (Last 24 hours) at 8/28/2021 1357  Last data filed at 8/28/2021 1132  Gross per 24 hour   Intake 360 ml   Output    Net 360 ml         Physical Exam:   General:    Well nourished. No overt distress  Head:  Normocephalic, atraumatic  Eyes:  Sclerae appear normal.  Pupils equally round. ENT:  Nares appear normal, no drainage. Moist oral mucosa  Neck:  No restricted ROM. Trachea midline   CV:   RRR. No m/r/g. No jugular venous distension. Lungs:   Scattered rhonchi. No wheezing. Respirations even, unlabored  Abdomen: Bowel sounds present. Soft, nontender, nondistended. Extremities: No cyanosis or clubbing. No edema  Skin:     No rashes and normal coloration. Warm and dry. Neuro: Cranial nerves II-XII grossly intact. Sensation intact  Psych: Normal mood and affect.   Alert and oriented x3    I have reviewed ordered lab tests and independently visualized imaging below:    Last 24hr Labs:  Recent Results (from the past 24 hour(s))   CULTURE, BLOOD    Collection Time: 08/27/21  9:24 PM    Specimen: Blood   Result Value Ref Range    Special Requests: NO SPECIAL REQUESTS  RIGHT  Antecubital        Culture result: NO GROWTH AFTER 8 HOURS     LACTIC ACID    Collection Time: 08/27/21  9:24 PM   Result Value Ref Range    Lactic acid 2.9 (H) 0.4 - 2.0 MMOL/L   CBC WITH AUTOMATED DIFF    Collection Time: 08/27/21  9:24 PM   Result Value Ref Range    WBC 7.1 4.3 - 11.1 K/uL    RBC 4.67 4.05 - 5.2 M/uL    HGB 14.4 11.7 - 15.4 g/dL    HCT 43.6 35.8 - 46.3 %    MCV 93.4 79.6 - 97.8 FL    MCH 30.8 26.1 - 32.9 PG    MCHC 33.0 31.4 - 35.0 g/dL    RDW 12.9 11.9 - 14.6 %    PLATELET 301 965 - 302 K/uL    MPV 11.3 9.4 - 12.3 FL    ABSOLUTE NRBC 0.00 0.0 - 0.2 K/uL    DF AUTOMATED      NEUTROPHILS 75 43 - 78 %    LYMPHOCYTES 18 13 - 44 %    MONOCYTES 6 4.0 - 12.0 %    EOSINOPHILS 0 (L) 0.5 - 7.8 %    BASOPHILS 0 0.0 - 2.0 %    IMMATURE GRANULOCYTES 1 0.0 - 5.0 %    ABS. NEUTROPHILS 5.3 1.7 - 8.2 K/UL    ABS. LYMPHOCYTES 1.3 0.5 - 4.6 K/UL    ABS. MONOCYTES 0.5 0.1 - 1.3 K/UL    ABS. EOSINOPHILS 0.0 0.0 - 0.8 K/UL    ABS. BASOPHILS 0.0 0.0 - 0.2 K/UL    ABS. IMM. GRANS. 0.1 0.0 - 0.5 K/UL   METABOLIC PANEL, COMPREHENSIVE    Collection Time: 08/27/21  9:24 PM   Result Value Ref Range    Sodium 133 (L) 136 - 145 mmol/L    Potassium 3.4 (L) 3.5 - 5.1 mmol/L    Chloride 96 (L) 98 - 107 mmol/L    CO2 26 21 - 32 mmol/L    Anion gap 11 7 - 16 mmol/L    Glucose 248 (H) 65 - 100 mg/dL    BUN 72 (H) 8 - 23 MG/DL    Creatinine 9.00 (H) 0.6 - 1.0 MG/DL    GFR est AA 6 (L) >60 ml/min/1.73m2    GFR est non-AA 5 (L) >60 ml/min/1.73m2    Calcium 8.5 8.3 - 10.4 MG/DL    Bilirubin, total 0.4 0.2 - 1.1 MG/DL    ALT (SGPT) 32 12 - 65 U/L    AST (SGOT) 29 15 - 37 U/L    Alk.  phosphatase 98 50 - 136 U/L    Protein, total 7.7 6.3 - 8.2 g/dL    Albumin 2.8 (L) 3.2 - 4.6 g/dL    Globulin 4.9 (H) 2.3 - 3.5 g/dL    A-G Ratio 0.6 (L) 1.2 - 3.5     D DIMER    Collection Time: 08/27/21  9:24 PM   Result Value Ref Range    D DIMER 0.85 (H) <0.56 ug/ml(FEU)   CULTURE, BLOOD    Collection Time: 08/27/21 11:23 PM    Specimen: Blood   Result Value Ref Range    Special Requests: RIGHT  HAND        Culture result: NO GROWTH AFTER 7 HOURS     LACTIC ACID    Collection Time: 08/27/21 11:23 PM   Result Value Ref Range    Lactic acid 1.9 0.4 - 2.0 MMOL/L   COVID-19 RAPID TEST    Collection Time: 08/28/21  1:08 AM   Result Value Ref Range    Specimen source Nasopharyngeal      COVID-19 rapid test Detected (AA) NOTD     GLUCOSE, POC    Collection Time: 08/28/21  7:49 AM   Result Value Ref Range    Glucose (POC) 293 (H) 65 - 100 mg/dL    Performed by [facility]    GLUCOSE, POC    Collection Time: 08/28/21 11:12 AM   Result Value Ref Range    Glucose (POC) 319 (H) 65 - 100 mg/dL    Performed by [facility]        All Micro Results     Procedure Component Value Units Date/Time    BLOOD CULTURE [[id number]] Collected: 08/27/21 2124    Order Status: Completed Specimen: Blood Updated: 08/28/21 0707     Special Requests: --        NO SPECIAL REQUESTS  RIGHT  Antecubital       Culture result: NO GROWTH AFTER 8 HOURS       BLOOD CULTURE [[id number]] Collected: 08/27/21 2323    Order Status: Completed Specimen: Blood Updated: 08/28/21 0707     Special Requests: --        RIGHT  HAND       Culture result: NO GROWTH AFTER 7 HOURS       COVID-19 RAPID TEST [[id number]]  (Abnormal) Collected: 08/28/21 0108    Order Status: Completed Specimen: Nasopharyngeal Updated: 08/28/21 0135     Specimen source Nasopharyngeal        COVID-19 rapid test Detected        Comment:      The specimen is POSITIVE for SARS-CoV-2, the novel coronavirus associated with COVID-19. This test has been authorized by the FDA under an Emergency Use Authorization (EUA) for use by authorized laboratories.         Fact sheet for Healthcare Providers: ConventionUpdate.co.nz  Fact sheet for Patients: ConventionUpdate.co.nz       Methodology: Isothermal Nucleic Acid Amplification               SARS-CoV-2 Lab Results  \"Novel Coronavirus\" Test: No results found for: COV2NT   \"Emergent Disease\" Test: No results found for: EDPR  \"SARS-COV-2\" Test: No results found for: XGCOVT  \"[facility]\" Test: No results found for: RSLT  Rapid Test:   Lab Results   Component Value Date/Time COVR Detected (AA) 08/28/2021 01:08 AM          Imaging:  XR CHEST PORT    Result Date: 8/27/2021  CHEST X-RAY, single portable view  8/27/2021 History: Positive Covid chest on Thursday. Weakness, shortness of breath, and nausea. Technique: Single frontal view of the chest. Comparison: Chest x-ray 9/28/2020 Findings: Stable post surgical changes overlie the mediastinal silhouette. The cardiac silhouette is normal in respect to size. The lungs are expanded without evidence for pneumothorax. No evolving consolidative airspace process or pleural effusion is seen. Stable asymmetric elevation of the right hemidiaphragm is seen. 1.  No acute cardiopulmonary process evident on single frontal view of the chest. This report was made using voice transcription. Despite my best efforts to avoid any, transcription errors may persist. If there is any question about the accuracy of the report or need for clarification, then please call [phone], or text me through [employer] for clarification or correction. No results found for this visit on 08/27/21.     Current Meds:  Current Facility-Administered Medications   Medication Dose Route Frequency    sodium chloride (NS) flush 5-40 mL  5-40 mL IntraVENous Q8H    sodium chloride (NS) flush 5-40 mL  5-40 mL IntraVENous PRN    acetaminophen (TYLENOL) tablet 650 mg  650 mg Oral Q6H PRN    Or    acetaminophen (TYLENOL) suppository 650 mg  650 mg Rectal Q6H PRN    polyethylene glycol (MIRALAX) packet 17 g  17 g Oral DAILY PRN    ondansetron (ZOFRAN ODT) tablet 4 mg  4 mg Oral Q8H PRN    Or    ondansetron (ZOFRAN) injection 4 mg  4 mg IntraVENous Q6H PRN    heparin (porcine) injection 5,000 Units  5,000 Units SubCUTAneous Q8H    dexamethasone (DECADRON) 10 mg/mL injection 6 mg  6 mg IntraVENous Q24H    cholecalciferol (VITAMIN D3) (1000 Units /25 mcg) tablet 2,000 Units  2,000 Units Oral DAILY    insulin lispro (HUMALOG) injection   SubCUTAneous AC&HS    ascorbic acid (vitamin C) (VITAMIN C) tablet 1,000 mg  1,000 mg Oral BID    insulin glargine (LANTUS) injection 10 Units  10 Units SubCUTAneous DAILY       Discharge Plan:     Home in 1-2 days    Signed By: Suzzette Boeck, DO     August 28, 2021

## 2021-08-28 NOTE — H&P
[facility] History and Physical       Name:  Guillermo Longo  Age:61 y.o. Sex:female   :  1960    MRN:  [id number]   PCP:  Perfecto Bueno,       Admit Date:  2021  9:27 PM   Chief Complaint: \"Pt arrives from home with [facility] with mask in place. Tested on Monday for Covid and received positive result on Thursday. C/O weakness, SOB, nausea. Denies v/d, CP and abd pain\"    Reason for Admission:   COVID-19 virus infection [U07.1] - 58 yo unvaccinated woman, ESRD on PD, IDDM, HTN, admitted with COVID, complicated by hypotension 83/39 initially, hypoxia 87%, clear CXR, but elevated lactic acid    Assessment & Plan:     Principal Problem:    COVID-19 virus infection (2021)        Active Problems:    Essential hypertension, benign ()          Type 2 diabetes with nephropathy ([facility] [address] (2018)          Acute respiratory failure with hypoxia ([facility] [address] (2021)          ESRD on peritoneal dialysis ([facility] [address] (2021)            PLAN:  1) Admit to COVID unit  2) Supportive care with oxygen, vitamins, decadron, anti-tussives  3) Resume home meds, but will HOLD her BP meds for a day or 2 until BP stabilizes  4) basal bolus insulin, rogelio with decadron  5) DVT prophy with heparin  6) Will consult [facility] to continue PD        Disposition/Expected LOS: 3  Diet: DIET ADULT  VTE ppx: heparin  GI ppx:   Code status: Prior  Surrogate decision-maker:       History of Presenting Illness:     Guillermo Longo is a 59 y.o. female with medical history of peritoneal dialysis at home presents with concerns about body aches, fevers, cough, and a positive Covid test from this past Monday. She says she started to get sick last Saturday. She says that she was then tested on Monday and found to be positive. She notes that her nephrologist tried to get her set up for monoclonal antibodies but could not find a location that could do her.   Patient says since then she started feel worse and has had very little in the way of oral intake due to her body aches and weakness.     She has not been vaccinated.     No other associated symptoms.     Workup in ER showed clear CXR, normal CBC, baseline BMP. Patient was hypoxic on room air with her O2 sats in the 87-88 range. She was initially noted to be hypotensive. She was treated with some IV Decadron and oxygen. Hospitalist asked to admit      Review of Systems:  A 14 point review of systems was taken and pertinent positive as per HPI. 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 9)     Osteomyelitis of toe ([address]     forth toe of left foot    Other specified acquired hypothyroidism     Thromboembolus ([address]     possible to back of left knee???, superficial    Type II or unspecified type diabetes mellitus without mention of complication, uncontrolled     checks QD, normal 100, hyposymptoms at 70       Past Surgical History:   Procedure Laterality Date    HX BREAST BIOPSY Left 02/20/2017    stereo    HX CORONARY ARTERY BYPASS GRAFT  2006    Three    HX HEART CATHETERIZATION  11/29/2019    stent X1    HX HEART CATHETERIZATION  10/07/2019    stent X3    HX HEART CATHETERIZATION  08/12/2019    stents X2    IR INSERT TUNL CVC W/O PORT OVER 5 YR  10/12/2020    IR PLC CATH AV SHUNT INT W SI LT  10/21/2020    IR REMOVE TUNL CVAD W/O PORT / PUMP  1/19/2021    DC CARDIAC SURG PROCEDURE UNLIST  2006    CABG x3    VASCULAR SURGERY PROCEDURE UNLIST  08/10/2016 arteriogram of leg    VASCULAR SURGERY PROCEDURE UNLIST Left 2016     fem pop endarterectomy    VASCULAR SURGERY PROCEDURE UNLIST Left 2018    4th toe amp    VASCULAR SURGERY PROCEDURE UNLIST Right 2019     Right second toe amputation    VASCULAR SURGERY PROCEDURE UNLIST Left 10/21/2020     Left radiocephalic arteriovenous fistulogram and central venogram.Coil embolization of left radiocephalic arteriovenous fistula venous branch. Retrograde PTA of proximal radiocephalic arteriovenous fistula.        Family History : reviewed  Family History   Problem Relation Age of Onset    Diabetes Other     Hypertension Other     Heart Disease Other     Diabetes Mother     Hypertension Mother     Cancer Father     Diabetes Sister     Heart Disease Sister     Diabetes Brother     Diabetes Sister     Hypertension Sister     Diabetes Sister     Hypertension Sister     Diabetes Sister     Hypertension Sister     Diabetes Brother         Social History     Tobacco Use    Smoking status: Former Smoker     Packs/day: 2.00     Years: 15.00     Pack years: 30.00     Types: Cigarettes     Quit date: 2/3/1992     Years since quittin.5    Smokeless tobacco: Never Used   Substance Use Topics    Alcohol use: No       No Known Allergies          PTA Medications:  Current Outpatient Medications   Medication Instructions    acetaminophen (TYLENOL) 650 mg, Oral, EVERY 6 HOURS AS NEEDED    amLODIPine (NORVASC) 5 mg, Oral, DAILY    aspirin delayed-release 81 mg, Oral, 7AM    Blood-Glucose Meter,Continuous (Dexcom G6 ) misc As dir    Blood-Glucose Meter,Continuous (Dexcom G6 ) misc 1 Units, Does Not Apply, 2 TIMES DAILY    Blood-Glucose Sensor (Dexcom G6 Sensor) jazmyn Change sensor every 10 days    Blood-Glucose Transmitter (Dexcom G6 Transmitter) jazmyn CGM    clopidogreL (PLAVIX) 75 mg, Oral, DAILY    escitalopram oxalate (LEXAPRO) 20 mg, Oral, 2 TIMES DAILY    evolocumab (REPATHA SURECLICK) 881 mg, SubCUTAneous, EVERY 14 DAYS    furosemide (LASIX) 60 mg, Oral, 2 TIMES DAILY    hydrALAZINE (APRESOLINE) 25 mg, Oral, 3 TIMES DAILY    isosorbide mononitrate ER (IMDUR) 120 mg, Oral, DAILY    levothyroxine (SYNTHROID) 200 mcg tablet TAKE 1 TABLET BY MOUTH DAILY BEFORE BREAKFAST FOR THYROID    levothyroxine (SYNTHROID) 25 mcg tablet TAKE 1 TAB BY MOUTH DAILY (BEFORE BREAKFAST) FOR THYROID.  metOLazone (ZAROXOLYN) 5 mg, Oral, DAILY AS NEEDED    metoprolol succinate (TOPROL-XL) 50 mg, Oral, DAILY    nitroglycerin (NITROSTAT) 0.4 mg, SubLINGual, EVERY 5 MIN AS NEEDED, Up to 3 doses.  ranolazine ER (RANEXA) 500 mg, Oral, EVERY 12 HOURS    Tresiba FlexTouch U-100 100 unit/mL (3 mL) inpn INJECT 70 UNITS UNDER THE SKIN DAILY FOR 90 DAYS. Objective:     Patient Vitals for the past 24 hrs:   Temp Pulse Resp BP SpO2   08/28/21 0041  62 20 137/63 (!) 75 %   08/28/21 0021  63 24 (!) 124/58 100 %   08/28/21 0002  63 22 (!) 144/64 96 %   08/27/21 2341  62 23 (!) 126/58    08/27/21 2322  62 23 130/61 97 %   08/27/21 2302  63 23 (!) 103/53 96 %   08/27/21 2241  63  (!) 92/55 97 %   08/27/21 2227  62 18  96 %   08/27/21 2221  63 25 (!) 86/45    08/27/21 2217  63 25 (!) 92/48    08/27/21 2136  65 20  (!) 87 %   08/27/21 2135  65   (!) 87 %   08/27/21 2134  65  (!) 91/47 (!) 87 %   08/27/21 2133  65   (!) 89 %   08/27/21 2115 98.9 °F (37.2 °C) 72 20 (!) 83/39 94 %       Oxygen Therapy  O2 Sat (%): (!) 75 % (08/28/21 0041)  Pulse via Oximetry: 62 beats per minute (08/28/21 0041)  O2 Device: None (Room air) (08/27/21 2115)    Body mass index is 40.75 kg/m². Physical Exam:    General:  Alert and oriented x 3, No acute distress, speaking in full sentences, mild obese, appears chronically ill and older than stated age.   HEENT:  Pupils equal and reactive to light and accommodation, oropharynx is clear   Neck:   Supple, no lymphadenopathy, no JVD   Lungs:  Diminished breath sounds bilaterally   CV:   Regular rate, regular rhythm, with normal S1 and S2   Abdomen:  Soft, nontender, nondistended, normoactive bowel sounds, obese, PD catheter site clean and dry   Extremities:  No cyanosis clubbing or edema   Neuro:  Nonfocal, A&O x3   Psych:  Normal mood and affect       Data Reviewed: I have reviewed all labs, meds, and studies. Recent Results (from the past 24 hour(s))   LACTIC ACID    Collection Time: 08/27/21  9:24 PM   Result Value Ref Range    Lactic acid 2.9 (H) 0.4 - 2.0 MMOL/L   CBC WITH AUTOMATED DIFF    Collection Time: 08/27/21  9:24 PM   Result Value Ref Range    WBC 7.1 4.3 - 11.1 K/uL    RBC 4.67 4.05 - 5.2 M/uL    HGB 14.4 11.7 - 15.4 g/dL    HCT 43.6 35.8 - 46.3 %    MCV 93.4 79.6 - 97.8 FL    MCH 30.8 26.1 - 32.9 PG    MCHC 33.0 31.4 - 35.0 g/dL    RDW 12.9 11.9 - 14.6 %    PLATELET 703 840 - 340 K/uL    MPV 11.3 9.4 - 12.3 FL    ABSOLUTE NRBC 0.00 0.0 - 0.2 K/uL    DF AUTOMATED      NEUTROPHILS 75 43 - 78 %    LYMPHOCYTES 18 13 - 44 %    MONOCYTES 6 4.0 - 12.0 %    EOSINOPHILS 0 (L) 0.5 - 7.8 %    BASOPHILS 0 0.0 - 2.0 %    IMMATURE GRANULOCYTES 1 0.0 - 5.0 %    ABS. NEUTROPHILS 5.3 1.7 - 8.2 K/UL    ABS. LYMPHOCYTES 1.3 0.5 - 4.6 K/UL    ABS. MONOCYTES 0.5 0.1 - 1.3 K/UL    ABS. EOSINOPHILS 0.0 0.0 - 0.8 K/UL    ABS. BASOPHILS 0.0 0.0 - 0.2 K/UL    ABS. IMM. GRANS. 0.1 0.0 - 0.5 K/UL   METABOLIC PANEL, COMPREHENSIVE    Collection Time: 08/27/21  9:24 PM   Result Value Ref Range    Sodium 133 (L) 136 - 145 mmol/L    Potassium 3.4 (L) 3.5 - 5.1 mmol/L    Chloride 96 (L) 98 - 107 mmol/L    CO2 26 21 - 32 mmol/L    Anion gap 11 7 - 16 mmol/L    Glucose 248 (H) 65 - 100 mg/dL    BUN 72 (H) 8 - 23 MG/DL    Creatinine 9.00 (H) 0.6 - 1.0 MG/DL    GFR est AA 6 (L) >60 ml/min/1.73m2    GFR est non-AA 5 (L) >60 ml/min/1.73m2    Calcium 8.5 8.3 - 10.4 MG/DL    Bilirubin, total 0.4 0.2 - 1.1 MG/DL    ALT (SGPT) 32 12 - 65 U/L    AST (SGOT) 29 15 - 37 U/L    Alk.  phosphatase 98 50 - 136 U/L    Protein, total 7.7 6.3 - 8.2 g/dL    Albumin 2.8 (L) 3.2 - 4.6 g/dL    Globulin 4.9 (H) 2.3 - 3.5 g/dL    A-G Ratio 0.6 (L) 1.2 - 3.5     D DIMER    Collection Time: 08/27/21  9:24 PM   Result Value Ref Range    D DIMER 0.85 (H) <0.56 ug/ml(FEU)   LACTIC ACID    Collection Time: 08/27/21 11:23 PM   Result Value Ref Range    Lactic acid 1.9 0.4 - 2.0 MMOL/L       EKG Results     Procedure 720 Value Units Date/Time    EKG, 12 LEAD, INITIAL [[id number]]     Order Status: Sent           All Micro Results     Procedure Component Value Units Date/Time    BLOOD CULTURE [[id number]] Collected: 08/27/21 2323    Order Status: Completed Specimen: Blood Updated: 08/27/21 2346    BLOOD CULTURE [[id number]] Collected: 08/27/21 2124    Order Status: Completed Specimen: Blood Updated: 08/27/21 2237          Other Studies:  XR CHEST PORT    Result Date: 8/27/2021  CHEST X-RAY, single portable view  8/27/2021 History: Positive Covid chest on Thursday. Weakness, shortness of breath, and nausea. Technique: Single frontal view of the chest. Comparison: Chest x-ray 9/28/2020 Findings: Stable post surgical changes overlie the mediastinal silhouette. The cardiac silhouette is normal in respect to size. The lungs are expanded without evidence for pneumothorax. No evolving consolidative airspace process or pleural effusion is seen. Stable asymmetric elevation of the right hemidiaphragm is seen. 1.  No acute cardiopulmonary process evident on single frontal view of the chest. This report was made using voice transcription. Despite my best efforts to avoid any, transcription errors may persist. If there is any question about the accuracy of the report or need for clarification, then please call [phone], or text me through [email or web address] for clarification or correction.          Medications:  Medications Administered      Medications Administered     dexamethasone (DECADRON) 10 mg/mL injection 6 mg     Admin Date  08/27/2021 Action  Given Dose  6 mg Route  IntraVENous Administered By  Luis A Rouse RN          lactated ringers bolus infusion 1,000 mL     Admin Date  08/27/2021 Action  New Bag Dose  1,000 mL Rate  1,000 mL/hr Route  IntraVENous Administered By  Luis A Rouse RN                    Signed By: Dane Rick MD   [facility] Service    August 28, 2021   12:52 AM

## 2021-08-28 NOTE — DIALYSIS
Peritoneal dialysis initiated as ordered. Peritoneal catheter exit site cleaned with Chlorhexidine scrub and Gentamicin cream applied to PD cath exit site. Dressing applied, site has no s/s of redness, swelling, or exudate. Patient states no complaints at this time. Report given to primary RN.

## 2021-08-28 NOTE — CONSULTS
Renal Consult    Subjective:     Donnell Isbell is a 59 y.o.  female who is being seen for ESRD on PD. She has a past medical history of  ESRD on peritoneal dialysis followed at the [facility]. CCPD 6 exchanges, 2.5 L fill volume, 10 hours and typically with  2.5%dextrsose    She developed body aches, fevers, cough, and tested positive  For Covid  Monday.     Workup in ER showed clear CXR, normal CBC, baseline BMP. Patient was hypoxic on room air with her O2 sats in the 87-88 range. so was admitted and now is on nasal canula. No dyspnea, no loss of taste smell. Feels ok a this point. No nausea or diarrhea. No  chills, sweats, epistaxis, vision changes, headache, shortness of breath, wheezing,  chest pain, palpitations. No nausea/vomitting, diarrhea or constipation. No dysuria, hematuria. No paresthesia, seizure history, no arthritis/ arthralgia or skin rashes.    .     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 9)     Osteomyelitis of toe ([address]     forth toe of left foot    Other specified acquired hypothyroidism     Thromboembolus ([address]     possible to back of left knee???, superficial    Type II or unspecified type diabetes mellitus without mention of complication, uncontrolled     checks QD, normal 100, hyposymptoms at 70      Past Surgical History:   Procedure Laterality Date    HX BREAST BIOPSY Left 02/20/2017    stereo    HX CORONARY ARTERY BYPASS GRAFT  2006    Three    HX HEART CATHETERIZATION  2019    stent X1    HX HEART CATHETERIZATION  10/07/2019    stent X3    HX HEART CATHETERIZATION  2019    stents X2    IR INSERT TUNL CVC W/O PORT OVER 5 YR  10/12/2020    IR PLC CATH AV SHUNT INT W SI LT  10/21/2020    IR REMOVE TUNL CVAD W/O PORT / PUMP  2021    HI CARDIAC SURG PROCEDURE UNLIST  2006    CABG x3    VASCULAR SURGERY PROCEDURE UNLIST  08/10/2016    arteriogram of leg    VASCULAR SURGERY PROCEDURE UNLIST Left 2016     fem pop endarterectomy    VASCULAR SURGERY PROCEDURE UNLIST Left 2018    4th toe amp    VASCULAR SURGERY PROCEDURE UNLIST Right 2019     Right second toe amputation    VASCULAR SURGERY PROCEDURE UNLIST Left 10/21/2020     Left radiocephalic arteriovenous fistulogram and central venogram.Coil embolization of left radiocephalic arteriovenous fistula venous branch. Retrograde PTA of proximal radiocephalic arteriovenous fistula.      Family History   Problem Relation Age of Onset    Diabetes Other     Hypertension Other     Heart Disease Other     Diabetes Mother     Hypertension Mother     Cancer Father     Diabetes Sister     Heart Disease Sister     Diabetes Brother     Diabetes Sister     Hypertension Sister     Diabetes Sister     Hypertension Sister     Diabetes Sister     Hypertension Sister     Diabetes Brother       Social History     Tobacco Use    Smoking status: Former Smoker     Packs/day: 2.00     Years: 15.00     Pack years: 30.00     Types: Cigarettes     Quit date: 2/3/1992     Years since quittin.5    Smokeless tobacco: Never Used   Substance Use Topics    Alcohol use: No       Current Facility-Administered Medications   Medication Dose Route Frequency Provider Last Rate Last Admin    sodium chloride (NS) flush 5-40 mL  5-40 mL IntraVENous Q8H Edilma OLEA MD   10 mL at 21 0625    sodium chloride (NS) flush 5-40 mL  5-40 mL IntraVENous PRN Alisa Guzman MD   5 mL at 08/28/21 0808    acetaminophen (TYLENOL) tablet 650 mg  650 mg Oral Q6H PRN Aretha Rucker MD        Or    acetaminophen (TYLENOL) suppository 650 mg  650 mg Rectal Q6H PRN Aretha Rucker MD        polyethylene glycol (MIRALAX) packet 17 g  17 g Oral DAILY PRN Aretha Rucker MD        ondansetron (ZOFRAN ODT) tablet 4 mg  4 mg Oral Q8H PRN Aretha Rucker MD        Or    [facility]) injection 4 mg  4 mg IntraVENous Q6H PRN Aretha Rucker MD        heparin (porcine) injection 5,000 Units  5,000 Units SubCUTAneous Q8H Aretha Rucker MD   5,000 Units at 08/28/21 1723    dexamethasone (DECADRON) 10 mg/mL injection 6 mg  6 mg IntraVENous Q24H Yg OLEA MD   6 mg at 08/28/21 5766    cholecalciferol (VITAMIN D3) (1000 Units /25 mcg) tablet 2,000 Units  2,000 Units Oral DAILY Aretha Rucker MD   2,000 Units at 08/28/21 1842    insulin lispro (HUMALOG) injection   SubCUTAneous AC&HS Aretha Rucker MD   9 Units at 08/28/21 6562    ascorbic acid (vitamin C) (VITAMIN C) tablet 1,000 mg  1,000 mg Oral BID Aretha Rucker MD   1,000 mg at 08/28/21 3387    insulin glargine (LANTUS) injection 10 Units  10 Units SubCUTAneous DAILY Aretha Rucker MD   10 Units at 08/28/21 9270        No Known Allergies     Review of Systems:  A comprehensive review of systems was negative except for that written in the History of Present Illness. Objective: Intake and Output:    No intake/output data recorded. No intake/output data recorded. Physical Exam:   General appearance: no distress, appears stated age  Head: atraumatic  Eyes: conjunctivae/corneas clear. Nose: no discharge  Throat: Lips, mucosa, and tongue normal.   Neck: supple, symmetrical, trachea midline and no JVD  Lungs: clear to auscultation bilaterally  Heart: regular rate and rhythm, S1, S2, no pericardial friction rub  Abdomen: soft, non-tender.  Bowel sounds normal.  PD catheter exit site clear   Extremities: No edema    Skin:  No rashes or lesions          Data Review:   Recent Results (from the past 24 hour(s))   CULTURE, BLOOD    Collection Time: 08/27/21  9:24 PM    Specimen: Blood   Result Value Ref Range    Special Requests: NO SPECIAL REQUESTS  RIGHT  Antecubital        Culture result: NO GROWTH AFTER 8 HOURS     LACTIC ACID    Collection Time: 08/27/21  9:24 PM   Result Value Ref Range    Lactic acid 2.9 (H) 0.4 - 2.0 MMOL/L   CBC WITH AUTOMATED DIFF    Collection Time: 08/27/21  9:24 PM   Result Value Ref Range    WBC 7.1 4.3 - 11.1 K/uL    RBC 4.67 4.05 - 5.2 M/uL    HGB 14.4 11.7 - 15.4 g/dL    HCT 43.6 35.8 - 46.3 %    MCV 93.4 79.6 - 97.8 FL    MCH 30.8 26.1 - 32.9 PG    MCHC 33.0 31.4 - 35.0 g/dL    RDW 12.9 11.9 - 14.6 %    PLATELET 263 310 - 036 K/uL    MPV 11.3 9.4 - 12.3 FL    ABSOLUTE NRBC 0.00 0.0 - 0.2 K/uL    DF AUTOMATED      NEUTROPHILS 75 43 - 78 %    LYMPHOCYTES 18 13 - 44 %    MONOCYTES 6 4.0 - 12.0 %    EOSINOPHILS 0 (L) 0.5 - 7.8 %    BASOPHILS 0 0.0 - 2.0 %    IMMATURE GRANULOCYTES 1 0.0 - 5.0 %    ABS. NEUTROPHILS 5.3 1.7 - 8.2 K/UL    ABS. LYMPHOCYTES 1.3 0.5 - 4.6 K/UL    ABS. MONOCYTES 0.5 0.1 - 1.3 K/UL    ABS. EOSINOPHILS 0.0 0.0 - 0.8 K/UL    ABS. BASOPHILS 0.0 0.0 - 0.2 K/UL    ABS. IMM. GRANS. 0.1 0.0 - 0.5 K/UL   METABOLIC PANEL, COMPREHENSIVE    Collection Time: 08/27/21  9:24 PM   Result Value Ref Range    Sodium 133 (L) 136 - 145 mmol/L    Potassium 3.4 (L) 3.5 - 5.1 mmol/L    Chloride 96 (L) 98 - 107 mmol/L    CO2 26 21 - 32 mmol/L    Anion gap 11 7 - 16 mmol/L    Glucose 248 (H) 65 - 100 mg/dL    BUN 72 (H) 8 - 23 MG/DL    Creatinine 9.00 (H) 0.6 - 1.0 MG/DL    GFR est AA 6 (L) >60 ml/min/1.73m2    GFR est non-AA 5 (L) >60 ml/min/1.73m2    Calcium 8.5 8.3 - 10.4 MG/DL    Bilirubin, total 0.4 0.2 - 1.1 MG/DL    ALT (SGPT) 32 12 - 65 U/L    AST (SGOT) 29 15 - 37 U/L    Alk.  phosphatase 98 50 - 136 U/L    Protein, total 7.7 6.3 - 8.2 g/dL    Albumin 2.8 (L) 3.2 - 4.6 g/dL    Globulin 4.9 (H) 2.3 - 3.5 g/dL A-G Ratio 0.6 (L) 1.2 - 3.5     D DIMER    Collection Time: 08/27/21  9:24 PM   Result Value Ref Range    D DIMER 0.85 (H) <0.56 ug/ml(FEU)   CULTURE, BLOOD    Collection Time: 08/27/21 11:23 PM    Specimen: Blood   Result Value Ref Range    Special Requests: RIGHT  HAND        Culture result: NO GROWTH AFTER 7 HOURS     LACTIC ACID    Collection Time: 08/27/21 11:23 PM   Result Value Ref Range    Lactic acid 1.9 0.4 - 2.0 MMOL/L   COVID-19 RAPID TEST    Collection Time: 08/28/21  1:08 AM   Result Value Ref Range    Specimen source Nasopharyngeal      COVID-19 rapid test Detected (AA) NOTD     GLUCOSE, POC    Collection Time: 08/28/21  7:49 AM   Result Value Ref Range    Glucose (POC) 293 (H) 65 - 100 mg/dL    [facility]            Assessment:     Principal Problem:    COVID-19 virus infection (8/28/2021)    Active Problems:    Essential hypertension, benign ()      Type 2 diabetes with nephropathy ([facility] [address] (5/2/2018)      Acute respiratory failure with hypoxia ([facility] [address] (8/28/2021)      ESRD on peritoneal dialysis ([facility] [address] (8/28/2021)        Plan:     ESRD on PD    Covid with some hypoxia on supplemental oxygen. - on decadron    Initial BP was low but now is stable. She is still eating and drinking with no GI symptoms    Will continue her PD with home prescription.   If concerns about volume depletion, can reduce the dialyate dextrose concentration for less Ultrafiltration     Signed By: Nicolas Tomas MD     August 28, 2021

## 2021-08-29 NOTE — DISCHARGE SUMMARY
Hospitalist Discharge Summary     Patient ID:  Zane Richey  [id number]  74 y.o.  1960  Admit date: 8/27/2021  Discharge date: 8/29/2021  Attending: Darling Kim DO  PCP:  Marcio Carbajal DO  Treatment Team: Attending Provider: Darling Kim DO; Consulting Provider: Wendy Villatoro MD; Utilization Review: Dayanara Bartholomew    Principal Diagnosis Acute respiratory failure with hypoxia [facility])    Hospital Problems as of 8/29/2021 Date Reviewed: 7/20/2021        Codes Class Noted - Resolved POA    * (Principal) Acute respiratory failure with hypoxia ([facility] ICD-10-CM: J96.01  ICD-9-CM: 518.81  8/28/2021 - Present Yes        COVID-19 virus infection ICD-10-CM: U07.1  ICD-9-CM: 079.89  8/28/2021 - Present Yes        Acute hypokalemia ICD-10-CM: E87.6  ICD-9-CM: 276.8  8/28/2021 - Present Yes        Hyponatremia ICD-10-CM: E87.1  ICD-9-CM: 276.1  8/28/2021 - Present Yes        Type 2 diabetes with nephropathy ([facility] ICD-10-CM: E11.21  ICD-9-CM: 250.40, 583.81  5/2/2018 - Present Yes        Essential hypertension, benign ICD-10-CM: I10  ICD-9-CM: 401.1  Unknown - Present Yes        ESRD on peritoneal dialysis ([facility] (Chronic) ICD-10-CM: N18.6, Z99.2  ICD-9-CM: 585.6, V45.11  8/28/2021 - Present Yes        Severe obesity (BMI 35.0-39. 9) with comorbidity [facility]) ICD-10-CM: E66.01  ICD-9-CM: 278.01  5/2/2018 - Present Yes        Mixed hyperlipidemia ICD-10-CM: E78.2  ICD-9-CM: 272.2  Unknown - Present Yes        Acquired hypothyroidism ICD-10-CM: E03.9  ICD-9-CM: 244.9  Unknown - Present Yes        RESOLVED: Lactic acidosis ICD-10-CM: E87.2  ICD-9-CM: 276.2  8/28/2021 - 8/28/2021 Yes              Hospital Course:  Please refer to the admission H&P for details of presentation. In summary, the patient is a 59year old CF with a PMH of ESRD on PD, DM2, CAD, and HTN admitted on 8/28/21 with acute respiratory failure due to COVID and associated with body aches, fevers, cough.  She was initially hypotensive with EMS which resolved with 1L normal saline. She was found to be satting 87% on room air in the ER. She was admitted and started on Decadron. She improved back to room air. Her BP remained stable off of BP meds. She remained stable and was discharged home. Significant Diagnostic Studies:    Labs: Results:       Chemistry Recent Labs     08/29/21  0653 08/27/21 2124   * 248*   * 133*   K 3.2* 3.4*   CL 97* 96*   CO2 24 26   BUN 82* 72*   CREA 9.24* 9.00*   CA 8.5 8.5   AGAP 12 11   AP 85 98   TP 6.9 7.7   ALB 2.6* 2.8*   GLOB 4.3* 4.9*   AGRAT 0.6* 0.6*      CBC w/Diff Recent Labs     08/29/21 0653 08/27/21 2124   WBC 12.0* 7.1   RBC 4.23 4.67   HGB 13.3 14.4   HCT 39.3 43.6    153   GRANS 89* 75   LYMPH 6* 18   EOS 0* 0*      Cardiac Enzymes No results for input(s): CPK, CKND1, JOSÉ ANTONIO in the last 72 hours. No lab exists for component: CKRMB, TROIP   Coagulation No results for input(s): PTP, INR, APTT, INREXT in the last 72 hours. Lipid Panel Lab Results   Component Value Date/Time    Cholesterol, total 160 07/13/2021 10:08 AM    HDL Cholesterol 39 (L) 07/13/2021 10:08 AM    LDL, calculated 89 07/13/2021 10:08 AM    LDL, calculated 86 01/27/2020 08:47 AM    VLDL, calculated 32 07/13/2021 10:08 AM    VLDL, calculated 12 01/27/2020 08:47 AM    Triglyceride 184 (H) 07/13/2021 10:08 AM    CHOL/HDL Ratio 2.9 10/08/2019 03:49 AM      BNP No results for input(s): BNPP in the last 72 hours. Liver Enzymes Recent Labs     08/29/21  0653   TP 6.9   ALB 2.6*   AP 85      Thyroid Studies Lab Results   Component Value Date/Time    T4, Total 9.1 10/26/2018 08:20 AM    TSH 4.440 07/13/2021 10:08 AM    TSH 2.710 10/07/2020 08:18 AM            Imaging:  XR CHEST PORT    Result Date: 8/27/2021  1. No acute cardiopulmonary process evident on single frontal view of the chest. This report was made using voice transcription.  Despite my best efforts to avoid any, transcription errors may persist. If there is any question about the accuracy of the report or need for clarification, then please call [phone], or text me through perfectserv for clarification or correction. Microbiology/Cultures: All Micro Results     Procedure Component Value Units Date/Time    BLOOD CULTURE [[id number]] Collected: 08/27/21 2124    Order Status: Completed Specimen: Blood Updated: 08/28/21 0707     Special Requests: --        NO SPECIAL REQUESTS  RIGHT  Antecubital       Culture result: NO GROWTH AFTER 8 HOURS       BLOOD CULTURE [[id number]] Collected: 08/27/21 2323    Order Status: Completed Specimen: Blood Updated: 08/28/21 0707     Special Requests: --        RIGHT  HAND       Culture result: NO GROWTH AFTER 7 HOURS       COVID-19 RAPID TEST [[id number]]  (Abnormal) Collected: 08/28/21 0108    Order Status: Completed Specimen: Nasopharyngeal Updated: 08/28/21 0135     Specimen source Nasopharyngeal        COVID-19 rapid test Detected        Comment:      The specimen is POSITIVE for SARS-CoV-2, the novel coronavirus associated with COVID-19. This test has been authorized by the FDA under an Emergency Use Authorization (EUA) for use by authorized laboratories. Fact sheet for Healthcare Providers: ConventionUpdate.co.nz  Fact sheet for Patients: ConventionUpdate.co.nz       Methodology: Isothermal Nucleic Acid Amplification               Discharge Exam:  Visit Vitals  BP (!) 140/68   Pulse 83   Temp 98.1 °F (36.7 °C)   Resp 19   Ht 5' 8\" (1.727 m)   Wt 118.6 kg (261 lb 6.4 oz)   SpO2 96%   BMI 39.75 kg/m²     General appearance: alert, cooperative, no distress, appears stated age  Lungs: scattered rhonchi bilaterally  Heart: regular rate and rhythm, S1, S2 normal, no murmur, click, rub or gallop  Abdomen: soft, non-tender.  Bowel sounds normal. No masses,  no organomegaly  Extremities: no cyanosis or edema  Neurologic: Grossly normal    Disposition: home  Discharge Condition: stable  Patient Instructions:   Current Discharge Medication List      START taking these medications    Details   dexAMETHasone (DECADRON) 6 mg tablet Take 1 Tablet by mouth Daily (before breakfast). Qty: 7 Tablet, Refills: 0  Start date: 8/29/2021         CONTINUE these medications which have NOT CHANGED    Details   escitalopram oxalate (Lexapro) 20 mg tablet Take 1 Tablet by mouth two (2) times a day. Qty: 180 Tablet, Refills: 3    Comments: PUT RX ON FILE  Associated Diagnoses: Depression, unspecified depression type; Abnormal liver enzymes; Type 2 diabetes with nephropathy (HCC)      evolocumab (REPATHA SURECLICK) pen injection 1 mL by SubCUTAneous route every fourteen (14) days. Qty: 4 Pen, Refills: 4      hydrALAZINE (APRESOLINE) 25 mg tablet Take 1 Tablet by mouth three (3) times daily. Qty: 90 Tablet, Refills: 3      furosemide (LASIX) 40 mg tablet Take 1.5 Tablets by mouth two (2) times a day. Qty: 45 Tablet, Refills: 11      amLODIPine (NORVASC) 5 mg tablet Take 1 Tablet by mouth daily. Qty: 90 Tablet, Refills: 3    Associated Diagnoses: Essential hypertension      clopidogreL (Plavix) 75 mg tab Take 1 Tablet by mouth daily. Qty: 90 Tablet, Refills: 3      ranolazine ER (RANEXA) 500 mg SR tablet Take 1 Tab by mouth every twelve (12) hours. Qty: 180 Tab, Refills: 3      isosorbide mononitrate ER (IMDUR) 120 mg CR tablet Take 1 Tab by mouth daily. Qty: 90 Tab, Refills: 3      Tresiba FlexTouch U-100 100 unit/mL (3 mL) inpn INJECT 70 UNITS UNDER THE SKIN DAILY FOR 90 DAYS. Qty: 63 mL, Refills: 3    Associated Diagnoses: Depression, unspecified depression type; Abnormal liver enzymes; Type 2 diabetes with nephropathy (HCC)      metOLazone (ZAROXOLYN) 5 mg tablet Take 1 Tab by mouth daily as needed for Other (weight gain). Qty: 20 Tab, Refills: 3      metoprolol succinate (TOPROL-XL) 50 mg XL tablet Take 1 Tab by mouth daily.   Qty: 90 Tab, Refills: 3      aspirin delayed-release 81 mg tablet Take 81 mg by mouth every morning. !! levothyroxine (SYNTHROID) 25 mcg tablet TAKE 1 TAB BY MOUTH DAILY (BEFORE BREAKFAST) FOR THYROID. Qty: 90 Tablet, Refills: 3    Comments: PUT RX ON FILE  Associated Diagnoses: Depression, unspecified depression type; Abnormal liver enzymes; Type 2 diabetes with nephropathy (HCC)      Blood-Glucose Transmitter (Dexcom G6 Transmitter) jazmyn CGM  Qty: 1 Device, Refills: 3      !! Blood-Glucose Meter,Continuous (Dexcom G6 ) misc As dir  Qty: 1 Each, Refills: 11      !! Blood-Glucose Meter,Continuous (Dexcom G6 ) misc 1 Units by Does Not Apply route two (2) times a day. Qty: 3 Each, Refills: 11      Blood-Glucose Sensor (Dexcom G6 Sensor) jazmyn Change sensor every 10 days  Qty: 3 Device, Refills: 11      !! levothyroxine (SYNTHROID) 200 mcg tablet TAKE 1 TABLET BY MOUTH DAILY BEFORE BREAKFAST FOR THYROID  Qty: 90 Tab, Refills: 3    Associated Diagnoses: Depression, unspecified depression type; Abnormal liver enzymes; Type 2 diabetes with nephropathy (HCC)      acetaminophen (TYLENOL) 325 mg tablet Take 2 Tabs by mouth every six (6) hours as needed for Pain. nitroglycerin (NITROSTAT) 0.4 mg SL tablet 1 Tab by SubLINGual route every five (5) minutes as needed for Chest Pain. Up to 3 doses. Qty: 1 Bottle, Refills: 4       !! - Potential duplicate medications found. Please discuss with provider.           Activity: Activity as tolerated  Diet: Renal Diet  Wound Care: None needed    Follow-up  ·   PCP within one week  · Nephrology at scheduled appt  Time spent to discharge patient 35 minutes  Signed:  Fay Armstrong DO  8/29/2021  10:19 AM

## 2021-08-29 NOTE — PROGRESS NOTES
PD cycler stated that treatment was complete. Disconnected pt PD cath from PD cycler using sterile technique. Betadine tip applied to PD catheter, Catheter dressing clean dry and intact. Sumanth Young

## 2021-08-29 NOTE — PROGRESS NOTES
Discharge instructions have been discussed  Patient verbalizes understanding  Hard copy of instructions given

## 2021-08-29 NOTE — PROGRESS NOTES
Renal Progress Note    Admission Date: 8/27/2021   Subjective:      Seen in room and just finished eating  Oxygen off for breakfast    Objective:     Physical Exam:    Patient Vitals for the past 8 hrs:   BP Temp Pulse Resp SpO2 Weight   08/29/21 0811 (!) 140/68 98.1 °F (36.7 °C) 83 19 96 %    08/29/21 0257 (!) 159/74 98.6 °F (37 °C) 77 18 97 % 118.6 kg (261 lb 6.4 oz)      Gen: comfortable , NAD  HEENT: moist membranes  CV: S1, S2  Lungs: Clear bilaterally  Extem: no edema     Current Facility-Administered Medications   Medication Dose Route Frequency    sodium chloride (NS) flush 5-40 mL  5-40 mL IntraVENous Q8H    sodium chloride (NS) flush 5-40 mL  5-40 mL IntraVENous PRN    acetaminophen (TYLENOL) tablet 650 mg  650 mg Oral Q6H PRN    Or    acetaminophen (TYLENOL) suppository 650 mg  650 mg Rectal Q6H PRN    polyethylene glycol (MIRALAX) packet 17 g  17 g Oral DAILY PRN    ondansetron (ZOFRAN ODT) tablet 4 mg  4 mg Oral Q8H PRN    Or    ondansetron (ZOFRAN) injection 4 mg  4 mg IntraVENous Q6H PRN    heparin (porcine) injection 5,000 Units  5,000 Units SubCUTAneous Q8H    dexamethasone (DECADRON) 10 mg/mL injection 6 mg  6 mg IntraVENous Q24H    cholecalciferol (VITAMIN D3) (1000 Units /25 mcg) tablet 2,000 Units  2,000 Units Oral DAILY    insulin lispro (HUMALOG) injection   SubCUTAneous AC&HS    ascorbic acid (vitamin C) (VITAMIN C) tablet 1,000 mg  1,000 mg Oral BID    insulin glargine (LANTUS) injection 10 Units  10 Units SubCUTAneous DAILY            Data Review:     LABS:   Recent Results (from the past 12 hour(s))   GLUCOSE, POC    Collection Time: 08/28/21  9:55 PM   Result Value Ref Range    Glucose (POC) 400 (H) 65 - 100 mg/dL    Performed by Lauren    CBC WITH AUTOMATED DIFF    Collection Time: 08/29/21  6:53 AM   Result Value Ref Range    WBC 12.0 (H) 4.3 - 11.1 K/uL    RBC 4.23 4.05 - 5.2 M/uL    HGB 13.3 11.7 - 15.4 g/dL    HCT 39.3 35.8 - 46.3 %    MCV 92.9 79.6 - 97.8 FL MCH 31.4 26.1 - 32.9 PG    MCHC 33.8 31.4 - 35.0 g/dL    RDW 12.3 11.9 - 14.6 %    PLATELET 139 105 - 014 K/uL    MPV 11.8 9.4 - 12.3 FL    ABSOLUTE NRBC 0.00 0.0 - 0.2 K/uL    DF AUTOMATED      NEUTROPHILS 89 (H) 43 - 78 %    LYMPHOCYTES 6 (L) 13 - 44 %    MONOCYTES 5 4.0 - 12.0 %    EOSINOPHILS 0 (L) 0.5 - 7.8 %    BASOPHILS 0 0.0 - 2.0 %    IMMATURE GRANULOCYTES 1 0.0 - 5.0 %    ABS. NEUTROPHILS 10.7 (H) 1.7 - 8.2 K/UL    ABS. LYMPHOCYTES 0.7 0.5 - 4.6 K/UL    ABS. MONOCYTES 0.6 0.1 - 1.3 K/UL    ABS. EOSINOPHILS 0.0 0.0 - 0.8 K/UL    ABS. BASOPHILS 0.0 0.0 - 0.2 K/UL    ABS. IMM. GRANS. 0.1 0.0 - 0.5 K/UL   MAGNESIUM    Collection Time: 08/29/21  6:53 AM   Result Value Ref Range    Magnesium 2.8 (H) 1.8 - 2.4 mg/dL   METABOLIC PANEL, COMPREHENSIVE    Collection Time: 08/29/21  6:53 AM   Result Value Ref Range    Sodium 133 (L) 136 - 145 mmol/L    Potassium 3.2 (L) 3.5 - 5.1 mmol/L    Chloride 97 (L) 98 - 107 mmol/L    CO2 24 21 - 32 mmol/L    Anion gap 12 7 - 16 mmol/L    Glucose 366 (H) 65 - 100 mg/dL    BUN 82 (H) 8 - 23 MG/DL    Creatinine 9.24 (H) 0.6 - 1.0 MG/DL    GFR est AA 6 (L) >60 ml/min/1.73m2    GFR est non-AA 5 (L) >60 ml/min/1.73m2    Calcium 8.5 8.3 - 10.4 MG/DL    Bilirubin, total 0.3 0.2 - 1.1 MG/DL    ALT (SGPT) 31 12 - 65 U/L    AST (SGOT) 35 15 - 37 U/L    Alk.  phosphatase 85 50 - 136 U/L    Protein, total 6.9 6.3 - 8.2 g/dL    Albumin 2.6 (L) 3.2 - 4.6 g/dL    Globulin 4.3 (H) 2.3 - 3.5 g/dL    A-G Ratio 0.6 (L) 1.2 - 3.5     C REACTIVE PROTEIN, QT    Collection Time: 08/29/21  6:53 AM   Result Value Ref Range    C-Reactive protein 12.8 (H) 0.0 - 0.9 mg/dL   VITAMIN D, 25 HYDROXY    Collection Time: 08/29/21  6:53 AM   Result Value Ref Range    Vitamin D 25-Hydroxy 11.1 (L) 30.0 - 100.0 ng/mL   GLUCOSE, POC    Collection Time: 08/29/21  7:43 AM   Result Value Ref Range    Glucose (POC) 346 (H) 65 - 100 mg/dL    Performed by Ashutosh          Plan:     Principal Problem:    Acute respiratory failure with hypoxia ([facility] (8/28/2021)    Active Problems:    Essential hypertension, benign ()      Mixed hyperlipidemia ()      Acquired hypothyroidism ()      Type 2 diabetes with nephropathy ([facility] (5/2/2018)      Severe obesity (BMI 35.0-39. 9) with comorbidity ([facility] (5/2/2018)      COVID-19 virus infection (8/28/2021)      ESRD on peritoneal dialysis ([facility] (8/28/2021)      Acute hypokalemia (8/28/2021)      Hyponatremia (8/28/2021)       ESRD on PD     Covid with some hypoxia on supplemental oxygen. - on decadron  - asking about discharge. Suggested it could be related to oxygen level.       Will continue her PD with home prescription.   All 2.5%

## 2021-08-29 NOTE — PROGRESS NOTES
Pt in bed resting on 2L NC, in no apparent distress, call light in reach. SBAR given to Maritza Tanner RN.

## 2021-09-02 PROBLEM — D84.9 COVID-19 IN IMMUNOCOMPROMISED PATIENT (HCC): Status: ACTIVE | Noted: 2021-01-01

## 2021-09-02 PROBLEM — J96.01 ACUTE HYPOXEMIC RESPIRATORY FAILURE DUE TO COVID-19 (HCC): Status: ACTIVE | Noted: 2021-01-01

## 2021-09-02 PROBLEM — F17.211 CIGARETTE NICOTINE DEPENDENCE IN REMISSION: Status: ACTIVE | Noted: 2021-01-01

## 2021-09-02 PROBLEM — U07.1 COVID-19 IN IMMUNOCOMPROMISED PATIENT (HCC): Status: ACTIVE | Noted: 2021-01-01

## 2021-09-02 PROBLEM — Z28.9 COVID-19 VACCINATION NOT DONE: Status: ACTIVE | Noted: 2021-01-01

## 2021-09-02 PROBLEM — U07.1 ACUTE HYPOXEMIC RESPIRATORY FAILURE DUE TO COVID-19 (HCC): Status: ACTIVE | Noted: 2021-01-01

## 2021-09-02 NOTE — ED NOTES
TRANSFER - OUT REPORT:    Verbal report given to iona Rajan(name) on Bari Essex  being transferred to [facility](unit) for routine progression of care       Report consisted of patients Situation, Background, Assessment and   Recommendations(SBAR). Information from the following report(s) SBAR was reviewed with the receiving nurse. Lines:   Peripheral IV 09/02/21 Right Hand (Active)   Site Assessment Clean, dry, & intact 09/02/21 0657   Phlebitis Assessment 0 09/02/21 0657   Infiltration Assessment 0 09/02/21 0657   Dressing Status Clean, dry, & intact 09/02/21 1943        Opportunity for questions and clarification was provided.       Patient transported with:   [employer]

## 2021-09-02 NOTE — H&P
Hospitalist History and Physical   Admit Date:  2021  6:44 AM   Name:  Marisol Talley   Age:  59 y.o. Sex:  female  :  1960   MRN:  [id number]     Presenting Complaint: Shortness of Breath    Reason(s) for Admission: Acute hypoxemic respiratory failure due to COVID-19 ([facility] [address] [U07.1, J96.01]     History of Present Illness:   Marisol Talley is a 59 y.o. female with medical history of ESRD on PD (started this year), Class III Obesity, IDDM, CAD s/p CABG and stents who presented with increasing SOB diagnosed with covid x 10 days ago. Briefly hospitalized and discharged stable on RA on . Sudden onset SOB and coughing yesterday. Presented to [facility] on  via EMS who  notes patient to be hypoxic at 77-80%. 7.48/28.8/57/21.5 spo2 92% on 15L     PD f/b Dr Shira Conte     CAD s/p CABG and stents f/b Dr Radha Arredondo     Former smoker. 30 pack year history     Wants only 5 days on the vent if intubated. Review of Systems:  10 systems reviewed and negative except as noted in HPI. Assessment & Plan:     Acute hypoxemic respiratory failure / COVID   +COVID   Unvaccinated   15L HF. Anticipate escalation to airvo. OK with intubation but only wants max 5 days on mechanical vent. Not a canididate for rem   Consented to Actemra (CRP 9.8)   Pseudo hypocalcemia - corrected 8.4   Procal 0.19. low suspicion for HAP at this time. High risk for ARDS, VTE, septic shock and invasive infections    T2DM w/ DM nephropathy, h/o OSTEO, PVD - high insulin needs. lantus plus prandal and SSI. Titrate <190 BGL     ESRD on PD - 2/2 DM. Started in January. Consult nephrology. CAD s/p CABG and stents - DAPT, BB,   Vitamin D deficiency - level 11. Start high dose replacement. Uncontrolled HTN - restart home meds. Diet: ADULT DIET Regular; 4 carb choices (60 gm/meal); Low Fat/Low Chol/High Fiber/BERNIE; Low Potassium (Less than 3000 mg/day);  Low Phosphorus (Less than 1000 mg)  VTE ppx: heparin 7500 K q8h Code status: Full Code    Active Hospital Problems    Diagnosis Date Noted    Acute hypoxemic respiratory failure due to COVID-19 [facility]) 09/02/2021    COVID-19 in immunocompromised patient ([facility] [address] 09/02/2021    Cigarette nicotine dependence in remission 09/02/2021    COVID-19 vaccination not done 09/02/2021    ESRD on peritoneal dialysis ([facility] [address] 08/28/2021    Acute hypokalemia 08/28/2021    Coronary artery disease with stable angina pectoris ([facility] [address] 02/03/2020    Class 3 severe obesity with serious comorbidity and body mass index (BMI) of 40.0 to 44.9 in adult [facility]) 05/02/2018    Type 2 diabetes with nephropathy ([facility] [address] 05/02/2018    PVD (peripheral vascular disease) ([facility] 08/18/2016    Acquired hypothyroidism     Mixed hyperlipidemia        Past Medical History:   Diagnosis Date    Acute bronchitis     Acute pharyngitis     Allergic rhinitis due to other allergen     Chronic kidney disease     ESRD    Coronary atherosclerosis of native coronary artery     CABG x3  ~2006, 11/29/19- stent X1, 10/7/19 stent X3, 8/12/19 stent X2    Depressive disorder, not elsewhere classified     daily meds    Essential hypertension, benign     daily meds    Ischemic ulcer of foot due to atherosclerosis of native artery of extremity ([facility] [address]     Mixed hyperlipidemia     Obesity (BMI 30-39. 9)     Osteomyelitis of toe ([address]     forth toe of left foot    Other specified acquired hypothyroidism     Thromboembolus ([facility] [address]     possible to back of left knee???, superficial    Type II or unspecified type diabetes mellitus without mention of complication, uncontrolled     checks QD, normal 100, hyposymptoms at 70     Past Surgical History:   Procedure Laterality Date    HX BREAST BIOPSY Left 02/20/2017    stereo    HX CORONARY ARTERY BYPASS GRAFT  2006    Three    HX HEART CATHETERIZATION  11/29/2019    stent X1    HX HEART CATHETERIZATION  10/07/2019    stent X3    HX HEART CATHETERIZATION  08/12/2019    stents X2    IR INSERT TUNL CVC W/O PORT OVER 5 YR  10/12/2020    IR PLC CATH AV SHUNT INT W SI LT  10/21/2020    IR REMOVE TUNL CVAD W/O PORT / PUMP  2021    SD CARDIAC SURG PROCEDURE UNLIST  2006    CABG x3    VASCULAR SURGERY PROCEDURE UNLIST  08/10/2016    arteriogram of leg    VASCULAR SURGERY PROCEDURE UNLIST Left 2016     fem pop endarterectomy    VASCULAR SURGERY PROCEDURE UNLIST Left 2018    4th toe amp    VASCULAR SURGERY PROCEDURE UNLIST Right 2019     Right second toe amputation    VASCULAR SURGERY PROCEDURE UNLIST Left 10/21/2020     Left radiocephalic arteriovenous fistulogram and central venogram.Coil embolization of left radiocephalic arteriovenous fistula venous branch. Retrograde PTA of proximal radiocephalic arteriovenous fistula. No Known Allergies   Social History     Tobacco Use    Smoking status: Former Smoker     Packs/day: 2.00     Years: 15.00     Pack years: 30.00     Types: Cigarettes     Quit date: 2/3/1992     Years since quittin.6    Smokeless tobacco: Never Used   Substance Use Topics    Alcohol use: No      Family History   Problem Relation Age of Onset    Diabetes Other     Hypertension Other     Heart Disease Other     Diabetes Mother     Hypertension Mother     Cancer Father     Diabetes Sister     Heart Disease Sister     Diabetes Brother     Diabetes Sister     Hypertension Sister     Diabetes Sister     Hypertension Sister     Diabetes Sister     Hypertension Sister     Diabetes Brother       Family history reviewed and negative except as noted above. There is no immunization history for the selected administration types on file for this patient.   PTA Medications:  Current Outpatient Medications   Medication Instructions    acetaminophen (TYLENOL) 650 mg, Oral, EVERY 6 HOURS AS NEEDED    amLODIPine (NORVASC) 5 mg, Oral, DAILY    aspirin delayed-release 81 mg, Oral, 7AM    Blood-Glucose Meter,Continuous (Dexcom G6 ) misc As dir    Blood-Glucose Meter,Continuous (Dexcom G6 ) misc 1 Units, Does Not Apply, 2 TIMES DAILY    Blood-Glucose Sensor (Dexcom G6 Sensor) jazmyn Change sensor every 10 days    Blood-Glucose Transmitter (Dexcom G6 Transmitter) jazmyn CGM    clopidogreL (PLAVIX) 75 mg, Oral, DAILY    dexAMETHasone (DECADRON) 6 mg, Oral, DAILY BEFORE BREAKFAST    escitalopram oxalate (LEXAPRO) 20 mg, Oral, 2 TIMES DAILY    evolocumab (REPATHA SURECLICK) 714 mg, SubCUTAneous, EVERY 14 DAYS    furosemide (LASIX) 60 mg, Oral, 2 TIMES DAILY    hydrALAZINE (APRESOLINE) 25 mg, Oral, 3 TIMES DAILY    isosorbide mononitrate ER (IMDUR) 120 mg, Oral, DAILY    levothyroxine (SYNTHROID) 200 mcg tablet TAKE 1 TABLET BY MOUTH DAILY BEFORE BREAKFAST FOR THYROID    levothyroxine (SYNTHROID) 25 mcg tablet TAKE 1 TAB BY MOUTH DAILY (BEFORE BREAKFAST) FOR THYROID.  metOLazone (ZAROXOLYN) 5 mg, Oral, DAILY AS NEEDED    metoprolol succinate (TOPROL-XL) 50 mg, Oral, DAILY    nitroglycerin (NITROSTAT) 0.4 mg, SubLINGual, EVERY 5 MIN AS NEEDED, Up to 3 doses.  ranolazine ER (RANEXA) 500 mg, Oral, EVERY 12 HOURS    Tresiba FlexTouch U-100 100 unit/mL (3 mL) inpn INJECT 70 UNITS UNDER THE SKIN DAILY FOR 90 DAYS.        Objective:     Patient Vitals for the past 24 hrs:   Temp Pulse Resp BP SpO2   09/02/21 1240 97.6 °F (36.4 °C) 74 22 (!) 172/78 93 %   09/02/21 1131  71 28 (!) 169/72 94 %   09/02/21 1101  73 (!) 31 (!) 162/74 93 %   09/02/21 0905  71  (!) 154/71    09/02/21 0901  72 (!) 34 (!) 154/71    09/02/21 0839  73 (!) 31  94 %   09/02/21 0831  74 (!) 32 (!) 181/83 90 %   09/02/21 0827     90 %   09/02/21 0823  75 (!) 36  95 %   09/02/21 0801  72 (!) 38 (!) 179/81 100 %   09/02/21 0655     94 %   09/02/21 0649 98.3 °F (36.8 °C) 78 24 (!) 180/84 97 %     Oxygen Therapy  O2 Sat (%): 93 % (09/02/21 1240)  Pulse via Oximetry: 72 beats per minute (09/02/21 1131)  O2 Device: Hi flow nasal cannula (09/02/21 0827)  O2 Flow Rate (L/min): 15 l/min (09/02/21 0827)    Estimated body mass index is 40.29 kg/m² as calculated from the following:    Height as of this encounter: 5' 8\" (1.727 m). Weight as of this encounter: 120.2 kg (265 lb). No intake or output data in the 24 hours ending 09/02/21 1316      Physical Exam:    General:    Obese ill appearing   Head:  Normocephalic, atraumatic  Eyes:  Sclerae appear normal.  Pupils equally round. ENT:  Nares appear normal, no drainage. Moist oral mucosa  Neck:  No restricted ROM. Trachea midline   CV:   RRR. No m/r/g. No jugular venous distension. Lungs:   Mild increased WOB; equal chest rise BL   Abdomen: Bowel sounds present. Soft, nontender, nondistended. Extremities: No cyanosis or clubbing. No edema  Skin:     No rashes and normal coloration. Warm and dry. Neuro:  Cranial nerves II-XII grossly intact. Sensation intact  Psych:  Normal mood and affect. Alert and oriented x3    I have reviewed ordered lab tests and independently visualized imaging below:    Last 24hr Labs:  Recent Results (from the past 24 hour(s))   EKG, 12 LEAD, INITIAL    Collection Time: 09/02/21  6:50 AM   Result Value Ref Range    Ventricular Rate 77 BPM    Atrial Rate 77 BPM    P-R Interval 176 ms    QRS Duration 88 ms    Q-T Interval 422 ms    QTC Calculation (Bezet) 477 ms    Calculated P Axis 49 degrees    Calculated R Axis -52 degrees    Calculated T Axis 101 degrees    Diagnosis       !! AGE AND GENDER SPECIFIC ECG ANALYSIS !!   Normal sinus rhythm  Possible Left atrial enlargement  Left axis deviation  Left ventricular hypertrophy with repolarization abnormality  Inferior infarct (cited on or before 10-AUG-2019)  Anterolateral infarct (cited on or before 10-AUG-2019)  Abnormal ECG  When compared with ECG of 28-SEP-2020 23:27,  No significant change was found  Confirmed by Amina Schwartz MD (), Jeimy Hare ([id number]) on 9/2/2021 5:95:57 AM     METABOLIC PANEL, COMPREHENSIVE    Collection Time: 09/02/21  6:53 AM   Result Value Ref Range    Sodium 142 136 - 145 mmol/L    Potassium 2.8 (L) 3.5 - 5.1 mmol/L    Chloride 110 (H) 98 - 107 mmol/L    CO2 22 21 - 32 mmol/L    Anion gap 10 7 - 16 mmol/L    Glucose 388 (H) 65 - 100 mg/dL    BUN 69 (H) 8 - 23 MG/DL    Creatinine 4.86 (H) 0.6 - 1.0 MG/DL    GFR est AA 12 (L) >60 ml/min/1.73m2    GFR est non-AA 10 (L) >60 ml/min/1.73m2    Calcium 6.7 (L) 8.3 - 10.4 MG/DL    Bilirubin, total 0.4 0.2 - 1.1 MG/DL    ALT (SGPT) 31 12 - 65 U/L    AST (SGOT) 28 15 - 37 U/L    Alk. phosphatase 70 50 - 136 U/L    Protein, total 5.7 (L) 6.3 - 8.2 g/dL    Albumin 1.9 (L) 3.2 - 4.6 g/dL    Globulin 3.8 (H) 2.3 - 3.5 g/dL    A-G Ratio 0.5 (L) 1.2 - 3.5     MAGNESIUM    Collection Time: 09/02/21  6:53 AM   Result Value Ref Range    Magnesium 2.0 1.8 - 2.4 mg/dL   D DIMER    Collection Time: 09/02/21  6:53 AM   Result Value Ref Range    D DIMER 0.53 <0.56 ug/ml(FEU)   C REACTIVE PROTEIN, QT    Collection Time: 09/02/21  6:53 AM   Result Value Ref Range    C-Reactive protein 9.8 (H) 0.0 - 0.9 mg/dL   TSH 3RD GENERATION    Collection Time: 09/02/21  6:53 AM   Result Value Ref Range    TSH 0.340 (L) 0.358 - 3.740 uIU/mL   PROCALCITONIN    Collection Time: 09/02/21  6:53 AM   Result Value Ref Range    Procalcitonin 0.19 ng/mL   CBC WITH AUTOMATED DIFF    Collection Time: 09/02/21  6:54 AM   Result Value Ref Range    WBC 11.2 (H) 4.3 - 11.1 K/uL    RBC 3.95 (L) 4.05 - 5.2 M/uL    HGB 12.0 11.7 - 15.4 g/dL    HCT 35.8 35.8 - 46.3 %    MCV 90.6 79.6 - 97.8 FL    MCH 30.4 26.1 - 32.9 PG    MCHC 33.5 31.4 - 35.0 g/dL    RDW 12.3 11.9 - 14.6 %    PLATELET 570 018 - 639 K/uL    MPV 11.1 9.4 - 12.3 FL    ABSOLUTE NRBC 0.00 0.0 - 0.2 K/uL    DF AUTOMATED      NEUTROPHILS 91 (H) 43 - 78 %    LYMPHOCYTES 5 (L) 13 - 44 %    MONOCYTES 3 (L) 4.0 - 12.0 %    EOSINOPHILS 0 (L) 0.5 - 7.8 %    BASOPHILS 0 0.0 - 2.0 %    IMMATURE GRANULOCYTES 1 0.0 - 5.0 %    ABS.  NEUTROPHILS 10.2 (H) 1.7 - 8.2 K/UL ABS. LYMPHOCYTES 0.5 0.5 - 4.6 K/UL    ABS. MONOCYTES 0.4 0.1 - 1.3 K/UL    ABS. EOSINOPHILS 0.0 0.0 - 0.8 K/UL    ABS. BASOPHILS 0.0 0.0 - 0.2 K/UL    ABS. IMM. GRANS. 0.2 0.0 - 0.5 K/UL   BLOOD GAS, ARTERIAL POC    Collection Time: 09/02/21  8:45 AM   Result Value Ref Range    Device: NASAL CANNULA      pH (POC) 7.48 (H) 7.35 - 7.45      pCO2 (POC) 28.8 (L) 35 - 45 MMHG    pO2 (POC) 57 (L) 75 - 100 MMHG    HCO3 (POC) 21.5 (L) 22 - 26 MMOL/L    sO2 (POC) 91.6 (L) 95 - 98 %    Base deficit (POC) 0.9 mmol/L    Allens test (POC) Positive      Site RIGHT RADIAL      Specimen type (POC) ARTERIAL      Performed by Naveen     FIO2, L/min 15     MSSA/MRSA SC BY PCR, NASAL SWAB    Collection Time: 09/02/21 10:54 AM    Specimen: Nasal swab   Result Value Ref Range    Special Requests: NO SPECIAL REQUESTS      Culture result:        SA target not detected. A MRSA NEGATIVE, SA NEGATIVE test result does not preclude MRSA or SA nasal colonization. GLUCOSE, POC    Collection Time: 09/02/21 12:36 PM   Result Value Ref Range    Glucose (POC) 418 (H) 65 - 100 mg/dL    Performed by ToneitaT        All Micro Results     Procedure Component Value Units Date/Time    MSSA/MRSA SC BY PCR, NASAL SWAB [[id number]] Collected: 09/02/21 1054    Order Status: Completed Specimen: Nasal swab Updated: 09/02/21 1253     Special Requests: NO SPECIAL REQUESTS        Culture result:       SA target not detected. A MRSA NEGATIVE, SA NEGATIVE test result does not preclude MRSA or SA nasal colonization. S. Chaneta Balloon, UR/CSF [[id number]] Collected: 09/02/21 0910    Order Status: No result     S. Chaneta Balloon, UR/CSF [[id number]] Collected: 09/02/21 5548    Order Status: Canceled           Other Studies:  XR CHEST PORT    Result Date: 9/2/2021  1 View portable chest x-ray 9/2/2021 7:05 AM Indication: Syncope, Covid positive status. Comparison: 8/27/2021 Findings:  This portable upright AP chest of 0705 shows the lungs adequately inflated. Monitoring leads overlie the chest. Cardiac silhouette normal in size, stable changes of prior sternotomy. There are increased bilateral perihilar and basilar patchy mildly consolidative and reticular opacities today. No effusion and no pneumothorax. Bilateral patchy lung opacities clearly progressed from before, severity now moderate. Signed:  Roxy Willams DO    Part of this note may have been written by using a voice dictation software. The note has been proof read but may still contain some grammatical/other typographical errors.

## 2021-09-02 NOTE — PROGRESS NOTES
TRANSFER - IN REPORT:    Verbal report received from 1200 Howard Toussaint RN(name) on [facility]  being received from ER(unit) for routine progression of care      Report consisted of patients Situation, Background, Assessment and   Recommendations(SBAR). Information from the following report(s) SBAR, Kardex, [facility] and Recent Results was reviewed with the receiving nurse. Opportunity for questions and clarification was provided. Report given to Alber Gallagher RN, who will assume primary care on arrival to floor.

## 2021-09-02 NOTE — PROGRESS NOTES
Patient arrived to room 819 from Emergency Department. Patient in stable condition. Respirations labored. Patient on 15 L to high flow nasal cannula. VSS. Hypertensive. FSBG is 418. MD notified. Skin assessment completed with Damien King RN. Will continue to monitor.

## 2021-09-02 NOTE — ED PROVIDER NOTES
20-year-old female patient presents this morning with concerns over increasing shortness of breath and hypoxia. EMS reports that they found the patient to be hypoxic at 68 to 80%    Patient was diagnosed with Covid about 10 days ago  Had a short hospitalization was given steroids at that time    Patient is a peritoneal dialysis patient with chronic renal failure followed by Dr. Ankush Shields    Patient has a history of coronary bypass and CAD with multiple stents. Followed by Dr. Rodrigue Jimenez    The history is provided by the patient and the EMS personnel. Shortness of Breath  This is a recurrent problem. The average episode lasts 1 day. The problem occurs continuously. The current episode started 12 to 24 hours ago. The problem has been gradually worsening. Associated symptoms include cough. Pertinent negatives include no fever, no headaches, no rhinorrhea, no sore throat, no ear pain, no neck pain, no sputum production, no chest pain, no syncope, no vomiting, no abdominal pain and no rash. It is unknown what precipitated the problem. She has had prior hospitalizations. She has had prior ED visits. Associated medical issues include CAD and past MI. Associated medical issues do not include asthma, COPD, heart failure or recent surgery. 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 9)     Osteomyelitis of toe ([address]     forth toe of left foot    Other specified acquired hypothyroidism     Thromboembolus ([address]     possible to back of left knee???, superficial    Type II or unspecified type diabetes mellitus without mention of complication, uncontrolled     checks QD, normal 100, hyposymptoms at 70       Past Surgical History:   Procedure Laterality Date    HX BREAST BIOPSY Left 02/20/2017    stereo    HX CORONARY ARTERY BYPASS GRAFT  2006    Three    HX HEART CATHETERIZATION  11/29/2019    stent X1    HX HEART CATHETERIZATION  10/07/2019    stent X3    HX HEART CATHETERIZATION  08/12/2019    stents X2    IR INSERT TUNL CVC W/O PORT OVER 5 YR  10/12/2020    IR PLC CATH AV SHUNT INT W SI LT  10/21/2020    IR REMOVE TUNL CVAD W/O PORT / PUMP  1/19/2021    [place] CARDIAC SURG PROCEDURE UNLIST  2006    CABG x3    VASCULAR SURGERY PROCEDURE UNLIST  08/10/2016    arteriogram of leg    VASCULAR SURGERY PROCEDURE UNLIST Left 08/26/2016     fem pop endarterectomy    VASCULAR SURGERY PROCEDURE UNLIST Left 03/12/2018    4th toe amp    VASCULAR SURGERY PROCEDURE UNLIST Right 08/01/2019     Right second toe amputation    VASCULAR SURGERY PROCEDURE UNLIST Left 10/21/2020     Left radiocephalic arteriovenous fistulogram and central venogram.Coil embolization of left radiocephalic arteriovenous fistula venous branch. Retrograde PTA of proximal radiocephalic arteriovenous fistula.          Family History:   Problem Relation Age of Onset    Diabetes Other     Hypertension Other     Heart Disease Other     Diabetes Mother     Hypertension Mother     Cancer Father     Diabetes Sister     Heart Disease Sister     Diabetes Brother     Diabetes Sister     Hypertension Sister     Diabetes Sister     Hypertension Sister     Diabetes Sister     Hypertension Sister     Diabetes Brother        Social History     Socioeconomic History    Marital status:      Spouse name: Not on file    Number of children: Not on file    Years of education: Not on file    Highest education level: Not on file   Occupational History    Not on file   Tobacco Use    Smoking status: Former Smoker     Packs/day: 2.00     Years: 15.00     Pack years: 30.00     Types: Cigarettes     Quit date: 2/3/1992     Years since quittin.6    Smokeless tobacco: Never Used   Substance and Sexual Activity    Alcohol use: No    Drug use: No    Sexual activity: Not on file   Other Topics Concern    Not on file   Social History Narrative    Not on file     Social Determinants of Health     Financial Resource Strain:     Difficulty of Paying Living Expenses:    Food Insecurity:     Worried About Running Out of Food in the Last Year:     [address] in the Last Year:    Transportation Needs:     Lack of Transportation (Medical):  Lack of Transportation (Non-Medical):    Physical Activity:     Days of Exercise per Week:     Minutes of Exercise per Session:    Stress:     Feeling of Stress :    Social Connections:     Frequency of Communication with Friends and Family:     Frequency of Social Gatherings with Friends and Family:     Attends [employer] Services:     Active Member of Clubs or Organizations:     Attends Club or Organization Meetings:     Marital Status:    Intimate Partner Violence:     Fear of Current or Ex-Partner:     Emotionally Abused:     Physically Abused:     Sexually Abused: ALLERGIES: Patient has no known allergies. Review of Systems   Constitutional: Positive for fatigue. Negative for chills and fever. HENT: Negative for congestion, ear pain, rhinorrhea and sore throat. Eyes: Negative for photophobia and discharge. Respiratory: Positive for cough and shortness of breath. Negative for sputum production. Cardiovascular: Negative for chest pain, palpitations and syncope. Gastrointestinal: Negative for abdominal pain, constipation, diarrhea and vomiting. Endocrine: Negative for cold intolerance and heat intolerance. Genitourinary: Negative for dysuria and flank pain.    Musculoskeletal: Positive for arthralgias and myalgias. Negative for neck pain. Skin: Negative for rash and wound. Allergic/Immunologic: Negative for environmental allergies and food allergies. Neurological: Negative for syncope and headaches. Hematological: Negative for adenopathy. Does not bruise/bleed easily. Psychiatric/Behavioral: Negative for dysphoric mood. The patient is not nervous/anxious. All other systems reviewed and are negative. Vitals:    09/02/21 0649 09/02/21 0655   BP: (!) 180/84    Pulse: 78    Resp: 24    Temp: 98.3 °F (36.8 °C)    SpO2: 97% 94%   Weight: 120.2 kg (265 lb)    Height: 5' 8\" (1.727 m)             Physical Exam  Vitals and nursing note reviewed. Constitutional:       General: She is in acute distress. Appearance: Normal appearance. She is well-developed. She is obese. She is ill-appearing. HENT:      Head: Normocephalic and atraumatic. Right Ear: External ear normal.      Left Ear: External ear normal.      Mouth/Throat:      Pharynx: No oropharyngeal exudate. Eyes:      Conjunctiva/sclera: Conjunctivae normal.      Pupils: Pupils are equal, round, and reactive to light. Neck:      Vascular: No JVD. Cardiovascular:      Rate and Rhythm: Normal rate and regular rhythm. Heart sounds: Normal heart sounds. No murmur heard. No friction rub. No gallop. Pulmonary:      Effort: Pulmonary effort is normal.      Breath sounds: Decreased breath sounds present. No rhonchi. Abdominal:      General: Bowel sounds are normal. There is no distension. Palpations: Abdomen is soft. There is no mass. Tenderness: There is no abdominal tenderness. Musculoskeletal:         General: No deformity. Normal range of motion. Cervical back: Normal range of motion and neck supple. Skin:     General: Skin is warm and dry. Capillary Refill: Capillary refill takes less than 2 seconds. Findings: No rash. Neurological:      General: No focal deficit present. Mental Status: She is alert and oriented to person, place, and time. Cranial Nerves: No cranial nerve deficit. Sensory: No sensory deficit. Gait: Gait normal.   Psychiatric:         Mood and Affect: Mood normal.         Speech: Speech normal.         Behavior: Behavior normal.         Thought Content: Thought content normal.         Judgment: Judgment normal.          MDM  Number of Diagnoses or Management Options  Acute respiratory failure with hypoxia ([address]): new and requires workup  Hypokalemia: new and requires workup  Peritoneal dialysis catheter in place [facility]): minor  Pneumonia due to COVID-19 virus: established and worsening  Diagnosis management comments: EKG reviewed  No acute ischemic changes    8:08 AM  Chest x-ray reveals progression of patchy bilateral infiltrates consistent with COVID-19    Patient currently has an increased oxygen demand/hypoxia. .  Patient is symptomatically improved significantly with supplemental oxygen    Potassium noted to be 2.8 today    I will go ahead and administer Decadron and an oral dose of potassium    Hospitalist consulted for admission       Amount and/or Complexity of Data Reviewed  Clinical lab tests: ordered and reviewed  Tests in the radiology section of CPT®: ordered and reviewed  Tests in the medicine section of CPT®: ordered and reviewed  Decide to obtain previous medical records or to obtain history from someone other than the patient: yes  Review and summarize past medical records: yes  Discuss the patient with other providers: yes  Independent visualization of images, tracings, or specimens: yes    Risk of Complications, Morbidity, and/or Mortality  Presenting problems: high  Diagnostic procedures: moderate  Management options: moderate  General comments: Elements of this note have been dictated via voice recognition software. Text and phrases may be limited by the accuracy of the software.   The chart has been reviewed, but errors may still be present. Patient Progress  Patient progress: improved         EKG    Date/Time: 9/2/2021 7:24 AM  Performed by: Gema Newell MD  Authorized by: Gema Newell MD     ECG reviewed by ED Physician in the absence of a cardiologist: yes    Previous ECG:     Previous ECG:  Compared to current    Similarity:  No change  Interpretation:     Interpretation: abnormal    Rate:     ECG rate assessment: normal    Rhythm:     Rhythm: sinus rhythm    Ectopy:     Ectopy: none    QRS:     QRS axis:  Left    QRS intervals:  Normal  Conduction:     Conduction: normal    ST segments:     ST segments:  Normal  T waves:     T waves: normal    Q waves:     Q waves:   I  Comments:      Sinus rhythm with LVH and left axis deviation  No acute ischemic changes  No significant interval change from prior tracing

## 2021-09-02 NOTE — ED TRIAGE NOTES
Pt arrives via EMS coming home c/o SOB that started 2 weeks ago after being diagnosed with covid. Pt was diagnosed, released with inhalers, but had no improvement. Pt was in dialysis (5th cycle) when EMS was called. Pt was 77% on RA per EMS. Pt spo2 80% at time of triage on RA. NRB applied with 02 sat of 93%.  No other complaints at time of discharge

## 2021-09-02 NOTE — PROGRESS NOTES
Patient remains in stable condition with respirations even/unlabored. No acute distress noted. No needs noted or voiced at this time. Safety measures in place. Patient on 15L HF NC. PD cycler running. Actemra is running. Call light remains within reach. Preparing to give report to oncoming shift. independent

## 2021-09-02 NOTE — CONSULTS
GEN GENERIC H&P/CONSULT    Subjective:     53-year-old female with H/O ESRD on PD, IIDM, HTN, CAD/ s/p CABG  Patient was diagnosed with Covid 19 10 days ago and had a short hospitalization and treated with steroids. She  presents this morning with  increasing shortness of breath and hypoxia with O2 sat 77 to 80%. Chest xray: worsening of danish. Infiltrates   Renal consult requested for management of ESRD/PD          Past Medical History:   Diagnosis Date    Acute bronchitis     Acute pharyngitis     Allergic rhinitis due to other allergen     Chronic kidney disease     ESRD    Coronary atherosclerosis of native coronary artery     CABG x3  ~2006, 11/29/19- stent X1, 10/7/19 stent X3, 8/12/19 stent X2    Depressive disorder, not elsewhere classified     daily meds    Essential hypertension, benign     daily meds    Ischemic ulcer of foot due to atherosclerosis of native artery of extremity ([address]     Mixed hyperlipidemia     Obesity (BMI 30-39. 9)     Osteomyelitis of toe ([address]     forth toe of left foot    Other specified acquired hypothyroidism     Thromboembolus ([address]     possible to back of left knee???, superficial    Type II or unspecified type diabetes mellitus without mention of complication, uncontrolled     checks QD, normal 100, hyposymptoms at 70      Past Surgical History:   Procedure Laterality Date    HX BREAST BIOPSY Left 02/20/2017    stereo    HX CORONARY ARTERY BYPASS GRAFT  2006    Three    HX HEART CATHETERIZATION  11/29/2019    stent X1    HX HEART CATHETERIZATION  10/07/2019    stent X3    HX HEART CATHETERIZATION  08/12/2019    stents X2    IR INSERT TUNL CVC W/O PORT OVER 5 YR  10/12/2020    IR PLC CATH AV SHUNT INT W SI LT  10/21/2020    IR REMOVE TUNL CVAD W/O PORT / PUMP  1/19/2021    MA CARDIAC SURG PROCEDURE UNLIST  2006    CABG x3    VASCULAR SURGERY PROCEDURE UNLIST  08/10/2016    arteriogram of leg    VASCULAR SURGERY PROCEDURE UNLIST Left 08/26/2016     fem pop endarterectomy    VASCULAR SURGERY PROCEDURE UNLIST Left 03/12/2018    4th toe amp    VASCULAR SURGERY PROCEDURE UNLIST Right 08/01/2019     Right second toe amputation    VASCULAR SURGERY PROCEDURE UNLIST Left 10/21/2020     Left radiocephalic arteriovenous fistulogram and central venogram.Coil embolization of left radiocephalic arteriovenous fistula venous branch. Retrograde PTA of proximal radiocephalic arteriovenous fistula. Prior to Admission medications    Medication Sig Start Date End Date Taking? Authorizing Provider   dexAMETHasone (DECADRON) 6 mg tablet Take 1 Tablet by mouth Daily (before breakfast). 8/29/21   Cristine Cortes, DO   levothyroxine (SYNTHROID) 25 mcg tablet TAKE 1 TAB BY MOUTH DAILY (BEFORE BREAKFAST) FOR THYROID. 7/20/21   Brendon DE LA GARZA, DO   escitalopram oxalate (Lexapro) 20 mg tablet Take 1 Tablet by mouth two (2) times a day. 7/20/21   Jayant Corrales, DO   evolocumab (REPATHA SURECLICK) pen injection 1 mL by SubCUTAneous route every fourteen (14) days. 6/18/21   Henry Dunaway MD   hydrALAZINE (APRESOLINE) 25 mg tablet Take 1 Tablet by mouth three (3) times daily. 6/18/21   Henry Dunaway MD   furosemide (LASIX) 40 mg tablet Take 1.5 Tablets by mouth two (2) times a day. 6/18/21   Henry Dunaway MD   amLODIPine (NORVASC) 5 mg tablet Take 1 Tablet by mouth daily. 6/18/21   Henry Dunaway MD   clopidogreL (Plavix) 75 mg tab Take 1 Tablet by mouth daily. 6/18/21   Henry Dunaway MD   Blood-Glucose Transmitter (Dexcom G6 Transmitter) jazmyn CGM 5/25/21   Jayant Corrales,    Blood-Glucose Meter,Continuous (Dexcom G6 ) misc As dir 4/20/21   Brendon DE LA GARZA DO   Blood-Glucose Meter,Continuous (Dexcom G6 ) misc 1 Units by Does Not Apply route two (2) times a day.  4/20/21   Nilda Labella, DO   Blood-Glucose Sensor (Dexcom G6 Sensor) jazmyn Change sensor every 10 days 4/20/21   Brendon DE LA GARZA DO   ranolazine ER (RANEXA) 500 mg SR tablet Take 1 Tab by mouth every twelve (12) hours. 21   Can Telles MD   isosorbide mononitrate ER (IMDUR) 120 mg CR tablet Take 1 Tab by mouth daily. 20   Can Telles MD   levothyroxine (SYNTHROID) 200 mcg tablet TAKE 1 TABLET BY MOUTH DAILY BEFORE BREAKFAST FOR THYROID  Patient taking differently: 125 mcg. TAKE 1 TABLET BY MOUTH DAILY BEFORE BREAKFAST FOR THYROID 20   Greg Herrera NP   [place] FlexTouch U-100 100 unit/mL (3 mL) inpn INJECT 70 UNITS UNDER THE SKIN DAILY FOR 90 DAYS. Patient taking differently: 100 Units. 20   Greg Herrera NP   metOLazone (ZAROXOLYN) 5 mg tablet Take 1 Tab by mouth daily as needed for Other (weight gain). 10/20/20   Can Telles MD   metoprolol succinate (TOPROL-XL) 50 mg XL tablet Take 1 Tab by mouth daily. 10/20/20   Can Telles MD   acetaminophen (TYLENOL) 325 mg tablet Take 2 Tabs by mouth every six (6) hours as needed for Pain. 19   Bobby Denton NP   nitroglycerin (NITROSTAT) 0.4 mg SL tablet 1 Tab by SubLINGual route every five (5) minutes as needed for Chest Pain. Up to 3 doses. 19   Bobby Denton NP   aspirin delayed-release 81 mg tablet Take 81 mg by mouth every morning.     Provider, Historical     No Known Allergies   Social History     Tobacco Use    Smoking status: Former Smoker     Packs/day: 2.00     Years: 15.00     Pack years: 30.00     Types: Cigarettes     Quit date: 2/3/1992     Years since quittin.6    Smokeless tobacco: Never Used   Substance Use Topics    Alcohol use: No      Family History   Problem Relation Age of Onset    Diabetes Other     Hypertension Other     Heart Disease Other     Diabetes Mother     Hypertension Mother     Cancer Father     Diabetes Sister     Heart Disease Sister     Diabetes Brother     Diabetes Sister     Hypertension Sister     Diabetes Sister     Hypertension Sister     Diabetes Sister     Hypertension Sister     Diabetes Brother Review of Systems    As above     Objective:       Visit Vitals  BP (!) 154/71   Pulse 71   Temp 98.3 °F (36.8 °C)   Resp (!) 34   Ht 5' 8\" (1.727 m)   Wt 120.2 kg (265 lb)   SpO2 94%   BMI 40.29 kg/m²       No intake/output data recorded. No intake/output data recorded. On 15l O2 supplement  NARD  Lung: diffuse bronchi  CV: RR, no rub  Abd: soft, not tender  Ext: trace  edema   PD exit site: some erythema, no exudate     Data Review:     Recent Results (from the past 24 hour(s))   EKG, 12 LEAD, INITIAL    Collection Time: 09/02/21  6:50 AM   Result Value Ref Range    Ventricular Rate 77 BPM    Atrial Rate 77 BPM    P-R Interval 176 ms    QRS Duration 88 ms    Q-T Interval 422 ms    QTC Calculation (Bezet) 477 ms    Calculated P Axis 49 degrees    Calculated R Axis -52 degrees    Calculated T Axis 101 degrees    Diagnosis       !! AGE AND GENDER SPECIFIC ECG ANALYSIS !! Normal sinus rhythm  Possible Left atrial enlargement  Left axis deviation  Left ventricular hypertrophy with repolarization abnormality  Inferior infarct (cited on or before 10-AUG-2019)  Anterolateral infarct (cited on or before 10-AUG-2019)  Abnormal ECG  When compared with ECG of 28-SEP-2020 23:27,  No significant change was found  Confirmed by [facility] RAMBO & AISHA [facility]  MD (), DELMA STRONG ([id number]) on 9/2/2021 4:16:30 AM     METABOLIC PANEL, COMPREHENSIVE    Collection Time: 09/02/21  6:53 AM   Result Value Ref Range    Sodium 142 136 - 145 mmol/L    Potassium 2.8 (L) 3.5 - 5.1 mmol/L    Chloride 110 (H) 98 - 107 mmol/L    CO2 22 21 - 32 mmol/L    Anion gap 10 7 - 16 mmol/L    Glucose 388 (H) 65 - 100 mg/dL    BUN 69 (H) 8 - 23 MG/DL    Creatinine 4.86 (H) 0.6 - 1.0 MG/DL    GFR est AA 12 (L) >60 ml/min/1.73m2    GFR est non-AA 10 (L) >60 ml/min/1.73m2    Calcium 6.7 (L) 8.3 - 10.4 MG/DL    Bilirubin, total 0.4 0.2 - 1.1 MG/DL    ALT (SGPT) 31 12 - 65 U/L    AST (SGOT) 28 15 - 37 U/L    Alk.  phosphatase 70 50 - 136 U/L    Protein, total 5.7 (L) 6.3 - 8.2 g/dL    Albumin 1.9 (L) 3.2 - 4.6 g/dL    Globulin 3.8 (H) 2.3 - 3.5 g/dL    A-G Ratio 0.5 (L) 1.2 - 3.5     MAGNESIUM    Collection Time: 09/02/21  6:53 AM   Result Value Ref Range    Magnesium 2.0 1.8 - 2.4 mg/dL   D DIMER    Collection Time: 09/02/21  6:53 AM   Result Value Ref Range    D DIMER 0.53 <0.56 ug/ml(FEU)   C REACTIVE PROTEIN, QT    Collection Time: 09/02/21  6:53 AM   Result Value Ref Range    C-Reactive protein 9.8 (H) 0.0 - 0.9 mg/dL   TSH 3RD GENERATION    Collection Time: 09/02/21  6:53 AM   Result Value Ref Range    TSH 0.340 (L) 0.358 - 3.740 uIU/mL   PROCALCITONIN    Collection Time: 09/02/21  6:53 AM   Result Value Ref Range    Procalcitonin 0.19 ng/mL   CBC WITH AUTOMATED DIFF    Collection Time: 09/02/21  6:54 AM   Result Value Ref Range    WBC 11.2 (H) 4.3 - 11.1 K/uL    RBC 3.95 (L) 4.05 - 5.2 M/uL    HGB 12.0 11.7 - 15.4 g/dL    HCT 35.8 35.8 - 46.3 %    MCV 90.6 79.6 - 97.8 FL    MCH 30.4 26.1 - 32.9 PG    MCHC 33.5 31.4 - 35.0 g/dL    RDW 12.3 11.9 - 14.6 %    PLATELET 014 478 - 353 K/uL    MPV 11.1 9.4 - 12.3 FL    ABSOLUTE NRBC 0.00 0.0 - 0.2 K/uL    DF AUTOMATED      NEUTROPHILS 91 (H) 43 - 78 %    LYMPHOCYTES 5 (L) 13 - 44 %    MONOCYTES 3 (L) 4.0 - 12.0 %    EOSINOPHILS 0 (L) 0.5 - 7.8 %    BASOPHILS 0 0.0 - 2.0 %    IMMATURE GRANULOCYTES 1 0.0 - 5.0 %    ABS. NEUTROPHILS 10.2 (H) 1.7 - 8.2 K/UL    ABS. LYMPHOCYTES 0.5 0.5 - 4.6 K/UL    ABS. MONOCYTES 0.4 0.1 - 1.3 K/UL    ABS. EOSINOPHILS 0.0 0.0 - 0.8 K/UL    ABS. BASOPHILS 0.0 0.0 - 0.2 K/UL    ABS. IMM.  GRANS. 0.2 0.0 - 0.5 K/UL   BLOOD GAS, ARTERIAL POC    Collection Time: 09/02/21  8:45 AM   Result Value Ref Range    Device: NASAL CANNULA      pH (POC) 7.48 (H) 7.35 - 7.45      pCO2 (POC) 28.8 (L) 35 - 45 MMHG    pO2 (POC) 57 (L) 75 - 100 MMHG    HCO3 (POC) 21.5 (L) 22 - 26 MMOL/L    sO2 (POC) 91.6 (L) 95 - 98 %    Base deficit (POC) 0.9 mmol/L    Allens test (POC) Positive      Site RIGHT RADIAL      Specimen type (POC) ARTERIAL      Performed by Naveen     FIO2, L/min 15             Assessment:     Active Problems:    Acute hypoxemic respiratory failure due to COVID-19 [facility]) (9/2/2021)      ESRD  Hypokalemia   IIDM  Plan:      To be admitted by hospitalist for COVID 19 treatment  K replacement  PD tonight   Thanks   Dr Muñoz

## 2021-09-02 NOTE — PROGRESS NOTES
[facility] rcvd from [place]. Patient in stable condition at this time. Safety measures in place. No S/Sx of distress. Respirations even and unlabored.  Call light within reach and patient encouraged to call nurse prn assist.

## 2021-09-02 NOTE — PROGRESS NOTES
Holding Actemra at this time. Patient has lost IV site. Vascular access team called and states they will come down and start a new IV site.

## 2021-09-03 NOTE — DIALYSIS
Disconnected PD cycler from patient. Betadine cap intact. Patient tolerated well. UF amount -1098mls. Effluent: clear, yellow.

## 2021-09-03 NOTE — PROGRESS NOTES
Hospitalist Progress Note   Admit Date:  2021  6:44 AM   Name:  Ning Guerrero   Age:  59 y.o. Sex:  female  :  1960   MRN:  [id number]     Presenting Complaint: Shortness of Breath    Reason(s) for Admission: Acute hypoxemic respiratory failure due to COVID-19 [facility]) [U07.1, J96.01]     Hospital Course & Interval History:   Ning Guerrero is a 59 y.o. female with medical history of ESRD on PD (started this year), Class III Obesity, IDDM, CAD s/p CABG and stents who presented with increasing SOB diagnosed with covid x 10 days ago. Briefly hospitalized and discharged stable on RA on . Sudden onset SOB and coughing yesterday. Presented to [facility] on  via EMS who  notes patient to be hypoxic at 77-80%. 7.48/28.8/57/21.5 spo2 92% on 15L      PD f/b Dr Raymundo Rodriges      CAD s/p CABG and stents f/b Dr Leonila Daniels      Former smoker. 30 pack year history      Wants only 5 days on the vent if intubated. Subjective (21): Patient seen and examined. Patient states she terrible. Patient complains of fatigue shortness of breath and weakness. Assessment & Plan:   Acute hypoxemic respiratory failure 2/2 COVID   +COVID   Unvaccinated   15L HF. Anticipate escalation to airvo. OK with intubation but only wants max 5 days on mechanical vent. Not a canididate for rem   Consented to Actemra (CRP 9.8)   Pseudo hypocalcemia - corrected 8.4   Procal 0.19. low suspicion for HAP at this time. High risk for ARDS, VTE, septic shock and invasive infections  9/3-continue Actemra. Clinically not improving and continues to require 15 L nasal cannula. Will transition patient to oral Decadron and add azithromycin to current regimen and continue albuterol with guaifenesin and Symbicort.       T2DM w/ DM nephropathy, h/o OSTEO, PVD - high insulin needs. lantus plus prandal and SSI.  Titrate <190 BGL   9/3-patient's blood sugars currently to goal on Lantus 48 units subcu daily and mealtime coverage at 24 units with each meal and sliding scale. We will continue to monitor. ESRD on PD - 2/2 DM. Started in January. Consult nephrology. 9/3-nephrology consulted and managing. CAD s/p CABG and stents - DAPT, BB,   Vitamin D deficiency - level 11. Start high dose replacement. 9/3-stable with no acute process. Continue dual antiplatelet therapy beta-blocker and vitamin D replacement     Uncontrolled HTN - restart home meds. 9/3-to goal on home regimen continue to monitor. Diet: ADULT DIET Regular; 4 carb choices (60 gm/meal); Low Fat/Low Chol/High Fiber/BERNIE; Low Potassium (Less than 3000 mg/day);  Low Phosphorus (Less than 1000 mg)  VTE ppx: heparin 7500 K q8h   Code status: Full Code        Active Hospital Problems    Diagnosis Date Noted    Acute hypoxemic respiratory failure due to COVID-19 [facility]) 09/02/2021    COVID-19 in immunocompromised patient ([facility] 09/02/2021    Cigarette nicotine dependence in remission 09/02/2021    COVID-19 vaccination not done 09/02/2021    ESRD on peritoneal dialysis ([facility] 08/28/2021    Acute hypokalemia 08/28/2021    Coronary artery disease with stable angina pectoris ([facility] 02/03/2020    Class 3 severe obesity with serious comorbidity and body mass index (BMI) of 40.0 to 44.9 in adult [facility]) 05/02/2018    Type 2 diabetes with nephropathy ([facility] 05/02/2018    PVD (peripheral vascular disease) ([facility] 08/18/2016    Acquired hypothyroidism     Mixed hyperlipidemia        Objective:     Patient Vitals for the past 24 hrs:   Temp Pulse Resp BP SpO2   09/03/21 1114 97.8 °F (36.6 °C) 73 20 116/71 90 %   09/03/21 0742 98 °F (36.7 °C) 80 20 (!) 136/101 91 %   09/03/21 0243 98.1 °F (36.7 °C) 69 20 127/61 91 %   09/02/21 2211 98.3 °F (36.8 °C) 78 20 133/75 92 %   09/02/21 2030     94 %   09/02/21 1851 98.2 °F (36.8 °C) 78 20 (!) 152/76 94 %     Oxygen Therapy  O2 Sat (%): 90 % (09/03/21 1114)  Pulse via Oximetry: 71 beats per minute (09/02/21 2030)  O2 Device: Hi flow nasal cannula (09/03/21 0750)  Skin Assessment: Clean, dry, & intact (09/03/21 0750)  Skin Protection for O2 Device: No (09/03/21 0750)  O2 Flow Rate (L/min): 15 l/min (09/03/21 0750)  FIO2 (%): 100 % (09/02/21 2030)    Estimated body mass index is 39.02 kg/m² as calculated from the following:    Height as of this encounter: 5' 8\" (1.727 m). Weight as of this encounter: 116.4 kg (256 lb 9.6 oz). Intake/Output Summary (Last 24 hours) at 9/3/2021 1522  Last data filed at 9/3/2021 1446  Gross per 24 hour   Intake 380 ml   Output 1648 ml   Net -1268 ml         Physical Exam:     General:    Well nourished. No overt distress  Head:  Normocephalic, atraumatic  Eyes:  Sclerae appear normal.  Pupils equally round. ENT:  Nares appear normal, no drainage. Moist oral mucosa  Neck:  No restricted ROM. Trachea midline   CV:   RRR. No m/r/g. No jugular venous distension. Lungs:   Crackles bilaterally. no wheezing, rhonchi, or rales. Respirations even, unlabored  Abdomen: Bowel sounds present. Soft, nontender, nondistended. Extremities: No cyanosis or clubbing. No edema  Skin:     No rashes and normal coloration. Warm and dry. Neuro:  Cranial nerves II-XII grossly intact. Sensation intact  Psych:  Normal mood and affect.   Alert and oriented x3    I have reviewed ordered lab tests and independently visualized imaging below:    Last 24hr Labs:  Recent Results (from the past 24 hour(s))   GLUCOSE, POC    Collection Time: 09/02/21  4:39 PM   Result Value Ref Range    Glucose (POC) 459 (HH) 65 - 100 mg/dL    Performed by Barbie    GLUCOSE, POC    Collection Time: 09/02/21  8:45 PM   Result Value Ref Range    Glucose (POC) 372 (H) 65 - 100 mg/dL    Performed by Lauren    PROTHROMBIN TIME + INR    Collection Time: 09/03/21  6:55 AM   Result Value Ref Range    Prothrombin time 13.5 12.6 - 14.5 sec    INR 1.0     PTT    Collection Time: 09/03/21  6:55 AM   Result Value Ref Range    aPTT 31.4 24.1 - 35.1 SEC   FIBRINOGEN    Collection Time: 09/03/21  6:55 AM   Result Value Ref Range    Fibrinogen 746 (H) 190 - 501 mg/dL   LD    Collection Time: 09/03/21  6:55 AM   Result Value Ref Range     (H) 110 - 210 U/L   FERRITIN    Collection Time: 09/03/21  6:55 AM   Result Value Ref Range    Ferritin 2,297 (H) 8 - 388 NG/ML   PROCALCITONIN    Collection Time: 09/03/21  6:55 AM   Result Value Ref Range    Procalcitonin 0.29 ng/mL   GLUCOSE, POC    Collection Time: 09/03/21  7:46 AM   Result Value Ref Range    Glucose (POC) 186 (H) 65 - 100 mg/dL    Performed by fypio    GLUCOSE, POC    Collection Time: 09/03/21 12:06 PM   Result Value Ref Range    Glucose (POC) 185 (H) 65 - 100 mg/dL    Performed by fypio        All Micro Results     Procedure Component Value Units Date/Time    S. Calos Dukes UR/CSF [[id number]] Collected: 09/02/21 1425    Order Status: No result     S. Calos Dukes UR/CSF [[id number]] Collected: 09/02/21 1421    Order Status: Canceled     MSSA/MRSA SC BY PCR, NASAL SWAB [[id number]] Collected: 09/02/21 1054    Order Status: Completed Specimen: Nasal swab Updated: 09/02/21 1253     Special Requests: NO SPECIAL REQUESTS        Culture result:       SA target not detected. A MRSA NEGATIVE, SA NEGATIVE test result does not preclude MRSA or SA nasal colonization. S. Calos Dukes, UR/CSF [[id number]] Collected: 09/02/21 5221    Order Status: Canceled           Other Studies:  No results found.     Current Meds:  Current Facility-Administered Medications   Medication Dose Route Frequency    sodium chloride (NS) flush 5-10 mL  5-10 mL IntraVENous Q8H    sodium chloride (NS) flush 5-10 mL  5-10 mL IntraVENous PRN    ergocalciferol capsule 50,000 Units  50,000 Units Oral Q7D    cetirizine (ZYRTEC) tablet 10 mg  10 mg Oral DAILY    fluticasone propionate (FLONASE) 50 mcg/actuation nasal spray 2 Spray  2 Spray Both Nostrils DAILY    amLODIPine (NORVASC) tablet 5 mg  5 mg Oral DAILY    albuterol (PROVENTIL HFA, VENTOLIN HFA, PROAIR HFA) inhaler 2 Puff  2 Puff Inhalation QID RT    budesonide-formoteroL (SYMBICORT) 160-4.5 mcg/actuation HFA inhaler 2 Puff  2 Puff Inhalation BID RT    sodium chloride (NS) flush 5-40 mL  5-40 mL IntraVENous Q8H    sodium chloride (NS) flush 5-40 mL  5-40 mL IntraVENous PRN    acetaminophen (TYLENOL) tablet 650 mg  650 mg Oral Q6H PRN    Or    acetaminophen (TYLENOL) suppository 650 mg  650 mg Rectal Q6H PRN    polyethylene glycol (MIRALAX) packet 17 g  17 g Oral DAILY PRN    ondansetron (ZOFRAN ODT) tablet 4 mg  4 mg Oral Q8H PRN    Or    ondansetron (ZOFRAN) injection 4 mg  4 mg IntraVENous Q6H PRN    alum-mag hydroxide-simeth (MYLANTA) oral suspension 15 mL  15 mL Oral Q6H PRN    heparin (porcine) injection 7,500 Units  7,500 Units SubCUTAneous Q8H    guaiFENesin-dextromethorphan (ROBITUSSIN DM) 100-10 mg/5 mL syrup 5 mL  5 mL Oral Q4H PRN    dexamethasone (DECADRON) 10 mg/mL injection 6 mg  6 mg IntraVENous Q24H    cholecalciferol (VITAMIN D3) tablet 6,000 Units  6,000 Units Oral DAILY    Followed by   Earnstine Laser ON 9/9/2021] cholecalciferol (VITAMIN D3) (1000 Units /25 mcg) tablet 2,000 Units  2,000 Units Oral DAILY    clopidogreL (PLAVIX) tablet 75 mg  75 mg Oral DAILY    escitalopram oxalate (LEXAPRO) tablet 20 mg  20 mg Oral BID    nitroglycerin (NITROSTAT) tablet 0.4 mg  0.4 mg SubLINGual Q5MIN PRN    ranolazine ER (RANEXA) tablet 500 mg  500 mg Oral Q12H    isosorbide mononitrate ER (IMDUR) tablet 120 mg  120 mg Oral DAILY    hydrALAZINE (APRESOLINE) tablet 25 mg  25 mg Oral TID    furosemide (LASIX) tablet 60 mg  60 mg Oral BID    aspirin delayed-release tablet 81 mg  81 mg Oral 7am    traZODone (DESYREL) tablet 100 mg  100 mg Oral QHS    LORazepam (ATIVAN) tablet 0.5 mg  0.5 mg Oral Q4H PRN    insulin glargine (LANTUS) injection 48 Units  0.4 Units/kg SubCUTAneous DAILY    insulin lispro (HUMALOG) injection   SubCUTAneous AC&HS    dextrose 40% (GLUTOSE) oral gel 1 Tube  15 g Oral PRN    glucagon (GLUCAGEN) injection 1 mg  1 mg IntraMUSCular PRN    dextrose (D50W) injection syrg 12.5-25 g  25-50 mL IntraVENous PRN    metoprolol succinate (TOPROL-XL) XL tablet 50 mg  50 mg Oral DAILY    levothyroxine (SYNTHROID) tablet 125 mcg  125 mcg Oral ACB    insulin lispro (HUMALOG) injection 24 Units  0.2 Units/kg SubCUTAneous TID WITH MEALS       Signed:  Ela Fuentes DO    Part of this note may have been written by using a voice dictation software. The note has been proof read but may still contain some grammatical/other typographical errors.

## 2021-09-03 NOTE — PROGRESS NOTES
Admit Date: 9/2/2021      Subjective:          Review of Systems  Cardio-vascular: no chest pain, + SOB  GI: no N/V/D  : no dysuria, no hematuria    Objective:     Patient Vitals for the past 8 hrs:   BP Temp Pulse Resp SpO2 Weight   09/03/21 0742 (!) 136/101 98 °F (36.7 °C) 80 20 91 %    09/03/21 0243 127/61 98.1 °F (36.7 °C) 69 20 91 % 116.4 kg (256 lb 9.6 oz)     No intake/output data recorded.       Physical Exam:   On O2 supplement  NARD  Lung: diffuse bronchi  CV: RR, no rub  Abd: soft, not tender  Ext: trace  edema   PD exit site: some erythema, no exudate           Data Review   Recent Results (from the past 8 hour(s))   PROTHROMBIN TIME + INR    Collection Time: 09/03/21  6:55 AM   Result Value Ref Range    Prothrombin time 13.5 12.6 - 14.5 sec    INR 1.0     PTT    Collection Time: 09/03/21  6:55 AM   Result Value Ref Range    aPTT 31.4 24.1 - 35.1 SEC   FIBRINOGEN    Collection Time: 09/03/21  6:55 AM   Result Value Ref Range    Fibrinogen 746 (H) 190 - 501 mg/dL   PROCALCITONIN    Collection Time: 09/03/21  6:55 AM   Result Value Ref Range    Procalcitonin 0.29 ng/mL   GLUCOSE, POC    Collection Time: 09/03/21  7:46 AM   Result Value Ref Range    Glucose (POC) 186 (H) 65 - 100 mg/dL    Performed by Mary            Assessment:     Principal Problem:    Acute hypoxemic respiratory failure due to COVID-19 [facility]) (9/2/2021)    Active Problems:    Mixed hyperlipidemia ()      Acquired hypothyroidism ()      PVD (peripheral vascular disease) ([facility] (8/18/2016)      Type 2 diabetes with nephropathy ([facility] (5/2/2018)      Class 3 severe obesity with serious comorbidity and body mass index (BMI) of 40.0 to 44.9 in adult [facility]) (5/2/2018)      Coronary artery disease with stable angina pectoris ([facility] (2/3/2020)      ESRD on peritoneal dialysis ([address] (8/28/2021)      Acute hypokalemia (8/28/2021)      COVID-19 in immunocompromised patient ([facility] (9/2/2021)      Cigarette nicotine dependence in remission (9/2/2021)      COVID-19 vaccination not done (9/2/2021)        Plan:     PD hilton Caceres MD

## 2021-09-03 NOTE — DISCHARGE INSTRUCTIONS
DISCHARGE SUMMARY from Nurse    PATIENT INSTRUCTIONS:    After general anesthesia or intravenous sedation, for 24 hours or while taking prescription Narcotics:  · Limit your activities  · Do not drive and operate hazardous machinery  · Do not make important personal or business decisions  · Do  not drink alcoholic beverages  · If you have not urinated within 8 hours after discharge, please contact your surgeon on call. What to do at Home:  Recommended activity: Activity as tolerated. If you experience any of the following symptoms worsening cough or wheezing, shortness of breath or fatigue not relieved with rest, please follow up with MD.    *  Please give a list of your current medications to your Primary Care Provider. *  Please update this list whenever your medications are discontinued, doses are      changed, or new medications (including over-the-counter products) are added. *  Please carry medication information at all times in case of emergency situations. These are general instructions for a healthy lifestyle:    No smoking/ No tobacco products/ Avoid exposure to second hand smoke  Surgeon General's Warning:  Quitting smoking now greatly reduces serious risk to your health. Obesity, smoking, and sedentary lifestyle greatly increases your risk for illness    A healthy diet, regular physical exercise & weight monitoring are important for maintaining a healthy lifestyle    You may be retaining fluid if you have a history of heart failure or if you experience any of the following symptoms:  Weight gain of 3 pounds or more overnight or 5 pounds in a week, increased swelling in our hands or feet or shortness of breath while lying flat in bed. Please call your doctor as soon as you notice any of these symptoms; do not wait until your next office visit. The discharge information has been reviewed with the patient. The patient verbalized understanding.   Discharge medications reviewed with the patient and appropriate educational materials and side effects teaching were provided. Advance Care Planning  People with COVID-19 may have no symptoms, mild symptoms, such as fever, cough, and shortness of breath or they may have more severe illness, developing severe and fatal pneumonia. As a result, Advance Care Planning with attention to naming a health care decision maker (someone you trust to make healthcare decisions for you if you could not speak for yourself) and sharing other health care preferences is important BEFORE a possible health crisis. Please contact your Primary Care Provider to discuss Advance Care Planning. Preventing the Spread of Coronavirus Disease 2019 in Homes and Residential Communities  For the most recent information go to [employer].    Prevention steps for People with confirmed or suspected COVID-19 (including persons under investigation) who do not need to be hospitalized  and   People with confirmed COVID-19 who were hospitalized and determined to be medically stable to go home    Your healthcare provider and public health staff will evaluate whether you can be cared for at home. If it is determined that you do not need to be hospitalized and can be isolated at home, you will be monitored by staff from your local or state health department. You should follow the prevention steps below until a healthcare provider or local or state health department says you can return to your normal activities. Stay home except to get medical care  People who are mildly ill with COVID-19 are able to isolate at home during their illness. You should restrict activities outside your home, except for getting medical care. Do not go to work, school, or public areas. Avoid using public transportation, ride-sharing, or taxis.   Separate yourself from other people and animals in your home  People: As much as possible, you should stay in a specific room and away from other people in your home. Also, you should use a separate bathroom, if available. Animals: You should restrict contact with pets and other animals while you are sick with COVID-19, just like you would around other people. Although there have not been reports of pets or other animals becoming sick with COVID-19, it is still recommended that people sick with COVID-19 limit contact with animals until more information is known about the virus. When possible, have another member of your household care for your animals while you are sick. If you are sick with COVID-19, avoid contact with your pet, including petting, snuggling, being kissed or licked, and sharing food. If you must care for your pet or be around animals while you are sick, wash your hands before and after you interact with pets and wear a facemask. Call ahead before visiting your doctor  If you have a medical appointment, call the healthcare provider and tell them that you have or may have COVID-19. This will help the healthcare providers office take steps to keep other people from getting infected or exposed. Wear a facemask  You should wear a facemask when you are around other people (e.g., sharing a room or vehicle) or pets and before you enter a healthcare providers office. If you are not able to wear a facemask (for example, because it causes trouble breathing), then people who live with you should not stay in the same room with you, or they should wear a facemask if they enter your room. Cover your coughs and sneezes  Cover your mouth and nose with a tissue when you cough or sneeze. Throw used tissues in a lined trash can. Immediately wash your hands with soap and water for at least 20 seconds or, if soap and water are not available, clean your hands with an alcohol-based hand  that contains at least 60% alcohol.   Clean your hands often  Wash your hands often with soap and water for at least 20 seconds, especially after blowing your nose, coughing, or sneezing; going to the bathroom; and before eating or preparing food. If soap and water are not readily available, use an alcohol-based hand  with at least 60% alcohol, covering all surfaces of your hands and rubbing them together until they feel dry. Soap and water are the best option if hands are visibly dirty. Avoid touching your eyes, nose, and mouth with unwashed hands. Avoid sharing personal household items  You should not share dishes, drinking glasses, cups, eating utensils, towels, or bedding with other people or pets in your home. After using these items, they should be washed thoroughly with soap and water. Clean all high-touch surfaces everyday  High touch surfaces include counters, tabletops, doorknobs, bathroom fixtures, toilets, phones, keyboards, tablets, and bedside tables. Also, clean any surfaces that may have blood, stool, or body fluids on them. Use a household cleaning spray or wipe, according to the label instructions. Labels contain instructions for safe and effective use of the cleaning product including precautions you should take when applying the product, such as wearing gloves and making sure you have good ventilation during use of the product. Monitor your symptoms  Seek prompt medical attention if your illness is worsening (e.g., difficulty breathing). Before seeking care, call your healthcare provider and tell them that you have, or are being evaluated for, COVID-19. Put on a facemask before you enter the facility. These steps will help the healthcare providers office to keep other people in the office or waiting room from getting infected or exposed. Ask your healthcare provider to call the local or [facility] health department.  Persons who are placed under active monitoring or facilitated self-monitoring should follow instructions provided by their local health department or occupational health professionals, as appropriate. When working with your local health department check their available hours. If you have a medical emergency and need to call 911, notify the dispatch personnel that you have, or are being evaluated for COVID-19. If possible, put on a facemask before emergency medical services arrive. Discontinuing home isolation  Patients with confirmed COVID-19 should remain under home isolation precautions until the risk of secondary transmission to others is thought to be low. The decision to discontinue home isolation precautions should be made on a case-by-case basis, in consultation with healthcare providers and state and local health departments. Patient Education        Learning About Hypoxemia  What is hypoxemia? Hypoxemia means that you don't have enough oxygen in your blood. It's a result of diseases that affect your heart or lungs. These include heart failure, COPD, and pulmonary fibrosis (scarring of the lungs). Being at high altitudes can also lead to hypoxemia. What happens when you have hypoxemia? Oxygen gets into your blood through your lungs. Your blood carries the oxygen to all parts of your body. When you have too little oxygen in your blood, your body doesn't get enough of it. With too little oxygen, your heart and other parts of your body don't work very well. What are the symptoms? In addition to the symptoms of whatever is causing your hypoxemia, you may:  · Get tired quickly. · Be short of breath when you are active. · Feel like your heart is pounding or racing. · Feel weak or dizzy. · Become confused. How is hypoxemia treated? Your doctor will do tests to find out how much oxygen is in your blood. He or she will look for the cause of your hypoxemia and treat that problem. For example, if you have heart failure, you may need medicines that help your heart pump better.   · If your hypoxemia is not severe, your doctor may give you oxygen through a mask or nasal cannula (say \"ERIC-yuh-rika\"). A cannula is a thin tube with two openings that fit just inside your nose. · If your hypoxemia is severe, you may have a breathing tube put into your windpipe. The breathing tube is attached to a machine that pushes air into your lungs. This machine is called a ventilator. · If you have a long-term problem with hypoxemia, your doctor may recommend that you use oxygen regularly. Some people need it all the time. Others need it from time to time throughout the day or overnight. Your doctor will tell you how much oxygen you need and how often to use it. Follow-up care is a key part of your treatment and safety. Be sure to make and go to all appointments, and call your doctor if you are having problems. It's also a good idea to know your test results and keep a list of the medicines you take. Where can you learn more? Go to http://www.gray.com/  Enter M375 in the search box to learn more about \"Learning About Hypoxemia. \"  Current as of: October 26, 2020               Content Version: 12.8  © 2006-2021 [employer]. Care instructions adapted under license by [employer] (which disclaims liability or warranty for this information). If you have questions about a medical condition or this instruction, always ask your healthcare professional. [address] any warranty or liability for your use of this information.          ___________________________________________________________________________________________________________________________________

## 2021-09-03 NOTE — PROGRESS NOTES
Patient remains stable. No S/Sx of distress. Respirations even and unlabored on 15L HF NC. Call light within reach. Preparing to give report to oncoming shift.

## 2021-09-04 NOTE — PROGRESS NOTES
Patient sleeping in bed on Airvo at 55L/90% with NRB at 94%. Respirations even and unlabored. Patient does not appear to be in pain and is in no acute distress at this time. Call light within reach, will continue to monitor. Preparing to give report to oncoming RN.

## 2021-09-04 NOTE — DIALYSIS
Disconnected PD cycler from patient. Betadine cap intact. Patient tolerated well. UF amount -1102mls.  Effluent: clear, light yellow

## 2021-09-04 NOTE — PROGRESS NOTES
Reviewed notes for spiritual concerns      Noted patient is progressing towards care goals      Will continue to follow closely

## 2021-09-04 NOTE — PROGRESS NOTES
Patient's O2 dropped to 87% on 15L HF NC. NRB mask placed, O2 increased to 96%. Patient connected to pulse ox on vitals machine as no continuous pulse ox machines were available. RT called and patient placed on Airvo 55L/90%, with sats coming up to 95-97%. Pulse ox from vitals machine in place. Will continue to monitor.

## 2021-09-04 NOTE — PROGRESS NOTES
Admit Date: 9/2/2021      Subjective:          Review of Systems  Cardio-vascular: no chest pain, + SOB  GI: no N/V/D  : no dysuria, no hematuria    Objective:     Patient Vitals for the past 8 hrs:   BP Temp Pulse SpO2   09/04/21 1207 (!) 101/45 98 °F (36.7 °C) 68 98 %   09/04/21 0832    93 %   09/04/21 0754 120/77 97.4 °F (36.3 °C) 72 100 %     09/04 0701 - 09/04 1900  In: 120 [P.O.:120]  Out: 1102       Physical Exam:   On O2 supplement  NARD  Lung: diffuse bronchi  CV: RR, no rub  Abd: soft, not tender  Ext: trace  edema   PD exit site: some erythema, no exudate           Data Review   Recent Results (from the past 8 hour(s))   GLUCOSE, POC    Collection Time: 09/04/21  8:09 AM   Result Value Ref Range    Glucose (POC) 207 (H) 65 - 100 mg/dL    Performed by [employer]    GLUCOSE, POC    Collection Time: 09/04/21 11:32 AM   Result Value Ref Range    Glucose (POC) 356 (H) 65 - 100 mg/dL    Performed by [employer]    CBC WITH AUTOMATED DIFF    Collection Time: 09/04/21 12:13 PM   Result Value Ref Range    WBC 10.3 4.3 - 11.1 K/uL    RBC 4.25 4.05 - 5.2 M/uL    HGB 12.8 11.7 - 15.4 g/dL    HCT 39.2 35.8 - 46.3 %    MCV 92.2 79.6 - 97.8 FL    MCH 30.1 26.1 - 32.9 PG    MCHC 32.7 31.4 - 35.0 g/dL    RDW 12.5 11.9 - 14.6 %    PLATELET 875 431 - 114 K/uL    MPV 11.1 9.4 - 12.3 FL    ABSOLUTE NRBC 0.00 0.0 - 0.2 K/uL    DF AUTOMATED      NEUTROPHILS 93 (H) 43 - 78 %    LYMPHOCYTES 4 (L) 13 - 44 %    MONOCYTES 2 (L) 4.0 - 12.0 %    EOSINOPHILS 0 (L) 0.5 - 7.8 %    BASOPHILS 0 0.0 - 2.0 %    IMMATURE GRANULOCYTES 2 0.0 - 5.0 %    ABS. NEUTROPHILS 9.6 (H) 1.7 - 8.2 K/UL    ABS. LYMPHOCYTES 0.4 (L) 0.5 - 4.6 K/UL    ABS. MONOCYTES 0.2 0.1 - 1.3 K/UL    ABS. EOSINOPHILS 0.0 0.0 - 0.8 K/UL    ABS. BASOPHILS 0.0 0.0 - 0.2 K/UL    ABS. IMM.  GRANS. 0.2 0.0 - 0.5 K/UL   METABOLIC PANEL, BASIC    Collection Time: 09/04/21 12:13 PM   Result Value Ref Range    Sodium 139 136 - 145 mmol/L    Potassium 4.4 3.5 - 5.1 mmol/L    Chloride 104 98 - 107 mmol/L    CO2 21 21 - 32 mmol/L    Anion gap 14 7 - 16 mmol/L    Glucose 303 (H) 65 - 100 mg/dL    BUN 90 (H) 8 - 23 MG/DL    Creatinine 6.54 (H) 0.6 - 1.0 MG/DL    GFR est AA 8 (L) >60 ml/min/1.73m2    GFR est non-AA 7 (L) >60 ml/min/1.73m2    Calcium 8.3 8.3 - 10.4 MG/DL   FERRITIN    Collection Time: 09/04/21 12:13 PM   Result Value Ref Range    Ferritin 1,956 (H) 8 - 388 NG/ML   LD    Collection Time: 09/04/21 12:13 PM   Result Value Ref Range     (H) 110 - 210 U/L           Assessment:     Principal Problem:    Acute hypoxemic respiratory failure due to COVID-19 [facility]) (9/2/2021)    Active Problems:    Mixed hyperlipidemia ()      Acquired hypothyroidism ()      PVD (peripheral vascular disease) ([facility] (8/18/2016)      Type 2 diabetes with nephropathy ([facility] (5/2/2018)      Class 3 severe obesity with serious comorbidity and body mass index (BMI) of 40.0 to 44.9 in adult [facility]) (5/2/2018)      Coronary artery disease with stable angina pectoris ([facility] (2/3/2020)      [facility] (8/28/2021)      Acute hypokalemia (8/28/2021)      COVID-19 in immunocompromised patient ([facility] (9/2/2021)      Cigarette nicotine dependence in remission (9/2/2021)      COVID-19 vaccination not done (9/2/2021)        Plan:     PD hilton Lang MD

## 2021-09-04 NOTE — PROGRESS NOTES
Sleeping on right side  Awakens easily  Wearing optiflo 60L 93%  Saturation is 94%  Patient is connected to PD cyclor

## 2021-09-04 NOTE — PROGRESS NOTES
Hospitalist Progress Note   Admit Date:  2021  6:44 AM   Name:  Jamaal Chaidez   Age:  59 y.o. Sex:  female  :  1960   MRN:  [id number]     Presenting Complaint: Shortness of Breath    Reason(s) for Admission: Acute hypoxemic respiratory failure due to COVID-19 [facility]) [U07.1, J96.01]     Hospital Course & Interval History:   Jamaal Chaidez is a 59 y.o. female with medical history of ESRD on PD (started this year), Class III Obesity, IDDM, CAD s/p CABG and stents who presented with increasing SOB diagnosed with covid x 10 days ago. Briefly hospitalized and discharged stable on RA on . Sudden onset SOB and coughing yesterday. Presented to [facility] on  via EMS who  notes patient to be hypoxic at 77-80%. 7.48/28.8/57/21.5 spo2 92% on 15L      PD f/b Dr Tracy Ashley      CAD s/p CABG and stents f/b Dr Jose Juan Kapadia      Former smoker. 30 pack year history      Wants only 5 days on the vent if intubated. Subjective (21):  Seen and examined. Patient continues to have shortness of breath and general malaise. Assessment & Plan:   Acute hypoxemic respiratory failure 2/2 COVID   +COVID   Unvaccinated   15L HF. Anticipate escalation to airvo. OK with intubation but only wants max 5 days on mechanical vent. Not a canididate for rem   Consented to Actemra (CRP 9.8)   Pseudo hypocalcemia - corrected 8.4   Procal 0.19. low suspicion for HAP at this time. High risk for ARDS, VTE, septic shock and invasive infections  9/3-continue Actemra. Clinically not improving and continues to require 15 L nasal cannula. Will transition patient to oral Decadron and add azithromycin to current regimen and continue albuterol with guaifenesin and Symbicort. -patient is status post Actemra. Patient rapidly declining and now on 60 L of heated high flow. Continue oral Decadron azithromycin albuterol and guaifenesin.   Patient continues to be at high risk for further decompensation.         T2DM w/ DM nephropathy, h/o OSTEO, PVD - high insulin needs. lantus plus prandal and SSI. Titrate <190 BGL   9/3-patient's blood sugars currently to goal on Lantus 48 units subcu daily and mealtime coverage at 24 units with each meal and sliding scale. We will continue to monitor. 9/4-poorly controlled. Will increase patient's Lantus to 60 units subcu daily and continue mealtime coverage of 24 units with each meal and monitor blood sugars. ESRD on PD - 2/2 DM. Started in January. Consult nephrology. 9/3-nephrology consulted and managing. 9/4-Per nephrology    CAD s/p CABG and stents - DAPT, BB,   Vitamin D deficiency - level 11. Start high dose replacement. 9/3-stable with no acute process. Continue dual antiplatelet therapy beta-blocker and vitamin D replacement     Uncontrolled HTN - restart home meds. 9/3-to goal on home regimen continue to monitor. 9/4-patient's blood pressures downtrending and now low normal.  Will hold 3 times daily hydralazine and monitor. Diet: ADULT DIET Regular; 4 carb choices (60 gm/meal); Low Fat/Low Chol/High Fiber/BERNIE; Low Potassium (Less than 3000 mg/day);  Low Phosphorus (Less than 1000 mg)  VTE ppx: heparin 7500 K q8h   Code status: Full Code        Active Hospital Problems    Diagnosis Date Noted    Acute hypoxemic respiratory failure due to COVID-19 [facility]) 09/02/2021    [facility]prom[facility] 09/02/2021    Cigarette nicotine dependence in remission 09/02/2021    COVID-19 vaccination not done 09/02/2021    ESRD on peritoneal dialysis ([facility] 08/28/2021    Acute hypokalemia 08/28/2021    Coronary artery disease with stable angina pectoris ([facility] 02/03/2020    Class 3 severe obesity with serious comorbidity and body mass index (BMI) of 40.0 to 44.9 in adult [facility]) 05/02/2018    Type 2 diabetes with nephropathy ([facility] 05/02/2018    PVD (peripheral vascular disease) ([facility] 08/18/2016    Acquired hypothyroidism     Mixed hyperlipidemia Objective:     Patient Vitals for the past 24 hrs:   Temp Pulse Resp BP SpO2   09/04/21 1207 98 °F (36.7 °C) 68  (!) 101/45 98 %   09/04/21 0832     93 %   09/04/21 0754 97.4 °F (36.3 °C) 72  120/77 100 %   09/04/21 0310 97.6 °F (36.4 °C) 74 20 126/80 96 %   09/03/21 2204     93 %   09/03/21 2202 98.4 °F (36.9 °C) 71 22 (!) 154/62 (!) 87 %   09/03/21 2045     97 %   09/03/21 1945 97.8 °F (36.6 °C) 78 22 139/80 90 %   09/03/21 1755     90 %   09/03/21 1635 97.5 °F (36.4 °C) 64 21 136/70 90 %     Oxygen Therapy  O2 Sat (%): 98 % (09/04/21 1207)  Pulse via Oximetry: 72 beats per minute (09/04/21 0832)  O2 Device: Heated; Hi flow nasal cannula;Humidifier;Non-rebreather mask (09/04/21 3971)  Skin Assessment: Clean, dry, & intact (09/04/21 0310)  Skin Protection for O2 Device: No (09/03/21 0750)  O2 Flow Rate (L/min): 60 l/min (09/04/21 0832)  FIO2 (%): 100 % (09/04/21 0832)    Estimated body mass index is 39.85 kg/m² as calculated from the following:    Height as of this encounter: 5' 8\" (1.727 m). Weight as of this encounter: 118.9 kg (262 lb 1.6 oz). Intake/Output Summary (Last 24 hours) at 9/4/2021 1408  Last data filed at 9/4/2021 1116  Gross per 24 hour   Intake 320 ml   Output 1352 ml   Net -1032 ml         Physical Exam:     General:    Well nourished. No overt distress  Head:  Normocephalic, atraumatic  Eyes:  Sclerae appear normal.  Pupils equally round. ENT:  Nares appear normal, no drainage. Moist oral mucosa  Neck:  No restricted ROM. Trachea midline   CV:   RRR. No m/r/g. No jugular venous distension. Lungs:   Crackles bilaterally. no wheezing, rhonchi, or rales. Respirations even, unlabored  Abdomen: Bowel sounds present. Soft, nontender, nondistended. Extremities: No cyanosis or clubbing. No edema  Skin:     No rashes and normal coloration. Warm and dry. Neuro:  Cranial nerves II-XII grossly intact. Sensation intact  Psych:  Normal mood and affect.   Alert and oriented x3    I have reviewed ordered lab tests and independently visualized imaging below:    Last 24hr Labs:  Recent Results (from the past 24 hour(s))   GLUCOSE, POC    Collection Time: 09/03/21  4:50 PM   Result Value Ref Range    Glucose (POC) 183 (H) 65 - 100 mg/dL    Performed by Mary    GLUCOSE, POC    Collection Time: 09/03/21  8:29 PM   Result Value Ref Range    Glucose (POC) 309 (H) 65 - 100 mg/dL    Performed by Lauren    GLUCOSE, POC    Collection Time: 09/04/21  8:09 AM   Result Value Ref Range    Glucose (POC) 207 (H) 65 - 100 mg/dL    Performed by Yasmani    GLUCOSE, POC    Collection Time: 09/04/21 11:32 AM   Result Value Ref Range    Glucose (POC) 356 (H) 65 - 100 mg/dL    Performed by Yasmani    CBC WITH AUTOMATED DIFF    Collection Time: 09/04/21 12:13 PM   Result Value Ref Range    WBC 10.3 4.3 - 11.1 K/uL    RBC 4.25 4.05 - 5.2 M/uL    HGB 12.8 11.7 - 15.4 g/dL    HCT 39.2 35.8 - 46.3 %    MCV 92.2 79.6 - 97.8 FL    MCH 30.1 26.1 - 32.9 PG    MCHC 32.7 31.4 - 35.0 g/dL    RDW 12.5 11.9 - 14.6 %    PLATELET 788 466 - 469 K/uL    MPV 11.1 9.4 - 12.3 FL    ABSOLUTE NRBC 0.00 0.0 - 0.2 K/uL    DF AUTOMATED      NEUTROPHILS 93 (H) 43 - 78 %    LYMPHOCYTES 4 (L) 13 - 44 %    MONOCYTES 2 (L) 4.0 - 12.0 %    EOSINOPHILS 0 (L) 0.5 - 7.8 %    BASOPHILS 0 0.0 - 2.0 %    IMMATURE GRANULOCYTES 2 0.0 - 5.0 %    ABS. NEUTROPHILS 9.6 (H) 1.7 - 8.2 K/UL    ABS. LYMPHOCYTES 0.4 (L) 0.5 - 4.6 K/UL    ABS. MONOCYTES 0.2 0.1 - 1.3 K/UL    ABS. EOSINOPHILS 0.0 0.0 - 0.8 K/UL    ABS. BASOPHILS 0.0 0.0 - 0.2 K/UL    ABS. IMM.  GRANS. 0.2 0.0 - 0.5 K/UL   METABOLIC PANEL, BASIC    Collection Time: 09/04/21 12:13 PM   Result Value Ref Range    Sodium 139 136 - 145 mmol/L    Potassium 4.4 3.5 - 5.1 mmol/L    Chloride 104 98 - 107 mmol/L    CO2 21 21 - 32 mmol/L    Anion gap 14 7 - 16 mmol/L    Glucose 303 (H) 65 - 100 mg/dL    BUN 90 (H) 8 - 23 MG/DL    Creatinine 6.54 (H) 0.6 - 1.0 MG/DL    GFR est AA 8 (L) >60 ml/min/1.73m2    GFR est non-AA 7 (L) >60 ml/min/1.73m2    Calcium 8.3 8.3 - 10.4 MG/DL   FERRITIN    Collection Time: 09/04/21 12:13 PM   Result Value Ref Range    Ferritin 1,956 (H) 8 - 388 NG/ML   LD    Collection Time: 09/04/21 12:13 PM   Result Value Ref Range     (H) 110 - 210 U/L       All Micro Results     Procedure Component Value Units Date/Time    EBENEZER Wray, UR/CSF [[id number]] Collected: 09/02/21 1425    Order Status: No result     EBENEZER Wray UR/CSF [[id number]] Collected: 09/02/21 1421    Order Status: Canceled     MSSA/MRSA SC BY PCR, NASAL SWAB [[id number]] Collected: 09/02/21 1054    Order Status: Completed Specimen: Nasal swab Updated: 09/02/21 1253     Special Requests: NO SPECIAL REQUESTS        Culture result:       SA target not detected. A MRSA NEGATIVE, SA NEGATIVE test result does not preclude MRSA or SA nasal colonization. EBENEZER Wray, UR/CSF [[id number]] Collected: 09/02/21 7806    Order Status: Canceled           Other Studies:  No results found.     Current Meds:  Current Facility-Administered Medications   Medication Dose Route Frequency    dexAMETHasone (DECADRON) tablet 4 mg  4 mg Oral Q12H    azithromycin (ZITHROMAX) tablet 500 mg  500 mg Oral DAILY    sodium chloride (NS) flush 5-10 mL  5-10 mL IntraVENous Q8H    sodium chloride (NS) flush 5-10 mL  5-10 mL IntraVENous PRN    ergocalciferol capsule 50,000 Units  50,000 Units Oral Q7D    cetirizine (ZYRTEC) tablet 10 mg  10 mg Oral DAILY    fluticasone propionate (FLONASE) 50 mcg/actuation nasal spray 2 Spray  2 Spray Both Nostrils DAILY    amLODIPine (NORVASC) tablet 5 mg  5 mg Oral DAILY    albuterol (PROVENTIL HFA, VENTOLIN HFA, PROAIR HFA) inhaler 2 Puff  2 Puff Inhalation QID RT    budesonide-formoteroL (SYMBICORT) 160-4.5 mcg/actuation HFA inhaler 2 Puff  2 Puff Inhalation BID RT    sodium chloride (NS) flush 5-40 mL  5-40 mL IntraVENous Q8H    sodium chloride (NS) flush 5-40 mL  5-40 mL IntraVENous PRN    acetaminophen (TYLENOL) tablet 650 mg  650 mg Oral Q6H PRN    Or    acetaminophen (TYLENOL) suppository 650 mg  650 mg Rectal Q6H PRN    polyethylene glycol (MIRALAX) packet 17 g  17 g Oral DAILY PRN    ondansetron (ZOFRAN ODT) tablet 4 mg  4 mg Oral Q8H PRN    Or    ondansetron (ZOFRAN) injection 4 mg  4 mg IntraVENous Q6H PRN    alum-mag hydroxide-simeth (MYLANTA) oral suspension 15 mL  15 mL Oral Q6H PRN    heparin (porcine) injection 7,500 Units  7,500 Units SubCUTAneous Q8H    guaiFENesin-dextromethorphan (ROBITUSSIN DM) 100-10 mg/5 mL syrup 5 mL  5 mL Oral Q4H PRN    cholecalciferol (VITAMIN D3) tablet 6,000 Units  6,000 Units Oral DAILY    Followed by   Carrie Conti ON 9/9/2021] cholecalciferol (VITAMIN D3) (1000 Units /25 mcg) tablet 2,000 Units  2,000 Units Oral DAILY    clopidogreL (PLAVIX) tablet 75 mg  75 mg Oral DAILY    escitalopram oxalate (LEXAPRO) tablet 20 mg  20 mg Oral BID    nitroglycerin (NITROSTAT) tablet 0.4 mg  0.4 mg SubLINGual Q5MIN PRN    ranolazine ER (RANEXA) tablet 500 mg  500 mg Oral Q12H    isosorbide mononitrate ER (IMDUR) tablet 120 mg  120 mg Oral DAILY    hydrALAZINE (APRESOLINE) tablet 25 mg  25 mg Oral TID    furosemide (LASIX) tablet 60 mg  60 mg Oral BID    aspirin delayed-release tablet 81 mg  81 mg Oral 7am    traZODone (DESYREL) tablet 100 mg  100 mg Oral QHS    LORazepam (ATIVAN) tablet 0.5 mg  0.5 mg Oral Q4H PRN    insulin glargine (LANTUS) injection 48 Units  0.4 Units/kg SubCUTAneous DAILY    insulin lispro (HUMALOG) injection   SubCUTAneous AC&HS    dextrose 40% (GLUTOSE) oral gel 1 Tube  15 g Oral PRN    glucagon (GLUCAGEN) injection 1 mg  1 mg IntraMUSCular PRN    dextrose (D50W) injection syrg 12.5-25 g  25-50 mL IntraVENous PRN    metoprolol succinate (TOPROL-XL) XL tablet 50 mg  50 mg Oral DAILY    levothyroxine (SYNTHROID) tablet 125 mcg  125 mcg Oral ACB    insulin lispro (HUMALOG) injection 24 Units  0.2 Units/kg SubCUTAneous TID WITH MEALS       Signed:  Chelsea Mahan DO    Part of this note may have been written by using a voice dictation software. The note has been proof read but may still contain some grammatical/other typographical errors.

## 2021-09-04 NOTE — PROGRESS NOTES
Assisted to reposition in bed  cyclor in progress  Oxygen at 60L 100% plus nrb on  Oxygen sat is 92%

## 2021-09-04 NOTE — PROGRESS NOTES
Patient resting in bed on 15L HF NC. Respirations even and unlabored. A&Ox4. Patient denies pain and is in no acute distress at this time. PD cath dressing is clean, dry, and intact. PD is currently running. Patient noted to have two missing toes (1 on each foot). No needs expressed. Call light within reach, will continue to monitor.

## 2021-09-05 NOTE — PROGRESS NOTES
Hospitalist Progress Note   Admit Date:  2021  6:44 AM   Name:  Kamran Schwab   Age:  59 y.o. Sex:  female  :  1960   MRN:  [id number]     Presenting Complaint: Shortness of Breath    Reason(s) for Admission: Acute hypoxemic respiratory failure due to COVID-19 [facility]) [U07.1, J96.01]     Hospital Course & Interval History:   Kamran Schwab is a 59 y.o. female with medical history of ESRD on PD (started this year), Class III Obesity, IDDM, CAD s/p CABG and stents who presented with increasing SOB diagnosed with covid x 10 days ago. Briefly hospitalized and discharged stable on RA on . Sudden onset SOB and coughing yesterday. Presented to [facility] on  via EMS who  notes patient to be hypoxic at 77-80%. 7.48/28.8/57/21.5 spo2 92% on 15L      PD f/b Dr Divya Marshall      CAD s/p CABG and stents f/b Dr Ova Lundborg      Former smoker. 30 pack year history      Wants only 5 days on the vent if intubated. Subjective (21):  Seen and examined. Patient continues to have shortness of breath and general malaise. Assessment & Plan:   Acute hypoxemic respiratory failure / COVID   +COVID   Unvaccinated   15L HF. Anticipate escalation to airvo. OK with intubation but only wants max 5 days on mechanical vent. Not a canididate for rem   Consented to Actemra (CRP 9.8)   Pseudo hypocalcemia - corrected 8.4   Procal 0.19. low suspicion for HAP at this time. High risk for ARDS, VTE, septic shock and invasive infections  9/3-continue Actemra. Clinically not improving and continues to require 15 L nasal cannula. Will transition patient to oral Decadron and add azithromycin to current regimen and continue albuterol with guaifenesin and Symbicort. -patient is status post Actemra. Patient rapidly declining and now on 60 L of heated high flow. Continue oral Decadron azithromycin albuterol and guaifenesin. Patient continues to be at high risk for further decompensation.   -patient status post Actemra. Continue Decadron azithromycin and albuterol. Not clinically improving and continues to be at risk for rapid decompensation requiring mechanical ventilation.       T2DM w/ DM nephropathy, h/o OSTEO, PVD - high insulin needs. lantus plus prandal and SSI. Titrate <190 BGL   9/3-patient's blood sugars currently to goal on Lantus 48 units subcu daily and mealtime coverage at 24 units with each meal and sliding scale. We will continue to monitor. 9/4-poorly controlled. Will increase patient's Lantus to 60 units subcu daily and continue mealtime coverage of 24 units with each meal and monitor blood sugars. 9/5-blood sugars controlled on Lantus 60 units daily and 24 units with each meal.  Continue to monitor on steroid therapy. ESRD on PD - 2/2 DM. Started in January. Consult nephrology. 9/3-nephrology consulted and managing. 9/4-Per nephrology    CAD s/p CABG and stents - DAPT, BB,   Vitamin D deficiency - level 11. Start high dose replacement. 9/3-stable with no acute process. Continue dual antiplatelet therapy beta-blocker and vitamin D replacement     Uncontrolled HTN - restart home meds. 9/3-to goal on home regimen continue to monitor. 9/4-patient's blood pressures downtrending and now low normal.  Will hold 3 times daily hydralazine and monitor. 9/5-blood pressures improved with holding hydralazine. Continue to monitor off hydralazine. Diet: ADULT DIET Regular; 4 carb choices (60 gm/meal); Low Fat/Low Chol/High Fiber/BERNIE; Low Potassium (Less than 3000 mg/day);  Low Phosphorus (Less than 1000 mg)  VTE ppx: heparin 7500 K q8h   Code status: Full Code        Active Hospital Problems    Diagnosis Date Noted    Acute hypoxemic respiratory failure due to COVID-19 [facility]) 09/02/2021    COVID-19 in immunocompromised patient ([facility] 09/02/2021    Cigarette nicotine dependence in remission 09/02/2021    COVID-19 vaccination not done 09/02/2021    ESRD on peritoneal dialysis [facility]) 08/28/2021    Acute hypokalemia 08/28/2021    Coronary artery disease with stable angina pectoris ([facility] 02/03/2020    Class 3 severe obesity with serious comorbidity and body mass index (BMI) of 40.0 to 44.9 in adult [facility]) 05/02/2018    Type 2 diabetes with nephropathy ([facility] 05/02/2018    PVD (peripheral vascular disease) ([facility] 08/18/2016    Acquired hypothyroidism     Mixed hyperlipidemia        Objective:     Patient Vitals for the past 24 hrs:   Temp Pulse Resp BP SpO2   09/05/21 0855 98 °F (36.7 °C) 80 20 (!) 154/72 98 %   09/05/21 0828     93 %   09/05/21 0621     95 %   09/05/21 0620     (!) 88 %   09/05/21 0301 97.6 °F (36.4 °C) 67 18 130/77 94 %   09/04/21 2330 98.5 °F (36.9 °C) 80 18 117/66 91 %   09/04/21 2000     92 %   09/04/21 1922 97.6 °F (36.4 °C) 78 18 118/74 92 %   09/04/21 1657     90 %   09/04/21 1625 98.3 °F (36.8 °C) 64 20 135/74 94 %   09/04/21 1442     92 %     Oxygen Therapy  O2 Sat (%): 98 % (09/05/21 0855)  Pulse via Oximetry: 76 beats per minute (09/05/21 0828)  O2 Device: Heated; Hi flow nasal cannula; Non-rebreather mask (09/05/21 1347)  Skin Assessment: Clean, dry, & intact (09/05/21 0054)  Skin Protection for O2 Device: No (09/03/21 7540)  O2 Flow Rate (L/min): 60 l/min (09/05/21 1347)  O2 Temperature: 87.8 °F (31 °C) (09/04/21 2000)  FIO2 (%): 100 % (09/05/21 1347)    Estimated body mass index is 39.85 kg/m² as calculated from the following:    Height as of this encounter: 5' 8\" (1.727 m). Weight as of this encounter: 118.9 kg (262 lb 1.6 oz). Intake/Output Summary (Last 24 hours) at 9/5/2021 1421  Last data filed at 9/5/2021 1342  Gross per 24 hour   Intake 480 ml   Output 601 ml   Net -121 ml         Physical Exam:     General:    Well nourished. No overt distress  Head:  Normocephalic, atraumatic  Eyes:  Sclerae appear normal.  Pupils equally round. ENT:  Nares appear normal, no drainage. Moist oral mucosa  Neck:  No restricted ROM.   Trachea midline   CV:   RRR. No m/r/g. No jugular venous distension. Lungs:   Crackles bilaterally. no wheezing, rhonchi, or rales. Respirations even, unlabored  Abdomen: Bowel sounds present. Soft, nontender, nondistended. Extremities: No cyanosis or clubbing. No edema  Skin:     No rashes and normal coloration. Warm and dry. Neuro:  Cranial nerves II-XII grossly intact. Sensation intact  Psych:  Normal mood and affect. Alert and oriented x3    I have reviewed ordered lab tests and independently visualized imaging below:    Last 24hr Labs:  Recent Results (from the past 24 hour(s))   GLUCOSE, POC    Collection Time: 09/04/21  4:58 PM   Result Value Ref Range    Glucose (POC) 392 (H) 65 - 100 mg/dL    Performed by Nick [address], POC    Collection Time: 09/04/21  8:26 PM   Result Value Ref Range    Glucose (POC) 399 (H) 65 - 100 mg/dL    Performed by Abbie    GLUCOSE, POC    Collection Time: 09/05/21  7:29 AM   Result Value Ref Range    Glucose (POC) 174 (H) 65 - 100 mg/dL    Performed by Anthony    GLUCOSE, POC    Collection Time: 09/05/21 11:49 AM   Result Value Ref Range    Glucose (POC) 149 (H) 65 - 100 mg/dL    Performed by Anthony        All Micro Results     Procedure Component Value Units Date/Time    EBENEZER De La Rosa UR/CSF [[id number]] Collected: 09/02/21 1425    Order Status: No result     EBENEZER De La Rosa UR/CSF [[id number]] Collected: 09/02/21 1421    Order Status: Canceled     MSSA/MRSA SC BY PCR, NASAL SWAB [[id number]] Collected: 09/02/21 1054    Order Status: Completed Specimen: Nasal swab Updated: 09/02/21 1253     Special Requests: NO SPECIAL REQUESTS        Culture result:       SA target not detected. A MRSA NEGATIVE, SA NEGATIVE test result does not preclude MRSA or SA nasal colonization. EBENEZER De La Rosa UR/CSF [[id number]] Collected: 09/02/21 0163    Order Status: Canceled           Other Studies:  No results found.    Current Meds:  Current Facility-Administered Medications   Medication Dose Route Frequency    insulin glargine (LANTUS) injection 60 Units  60 Units SubCUTAneous DAILY    dexAMETHasone (DECADRON) tablet 4 mg  4 mg Oral Q12H    azithromycin (ZITHROMAX) tablet 500 mg  500 mg Oral DAILY    sodium chloride (NS) flush 5-10 mL  5-10 mL IntraVENous Q8H    sodium chloride (NS) flush 5-10 mL  5-10 mL IntraVENous PRN    ergocalciferol capsule 50,000 Units  50,000 Units Oral Q7D    cetirizine (ZYRTEC) tablet 10 mg  10 mg Oral DAILY    fluticasone propionate (FLONASE) 50 mcg/actuation nasal spray 2 Spray  2 Spray Both Nostrils DAILY    amLODIPine (NORVASC) tablet 5 mg  5 mg Oral DAILY    albuterol (PROVENTIL HFA, VENTOLIN HFA, PROAIR HFA) inhaler 2 Puff  2 Puff Inhalation QID RT    budesonide-formoteroL (SYMBICORT) 160-4.5 mcg/actuation HFA inhaler 2 Puff  2 Puff Inhalation BID RT    sodium chloride (NS) flush 5-40 mL  5-40 mL IntraVENous Q8H    sodium chloride (NS) flush 5-40 mL  5-40 mL IntraVENous PRN    acetaminophen (TYLENOL) tablet 650 mg  650 mg Oral Q6H PRN    Or    acetaminophen (TYLENOL) suppository 650 mg  650 mg Rectal Q6H PRN    polyethylene glycol (MIRALAX) packet 17 g  17 g Oral DAILY PRN    ondansetron (ZOFRAN ODT) tablet 4 mg  4 mg Oral Q8H PRN    Or    ondansetron (ZOFRAN) injection 4 mg  4 mg IntraVENous Q6H PRN    alum-mag hydroxide-simeth (MYLANTA) oral suspension 15 mL  15 mL Oral Q6H PRN    heparin (porcine) injection 7,500 Units  7,500 Units SubCUTAneous Q8H    guaiFENesin-dextromethorphan (ROBITUSSIN DM) 100-10 mg/5 mL syrup 5 mL  5 mL Oral Q4H PRN    cholecalciferol (VITAMIN D3) tablet 6,000 Units  6,000 Units Oral DAILY    Followed by   Lance Mendez ON 9/9/2021] cholecalciferol (VITAMIN D3) (1000 Units /25 mcg) tablet 2,000 Units  2,000 Units Oral DAILY    clopidogreL (PLAVIX) tablet 75 mg  75 mg Oral DAILY    escitalopram oxalate (LEXAPRO) tablet 20 mg  20 mg Oral BID  nitroglycerin (NITROSTAT) tablet 0.4 mg  0.4 mg SubLINGual Q5MIN PRN    ranolazine ER (RANEXA) tablet 500 mg  500 mg Oral Q12H    isosorbide mononitrate ER (IMDUR) tablet 120 mg  120 mg Oral DAILY    [Held by provider] hydrALAZINE (APRESOLINE) tablet 25 mg  25 mg Oral TID    furosemide (LASIX) tablet 60 mg  60 mg Oral BID    aspirin delayed-release tablet 81 mg  81 mg Oral 7am    traZODone (DESYREL) tablet 100 mg  100 mg Oral QHS    LORazepam (ATIVAN) tablet 0.5 mg  0.5 mg Oral Q4H PRN    insulin lispro (HUMALOG) injection   SubCUTAneous AC&HS    dextrose 40% (GLUTOSE) oral gel 1 Tube  15 g Oral PRN    glucagon (GLUCAGEN) injection 1 mg  1 mg IntraMUSCular PRN    dextrose (D50W) injection syrg 12.5-25 g  25-50 mL IntraVENous PRN    metoprolol succinate (TOPROL-XL) XL tablet 50 mg  50 mg Oral DAILY    levothyroxine (SYNTHROID) tablet 125 mcg  125 mcg Oral ACB    insulin lispro (HUMALOG) injection 24 Units  0.2 Units/kg SubCUTAneous TID WITH MEALS       Signed:  Sissy Fernandez DO    Part of this note may have been written by using a voice dictation software. The note has been proof read but may still contain some grammatical/other typographical errors.

## 2021-09-05 NOTE — PROGRESS NOTES
Patient sleeping in bed on Airvo 60L/100% with NRB. O2 at 92% via vital signs machine. Respirations even and unlabored. PD cath connected and draining. Patient denies pain and is in no acute distress at this time. Call light within reach, will continue to monitor.

## 2021-09-05 NOTE — PROGRESS NOTES
Pt's BG is 399. Dr. Meghan Mills notified via perfect serve. Awaiting orders. Orders placed for additional 5 units of Humalog to equal 20 units Humalog coverage.

## 2021-09-05 NOTE — PROGRESS NOTES
Patient resting in bed on Airvo 60L/100% with O2 sats around 95%. Respirations even and unlabored. Patient denies pain and is no acute distress at this time. Call light within reach, will continue to monitor. Preparing to give report to oncoming RN.

## 2021-09-05 NOTE — PROGRESS NOTES
Alert  Feels better  cyclor in progress  Pd cath intact  Oxygen at 60L 100%  Sat is 95%  Patient puts nrb on over airvo when sat below 90%

## 2021-09-05 NOTE — DIALYSIS
Disconnected PD cycler from patient. Betadine cap intact. Patient tolerated well. UF amount 601mls.  Effluent: light yellow and clear

## 2021-09-05 NOTE — PROGRESS NOTES
Admit Date: 9/2/2021      Subjective:          Review of Systems  Cardio-vascular: no chest pain, + SOB  GI: no N/V/D  : no dysuria, no hematuria    Objective:     Patient Vitals for the past 8 hrs:   BP Temp Pulse Resp SpO2   09/05/21 0855 (!) 154/72 98 °F (36.7 °C) 80 20 98 %   09/05/21 0828     93 %   09/05/21 0621     95 %   09/05/21 0620     (!) 88 %     No intake/output data recorded.       Physical Exam:   On O2 supplement  NARD  Lung: diffuse bronchi  CV: RR, no rub  Abd: soft, not tender  Ext: trace  edema   PD exit site: some erythema, no exudate           Data Review   Recent Results (from the past 8 hour(s))   GLUCOSE, POC    Collection Time: 09/05/21  7:29 AM   Result Value Ref Range    Glucose (POC) 174 (H) 65 - 100 mg/dL    Performed by Anthony            Assessment:     Principal Problem:    Acute hypoxemic respiratory failure due to COVID-19 [facility]) (9/2/2021)    Active Problems:    Mixed hyperlipidemia ()      Acquired hypothyroidism ()      PVD (peripheral vascular disease) ([facility] [address] (8/18/2016)      Type 2 diabetes with nephropathy ([facility] [facility] (5/2/2018)      Class 3 severe obesity with serious comorbidity and body mass index (BMI) of 40.0 to 44.9 in adult [facility]) (5/2/2018)      Coronary artery disease with stable angina pectoris ([address] (2/3/2020)      ESRD on peritoneal dialysis ([facility] [address] (8/28/2021)      Acute hypokalemia (8/28/2021)      COVID-19 in immunocompromised patient ([address] (9/2/2021)      Cigarette nicotine dependence in remission (9/2/2021)      COVID-19 vaccination not done (9/2/2021)        Plan:     PD hilton Iraheta MD

## 2021-09-05 NOTE — PROGRESS NOTES
Patient found with airvo off and O2 sats at 88%. Patient complaints of pain as the Airvo was out of water, but did not alarm. Replaced water, reconnected Bita Godoy returning to 95%.

## 2021-09-05 NOTE — PROGRESS NOTES
Patient sleeping in bed on Airvo 60L/100% with NRB and O2 sats around 91-94%. Respirations even and unlabored. Patient does not appear to be in pain and is in no acute distress at this time. Call light within reach, will continue to monitor.

## 2021-09-05 NOTE — PROGRESS NOTES
Patient is sleeping  Respirations unlabored  Oxygen 60L 100% airvo wearing non rebreather  Saturation 95%

## 2021-09-06 NOTE — PROGRESS NOTES
Problem: Risk for Spread of Infection  Goal: Prevent transmission of infectious organism to others  Description: Prevent the transmission of infectious organisms to other patients, staff members, and visitors. Outcome: Progressing Towards Goal     Problem: Patient Education:  Go to Education Activity  Goal: Patient/Family Education  Outcome: Progressing Towards Goal     Problem: Airway Clearance - Ineffective  Goal: Achieve or maintain patent airway  Outcome: Progressing Towards Goal     Problem: Gas Exchange - Impaired  Goal: Absence of hypoxia  Outcome: Progressing Towards Goal  Goal: Promote optimal lung function  Outcome: Progressing Towards Goal     Problem: Breathing Pattern - Ineffective  Goal: Ability to achieve and maintain a regular respiratory rate  Outcome: Progressing Towards Goal     Problem:  Body Temperature -  Risk of, Imbalanced  Goal: Ability to maintain a body temperature within defined limits  Outcome: Progressing Towards Goal  Goal: Will regain or maintain usual level of consciousness  Outcome: Progressing Towards Goal  Goal: Complications related to the disease process, condition or treatment will be avoided or minimized  Outcome: Progressing Towards Goal     Problem: Isolation Precautions - Risk of Spread of Infection  Goal: Prevent transmission of infectious organism to others  Outcome: Progressing Towards Goal     Problem: Nutrition Deficits  Goal: Optimize nutrtional status  Outcome: Progressing Towards Goal     Problem: Risk for Fluid Volume Deficit  Goal: Maintain normal heart rhythm  Outcome: Progressing Towards Goal  Goal: Maintain absence of muscle cramping  Outcome: Progressing Towards Goal  Goal: Maintain normal serum potassium, sodium, calcium, phosphorus, and pH  Outcome: Progressing Towards Goal     Problem: Loneliness or Risk for Loneliness  Goal: Demonstrate positive use of time alone when socialization is not possible  Outcome: Progressing Towards Goal     Problem: Fatigue  Goal: Verbalize increase energy and improved vitality  Outcome: Progressing Towards Goal     Problem: Patient Education: Go to Patient Education Activity  Goal: Patient/Family Education  Outcome: Progressing Towards Goal     Problem: Pressure Injury - Risk of  Goal: *Prevention of pressure injury  Description: Document Vega Scale and appropriate interventions in the flowsheet. Outcome: Progressing Towards Goal  Note: Pressure Injury Interventions:       Moisture Interventions: Absorbent underpads, Apply protective barrier, creams and emollients    Activity Interventions: Increase time out of bed, Pressure redistribution bed/mattress(bed type), PT/OT evaluation    Mobility Interventions: Pressure redistribution bed/mattress (bed type), PT/OT evaluation    Nutrition Interventions: Document food/fluid/supplement intake    Friction and Shear Interventions: Minimize layers                Problem: Patient Education: Go to Patient Education Activity  Goal: Patient/Family Education  Outcome: Progressing Towards Goal     Problem: Falls - Risk of  Goal: *Absence of Falls  Description: Document Patricia Fall Risk and appropriate interventions in the flowsheet.   Outcome: Progressing Towards Goal  Note: Fall Risk Interventions:  Mobility Interventions: Patient to call before getting OOB         Medication Interventions: Patient to call before getting OOB    Elimination Interventions: Call light in reach, Toileting schedule/hourly rounds, Patient to call for help with toileting needs    History of Falls Interventions: Evaluate medications/consider consulting pharmacy         Problem: Patient Education: Go to Patient Education Activity  Goal: Patient/Family Education  Outcome: Progressing Towards Goal

## 2021-09-06 NOTE — DIALYSIS
Disconnected PD cycler from patient. Betadine cap intact. Patient tolerated well. UF amount -1346mls.  Effluent: clear, yellow

## 2021-09-06 NOTE — PROGRESS NOTES
"        Dakota Boyer   2017 8:40 AM   Office Visit   MRN: [id number]    Department:  Endocrinology Med [facility]   Dept Phone:  [phone]    Description:  Male : 1946   Provider:  Isabela Singletary M.D.; ENDOCRINOLOGY DIABETES RN           Reason for Visit     Diabetes Mellitus           Allergies as of 2017     Allergen Noted Reactions    Aleve Cold & [Pseudoephedrine-Naproxen Na] 2013   Anaphylaxis    Ceftriaxone Sodium 2013   Anaphylaxis    Reaction; 1970's.    Naproxen 2013   Anaphylaxis    Reaction; 2004.    Tape 10/18/2016   Rash, Itching    Plastic tape (paper tape ok)      You were diagnosed with     Type 2 diabetes mellitus without complication, with long-term current use of insulin (CMS-[facility])   [8874009]       Essential hypertension   [7753913]       Mixed hyperlipidemia   [272.2.ICD-9-CM]       Acquired hypothyroidism   [5868425]         Vital Signs     Blood Pressure Pulse Height Weight Body Mass Index Oxygen Saturation    118/64 mmHg 66 1.727 m (5' 8\") 113.036 kg (249 lb 3.2 oz) 37.90 kg/m2 94%    Smoking Status                   Former Smoker           Basic Information     Date Of Birth Sex Race Ethnicity Preferred Language    1946 Male White Non- English      Your appointments     May 25, 2017 10:00 AM   Diabetes Care Visit with Isabela Singletary M.D., ENDOCRINOLOGY DIABETES RN   [facility]    [address] [facility], [address]  [facility] [address] [phone]           You will be receiving a confirmation call a few days before your appointment from our automated call confirmation system.            2017 10:15 AM   PACER CHECK ONLY with PACER CHECK-CAM B   [facility] (--)    [address], [address]  [facility] [address] [phone]            2017 10:45 AM   FOLLOW UP with Jac Christiansen M.D.   [facility] (--) " Tylenol 2 tabs po for ear pain    [address], [address]  [place] [address]   [phone]              Problem List              ICD-10-CM Priority Class Noted - Resolved    Type 2 diabetes mellitus without complication (CMS-HCC) E11.9 Medium  4/10/2012 - Present    Back pain, chronic M54.9, G89.29 Low  4/10/2012 - Present    Essential hypertension, benign I10 High  4/10/2012 - Present    Neck pain M54.2 Low  4/10/2012 - Present    Insomnia G47.00 Low  4/10/2012 - Present    Dyslipidemia E78.5 High  9/28/2012 - Present    RBBB I45.10 High  9/28/2012 - Present    Hypersensitive carotid sinus syndrome G90.01 High  10/2/2012 - Present    Sick sinus syndrome (CMS-[facility]) I49.5 High  10/4/2012 - Present    Pacemaker Z95.0 High  10/4/2012 - Present    Degeneration of lumbar or lumbosacral intervertebral disc M51.37 Low  11/20/2012 - Present    S/P lumbar discectomy Z98.890 Low  11/21/2012 - Present    DJD (degenerative joint disease) of cervical spine M47.812 Low  2/27/2013 - Present    Coronary artery disease due to calcified coronary lesion: 40-50% disease in the circumflex at cardiac catheterization in 2013 I25.10, I25.84 High  8/16/2013 - Present    Fatigue R53.83 Medium  9/10/2013 - Present    Shortness of breath R06.02 High  9/10/2013 - Present    Other dyspnea and respiratory abnormality R06.09, R09.89   10/17/2013 - Present    History of diverticulitis Z87.19 High  11/13/2013 - Present    Hypothyroidism E03.9   8/21/2014 - Present    Obesity E66.9   8/21/2014 - Present    Depression with anxiety F41.8   3/28/2016 - Present    Obstructive sleep apnea on CPAP G47.33   3/28/2016 - Present    Non-proliferative diabetic retinopathy, mild, both eyes ([facility]) E11.3293   6/2/2016 - Present    Hypertensive retinopathy of both eyes H35.033   6/2/2016 - Present    Cervical spondylosis M47.812   10/19/2016 - Present      Health Maintenance        Date Due Completion Dates    IMM DTaP/Tdap/Td Vaccine (1 - Tdap) 3/29/2008 3/28/2008    DIABETES MONOFILAMENT / LE  EXAM 8/21/2015 8/21/2014, 10/11/2013 (Done)    Override on 10/11/2013: Done (seen by podiatry q 3 months)    A1C SCREENING 9/30/2016 3/30/2016, 9/29/2015, 5/19/2015, 3/3/2015, 12/2/2014, 7/22/2014, 3/5/2014, 2/10/2014, 12/5/2012, 5/1/2012    RETINAL SCREENING 1/6/2017 1/6/2016, 1/6/2016 (Done), 1/7/2015, 1/7/2015, 1/9/2014    Override on 1/6/2016: Done    URINE ACR / MICROALBUMIN 3/30/2017 3/30/2016, 7/22/2014, 3/5/2014, 2/10/2014, 5/1/2012    FASTING LIPID PROFILE 6/13/2017 6/13/2016, 12/3/2015, 5/19/2015, 12/2/2014, 7/22/2014, 8/9/2013, 12/5/2012, 10/1/2012, 5/1/2012    SERUM CREATININE 6/13/2017 6/13/2016, 3/30/2016, 12/3/2015, 9/29/2015, 5/19/2015, 5/19/2015, 12/2/2014, 11/14/2013, 11/13/2013, 10/15/2013, 8/9/2013, 4/10/2013, 3/1/2013, 2/28/2013, 2/19/2013, 12/5/2012, 11/12/2012, 10/1/2012, 9/24/2012    COLONOSCOPY 12/18/2018 12/18/2013            Results     POCT Hemoglobin A1C      Component    Glycohemoglobin    5.7    Comment:     lot [id number]  9/18    Internal Control Negative    Internal Control Positive                        Current Immunizations     13-VALENT PCV PREVNAR 3/28/2016    Influenza TIV (IM) 11/13/2013  3:04 PM, 10/4/2012  4:58 PM    Influenza Vaccine Adult HD 10/28/2016    Pneumococcal polysaccharide vaccine (PPSV-23) 10/4/2012  5:00 PM    SHINGLES VACCINE 3/28/2014    TD Vaccine 3/28/2008      Below and/or attached are the medications your provider expects you to take. Review all of your home medications and newly ordered medications with your provider and/or pharmacist. Follow medication instructions as directed by your provider and/or pharmacist. Please keep your medication list with you and share with your provider. Update the information when medications are discontinued, doses are changed, or new medications (including over-the-counter products) are added; and carry medication information at all times in the event of emergency situations     Allergies:  ALEVE COLD & - Anaphylaxis      CEFTRIAXONE SODIUM - Anaphylaxis     NAPROXEN - Anaphylaxis     TAPE - Rash,Itching               Medications  Valid as of: January 26, 2017 -  9:21 AM    Generic Name Brand Name Tablet Size Instructions for use    Albiglutide (Pen-injector) Albiglutide 30 MG Inject 1 PEN as instructed every 7 days.        Aspirin (Tablet Delayed Response) ECOTRIN 81 MG Take 81 mg by mouth every day.        Atorvastatin Calcium (Tab) LIPITOR 20 MG Take 1 Tab by mouth every day.        Blood Glucose Monitoring Suppl (Device) Blood Glucose Monitoring Suppl  Meter: Dispense Abbott Freestyle Lite meter. Sig. Use as directed for blood sugar monitoring. #1. NR.        Blood Glucose Monitoring Suppl (Misc) Blood Glucose Monitoring Suppl SUPPLIES Test strips order: Test strips for Abbott Freestyle Lite meter. Sig: use 4 times daily and prn ssx high or low sugar #100 RF x 0        BuPROPion HCl (TABLET SR 24 HR) WELLBUTRIN  MG TAKE 1 TABLET EVERY MORNING        Cyanocobalamin (SL Tab) Vitamin B-12 2500 MCG Place  under tongue every day.        Ferrous Sulfate (Tab) ferrous sulfate 325 (65 FE) MG Take 325 mg by mouth every 48 hours.        Glucose Blood (Strip) glucose blood  1 Each by Other route as needed.        Insulin Glargine (Solution Pen-injector) LANTUS 100 UNIT/ML INJECT 30 UNITS twice a day        Insulin Pen Needle (Misc) B-D ULTRAFINE III SHORT PEN 31G X 8 MM USE 1 NEEDLE EVERY DAY WITH LANTUS        Insulin Pen Needle (Misc) B-D ULTRAFINE III SHORT PEN 31G X 8 MM USE 1 PEN NEEDLE TWICE A DAY        Lancets (Misc) Lancets  Lancets order: Lancets for Abbott Freestyle Lite meter. Sig: use 4 times daily  and prn ssx high or low sugar. #100 RF x 0        Lancets (Misc) FREESTYLE LANCETS  by Does not apply route.        Levothyroxine Sodium (Tab) SYNTHROID 75 MCG TAKE 1 TABLET EVERY DAY        MetFORMIN HCl (Tab) GLUCOPHAGE 500 MG Take 2 Tabs by mouth 2 times a day, with meals.        Metoprolol Tartrate (Tab) LOPRESSOR 25 MG  Take 1 Tab by mouth 2 times a day.        Valsartan (Tab) DIOVAN 80 MG TAKE 1 TABLET DAILY        .                 Medicines prescribed today were sent to:     [facility] [address]    [address] [facility] [address]    Phone: [phone] Fax: [phone]    Open 24 Hours?: No    [facility] [address] [place] [address] - [address] [place] [address] [place] [address]    Phone: [phone] Fax: [phone]    Open 24 Hours?: No      Medication refill instructions:       If your prescription bottle indicates you have medication refills left, it is not necessary to call your provider’s office. Please contact your pharmacy and they will refill your medication.    If your prescription bottle indicates you do not have any refills left, you may request refills at any time through one of the following ways: The online [employer] system (except Urgent Care), by calling your provider’s office, or by asking your pharmacy to contact your provider’s office with a refill request. Medication refills are processed only during regular business hours and may not be available until the next business day. Your provider may request additional information or to have a follow-up visit with you prior to refilling your medication.   *Please Note: Medication refills are assigned a new Rx number when refilled electronically. Your pharmacy may indicate that no refills were authorized even though a new prescription for the same medication is available at the pharmacy. Please request the medicine by name with the pharmacy before contacting your provider for a refill.        Your To Do List     Future Labs/Procedures Complete By Expires    FREE THYROXINE  As directed 1/26/2018    MICROALBUMIN CREAT RATIO URINE  As directed 1/26/2018    TSH  As directed 1/26/2018         [employer] Access Code: Activation code not generated  Current [employer] Status: Active

## 2021-09-06 NOTE — PROGRESS NOTES
Patient assisted to bedside commode to void  Wearing 100% optiflo with NRB mask over  Tolerated fairly well sat to 86% and back to 94% PD cath intact  Site clean  Denies pain

## 2021-09-06 NOTE — PROGRESS NOTES
Admit Date: 9/2/2021      Subjective:          Review of Systems  Cardio-vascular: no chest pain, + SOB  GI: no N/V/D  : no dysuria, no hematuria    Objective:     Patient Vitals for the past 8 hrs:   BP Temp Pulse Resp SpO2   09/06/21 1114 123/85 98.4 °F (36.9 °C) 67 20 92 %   09/06/21 0800     90 %   09/06/21 0753 134/74 98 °F (36.7 °C) 71 20 93 %     09/06 0701 - 09/06 1900  In: 240 [P.O.:240]  Out: 1346       Physical Exam:   On O2 supplement  NARD  Lung: diffuse bronchi  CV: RR, no rub  Abd: soft, not tender  Ext: trace  edema   PD exit site: some erythema, no exudate           Data Review   Recent Results (from the past 8 hour(s))   GLUCOSE, POC    Collection Time: 09/06/21  7:25 AM   Result Value Ref Range    Glucose (POC) 209 (H) 65 - 100 mg/dL    Performed by Ashutosh            Assessment:     Principal Problem:    Acute hypoxemic respiratory failure due to COVID-19 [facility]) (9/2/2021)    Active Problems:    Mixed hyperlipidemia ()      Acquired hypothyroidism ()      PVD (peripheral vascular disease) ([facility] [address] (8/18/2016)      Type 2 diabetes with nephropathy (HCC) (5/2/2018)      Class 3 severe obesity with serious comorbidity and body mass index (BMI) of 40.0 to 44.9 in adult [facility]) (5/2/2018)      Coronary artery disease with stable angina pectoris ([facility] [address] (2/3/2020)      ESRD on peritoneal dialysis ([facility] [address] (8/28/2021)      Acute hypokalemia (8/28/2021)      COVID-19 in immunocompromised patient ([facility] [address] (9/2/2021)      Cigarette nicotine dependence in remission (9/2/2021)      COVID-19 vaccination not done (9/2/2021)        Plan:     PD hilton Egan MD

## 2021-09-06 NOTE — PROGRESS NOTES
Hospitalist Progress Note   Admit Date:  2021  6:44 AM   Name:  Jenelle Romero   Age:  59 y.o. Sex:  female  :  1960   MRN:  [id number]     Presenting Complaint: Shortness of Breath    Reason(s) for Admission: Acute hypoxemic respiratory failure due to COVID-19 [facility]) [U07.1, J96.01]     Hospital Course & Interval History:   Jenelle Romero is a 59 y.o. female with medical history of ESRD on PD (started this year), Class III Obesity, IDDM, CAD s/p CABG and stents who presented with increasing SOB diagnosed with covid x 10 days ago. Briefly hospitalized and discharged stable on RA on . Sudden onset SOB and coughing yesterday. Presented to [facility] on  via EMS who  notes patient to be hypoxic at 77-80%. 7.48/28.8/57/21.5 spo2 92% on 15L      PD f/b Dr Ankush Shields      CAD s/p CABG and stents f/b Dr Rodrigue Jimenez      Former smoker. 30 pack year history      Wants only 5 days on the vent if intubated. Subjective (21): Patient seen and examined. Patient complaining of left ear pain and ear fullness. Patient denies fever chills chest pain. Patient continues to complain of shortness of breath. Assessment & Plan:   Acute hypoxemic respiratory failure 2/2 COVID   +COVID   Unvaccinated   15L HF. Anticipate escalation to airvo. OK with intubation but only wants max 5 days on mechanical vent. Not a canididate for rem   Consented to Actemra (CRP 9.8)   Pseudo hypocalcemia - corrected 8.4   Procal 0.19. low suspicion for HAP at this time. High risk for ARDS, VTE, septic shock and invasive infections  9/3-continue Actemra. Clinically not improving and continues to require 15 L nasal cannula. Will transition patient to oral Decadron and add azithromycin to current regimen and continue albuterol with guaifenesin and Symbicort. -patient is status post Actemra. Patient rapidly declining and now on 60 L of heated high flow.   Continue oral Decadron azithromycin albuterol and guaifenesin. Patient continues to be at high risk for further decompensation. 9/5-patient status post Actemra. Continue Decadron azithromycin and albuterol. Not clinically improving and continues to be at risk for rapid decompensation requiring mechanical ventilation. 9/6-continues to require 60 L of oxygen supplementation. Patient at risk for rapid decompensation. Patient status post at term a continue oral Decadron and azithromycin     T2DM w/ DM nephropathy, h/o OSTEO, PVD - high insulin needs. lantus plus prandal and SSI. Titrate <190 BGL   9/3-patient's blood sugars currently to goal on Lantus 48 units subcu daily and mealtime coverage at 24 units with each meal and sliding scale. We will continue to monitor. 9/4-poorly controlled. Will increase patient's Lantus to 60 units subcu daily and continue mealtime coverage of 24 units with each meal and monitor blood sugars. 9/5-blood sugars controlled on Lantus 60 units daily and 24 units with each meal.  Continue to monitor on steroid therapy. 9/6-poorly controlled. Will adjust patient's Lantus to 40 units twice daily and continue 24 units with each meal and sliding scale and monitor. ESRD on PD - 2/2 DM. Started in January. Consult nephrology. 9/3-nephrology consulted and managing. 9/4-Per nephrology    CAD s/p CABG and stents - DAPT, BB,   Vitamin D deficiency - level 11. Start high dose replacement. 9/3-stable with no acute process. Continue dual antiplatelet therapy beta-blocker and vitamin D replacement     Uncontrolled HTN - restart home meds. 9/3-to goal on home regimen continue to monitor. 9/4-patient's blood pressures downtrending and now low normal.  Will hold 3 times daily hydralazine and monitor. 9/5-blood pressures improved with holding hydralazine. Continue to monitor off hydralazine.   9/6-continue holding hydralazine blood pressure currently to goal      Otitis media-we will start patient on Cipro otic drops and monitor  Diet: ADULT DIET Regular; 4 carb choices (60 gm/meal); Low Fat/Low Chol/High Fiber/BERNIE; Low Potassium (Less than 3000 mg/day); Low Phosphorus (Less than 1000 mg)  VTE ppx: heparin 7500 K q8h   Code status: Full Code        Active Hospital Problems    Diagnosis Date Noted    Acute hypoxemic respiratory failure due to COVID-19 [facility]) 09/02/2021    COVID-19 in immunocompromised patient ([facility] [place] [facility] 09/02/2021    Cigarette nicotine dependence in remission 09/02/2021    COVID-19 vaccination not done 09/02/2021    ESRD on peritoneal dialysis ([facility] [place] [facility] 08/28/2021    Acute hypokalemia 08/28/2021    Coronary artery disease with stable angina pectoris ([place] [id number] 02/03/2020    Class 3 severe obesity with serious comorbidity and body mass index (BMI) of 40.0 to 44.9 in adult [facility]) 05/02/2018    Type 2 diabetes with nephropathy ([facility] [place] [facility] 05/02/2018    PVD (peripheral vascular disease) ([place] [address] 08/18/2016    Acquired hypothyroidism     Mixed hyperlipidemia        Objective:     Patient Vitals for the past 24 hrs:   Temp Pulse Resp BP SpO2   09/06/21 1114 98.4 °F (36.9 °C) 67 20 123/85 92 %   09/06/21 0800     90 %   09/06/21 0753 98 °F (36.7 °C) 71 20 134/74 93 %   09/06/21 0313 98.1 °F (36.7 °C) 86 20 119/66 93 %   09/05/21 2252 98 °F (36.7 °C) 79 20 124/73 92 %   09/05/21 2150     96 %   09/05/21 2112 97.7 °F (36.5 °C) 66 20 135/73 95 %   09/05/21 2005 97.7 °F (36.5 °C) 66 20 135/73 95 %   09/05/21 1823     95 %   09/05/21 1700     95 %   09/05/21 1651 98.3 °F (36.8 °C) 80 20 (!) 147/70 91 %   09/05/21 1641     90 %     Oxygen Therapy  O2 Sat (%): 92 % (09/06/21 1114)  Pulse via Oximetry: 87 beats per minute (09/06/21 0800)  O2 Device: Heated; Hi flow nasal cannula (09/06/21 0800)  Skin Assessment: Clean, dry, & intact (09/05/21 1468)  Skin Protection for O2 Device: No (09/03/21 0750)  O2 Flow Rate (L/min): 60 l/min (09/06/21 0800)  O2 Temperature: 87.8 °F (31 °C) (09/06/21 0800)  FIO2 (%): 100 % (09/06/21 0800)    Estimated body mass index is 39.85 kg/m² as calculated from the following:    Height as of this encounter: 5' 8\" (1.727 m). Weight as of this encounter: 118.9 kg (262 lb 1.6 oz). Intake/Output Summary (Last 24 hours) at 9/6/2021 1507  Last data filed at 9/6/2021 1005  Gross per 24 hour   Intake 240 ml   Output 1346 ml   Net -1106 ml         Physical Exam:     General:    Well nourished. No overt distress  Head:  Normocephalic, atraumatic  Eyes:  Sclerae appear normal.  Pupils equally round. ENT:  Nares appear normal, no drainage. Moist oral mucosa. Left ear tympanic membrane intact and erythematous with fluid collection  Neck:  No restricted ROM. Trachea midline   CV:   RRR. No m/r/g. No jugular venous distension. Lungs:   Crackles bilaterally. no wheezing, rhonchi, or rales. Respirations even, unlabored  Abdomen: Bowel sounds present. Soft, nontender, nondistended. Extremities: No cyanosis or clubbing. No edema  Skin:     No rashes and normal coloration. Warm and dry. Neuro:  Cranial nerves II-XII grossly intact. Sensation intact  Psych:  Normal mood and affect.   Alert and oriented x3    I have reviewed ordered lab tests and independently visualized imaging below:    Last 24hr Labs:  Recent Results (from the past 24 hour(s))   GLUCOSE, POC    Collection Time: 09/05/21  4:45 PM   Result Value Ref Range    Glucose (POC) 228 (H) 65 - 100 mg/dL    Performed by Anthony    GLUCOSE, POC    Collection Time: 09/05/21  8:58 PM   Result Value Ref Range    Glucose (POC) 302 (H) 65 - 100 mg/dL    Performed by Nohemy    GLUCOSE, POC    Collection Time: 09/06/21  7:25 AM   Result Value Ref Range    Glucose (POC) 209 (H) 65 - 100 mg/dL    Performed by PlaneTiffany    GLUCOSE, POC    Collection Time: 09/06/21 12:11 PM   Result Value Ref Range    Glucose (POC) 242 (H) 65 - 100 mg/dL    Performed by [employer]        All Micro Results     Procedure Component Value Units Date/Time    EBENEZER Gibson, UR/CSF [[id number]] Collected: 09/02/21 1425    Order Status: Canceled     EBENEZER Gibson, UR/CSF [[id number]] Collected: 09/02/21 1421    Order Status: Canceled     MSSA/MRSA SC BY PCR, NASAL SWAB [[id number]] Collected: 09/02/21 1054    Order Status: Completed Specimen: Nasal swab Updated: 09/02/21 1253     Special Requests: NO SPECIAL REQUESTS        Culture result:       SA target not detected. A MRSA NEGATIVE, SA NEGATIVE test result does not preclude MRSA or SA nasal colonization. EBENEZER Gibson, UR/CSF [[id number]] Collected: 09/02/21 5846    Order Status: Canceled           Other Studies:  No results found.     Current Meds:  Current Facility-Administered Medications   Medication Dose Route Frequency    insulin glargine (LANTUS) injection 40 Units  40 Units SubCUTAneous BID    dexAMETHasone (DECADRON) tablet 4 mg  4 mg Oral Q12H    azithromycin (ZITHROMAX) tablet 500 mg  500 mg Oral DAILY    sodium chloride (NS) flush 5-10 mL  5-10 mL IntraVENous Q8H    sodium chloride (NS) flush 5-10 mL  5-10 mL IntraVENous PRN    ergocalciferol capsule 50,000 Units  50,000 Units Oral Q7D    cetirizine (ZYRTEC) tablet 10 mg  10 mg Oral DAILY    fluticasone propionate (FLONASE) 50 mcg/actuation nasal spray 2 Spray  2 Spray Both Nostrils DAILY    amLODIPine (NORVASC) tablet 5 mg  5 mg Oral DAILY    albuterol (PROVENTIL HFA, VENTOLIN HFA, PROAIR HFA) inhaler 2 Puff  2 Puff Inhalation QID RT    budesonide-formoteroL (SYMBICORT) 160-4.5 mcg/actuation HFA inhaler 2 Puff  2 Puff Inhalation BID RT    sodium chloride (NS) flush 5-40 mL  5-40 mL IntraVENous Q8H    sodium chloride (NS) flush 5-40 mL  5-40 mL IntraVENous PRN    acetaminophen (TYLENOL) tablet 650 mg  650 mg Oral Q6H PRN    Or    acetaminophen (TYLENOL) suppository 650 mg  650 mg Rectal Q6H PRN    polyethylene glycol (MIRALAX) packet 17 g  17 g Oral DAILY PRN    ondansetron (ZOFRAN ODT) tablet 4 mg 4 mg Oral Q8H PRN    Or    ondansetron (ZOFRAN) injection 4 mg  4 mg IntraVENous Q6H PRN    alum-mag hydroxide-simeth (MYLANTA) oral suspension 15 mL  15 mL Oral Q6H PRN    heparin (porcine) injection 7,500 Units  7,500 Units SubCUTAneous Q8H    guaiFENesin-dextromethorphan (ROBITUSSIN DM) 100-10 mg/5 mL syrup 5 mL  5 mL Oral Q4H PRN    cholecalciferol (VITAMIN D3) tablet 6,000 Units  6,000 Units Oral DAILY    Followed by   Mazin Wong ON 9/9/2021] cholecalciferol (VITAMIN D3) (1000 Units /25 mcg) tablet 2,000 Units  2,000 Units Oral DAILY    clopidogreL (PLAVIX) tablet 75 mg  75 mg Oral DAILY    escitalopram oxalate (LEXAPRO) tablet 20 mg  20 mg Oral BID    nitroglycerin (NITROSTAT) tablet 0.4 mg  0.4 mg SubLINGual Q5MIN PRN    ranolazine ER (RANEXA) tablet 500 mg  500 mg Oral Q12H    isosorbide mononitrate ER (IMDUR) tablet 120 mg  120 mg Oral DAILY    [Held by provider] hydrALAZINE (APRESOLINE) tablet 25 mg  25 mg Oral TID    furosemide (LASIX) tablet 60 mg  60 mg Oral BID    aspirin delayed-release tablet 81 mg  81 mg Oral 7am    traZODone (DESYREL) tablet 100 mg  100 mg Oral QHS    LORazepam (ATIVAN) tablet 0.5 mg  0.5 mg Oral Q4H PRN    insulin lispro (HUMALOG) injection   SubCUTAneous AC&HS    dextrose 40% (GLUTOSE) oral gel 1 Tube  15 g Oral PRN    glucagon (GLUCAGEN) injection 1 mg  1 mg IntraMUSCular PRN    dextrose (D50W) injection syrg 12.5-25 g  25-50 mL IntraVENous PRN    metoprolol succinate (TOPROL-XL) XL tablet 50 mg  50 mg Oral DAILY    levothyroxine (SYNTHROID) tablet 125 mcg  125 mcg Oral ACB    insulin lispro (HUMALOG) injection 24 Units  0.2 Units/kg SubCUTAneous TID WITH MEALS       Signed:  Danielle Keep, DO    Part of this note may have been written by using a voice dictation software. The note has been proof read but may still contain some grammatical/other typographical errors.

## 2021-09-07 NOTE — PROGRESS NOTES
Admit Date: 9/2/2021      Subjective:          Review of Systems  Cardio-vascular: no chest pain, + SOB  GI: no N/V/D  : no dysuria, no hematuria    Objective:     Patient Vitals for the past 8 hrs:   BP Temp Pulse Resp SpO2   09/07/21 0853     92 %   09/07/21 0722 (!) 145/72 97.3 °F (36.3 °C) 78 20 90 %   09/07/21 0323 (!) 122/56 97.5 °F (36.4 °C) 69 22 92 %     No intake/output data recorded. Physical Exam:   On O2 supplement  NARD  Lung: diffuse bronchi  CV: RR, no rub  Abd: soft, not tender  Ext: trace  edema   PD exit site: some erythema, no exudate           Data Review   Recent Results (from the past 8 hour(s))   GLUCOSE, POC    Collection Time: 09/07/21  7:36 AM   Result Value Ref Range    Glucose (POC) 182 (H) 65 - 100 mg/dL    Performed by Yasmani            Assessment:     Principal Problem:    Acute hypoxemic respiratory failure due to COVID-19 [facility]) (9/2/2021)    Active Problems:    Mixed hyperlipidemia ()      Acquired hypothyroidism ()      PVD (peripheral vascular disease) ([facility] [place] [address] (8/18/2016)      Type 2 diabetes with nephropathy ([facility] [place] [address] (5/2/2018)      Class 3 severe obesity with serious comorbidity and body mass index (BMI) of 40.0 to 44.9 in adult [facility]) (5/2/2018)      Coronary artery disease with stable angina pectoris ([address] (2/3/2020)      ESRD on peritoneal dialysis ([facility] [place] [address] (8/28/2021)      Acute hypokalemia (8/28/2021)      COVID-19 in immunocompromised patient ([address] (9/2/2021)      Cigarette nicotine dependence in remission (9/2/2021)      COVID-19 vaccination not done (9/2/2021)        Plan:     1. ESRD on PD   PD hilton Cummings MD

## 2021-09-07 NOTE — PROGRESS NOTES
MSN, CM:  Patient currently requiring on 60/100 plus a NRB. Patient requiring nightly PD. Patient with new dx of ear infection. Patient to continue Decadron and Azithromycin. Patient has received Actemra. Patient's discharge plan is unclear at this time r/t oxygen demand. Patient will be reevaluated when oxygen demand is lower. Case Management will continue to follow.

## 2021-09-07 NOTE — PROGRESS NOTES
SPEECH PATHOLOGY NOTE:    Speech therapy consult received and appreciated. Attempted to see patient this afternoon for bedside swallow evaluation, however she politely declined po trials at this time. Reports ate half a hamburger for lunch. Explained reasoning for eval and patient agreeable to participating tomorrow. Will follow up.        Tye Liz, [employer] [facility], [facility] ASHWINI ELLE RODARTE, CCC-SLP

## 2021-09-07 NOTE — PROGRESS NOTES
Hospitalist Progress Note   Admit Date:  2021  6:44 AM   Name:  Tina Frazier   Age:  59 y.o. Sex:  female  :  1960   MRN:  [id number]     Presenting Complaint: Shortness of Breath    Reason(s) for Admission: Acute hypoxemic respiratory failure due to COVID-19 [facility]) [U07.1, J96.01]     Hospital Course & Interval History:   Tina Frazier is a 59 y.o. female with medical history of ESRD on PD (started this year), Class III Obesity, IDDM, CAD s/p CABG and stents who presented with increasing SOB diagnosed with covid x 10 days ago. Briefly hospitalized and discharged stable on RA on . Sudden onset SOB and coughing yesterday. Presented to [facility] on  via EMS who  notes patient to be hypoxic at 77-80%. 7.48/28.8/57/21.5 spo2 92% on 15L      PD f/b Dr Whalen Situ      CAD s/p CABG and stents f/b Dr Maritza Winslow      Former smoker. 30 pack year history      Wants only 5 days on the vent if intubated. Subjective (21): Patient seen and examined. Patient complains of left ear pain. Patient complains of shortness of breath. Patient denies fever chills chest pain    Assessment & Plan:   Acute hypoxemic respiratory failure 2/2 COVID   +COVID   Unvaccinated   15L HF. Anticipate escalation to airvo. OK with intubation but only wants max 5 days on mechanical vent. Not a canididate for rem   Consented to Actemra (CRP 9.8)   Pseudo hypocalcemia - corrected 8.4   Procal 0.19. low suspicion for HAP at this time. High risk for ARDS, VTE, septic shock and invasive infections  9/3-continue Actemra. Clinically not improving and continues to require 15 L nasal cannula. Will transition patient to oral Decadron and add azithromycin to current regimen and continue albuterol with guaifenesin and Symbicort. -patient is status post Actemra. Patient rapidly declining and now on 60 L of heated high flow. Continue oral Decadron azithromycin albuterol and guaifenesin.   Patient continues to be at high risk for further decompensation. 9/5-patient status post Actemra. Continue Decadron azithromycin and albuterol. Not clinically improving and continues to be at risk for rapid decompensation requiring mechanical ventilation. 9/6-continues to require 60 L of oxygen supplementation. Patient at risk for rapid decompensation. Patient status post at term a continue oral Decadron and azithromycin  9/7-now requiring heated high flow at 50 L. Continue Decadron and azithromycin. Patient now on day 5 of Decadron. Patient is status post Actemra. Patient at significant risk for rapid decompensation. Not clinically improving       T2DM w/ DM nephropathy, h/o OSTEO, PVD - high insulin needs. lantus plus prandal and SSI. Titrate <190 BGL   9/3-patient's blood sugars currently to goal on Lantus 48 units subcu daily and mealtime coverage at 24 units with each meal and sliding scale. We will continue to monitor. 9/4-poorly controlled. Will increase patient's Lantus to 60 units subcu daily and continue mealtime coverage of 24 units with each meal and monitor blood sugars. 9/5-blood sugars controlled on Lantus 60 units daily and 24 units with each meal.  Continue to monitor on steroid therapy. 9/6-poorly controlled. Will adjust patient's Lantus to 40 units twice daily and continue 24 units with each meal and sliding scale and monitor. 9/7-blood sugar significantly improved on Lantus 40 units twice daily and 24 units with meals and sliding scale. Continue to monitor      ESRD on PD - 2/2 DM. Started in January. Consult nephrology. 9/3-nephrology consulted and managing. 9/4-Per nephrology  9/7-peritoneal dialysis per nephrology      CAD s/p CABG and stents - DAPT, BB,   Vitamin D deficiency - level 11. Start high dose replacement. 9/3-stable with no acute process. Continue dual antiplatelet therapy beta-blocker and vitamin D replacement     Uncontrolled HTN - restart home meds.    9/3-to goal on home regimen continue to monitor. 9/4-patient's blood pressures downtrending and now low normal.  Will hold 3 times daily hydralazine and monitor. 9/5-blood pressures improved with holding hydralazine. Continue to monitor off hydralazine. 9/6-continue holding hydralazine blood pressure currently to goal  9/7-blood pressures improving. We will continue monitoring off hydralazine. Otitis media-  9/6 we will start patient on Cipro otic drops and monitor  9/7 Cipro Cipro otic. Patient continues to complain of severe ear pain. Unfortunately hospital does not carry any otic numbing agent for patient's ear pain. Patient's family asked to bring in medication from outpatient pharmacy and will provide to patient. This was discussed with pharmacy. Diet: ADULT DIET Regular; 4 carb choices (60 gm/meal); Low Fat/Low Chol/High Fiber/BERNIE; Low Potassium (Less than 3000 mg/day);  Low Phosphorus (Less than 1000 mg)  VTE ppx: heparin 7500 K q8h   Code status: Full Code        Active Hospital Problems    Diagnosis Date Noted    Acute hypoxemic respiratory failure due to COVID-19 [facility]) 09/02/2021    COVID-19 in immunocompromised patient ([facility] 09/02/2021    Cigarette nicotine dependence in remission 09/02/2021    COVID-19 vaccination not done 09/02/2021    ESRD on peritoneal dialysis ([facility] 08/28/2021    Acute hypokalemia 08/28/2021    Coronary artery disease with stable angina pectoris ([facility] 02/03/2020    Class 3 severe obesity with serious comorbidity and body mass index (BMI) of 40.0 to 44.9 in adult [facility]) 05/02/2018    Type 2 diabetes with nephropathy ([facility] 05/02/2018    PVD (peripheral vascular disease) ([facility] 08/18/2016    Acquired hypothyroidism     Mixed hyperlipidemia        Objective:     Patient Vitals for the past 24 hrs:   Temp Pulse Resp BP SpO2   09/07/21 1110 97.7 °F (36.5 °C) 72   93 %   09/07/21 0853     92 %   09/07/21 0722 97.3 °F (36.3 °C) 78 20 (!) 145/72 90 %   09/07/21 0323 97.5 °F (36.4 °C) 69 22 (!) 122/56 92 %   09/06/21 2251 97.9 °F (36.6 °C) 85 18 (!) 132/51 94 %   09/06/21 2032     90 %   09/06/21 1927 97.6 °F (36.4 °C) 66 24 (!) 129/57 94 %   09/06/21 1524     96 %   09/06/21 1511 97.7 °F (36.5 °C) 64 20 (!) 120/56 92 %     Oxygen Therapy  O2 Sat (%): 93 % (09/07/21 1110)  Pulse via Oximetry: 72 beats per minute (09/07/21 0853)  O2 Device: Heated; Hi flow nasal cannula (09/07/21 0853)  Skin Assessment: Clean, dry, & intact (09/07/21 1161)  Skin Protection for O2 Device: No (09/07/21 0722)  O2 Flow Rate (L/min): 60 l/min (09/07/21 0853)  O2 Temperature: 87.8 °F (31 °C) (09/07/21 0722)  FIO2 (%): 100 % (09/07/21 0853)    Estimated body mass index is 39.85 kg/m² as calculated from the following:    Height as of this encounter: 5' 8\" (1.727 m). Weight as of this encounter: 118.9 kg (262 lb 1.6 oz). Intake/Output Summary (Last 24 hours) at 9/7/2021 1131  Last data filed at 9/7/2021 1054  Gross per 24 hour   Intake 480 ml   Output 400 ml   Net 80 ml         Physical Exam:     General:    Well nourished. No overt distress  Head:  Normocephalic, atraumatic  Eyes:  Sclerae appear normal.  Pupils equally round. ENT:  Nares appear normal, no drainage. Moist oral mucosa. Left ear tympanic membrane intact and erythematous with fluid collection  Neck:  No restricted ROM. Trachea midline   CV:   RRR. No m/r/g. No jugular venous distension. Lungs:   Crackles bilaterally. no wheezing, rhonchi, or rales. Respirations even, unlabored  Abdomen: Bowel sounds present. Soft, nontender, nondistended. Extremities: No cyanosis or clubbing. No edema  Skin:     No rashes and normal coloration. Warm and dry. Neuro:  Cranial nerves II-XII grossly intact. Sensation intact  Psych:  Normal mood and affect.   Alert and oriented x3    I have reviewed ordered lab tests and independently visualized imaging below:    Last 24hr Labs:  Recent Results (from the past 24 hour(s))   GLUCOSE, POC Collection Time: 09/06/21 12:11 PM   Result Value Ref Range    Glucose (POC) 242 (H) 65 - 100 mg/dL    Performed by Ashutosh    GLUCOSE, POC    Collection Time: 09/06/21  4:03 PM   Result Value Ref Range    Glucose (POC) 263 (H) 65 - 100 mg/dL    Performed by Ashutosh    GLUCOSE, POC    Collection Time: 09/06/21  8:45 PM   Result Value Ref Range    Glucose (POC) 299 (H) 65 - 100 mg/dL    Performed by Pedrito    GLUCOSE, POC    Collection Time: 09/07/21  7:36 AM   Result Value Ref Range    Glucose (POC) 182 (H) 65 - 100 mg/dL    Performed by Yasmani    GLUCOSE, POC    Collection Time: 09/07/21 11:07 AM   Result Value Ref Range    Glucose (POC) 194 (H) 65 - 100 mg/dL    Performed by Yasmani        All Micro Results     Procedure Component Value Units Date/Time    EBENEZER Howell Toni UR/CSF [[id number]] Collected: 09/02/21 1425    Order Status: Canceled     EBENEZER Howell Toni UR/CSF [[id number]] Collected: 09/02/21 1421    Order Status: Canceled     MSSA/MRSA SC BY PCR, NASAL SWAB [[id number]] Collected: 09/02/21 1054    Order Status: Completed Specimen: Nasal swab Updated: 09/02/21 1253     Special Requests: NO SPECIAL REQUESTS        Culture result:       SA target not detected. A MRSA NEGATIVE, SA NEGATIVE test result does not preclude MRSA or SA nasal colonization. EBENEZER Howell Toni UR/CSF [[id number]] Collected: 09/02/21 8989    Order Status: Canceled           Other Studies:  No results found.     Current Meds:  Current Facility-Administered Medications   Medication Dose Route Frequency    insulin glargine (LANTUS) injection 40 Units  40 Units SubCUTAneous BID    ciprofloxacin (CETRAXAL) 0.2 % otic solution 0.25 mL  0.25 mL Left Ear Q12H    dexAMETHasone (DECADRON) tablet 4 mg  4 mg Oral Q12H    azithromycin (ZITHROMAX) tablet 500 mg  500 mg Oral DAILY    sodium chloride (NS) flush 5-10 mL  5-10 mL IntraVENous Q8H    sodium chloride (NS) flush 5-10 mL 5-10 mL IntraVENous PRN    ergocalciferol capsule 50,000 Units  50,000 Units Oral Q7D    cetirizine (ZYRTEC) tablet 10 mg  10 mg Oral DAILY    fluticasone propionate (FLONASE) 50 mcg/actuation nasal spray 2 Spray  2 Spray Both Nostrils DAILY    amLODIPine (NORVASC) tablet 5 mg  5 mg Oral DAILY    albuterol (PROVENTIL HFA, VENTOLIN HFA, PROAIR HFA) inhaler 2 Puff  2 Puff Inhalation QID RT    budesonide-formoteroL (SYMBICORT) 160-4.5 mcg/actuation HFA inhaler 2 Puff  2 Puff Inhalation BID RT    sodium chloride (NS) flush 5-40 mL  5-40 mL IntraVENous Q8H    sodium chloride (NS) flush 5-40 mL  5-40 mL IntraVENous PRN    acetaminophen (TYLENOL) tablet 650 mg  650 mg Oral Q6H PRN    Or    acetaminophen (TYLENOL) suppository 650 mg  650 mg Rectal Q6H PRN    polyethylene glycol (MIRALAX) packet 17 g  17 g Oral DAILY PRN    ondansetron (ZOFRAN ODT) tablet 4 mg  4 mg Oral Q8H PRN    Or    ondansetron (ZOFRAN) injection 4 mg  4 mg IntraVENous Q6H PRN    alum-mag hydroxide-simeth (MYLANTA) oral suspension 15 mL  15 mL Oral Q6H PRN    heparin (porcine) injection 7,500 Units  7,500 Units SubCUTAneous Q8H    guaiFENesin-dextromethorphan (ROBITUSSIN DM) 100-10 mg/5 mL syrup 5 mL  5 mL Oral Q4H PRN    cholecalciferol (VITAMIN D3) tablet 6,000 Units  6,000 Units Oral DAILY    Followed by   Raine Ott ON 9/9/2021] cholecalciferol (VITAMIN D3) (1000 Units /25 mcg) tablet 2,000 Units  2,000 Units Oral DAILY    clopidogreL (PLAVIX) tablet 75 mg  75 mg Oral DAILY    escitalopram oxalate (LEXAPRO) tablet 20 mg  20 mg Oral BID    nitroglycerin (NITROSTAT) tablet 0.4 mg  0.4 mg SubLINGual Q5MIN PRN    ranolazine ER (RANEXA) tablet 500 mg  500 mg Oral Q12H    isosorbide mononitrate ER (IMDUR) tablet 120 mg  120 mg Oral DAILY    [Held by provider] hydrALAZINE (APRESOLINE) tablet 25 mg  25 mg Oral TID    furosemide (LASIX) tablet 60 mg  60 mg Oral BID    aspirin delayed-release tablet 81 mg  81 mg Oral 7am    traZODone (DESYREL) tablet 100 mg  100 mg Oral QHS    LORazepam (ATIVAN) tablet 0.5 mg  0.5 mg Oral Q4H PRN    insulin lispro (HUMALOG) injection   SubCUTAneous AC&HS    dextrose 40% (GLUTOSE) oral gel 1 Tube  15 g Oral PRN    glucagon (GLUCAGEN) injection 1 mg  1 mg IntraMUSCular PRN    dextrose (D50W) injection syrg 12.5-25 g  25-50 mL IntraVENous PRN    metoprolol succinate (TOPROL-XL) XL tablet 50 mg  50 mg Oral DAILY    levothyroxine (SYNTHROID) tablet 125 mcg  125 mcg Oral ACB    insulin lispro (HUMALOG) injection 24 Units  0.2 Units/kg SubCUTAneous TID WITH MEALS       Signed:  Kaylan Jurado DO    Part of this note may have been written by using a voice dictation software. The note has been proof read but may still contain some grammatical/other typographical errors.

## 2021-09-07 NOTE — PROGRESS NOTES
Spiritual Care Visit, initial visit. Visited with patient's daughter in [facility]. Daughter had come to the hospital to bring medicine for patient. She had thought that she would be able to visit  Patient briefly, but learned that she could not because patient is in a Covid 19 area. She was  Quite disappointed and upset.  was told of this event, and went to speak with the daughter. He was able to catch up with her just before she went out the door of the hospital.  talked and prayed with her, which she seemed to appreciate. Visit by Ana M Gaitan, Staff .  TAE.Edward., Th.B., B.A.

## 2021-09-07 NOTE — PROGRESS NOTES
Problem: Risk for Spread of Infection  Goal: Prevent transmission of infectious organism to others  Description: Prevent the transmission of infectious organisms to other patients, staff members, and visitors. Outcome: Progressing Towards Goal     Problem: Patient Education:  Go to Education Activity  Goal: Patient/Family Education  Outcome: Progressing Towards Goal     Problem: Airway Clearance - Ineffective  Goal: Achieve or maintain patent airway  Outcome: Progressing Towards Goal     Problem: Gas Exchange - Impaired  Goal: Absence of hypoxia  Outcome: Progressing Towards Goal  Goal: Promote optimal lung function  Outcome: Progressing Towards Goal     Problem: Breathing Pattern - Ineffective  Goal: Ability to achieve and maintain a regular respiratory rate  Outcome: Progressing Towards Goal     Problem:  Body Temperature -  Risk of, Imbalanced  Goal: Ability to maintain a body temperature within defined limits  Outcome: Progressing Towards Goal  Goal: Will regain or maintain usual level of consciousness  Outcome: Progressing Towards Goal  Goal: Complications related to the disease process, condition or treatment will be avoided or minimized  Outcome: Progressing Towards Goal     Problem: Isolation Precautions - Risk of Spread of Infection  Goal: Prevent transmission of infectious organism to others  Outcome: Progressing Towards Goal     Problem: Nutrition Deficits  Goal: Optimize nutrtional status  Outcome: Progressing Towards Goal     Problem: Risk for Fluid Volume Deficit  Goal: Maintain normal heart rhythm  Outcome: Progressing Towards Goal  Goal: Maintain absence of muscle cramping  Outcome: Progressing Towards Goal  Goal: Maintain normal serum potassium, sodium, calcium, phosphorus, and pH  Outcome: Progressing Towards Goal     Problem: Loneliness or Risk for Loneliness  Goal: Demonstrate positive use of time alone when socialization is not possible  Outcome: Progressing Towards Goal     Problem: Fatigue  Goal: Verbalize increase energy and improved vitality  Outcome: Progressing Towards Goal     Problem: Patient Education: Go to Patient Education Activity  Goal: Patient/Family Education  Outcome: Progressing Towards Goal     Problem: Pressure Injury - Risk of  Goal: *Prevention of pressure injury  Description: Document Vega Scale and appropriate interventions in the flowsheet. Outcome: Progressing Towards Goal  Note: Pressure Injury Interventions:       Moisture Interventions: Absorbent underpads, Apply protective barrier, creams and emollients, Check for incontinence Q2 hours and as needed, Limit adult briefs    Activity Interventions: Increase time out of bed, Pressure redistribution bed/mattress(bed type), PT/OT evaluation    Mobility Interventions: Float heels, HOB 30 degrees or less, Pressure redistribution bed/mattress (bed type), PT/OT evaluation    Nutrition Interventions: Document food/fluid/supplement intake    Friction and Shear Interventions: Minimize layers                Problem: Patient Education: Go to Patient Education Activity  Goal: Patient/Family Education  Outcome: Progressing Towards Goal     Problem: Falls - Risk of  Goal: *Absence of Falls  Description: Document Patricia Fall Risk and appropriate interventions in the flowsheet.   Outcome: Progressing Towards Goal     Problem: Patient Education: Go to Patient Education Activity  Goal: Patient/Family Education  Outcome: Progressing Towards Goal

## 2021-09-07 NOTE — PROGRESS NOTES
Problem: Risk for Spread of Infection  Goal: Prevent transmission of infectious organism to others  Description: Prevent the transmission of infectious organisms to other patients, staff members, and visitors. Outcome: Progressing Towards Goal     Problem: Patient Education:  Go to Education Activity  Goal: Patient/Family Education  Outcome: Progressing Towards Goal     Problem: Airway Clearance - Ineffective  Goal: Achieve or maintain patent airway  Outcome: Progressing Towards Goal     Problem: Gas Exchange - Impaired  Goal: Absence of hypoxia  Outcome: Progressing Towards Goal  Goal: Promote optimal lung function  Outcome: Progressing Towards Goal     Problem: Breathing Pattern - Ineffective  Goal: Ability to achieve and maintain a regular respiratory rate  Outcome: Progressing Towards Goal     Problem:  Body Temperature -  Risk of, Imbalanced  Goal: Ability to maintain a body temperature within defined limits  Outcome: Progressing Towards Goal  Goal: Will regain or maintain usual level of consciousness  Outcome: Progressing Towards Goal  Goal: Complications related to the disease process, condition or treatment will be avoided or minimized  Outcome: Progressing Towards Goal     Problem: Isolation Precautions - Risk of Spread of Infection  Goal: Prevent transmission of infectious organism to others  Outcome: Progressing Towards Goal     Problem: Nutrition Deficits  Goal: Optimize nutrtional status  Outcome: Progressing Towards Goal     Problem: Risk for Fluid Volume Deficit  Goal: Maintain normal heart rhythm  Outcome: Progressing Towards Goal  Goal: Maintain absence of muscle cramping  Outcome: Progressing Towards Goal  Goal: Maintain normal serum potassium, sodium, calcium, phosphorus, and pH  Outcome: Progressing Towards Goal     Problem: Loneliness or Risk for Loneliness  Goal: Demonstrate positive use of time alone when socialization is not possible  Outcome: Progressing Towards Goal     Problem: Fatigue  Goal: Verbalize increase energy and improved vitality  Outcome: Progressing Towards Goal     Problem: Patient Education: Go to Patient Education Activity  Goal: Patient/Family Education  Outcome: Progressing Towards Goal     Problem: Pressure Injury - Risk of  Goal: *Prevention of pressure injury  Description: Document Vega Scale and appropriate interventions in the flowsheet. Outcome: Progressing Towards Goal  Note: Pressure Injury Interventions:       Moisture Interventions: Moisture barrier    Activity Interventions: Pressure redistribution bed/mattress(bed type)    Mobility Interventions: Pressure redistribution bed/mattress (bed type)    Nutrition Interventions: Document food/fluid/supplement intake    Friction and Shear Interventions: Lift sheet                Problem: Patient Education: Go to Patient Education Activity  Goal: Patient/Family Education  Outcome: Progressing Towards Goal     Problem: Falls - Risk of  Goal: *Absence of Falls  Description: Document Patricia Fall Risk and appropriate interventions in the flowsheet.   Outcome: Progressing Towards Goal  Note: Fall Risk Interventions:  Mobility Interventions: Bed/chair exit alarm         Medication Interventions: Bed/chair exit alarm    Elimination Interventions: Bed/chair exit alarm, Call light in reach, Patient to call for help with toileting needs    History of Falls Interventions: Bed/chair exit alarm         Problem: Patient Education: Go to Patient Education Activity  Goal: Patient/Family Education  Outcome: Progressing Towards Goal

## 2021-09-07 NOTE — PROGRESS NOTES
MSN, CM:  Patient was admitted on 8/27/2021 for body aches, fever, cough and COVID positive. Nephrology consulted r/t patient is currently on PD. Patient was discharged on 8/29/2021 with new orders for Decadron and follow up in 1 week PCP. Patient was readmitted on 9/2/2021 for shortness of breath and hypoxia.

## 2021-09-08 PROBLEM — D72.829 LEUKOCYTOSIS: Status: ACTIVE | Noted: 2021-01-01

## 2021-09-08 NOTE — CONSULTS
Comprehensive Nutrition Assessment  This assessment was completed remotely. . Patient consent was obtained for remote assessment. Type and Reason for Visit: Initial, Consult  General nutrition management/ other reason PD (Hospitalist)    Nutrition Recommendations/Plan:    Change diet to [facility]. No need for renal diet restrictions at this time.  Add Nepro tid-Specify berry or butter pecan. Malnutrition Assessment:  Malnutrition Status: At risk for malnutrition (specify) (Decreased po intake for unkown time frame, COVID since ~8/23)  Nutrition Assessment:   Nutrition History: 9/8: Unable to obtain. Could only understand small amounts of what pt was communicating via mobile phone. Gathered maximilian could not say whether she had lost any weight anf that she started PD in January 2021. Nutrition Background: H/O:ESRD on PD ( started this year), DM, HTN, CAD s/p CABG and stents. Presented with increasing SOB diagnosed with covid x 10 days ago. Admitted with acute hypoxemic respiratory failure due to COVID-19  Daily Update:  Spoke to pt via mobile phone. Pt says she was eating ~50% of meals but now she is only eats 2-3 bites. Unable to understand pt as to when this change occurred. Pt has had high O2 needs at least since 9/4. Pt receptive to ONS  Nutrition Related Findings:   NFPE deferred d/t isolation and remote assessment. 9/8: SLP: \"functional oropharyngeal swallow, however remains at increased risk of dysphagia due to respiratory status and poor endurance. Patients sats drop to 85% with intake requiring rest breaks and intermittent placement of non re-breather to improve sats between bites/sips. \"      Current Nutrition Therapies:  ADULT DIET Regular; 4 carb choices (60 gm/meal); Low Fat/Low Chol/High Fiber/BERNIE; Low Potassium (Less than 3000 mg/day); Low Phosphorus (Less than 1000 mg)    Current Intake:   Average Meal Intake: 1-25% stated by pt.  Average recorded intake 25-50% for 8 recorded meals in past 6 days.   Average Supplement Intake: None ordered Additional Caloric Sources: CCPD 12.5L/d of 2.5% ~300-540 kcal absorbed per day    Anthropometric Measures:  Height: 5' 8\" (172.7 cm)  Current Body Wt: 118.9 kg (262 lb 2 oz) (9/4), Weight source: Not specified  BMI: 39.9, Obese class 2 (BMI 35.0-39. 9)  Admission Body Weight: 264 lb 15.9 oz (source not specified)  Ideal Body Weight (lbs) (Calculated): 140 lbs (64 kg),    Usual Body Wt: 115.7 kg (255 lb) (stated), Percent weight change: 2.8          Edema: No data recorded   Estimated Daily Nutrient Needs:  Energy (kcal/day): 6309-9293 (Kcal/kg (25-30), Weight Used: Ideal (63.6 kg))  Protein (g/day): 76-95 (1.2-1.5 grams/kg)-[facility] [place] [place] Weight Used: (Ideal (63.6 kg))  Fluid (ml/day):   (Other (comment) (per MD))    Nutrition Diagnosis:   · Inadequate oral intake related to impaired respiratory function as evidenced by  (current intake <25% of estimated need)    Nutrition Interventions:   Food and/or Nutrient Delivery: Modify current diet, Start oral nutrition supplement     Coordination of Nutrition Care: Continue to monitor while inpatient      Goals: Active Goal: Intake >75% of estimated needs by follow up    Nutrition Monitoring and Evaluation:      Food/Nutrient Intake Outcomes: Food and nutrient intake, Supplement intake       Discharge Planning:     Too soon to determine    Delaney Warren RD, LD, [facility]    Disaster Mode Active

## 2021-09-08 NOTE — PROGRESS NOTES
Hospitalist Progress Note   Admit Date:  2021  6:44 AM   LOS: 6 days   Name:  Domingo Villanueva   Age:  59 y.o. Sex:  female  :  1960   MRN:  [id number]     Chief Complaint: Shortness of Breath  Reason(s) for Admission: Acute hypoxemic respiratory failure due to COVID-19 ([facility] [address] [U07.1, J96.01]     Hospital Course & Interval History:   59 y. o. female with medical history of ESRD on PD, Class III Obesity, IDDM, CAD s/p CABG and stents who presented with increasing SOB diagnosed with covid x 10 days ago. Briefly hospitalized and discharged stable on RA on . Sudden onset SOB and coughing on 21. Presented to [facility] on  via EMS who and she was found to be hypoxic at 77-80%. She was placed on 10LNC and readmitted.     Subjective (21): She is asking for something for her ear pain. SOB improving. Maddie CP. Maddie F/C. Denies N/V/D. Review of systems negative except stated above. Assessment & Plan:     Principal Problem:    Acute respiratory failure with hypoxia ([facility] [address] (2021)  - Due to COVID  - Currently unstable on 60L/100% Airvo  - Continue Decadron  - Continue Lasix  - Continue aerosols  - Wean oxygen as appropriate    Active Problems:    COVID-19 in immunocompromised patient ([facility] [address] (2021)  - First symptoms , positive test   - Continue Decadron  - Not a candidate for Remdesivir due to >7 days and on PD  - Does not meet criteria for Actemra  - CRP 9.8      Acute hypokalemia (2021)  - Per Nephrology      Leukocytosis (2021)  - Likely due to steroids  - Worse today  - Check CBC daily      Type 2 diabetes with nephropathy ([facility] [address] (2018)  - Glucose well controlled  - Continue Lantus  - Continue Humalog      ESRD on peritoneal dialysis ([facility] [address] (2021)  - Per Nephrology      Class 3 severe obesity with serious comorbidity and body mass index (BMI) of 40.0 to 44.9 in adult [facility]) (2018)    Diet:  ADULT DIET Regular; 4 carb choices (60 gm/meal);  Low Fat/Low Chol/High Fiber/BERNIE; Low Potassium (Less than 3000 mg/day);  Low Phosphorus (Less than 1000 mg)  DVT PPx: Heparin  Code status: Full Code    Hospital Problems as of 9/8/2021 Date Reviewed: 9/2/2021        Codes Class Noted - Resolved POA    * (Principal) Acute respiratory failure with hypoxia ([facility] ICD-10-CM: J96.01  ICD-9-CM: 518.81  9/2/2021 - Present Yes        COVID-19 in immunocompromised patient [facility]) ICD-10-CM: U07.1, D84.9  ICD-9-CM: 079.89, 279.9  9/2/2021 - Present Yes        Leukocytosis ICD-10-CM: D72.829  ICD-9-CM: 288.60  9/8/2021 - Present Yes        Acute hypokalemia ICD-10-CM: E87.6  ICD-9-CM: 276.8  8/28/2021 - Present Yes        Type 2 diabetes with nephropathy ([facility] ICD-10-CM: E11.21  ICD-9-CM: 250.40, 583.81  5/2/2018 - Present Yes        ESRD on peritoneal dialysis [facility]) (Chronic) ICD-10-CM: N18.6, Z99.2  ICD-9-CM: 585.6, V45.11  8/28/2021 - Present Yes        Coronary artery disease with stable angina pectoris ([facility] ICD-10-CM: I25.118  ICD-9-CM: 414.00, 413.9  2/3/2020 - Present Yes        Cigarette nicotine dependence in remission ICD-10-CM: F17.211  ICD-9-CM: V15.82  9/2/2021 - Present Yes        COVID-19 vaccination not done ICD-10-CM: Z28.9  ICD-9-CM: V64.00  9/2/2021 - Present Yes        Class 3 severe obesity with serious comorbidity and body mass index (BMI) of 40.0 to 44.9 in adult [facility]) ICD-10-CM: E66.01, Z68.41  ICD-9-CM: 278.01, V85.41  5/2/2018 - Present Yes        Mixed hyperlipidemia ICD-10-CM: E78.2  ICD-9-CM: 272.2  Unknown - Present Yes        Acquired hypothyroidism ICD-10-CM: E03.9  ICD-9-CM: 244.9  Unknown - Present Yes        PVD (peripheral vascular disease) ([facility] ICD-10-CM: I73.9  ICD-9-CM: 443.9  8/18/2016 - Present               Objective:     Patient Vitals for the past 24 hrs:   Temp Pulse Resp BP SpO2   09/08/21 1140     90 %   09/08/21 1125 98 °F (36.7 °C) 72 20 95/65 91 %   09/08/21 0941     90 %   09/08/21 0830 97.6 °F (36.4 °C) 78 20 115/80 91 % 09/08/21 0242 97.6 °F (36.4 °C) 76 17 (!) 104/57 93 %   09/07/21 2321 97.4 °F (36.3 °C) 71 18 (!) 93/59 93 %   09/07/21 2211     92 %   09/07/21 2012     92 %   09/07/21 2007 97.5 °F (36.4 °C) 78 18 116/70 91 %   09/1960 97.7 °F (36.5 °C) 72 20 (!) 145/72      Oxygen Therapy  O2 Sat (%): 90 % (09/08/21 1140)  Pulse via Oximetry: 67 beats per minute (09/08/21 1140)  O2 Device: Hi flow nasal cannula; Heated (nrb) (09/08/21 1140)  Skin Assessment: Clean, dry, & intact (09/08/21 0830)  Skin Protection for O2 Device: No (09/08/21 0830)  O2 Flow Rate (L/min): 60 l/min (09/08/21 1140)  O2 Temperature: 87.8 °F (31 °C) (09/08/21 0941)  FIO2 (%): 100 % (09/08/21 1140)    Estimated body mass index is 39.85 kg/m² as calculated from the following:    Height as of this encounter: 5' 8\" (1.727 m). Weight as of this encounter: 118.9 kg (262 lb 1.6 oz). Intake/Output Summary (Last 24 hours) at 9/8/2021 1301  Last data filed at 9/8/2021 0926  Gross per 24 hour   Intake 0 ml   Output 464 ml   Net -464 ml         Physical Exam:   General:    Well nourished. No overt distress  Head:  Normocephalic, atraumatic  Eyes:  Sclerae appear normal.  Pupils equally round. ENT:  Nares appear normal, no drainage. Moist oral mucosa  Neck:  No restricted ROM. Trachea midline   CV:   RRR. No m/r/g. No jugular venous distension. Lungs:   Scattered rhonchi bilaterally. No wheezing. Respirations even, unlabored  Abdomen: Bowel sounds present. Soft, nontender, nondistended. Extremities: No cyanosis or clubbing. No edema  Skin:     No rashes and normal coloration. Warm and dry. Neuro: Cranial nerves II-XII grossly intact. Sensation intact  Psych: Normal mood and affect.   Alert and oriented x3    I have reviewed ordered lab tests and independently visualized imaging below:    Last 24hr Labs:  Recent Results (from the past 24 hour(s))   GLUCOSE, POC    Collection Time: 09/07/21  4:07 PM   Result Value Ref Range Glucose (POC) 192 (H) 65 - 100 mg/dL    Performed by Yasmani    GLUCOSE, POC    Collection Time: 09/07/21  9:19 PM   Result Value Ref Range    Glucose (POC) 190 (H) 65 - 100 mg/dL    Performed by Lulu    CBC W/O DIFF    Collection Time: 09/08/21  6:59 AM   Result Value Ref Range    WBC 17.7 (H) 4.3 - 11.1 K/uL    RBC 4.74 4.05 - 5.2 M/uL    HGB 14.4 11.7 - 15.4 g/dL    HCT 42.7 35.8 - 46.3 %    MCV 90.1 79.6 - 97.8 FL    MCH 30.4 26.1 - 32.9 PG    MCHC 33.7 31.4 - 35.0 g/dL    RDW 12.4 11.9 - 14.6 %    PLATELET 662 460 - 654 K/uL    MPV 11.1 9.4 - 12.3 FL    ABSOLUTE NRBC 0.00 0.0 - 0.2 K/uL   METABOLIC PANEL, BASIC    Collection Time: 09/08/21  6:59 AM   Result Value Ref Range    Sodium 138 136 - 145 mmol/L    Potassium 4.5 3.5 - 5.1 mmol/L    Chloride 101 98 - 107 mmol/L    CO2 21 21 - 32 mmol/L    Anion gap 16 7 - 16 mmol/L    Glucose 125 (H) 65 - 100 mg/dL     (H) 8 - 23 MG/DL    Creatinine 8.19 (H) 0.6 - 1.0 MG/DL    GFR est AA 6 (L) >60 ml/min/1.73m2    GFR est non-AA 5 (L) >60 ml/min/1.73m2    Calcium 8.4 8.3 - 10.4 MG/DL   GLUCOSE, POC    Collection Time: 09/08/21  7:11 AM   Result Value Ref Range    Glucose (POC) 115 (H) 65 - 100 mg/dL    Performed by Ashutosh    GLUCOSE, POC    Collection Time: 09/08/21 12:04 PM   Result Value Ref Range    Glucose (POC) 165 (H) 65 - 100 mg/dL    Performed by Ashutosh        All Micro Results     Procedure Component Value Units Date/Time    EBENEZER Dunaway, UR/CSF [[id number]] Collected: 09/02/21 1425    Order Status: Canceled     EBENEZER Dunaway, UR/CSF [[id number]] Collected: 09/02/21 1421    Order Status: Canceled     MSSA/MRSA SC BY PCR, NASAL SWAB [[id number]] Collected: 09/02/21 1054    Order Status: Completed Specimen: Nasal swab Updated: 09/02/21 1253     Special Requests: NO SPECIAL REQUESTS        Culture result:       SA target not detected.                                  A MRSA NEGATIVE, SA NEGATIVE test result does not preclude MRSA or SA nasal colonization. EBENEZER Deng, UR/CSF [[id number]] Collected: 09/02/21 7469    Order Status: Canceled           SARS-CoV-2 Lab Results  \"Novel Coronavirus\" Test: No results found for: COV2NT   \"Emergent Disease\" Test: No results found for: EDPR  \"SARS-COV-2\" Test: No results found for: XGCOVT  \"Precision Labs\" Test: No results found for: RSLT  Rapid Test:   Lab Results   Component Value Date/Time    COVR Detected (AA) 08/28/2021 01:08 AM          Imaging:  No results found. No results found for this visit on 09/02/21.     Current Meds:  Current Facility-Administered Medications   Medication Dose Route Frequency    insulin glargine (LANTUS) injection 40 Units  40 Units SubCUTAneous BID    ciprofloxacin (CETRAXAL) 0.2 % otic solution 0.25 mL  0.25 mL Left Ear Q12H    dexAMETHasone (DECADRON) tablet 4 mg  4 mg Oral Q12H    azithromycin (ZITHROMAX) tablet 500 mg  500 mg Oral DAILY    sodium chloride (NS) flush 5-10 mL  5-10 mL IntraVENous Q8H    sodium chloride (NS) flush 5-10 mL  5-10 mL IntraVENous PRN    ergocalciferol capsule 50,000 Units  50,000 Units Oral Q7D    cetirizine (ZYRTEC) tablet 10 mg  10 mg Oral DAILY    fluticasone propionate (FLONASE) 50 mcg/actuation nasal spray 2 Spray  2 Spray Both Nostrils DAILY    amLODIPine (NORVASC) tablet 5 mg  5 mg Oral DAILY    albuterol (PROVENTIL HFA, VENTOLIN HFA, PROAIR HFA) inhaler 2 Puff  2 Puff Inhalation QID RT    budesonide-formoteroL (SYMBICORT) 160-4.5 mcg/actuation HFA inhaler 2 Puff  2 Puff Inhalation BID RT    sodium chloride (NS) flush 5-40 mL  5-40 mL IntraVENous Q8H    sodium chloride (NS) flush 5-40 mL  5-40 mL IntraVENous PRN    acetaminophen (TYLENOL) tablet 650 mg  650 mg Oral Q6H PRN    Or    acetaminophen (TYLENOL) suppository 650 mg  650 mg Rectal Q6H PRN    polyethylene glycol (MIRALAX) packet 17 g  17 g Oral DAILY PRN    ondansetron (ZOFRAN ODT) tablet 4 mg  4 mg Oral Q8H PRN    Or    ondansetron (ZOFRAN) injection 4 mg  4 mg IntraVENous Q6H PRN    alum-mag hydroxide-simeth (MYLANTA) oral suspension 15 mL  15 mL Oral Q6H PRN    heparin (porcine) injection 7,500 Units  7,500 Units SubCUTAneous Q8H    guaiFENesin-dextromethorphan (ROBITUSSIN DM) 100-10 mg/5 mL syrup 5 mL  5 mL Oral Q4H PRN    [START ON 9/9/2021] cholecalciferol (VITAMIN D3) (1000 Units /25 mcg) tablet 2,000 Units  2,000 Units Oral DAILY    clopidogreL (PLAVIX) tablet 75 mg  75 mg Oral DAILY    escitalopram oxalate (LEXAPRO) tablet 20 mg  20 mg Oral BID    nitroglycerin (NITROSTAT) tablet 0.4 mg  0.4 mg SubLINGual Q5MIN PRN    ranolazine ER (RANEXA) tablet 500 mg  500 mg Oral Q12H    isosorbide mononitrate ER (IMDUR) tablet 120 mg  120 mg Oral DAILY    [Held by provider] hydrALAZINE (APRESOLINE) tablet 25 mg  25 mg Oral TID    furosemide (LASIX) tablet 60 mg  60 mg Oral BID    aspirin delayed-release tablet 81 mg  81 mg Oral 7am    traZODone (DESYREL) tablet 100 mg  100 mg Oral QHS    LORazepam (ATIVAN) tablet 0.5 mg  0.5 mg Oral Q4H PRN    insulin lispro (HUMALOG) injection   SubCUTAneous AC&HS    dextrose 40% (GLUTOSE) oral gel 1 Tube  15 g Oral PRN    glucagon (GLUCAGEN) injection 1 mg  1 mg IntraMUSCular PRN    dextrose (D50W) injection syrg 12.5-25 g  25-50 mL IntraVENous PRN    metoprolol succinate (TOPROL-XL) XL tablet 50 mg  50 mg Oral DAILY    levothyroxine (SYNTHROID) tablet 125 mcg  125 mcg Oral ACB    insulin lispro (HUMALOG) injection 24 Units  0.2 Units/kg SubCUTAneous TID WITH MEALS       Discharge Plan:     TBD    Signed By: William Durant DO     September 8, 2021

## 2021-09-08 NOTE — PROGRESS NOTES
Respirations even and unlabored. No s/sx distress. No needs at present. Oxygen 60 l/m 100 % with NRB alternated while attempting to eat dinner. Oxygen saturation 90-91%.

## 2021-09-08 NOTE — PROGRESS NOTES
Admit Date: 9/2/2021    Follow up ESRD  Subjective:   Patient seen and examined on rounds, pt SOB remains on 100 % high [place] O2, no issues with PD overnight,  cc. + constipation       Review of Systems  Cardio-vascular: no chest pain, + SOB, + cough  GI: no N/V/D  : no dysuria, no hematuria  Ext: no edema    Objective:     Patient Vitals for the past 8 hrs:   BP Temp Pulse Resp SpO2   09/08/21 0830 115/80 97.6 °F (36.4 °C) 78 20 91 %   09/08/21 0242 (!) 104/57 97.6 °F (36.4 °C) 76 17 93 %     No intake/output data recorded.       Physical Exam:   General: alert and oriented  On O2 supplement  NARD  Lung: diffuse bronchi  CV: RR, no rub  Abd: soft, not tender  Ext: trace edema   PD exit site: intact, some erythema, no exudate           Data Review   Recent Results (from the past 8 hour(s))   CBC W/O DIFF    Collection Time: 09/08/21  6:59 AM   Result Value Ref Range    WBC 17.7 (H) 4.3 - 11.1 K/uL    RBC 4.74 4.05 - 5.2 M/uL    HGB 14.4 11.7 - 15.4 g/dL    HCT 42.7 35.8 - 46.3 %    MCV 90.1 79.6 - 97.8 FL    MCH 30.4 26.1 - 32.9 PG    MCHC 33.7 31.4 - 35.0 g/dL    RDW 12.4 11.9 - 14.6 %    PLATELET 949 253 - 150 K/uL    MPV 11.1 9.4 - 12.3 FL    ABSOLUTE NRBC 0.00 0.0 - 0.2 K/uL   METABOLIC PANEL, BASIC    Collection Time: 09/08/21  6:59 AM   Result Value Ref Range    Sodium 138 136 - 145 mmol/L    Potassium 4.5 3.5 - 5.1 mmol/L    Chloride 101 98 - 107 mmol/L    CO2 21 21 - 32 mmol/L    Anion gap 16 7 - 16 mmol/L    Glucose 125 (H) 65 - 100 mg/dL     (H) 8 - 23 MG/DL    Creatinine 8.19 (H) 0.6 - 1.0 MG/DL    GFR est AA 6 (L) >60 ml/min/1.73m2    GFR est non-AA 5 (L) >60 ml/min/1.73m2    Calcium 8.4 8.3 - 10.4 MG/DL   GLUCOSE, POC    Collection Time: 09/08/21  7:11 AM   Result Value Ref Range    Glucose (POC) 115 (H) 65 - 100 mg/dL    Performed by Ashutosh            Assessment:     Principal Problem:    Acute hypoxemic respiratory failure due to COVID-19 [facility]) (9/2/2021)    Active Problems: Mixed hyperlipidemia ()      Acquired hypothyroidism ()      PVD (peripheral vascular disease) ([facility] [address] (8/18/2016)      Type 2 diabetes with nephropathy ([facility] [address] (5/2/2018)      Class 3 severe obesity with serious comorbidity and body mass index (BMI) of 40.0 to 44.9 in adult [facility]) (5/2/2018)      Coronary artery disease with stable angina pectoris ([facility] [address] (2/3/2020)      ESRD on peritoneal dialysis ([facility] [address] (8/28/2021)      Acute hypokalemia (8/28/2021)      COVID-19 in immunocompromised patient ([facility] [address] (9/2/2021)      Cigarette nicotine dependence in remission (9/2/2021)      COVID-19 vaccination not done (9/2/2021)        Plan:     1. ESRD on PD all 2.5% PD fluid  PD tonight   Added bowel regimen    2.  COVID 19+/acute resp failure with hypoxemia      Maureen Marie NP

## 2021-09-08 NOTE — PROGRESS NOTES
LTG: Patient will tolerate least restrictive diet without overt signs or symptoms of airway compromise. STG: Patient will tolerate regular diet and thin liquids without overt signs or symptoms of airway compromise. SPEECH LANGUAGE PATHOLOGY: DYSPHAGIA- Initial Assessment    NAME/AGE/GENDER: Freeman Hoffman is a 59 y.o. female  DATE: 9/8/2021  PRIMARY DIAGNOSIS: Acute hypoxemic respiratory failure due to COVID-19 ([facility] [U07.1, J96.01]      ICD-10: Treatment Diagnosis: R13.12 Dysphagia, Oropharyngeal Phase    RECOMMENDATIONS   DIET:    Regular Consistency   Thin Liquids    MEDICATIONS: With liquid     PRECAUTIONS, MODIFICATIONS, AND STRATEGIES  · Slow rate of intake  · Small bites/sips  · Rest breaks as needed to maintain sats  · Smaller more frequent meals  · Oral care 3x daily      RECOMMENDATIONS FOR CONTINUED SPEECH THERAPY:   YES: Anticipate need for ongoing speech therapy during this hospitalization. ASSESSMENT   Patient presents with functional oropharyngeal swallow, however remains at increased risk of dysphagia due to respiratory status and poor endurance. Patients sats drop to 85% with intake requiring rest breaks and intermittent placement of non re-breather to improve sats between bites/sips. Recommend continue current diet: regular/thin liquids. Patient educated on strategies above. Patient agreeable to supplements since likely difficult for patient to meet nutritionally needs orally with current respiratory status and decreased endurance. Consider registered dietician consult. EDUCATION:  · Recommendations discussed with Patient and RN    REHABILITATION POTENTIAL FOR STATED GOALS: Excellent    PLAN    FREQUENCY/DURATION:   Continue to follow patient 2 times a week for duration of hospital stay to address above goals.  Recommendations for next treatment session: Next treatment session will address diet tolerance    CONTINUATION OF SKILLED SERVICES/MEDICAL NECESSITY:   Patient is expected to demonstrate progress in  diet tolerance and swallow safety in order to  improve swallow safety and decrease aspiration risk.  Patient continues to require skilled intervention due to respiratory status. SUBJECTIVE   Awake. Alert and pleasant. Reports not getting enough protein (typically takes a medication for this at home?) RN notified. Oxygen Device: airvo 60L 100% at rest. Intermittently needed NRB to maintain sats   Pain: Pain Scale 1: Numeric (0 - 10)  Pain Intensity 1: 0    History of Present Injury/Illness: Ms. 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 She also has no past medical history of Adverse effect of anesthesia, Difficult intubation, Malignant hyperthermia due to anesthesia, Nausea & vomiting, or Pseudocholinesterase deficiency. . She also  has a past surgical history that includes hx breast biopsy (Left, 02/20/2017); pr cardiac surg procedure unlist (2006); hx heart catheterization (11/29/2019); hx heart catheterization (10/07/2019); hx heart catheterization (08/12/2019); ir insert tunl cvc w/o port over 5 yr (10/12/2020); ir plc cath av shunt int w si lt (10/21/2020); hx coronary artery bypass graft (2006); vascular surgery procedure unlist (08/10/2016); vascular surgery procedure unlist (Left, 08/26/2016); vascular surgery procedure unlist (Left, 03/12/2018); vascular surgery procedure unlist (Right, 08/01/2019); vascular surgery procedure unlist (Left, 10/21/2020); and ir remove tunl cvad w/o port / pump (1/19/2021). PRECAUTIONS/ALLERGIES: Patient has no known allergies. Problem List:  (Impairments causing functional limitations):  1. Acute respiratory failure    Previous Dysphagia: no complaints of dysphagia. Consulted due to high oxygen demands   Diet Prior to Evaluation: regular/thin    Orientation:  Person  Place  Time  Situation    Cognitive-Linguistic Screening:   Alertness  o Alert   Speech Production: Fully intelligible -min hoarseness    Expressive Language:Fluent speech and Able to effectively communicate wants and needs   Receptive Language: Answers yes/no questions appropriately and Follows commands   Cognition: denies recent changes  Prior Level of Function: independent   Recommendations: Given results of screening, patient appears to be functioning at baseline. No acute assessment of speech, language, or cognition warranted. OBJECTIVE   Oral Motor:   · Labial: No impairment  · Dentition: Intact  · Oral Hygiene: Adequate  · Lingual: No impairment    Dysphagia evaluation:   Patient presented with thin liquid via cup and straw, puree, mixed, and regular solids. Appropriate oral prep with all textures. Timely swallow initiation, and single swallows upon palpation. Adequate oral clearing. No overt signs or symptoms of airway compromise observed with liquid or solid textures. Vocal quality clear. Oxygen drops with intake to 85%(at worst), however patient taking breaks, focusing on breathing, and intermittently placing non rebreather as needed to increase to 90%. Educated on compensatory strategies, aspiration precautions. Also discussed diet consistencies should patient require downgrade at some point if regular textures become too difficult to manage with current respiratory status. Patient verbalized understanding.          Tool Used: Dysphagia Outcome and Severity Scale (BERTA)    Score Comments   Normal Diet  [] 7 With no strategies or extra time needed   Functional Swallow  [] 6 May have mild oral or pharyngeal delay   Mild Dysphagia  [] 5 Which may require one diet consistency restricted    Mild-Moderate Dysphagia  [] 4 With 1-2 diet consistencies restricted   Moderate Dysphagia  [] 3 With 2 or more diet consistencies restricted   Moderate-Severe Dysphagia  [] 2 With partial PO strategies (trials with ST only)   Severe Dysphagia  [] 1 With inability to tolerate any PO safely      Score:  Initial: 6 Most Recent: x (Date 09/08/21 )   Interpretation of Tool: The Dysphagia Outcome and Severity Scale (BERTA) is a simple, easy-to-use, 7-point scale developed to systematically rate the functional severity of dysphagia based on objective assessment and make recommendations for diet level, independence level, and type of nutrition. Current Medications:   No current facility-administered medications on file prior to encounter. Current Outpatient Medications on File Prior to Encounter   Medication Sig Dispense Refill    dexAMETHasone (DECADRON) 6 mg tablet Take 1 Tablet by mouth Daily (before breakfast). 7 Tablet 0    levothyroxine (SYNTHROID) 25 mcg tablet TAKE 1 TAB BY MOUTH DAILY (BEFORE BREAKFAST) FOR THYROID. 90 Tablet 3    escitalopram oxalate (Lexapro) 20 mg tablet Take 1 Tablet by mouth two (2) times a day. 180 Tablet 3    evolocumab (REPATHA SURECLICK) pen injection 1 mL by SubCUTAneous route every fourteen (14) days. 4 Pen 4    hydrALAZINE (APRESOLINE) 25 mg tablet Take 1 Tablet by mouth three (3) times daily. 90 Tablet 3    furosemide (LASIX) 40 mg tablet Take 1.5 Tablets by mouth two (2) times a day. 45 Tablet 11    amLODIPine (NORVASC) 5 mg tablet Take 1 Tablet by mouth daily. 90 Tablet 3    clopidogreL (Plavix) 75 mg tab Take 1 Tablet by mouth daily. 90 Tablet 3    ranolazine ER (RANEXA) 500 mg SR tablet Take 1 Tab by mouth every twelve (12) hours.  180 Tab 3    isosorbide mononitrate ER (IMDUR) 120 mg CR tablet Take 1 Tab by mouth daily. 90 Tab 3    levothyroxine (SYNTHROID) 200 mcg tablet TAKE 1 TABLET BY MOUTH DAILY BEFORE BREAKFAST FOR THYROID (Patient taking differently: 125 mcg. TAKE 1 TABLET BY MOUTH DAILY BEFORE BREAKFAST FOR THYROID) 90 Tab 3    Tresiba FlexTouch U-100 100 unit/mL (3 mL) inpn INJECT 70 UNITS UNDER THE SKIN DAILY FOR 90 DAYS. (Patient taking differently: 100 Units.) 63 mL 3    metoprolol succinate (TOPROL-XL) 50 mg XL tablet Take 1 Tab by mouth daily. 90 Tab 3    acetaminophen (TYLENOL) 325 mg tablet Take 2 Tabs by mouth every six (6) hours as needed for Pain.  aspirin delayed-release 81 mg tablet Take 81 mg by mouth every morning.  Blood-Glucose Transmitter (Dexcom G6 Transmitter) jazmyn CGM 1 Device 3    Blood-Glucose Meter,Continuous (Dexcom G6 ) misc As dir 1 Each 11    Blood-Glucose Meter,Continuous (Dexcom G6 ) misc 1 Units by Does Not Apply route two (2) times a day. 3 Each 11    Blood-Glucose Sensor (Dexcom G6 Sensor) jazmyn Change sensor every 10 days 3 Device 11    metOLazone (ZAROXOLYN) 5 mg tablet Take 1 Tab by mouth daily as needed for Other (weight gain). (Patient not taking: Reported on 9/2/2021) 20 Tab 3    nitroglycerin (NITROSTAT) 0.4 mg SL tablet 1 Tab by SubLINGual route every five (5) minutes as needed for Chest Pain. Up to 3 doses.  1 Bottle 4        INTERDISCIPLINARY COLLABORATION: RN    After treatment position/precautions:  · Upright in bed  · Call light within reach  · RN notified    Total Treatment Duration:   Time In: 4635  Time Out: [address] [facility], [facility] MEDICO DEL [facility], [facility] [place], [address] [facility]

## 2021-09-08 NOTE — PROGRESS NOTES
PT Note:  PT orders received and chart review initiated. Attempted evaluation, however, patient on PD. Will attempt another time/day as schedule permits.   A Charles Rose, PT, DPT

## 2021-09-08 NOTE — PROGRESS NOTES
Assisting patient to bedside commode with heated high flow oxygen 60 l/m 100 % with oxygen saturation 91-93%. While sitting on bedside commode, the patient developed severe dyspnea. NRB in place. Continuous pulse oximetry 69-71 %. Instructed on purse lip breathing while this staff member providing personal hygiene. NRB mask broke in half. Patient becoming dizzy with facial color becoming dusky. Difficult to place patient in bed. Extra NRB mask found in room and placed on patient. Oxygen saturation remains 71-73%. Rapid response called. Respiratory arrived to bedside for oxygen support. NRB flow adjusted with oxygen saturation 93-96% while patient in bed with head of bed elevated 90. Patient skin color less dusky. Patient alert with less dizziness. Dr. Elsi Bender arrived. No further distress or complaints noted, patient talking. Rapid response canceled.

## 2021-09-08 NOTE — DIALYSIS
Disconnected PD cycler from patient. Betadine cap intact. Patient tolerated well. UF amount -434mls.  Effluent: clear, yellow

## 2021-09-09 NOTE — PROGRESS NOTES
SPEECH PATHOLOGY NOTE:    Patient not appropriate for po intake due to worsening respiratory status. RN reports breakfast tray held this am.   Recommend continuing to hold po intake except critical meds until respiratory status improves and SLP can re evaluate. Discussed with RN.       Sumanth Holt, INST MEDICO DEL [facility], [facility] [place], CCC-SLP

## 2021-09-09 NOTE — PROGRESS NOTES
Problem: Risk for Spread of Infection  Goal: Prevent transmission of infectious organism to others  Description: Prevent the transmission of infectious organisms to other patients, staff members, and visitors. Outcome: Progressing Towards Goal     Problem: Patient Education:  Go to Education Activity  Goal: Patient/Family Education  Outcome: Progressing Towards Goal     Problem: Airway Clearance - Ineffective  Goal: Achieve or maintain patent airway  Outcome: Progressing Towards Goal     Problem: Gas Exchange - Impaired  Goal: Absence of hypoxia  Outcome: Progressing Towards Goal  Goal: Promote optimal lung function  Outcome: Progressing Towards Goal     Problem: Breathing Pattern - Ineffective  Goal: Ability to achieve and maintain a regular respiratory rate  Outcome: Progressing Towards Goal     Problem:  Body Temperature -  Risk of, Imbalanced  Goal: Ability to maintain a body temperature within defined limits  Outcome: Progressing Towards Goal  Goal: Will regain or maintain usual level of consciousness  Outcome: Progressing Towards Goal  Goal: Complications related to the disease process, condition or treatment will be avoided or minimized  Outcome: Progressing Towards Goal     Problem: Isolation Precautions - Risk of Spread of Infection  Goal: Prevent transmission of infectious organism to others  Outcome: Progressing Towards Goal     Problem: Nutrition Deficits  Goal: Optimize nutrtional status  Outcome: Progressing Towards Goal     Problem: Risk for Fluid Volume Deficit  Goal: Maintain normal heart rhythm  Outcome: Progressing Towards Goal  Goal: Maintain absence of muscle cramping  Outcome: Progressing Towards Goal  Goal: Maintain normal serum potassium, sodium, calcium, phosphorus, and pH  Outcome: Progressing Towards Goal     Problem: Loneliness or Risk for Loneliness  Goal: Demonstrate positive use of time alone when socialization is not possible  Outcome: Progressing Towards Goal     Problem: Fatigue  Goal: Verbalize increase energy and improved vitality  Outcome: Progressing Towards Goal     Problem: Patient Education: Go to Patient Education Activity  Goal: Patient/Family Education  Outcome: Progressing Towards Goal     Problem: Pressure Injury - Risk of  Goal: *Prevention of pressure injury  Description: Document Vega Scale and appropriate interventions in the flowsheet. Outcome: Progressing Towards Goal  Note: Pressure Injury Interventions:       Moisture Interventions: Moisture barrier    Activity Interventions: Pressure redistribution bed/mattress(bed type)    Mobility Interventions: Pressure redistribution bed/mattress (bed type)    Nutrition Interventions: Document food/fluid/supplement intake    Friction and Shear Interventions: Lift sheet                Problem: Patient Education: Go to Patient Education Activity  Goal: Patient/Family Education  Outcome: Progressing Towards Goal     Problem: Falls - Risk of  Goal: *Absence of Falls  Description: Document Patricia Fall Risk and appropriate interventions in the flowsheet.   Outcome: Progressing Towards Goal  Note: Fall Risk Interventions:  Mobility Interventions: Bed/chair exit alarm         Medication Interventions: Bed/chair exit alarm    Elimination Interventions: Bed/chair exit alarm    History of Falls Interventions: Bed/chair exit alarm         Problem: Patient Education: Go to Patient Education Activity  Goal: Patient/Family Education  Outcome: Progressing Towards Goal

## 2021-09-09 NOTE — PROGRESS NOTES
PT note: Evaluation received and chart reviewed. Pt currently not stable for therapy evaluation or mobility due to respiratory status. Discussed with RN. Will check back tomorrow.      Render Smart, DPT

## 2021-09-09 NOTE — PROGRESS NOTES
Admit Date: 9/2/2021    Follow up ESRD  Subjective:   Hemodynamically stable        Review of Systems  Cardio-vascular: no chest pain, + SOB, + cough  GI: no N/V/D  : no dysuria, no hematuria  Ext: no edema    Objective:     Patient Vitals for the past 8 hrs:   BP Temp Pulse Resp SpO2 Weight   09/09/21 0939     90 %    09/09/21 0929 104/75        09/09/21 0816     90 %    09/09/21 0749 105/68 96.8 °F (36 °C) 73 24 (!) 88 %    09/09/21 0342 109/62 97.4 °F (36.3 °C) 72 19 90 % 117.1 kg (258 lb 1.6 oz)     No intake/output data recorded.       Physical Exam:   General: alert and oriented  On O2 supplement  NARD  Lung: diffuse bronchi  CV: RR, no rub  Abd: soft, not tender  Ext: trace edema   PD exit site: intact, some erythema, no exudate           Data Review   Recent Results (from the past 8 hour(s))   METABOLIC PANEL, BASIC    Collection Time: 09/09/21  6:39 AM   Result Value Ref Range    Sodium 136 136 - 145 mmol/L    Potassium 4.7 3.5 - 5.1 mmol/L    Chloride 99 98 - 107 mmol/L    CO2 19 (L) 21 - 32 mmol/L    Anion gap 18 (H) 7 - 16 mmol/L    Glucose 75 65 - 100 mg/dL     (H) 8 - 23 MG/DL    Creatinine 8.94 (H) 0.6 - 1.0 MG/DL    GFR est AA 6 (L) >60 ml/min/1.73m2    GFR est non-AA 5 (L) >60 ml/min/1.73m2    Calcium 8.2 (L) 8.3 - 10.4 MG/DL   CBC W/O DIFF    Collection Time: 09/09/21  6:39 AM   Result Value Ref Range    WBC 22.3 (H) 4.3 - 11.1 K/uL    RBC 4.69 4.05 - 5.2 M/uL    HGB 14.8 11.7 - 15.4 g/dL    HCT 43.6 35.8 - 46.3 %    MCV 93.0 79.6 - 97.8 FL    MCH 31.6 26.1 - 32.9 PG    MCHC 33.9 31.4 - 35.0 g/dL    RDW 13.0 11.9 - 14.6 %    PLATELET 855 376 - 523 K/uL    MPV 11.1 9.4 - 12.3 FL    ABSOLUTE NRBC 0.07 0.0 - 0.2 K/uL   GLUCOSE, POC    Collection Time: 09/09/21  7:37 AM   Result Value Ref Range    Glucose (POC) 77 65 - 100 mg/dL    Performed by Gibson    GLUCOSE, POC    Collection Time: 09/09/21  9:19 AM   Result Value Ref Range    Glucose (POC) 74 65 - 100 mg/dL    Performed by Juvenal Real            Assessment:     Principal Problem:    Acute respiratory failure with hypoxia ([address] (9/2/2021)    Active Problems:    Mixed hyperlipidemia ()      Acquired hypothyroidism ()      Type 2 diabetes with nephropathy ([address] (5/2/2018)      Class 3 severe obesity with serious comorbidity and body mass index (BMI) of 40.0 to 44.9 in adult [facility]) (5/2/2018)      Coronary artery disease with stable angina pectoris ([address] (2/3/2020)      ESRD on peritoneal dialysis ([address] (8/28/2021)      Acute hypokalemia (8/28/2021)      COVID-19 in immunocompromised patient ([address] (9/2/2021)      Cigarette nicotine dependence in remission (9/2/2021)      COVID-19 vaccination not done (9/2/2021)      Leukocytosis (9/8/2021)        Plan:     1. ESRD on PD all 2.5% PD fluid  PD tonight       2.  COVID 19+/acute resp failure with hypoxemia      Claudette Flores MD

## 2021-09-09 NOTE — DIALYSIS
Disconnected PD cycler from patient. Betadine cap intact. Patient tolerated well. UF amount -1384mls. Effluent: clear, yellow    Treatment was only half completed when time to take off in the morning. Went back may hours later and treatment was alarming.

## 2021-09-09 NOTE — PROGRESS NOTES
Pt found laying down with HOB flat, SaO2 77% on max Airvo 60L/100% + NRB dyspnea. Pt sat up in bed, instructed on deep breathing, highest SaO2 achieved 87%. RT called to bedside.

## 2021-09-09 NOTE — PROGRESS NOTES
Pt c/o sudden onset 7/10 midsternal chest pain described as \"punched in the chest.\" Non radiating, no paresthesia. Pt remains with borderline O2 saturations on Airvo 60L/100% + NRB. Dr Johnson Hogan notified - see new orders.

## 2021-09-09 NOTE — PROGRESS NOTES
Hospitalist Progress Note   Admit Date:  2021  6:44 AM   LOS: 7 days   Name:  Chantel Patel   Age:  59 y.o. Sex:  female  :  1960   MRN:  [id number]     Chief Complaint: Shortness of Breath  Reason(s) for Admission: Acute hypoxemic respiratory failure due to COVID-19 ([facility] [U07.1, J96.01]     Hospital Course & Interval History:   59 y. o. female with medical history of ESRD on PD, Class III Obesity, IDDM, CAD s/p CABG and stents who presented with increasing SOB diagnosed with covid x 10 days ago. Briefly hospitalized and discharged stable on RA on . Sudden onset SOB and coughing on 21. Presented to [facility] on  via EMS who and she was found to be hypoxic at 77-80%. She was placed on 10LNC and readmitted.     Subjective (21): She had and episode of substernal chest pain this AM, resolved now with Morphine. SOB worse today. Maddie F/C. Denies N/V/D. Review of systems negative except stated above.     Assessment & Plan:     Principal Problem:    Acute respiratory failure with hypoxia ([facility] (2021)  - Due to COVID  - Currently unstable on 60L/100% Airvo  - May need CPAP soon  - Continue Decadron  - Continue Lasix  - Continue aerosols  - Wean oxygen as appropriate    Active Problems:    COVID-19 in immunocompromised patient ([facility] (2021)  - First symptoms , positive test   - Continue Decadron  - Not a candidate for Remdesivir due to >7 days and on PD  - Does not meet criteria for Actemra  - CRP 9.8      Chest Pain (2021)  - Patient with substernal chest pain this AM  - Check repeat CXR  - Check d-dimer  - Check EKG      Acute hypokalemia (2021)  - Resolved  - Per Nephrology      Leukocytosis (2021)  - Likely due to steroids  - Worse today  - No fevers  - Check CBC daily      Type 2 diabetes with nephropathy ([facility] (2018)  - Glucose well controlled  - Continue Lantus  - Continue Humalog      ESRD on peritoneal dialysis ([facility] (2021)  - Per Nephrology      Class 3 severe obesity with serious comorbidity and body mass index (BMI) of 40.0 to 44.9 in adult [facility]) (5/2/2018)    Diet:  ADULT DIET Regular; 4 carb choices (60 gm/meal)  ADULT ORAL NUTRITION SUPPLEMENT Breakfast, Lunch, Dinner; Renal Supplement  DVT PPx: Heparin  Code status: Full Code    Hospital Problems as of 9/9/2021 Date Reviewed: 9/2/2021        Codes Class Noted - Resolved POA    * (Principal) Acute respiratory failure with hypoxia ([facility] ICD-10-CM: J96.01  ICD-9-CM: 518.81  9/2/2021 - Present Yes        COVID-19 in immunocompromised patient [facility]) ICD-10-CM: U07.1, D84.9  ICD-9-CM: 079.89, 279.9  9/2/2021 - Present Yes        Leukocytosis ICD-10-CM: V41.088  ICD-9-CM: 288.60  9/8/2021 - Present Yes        Acute hypokalemia ICD-10-CM: E87.6  ICD-9-CM: 276.8  8/28/2021 - Present Yes        Type 2 diabetes with nephropathy ([facility] ICD-10-CM: E11.21  ICD-9-CM: 250.40, 583.81  5/2/2018 - Present Yes        ESRD on peritoneal dialysis ([facility] (Chronic) ICD-10-CM: N18.6, Z99.2  ICD-9-CM: 585.6, V45.11  8/28/2021 - Present Yes        Coronary artery disease with stable angina pectoris ([facility] ICD-10-CM: I25.118  ICD-9-CM: 414.00, 413.9  2/3/2020 - Present Yes        Cigarette nicotine dependence in remission ICD-10-CM: F17.211  ICD-9-CM: V15.82  9/2/2021 - Present Yes        COVID-19 vaccination not done ICD-10-CM: Z28.9  ICD-9-CM: V64.00  9/2/2021 - Present Yes        Class 3 severe obesity with serious comorbidity and body mass index (BMI) of 40.0 to 44.9 in adult [facility]) ICD-10-CM: E66.01, Z68.41  ICD-9-CM: 278.01, V85.41  5/2/2018 - Present Yes        Mixed hyperlipidemia ICD-10-CM: E78.2  ICD-9-CM: 272.2  Unknown - Present Yes        Acquired hypothyroidism ICD-10-CM: E03.9  ICD-9-CM: 244.9  Unknown - Present Yes        PVD (peripheral vascular disease) ([facility] ICD-10-CM: I73.9  ICD-9-CM: 443.9  8/18/2016 - Present               Objective:     Patient Vitals for the past 24 hrs:   Temp Pulse Resp BP SpO2   09/09/21 1204     (!) 87 %   09/09/21 1141 97.6 °F (36.4 °C) 71 24 (!) 96/58 (!) 88 %   09/09/21 0939     90 %   09/09/21 0929    104/75    09/09/21 0816     90 %   09/09/21 0749 96.8 °F (36 °C) 73 24 105/68 (!) 88 %   09/09/21 0342 97.4 °F (36.3 °C) 72 19 109/62 90 %   09/08/21 2356 97.6 °F (36.4 °C) 78 20 106/69 90 %   09/08/21 2148     92 %   09/08/21 1947 97.5 °F (36.4 °C) 63 22 95/72 91 %   09/08/21 1518     90 %   09/08/21 1513 98 °F (36.7 °C) 64 20 98/60 90 %     Oxygen Therapy  O2 Sat (%): (!) 87 % (09/09/21 1204)  Pulse via Oximetry: 68 beats per minute (09/09/21 0939)  O2 Device: Heated; Hi flow nasal cannula; Non-rebreather mask (09/09/21 1204)  Skin Assessment: Clean, dry, & intact (09/08/21 0830)  Skin Protection for O2 Device: No (09/08/21 0830)  O2 Flow Rate (L/min): 60 l/min (09/09/21 1204)  O2 Temperature: 87.8 °F (31 °C) (09/08/21 2148)  FIO2 (%): 100 % (09/09/21 1204)    Estimated body mass index is 39.24 kg/m² as calculated from the following:    Height as of this encounter: 5' 8\" (1.727 m). Weight as of this encounter: 117.1 kg (258 lb 1.6 oz). Intake/Output Summary (Last 24 hours) at 9/9/2021 1206  Last data filed at 9/9/2021 0939  Gross per 24 hour   Intake 0 ml   Output    Net 0 ml         Physical Exam:   General:    Well nourished. Moderate resp distress  Head:  Normocephalic, atraumatic  Eyes:  Sclerae appear normal.  Pupils equally round. ENT:  Nares appear normal, no drainage. Moist oral mucosa  Neck:  No restricted ROM. Trachea midline   CV:   RRR. No m/r/g. No jugular venous distension. Lungs:   Scattered rhonchi bilaterally. No wheezing. Respirations labored  Abdomen: Bowel sounds present. Soft, nontender, nondistended. Extremities: No cyanosis or clubbing. No edema  Skin:     No rashes and normal coloration. Warm and dry. Neuro: Cranial nerves II-XII grossly intact. Sensation intact  Psych: Normal mood and affect.   Alert and oriented x3    I have reviewed ordered lab tests and independently visualized imaging below:    Last 24hr Labs:  Recent Results (from the past 24 hour(s))   GLUCOSE, POC    Collection Time: 09/08/21  5:01 PM   Result Value Ref Range    Glucose (POC) 118 (H) 65 - 100 mg/dL    Performed by Malachi Leahy    GLUCOSE, POC    Collection Time: 09/08/21  9:05 PM   Result Value Ref Range    Glucose (POC) 136 (H) 65 - 100 mg/dL    Performed by [email or web address]    METABOLIC PANEL, BASIC    Collection Time: 09/09/21  6:39 AM   Result Value Ref Range    Sodium 136 136 - 145 mmol/L    Potassium 4.7 3.5 - 5.1 mmol/L    Chloride 99 98 - 107 mmol/L    CO2 19 (L) 21 - 32 mmol/L    Anion gap 18 (H) 7 - 16 mmol/L    Glucose 75 65 - 100 mg/dL     (H) 8 - 23 MG/DL    Creatinine 8.94 (H) 0.6 - 1.0 MG/DL    GFR est AA 6 (L) >60 ml/min/1.73m2    GFR est non-AA 5 (L) >60 ml/min/1.73m2    Calcium 8.2 (L) 8.3 - 10.4 MG/DL   CBC W/O DIFF    Collection Time: 09/09/21  6:39 AM   Result Value Ref Range    WBC 22.3 (H) 4.3 - 11.1 K/uL    RBC 4.69 4.05 - 5.2 M/uL    HGB 14.8 11.7 - 15.4 g/dL    HCT 43.6 35.8 - 46.3 %    MCV 93.0 79.6 - 97.8 FL    MCH 31.6 26.1 - 32.9 PG    MCHC 33.9 31.4 - 35.0 g/dL    RDW 13.0 11.9 - 14.6 %    PLATELET 454 116 - 934 K/uL    MPV 11.1 9.4 - 12.3 FL    ABSOLUTE NRBC 0.07 0.0 - 0.2 K/uL   GLUCOSE, POC    Collection Time: 09/09/21  7:37 AM   Result Value Ref Range    Glucose (POC) 77 65 - 100 mg/dL    Performed by Gibson    GLUCOSE, POC    Collection Time: 09/09/21  9:19 AM   Result Value Ref Range    Glucose (POC) 74 65 - 100 mg/dL    Performed by Bess    PROCALCITONIN    Collection Time: 09/09/21 10:47 AM   Result Value Ref Range    Procalcitonin 0.38 ng/mL   GLUCOSE, POC    Collection Time: 09/09/21 11:17 AM   Result Value Ref Range    Glucose (POC) 110 (H) 65 - 100 mg/dL    Performed by Gibson        All Micro Results     Procedure Component Value Units Date/Time    EBENEZER Gan UR/CSF [[id number]] Collected: 09/02/21 1425    Order Status: Canceled     EBENEZER Gan UR/CSF [[id number]] Collected: 09/02/21 1421    Order Status: Canceled     MSSA/MRSA SC BY PCR, NASAL SWAB [[id number]] Collected: 09/02/21 1054    Order Status: Completed Specimen: Nasal swab Updated: 09/02/21 1253     Special Requests: NO SPECIAL REQUESTS        Culture result:       SA target not detected. A MRSA NEGATIVE, SA NEGATIVE test result does not preclude MRSA or SA nasal colonization. EBENEZER Gan UR/CSF [[id number]] Collected: 09/02/21 7810    Order Status: Canceled           SARS-CoV-2 Lab Results  \"Novel Coronavirus\" Test: No results found for: COV2NT   \"Emergent Disease\" Test: No results found for: EDPR  \"SARS-COV-2\" Test: No results found for: XGCOVT  \"Precision Labs\" Test: No results found for: RSLT  Rapid Test:   Lab Results   Component Value Date/Time    COVR Detected (AA) 08/28/2021 01:08 AM          Imaging:  XR CHEST SNGL V    Result Date: 9/9/2021  PORTABLE CHEST X-RAY 9/9/2021 10:05 AM HISTORY: Worsening hypoxia and chest pain COMPARISON: September 2, 2021 FINDINGS: The lung volumes are diminished. Patchy peripheral bibasilar infiltrates are present. Pleural spaces are clear. Median sternotomy wires are present. Low lung volumes with bilateral infiltrates. No results found for this visit on 09/02/21.     Current Meds:  Current Facility-Administered Medications   Medication Dose Route Frequency    tuberculin injection 5 Units  5 Units IntraDERMal ONCE    piperacillin-tazobactam (ZOSYN) 3.375 g in 0.9% sodium chloride (MBP/ADV) 100 mL MBP  3.375 g IntraVENous Q6H    morphine injection 2 mg  2 mg IntraVENous Q6H PRN    insulin glargine (LANTUS) injection 40 Units  40 Units SubCUTAneous BID    ciprofloxacin (CETRAXAL) 0.2 % otic solution 0.25 mL  0.25 mL Left Ear Q12H    dexAMETHasone (DECADRON) tablet 4 mg  4 mg Oral Q12H    azithromycin (ZITHROMAX) tablet 500 mg  500 mg Oral DAILY    sodium chloride (NS) flush 5-10 mL  5-10 mL IntraVENous Q8H    sodium chloride (NS) flush 5-10 mL  5-10 mL IntraVENous PRN    ergocalciferol capsule 50,000 Units  50,000 Units Oral Q7D    cetirizine (ZYRTEC) tablet 10 mg  10 mg Oral DAILY    fluticasone propionate (FLONASE) 50 mcg/actuation nasal spray 2 Spray  2 Spray Both Nostrils DAILY    amLODIPine (NORVASC) tablet 5 mg  5 mg Oral DAILY    albuterol (PROVENTIL HFA, VENTOLIN HFA, PROAIR HFA) inhaler 2 Puff  2 Puff Inhalation QID RT    budesonide-formoteroL (SYMBICORT) 160-4.5 mcg/actuation HFA inhaler 2 Puff  2 Puff Inhalation BID RT    sodium chloride (NS) flush 5-40 mL  5-40 mL IntraVENous Q8H    sodium chloride (NS) flush 5-40 mL  5-40 mL IntraVENous PRN    acetaminophen (TYLENOL) tablet 650 mg  650 mg Oral Q6H PRN    Or    acetaminophen (TYLENOL) suppository 650 mg  650 mg Rectal Q6H PRN    polyethylene glycol (MIRALAX) packet 17 g  17 g Oral DAILY PRN    ondansetron (ZOFRAN ODT) tablet 4 mg  4 mg Oral Q8H PRN    Or    ondansetron (ZOFRAN) injection 4 mg  4 mg IntraVENous Q6H PRN    alum-mag hydroxide-simeth (MYLANTA) oral suspension 15 mL  15 mL Oral Q6H PRN    heparin (porcine) injection 7,500 Units  7,500 Units SubCUTAneous Q8H    guaiFENesin-dextromethorphan (ROBITUSSIN DM) 100-10 mg/5 mL syrup 5 mL  5 mL Oral Q4H PRN    clopidogreL (PLAVIX) tablet 75 mg  75 mg Oral DAILY    escitalopram oxalate (LEXAPRO) tablet 20 mg  20 mg Oral BID    nitroglycerin (NITROSTAT) tablet 0.4 mg  0.4 mg SubLINGual Q5MIN PRN    ranolazine ER (RANEXA) tablet 500 mg  500 mg Oral Q12H    isosorbide mononitrate ER (IMDUR) tablet 120 mg  120 mg Oral DAILY    [Held by provider] hydrALAZINE (APRESOLINE) tablet 25 mg  25 mg Oral TID    furosemide (LASIX) tablet 60 mg  60 mg Oral BID    aspirin delayed-release tablet 81 mg  81 mg Oral 7am    traZODone (DESYREL) tablet 100 mg  100 mg Oral QHS    LORazepam (ATIVAN) tablet 0.5 mg  0.5 mg Oral Q4H PRN    insulin lispro (HUMALOG) injection   SubCUTAneous AC&HS    dextrose 40% (GLUTOSE) oral gel 1 Tube  15 g Oral PRN    glucagon (GLUCAGEN) injection 1 mg  1 mg IntraMUSCular PRN    dextrose (D50W) injection syrg 12.5-25 g  25-50 mL IntraVENous PRN    metoprolol succinate (TOPROL-XL) XL tablet 50 mg  50 mg Oral DAILY    levothyroxine (SYNTHROID) tablet 125 mcg  125 mcg Oral ACB    insulin lispro (HUMALOG) injection 24 Units  0.2 Units/kg SubCUTAneous TID WITH MEALS       Discharge Plan:     TBD    Signed By: Anatoliy Cueva DO     September 9, 2021        There is a high probability of acute organ impairment or life-threatening deterioration in the patient's condition from: Acute chest pain with worsening   hypoxia    Critical care interventions: CXR, Airvo to CPAP    Total critical care time spent: 32 minutes. Time is indicative of direct patient attendance at bedside and on the patient's floor nearby. Includes time spent at bedside performing history and exam, performing chart review, discussing findings and treatment plan with patient and/or family, discussing patient with consultants and colleagues, ordering and reviewing pertinent laboratory and radiographic evaluations, and discussing patient with nursing staff. Time excludes procedures.

## 2021-09-09 NOTE — PROGRESS NOTES
Saturations averaging 86-88% on max Airvo 60L/100% + NRB. Pt displays increased work of breathing. CPAP placed by RT. Now resting quietly with SaO2 94%.

## 2021-09-09 NOTE — CONSULTS
Nutrition Note    Received 2nd consult:  Oral nutrition supplements (RN case manager generated)     Please refer to assesment completed 9/8. Pt has active order for ONS. However pt now on max Airvo 60L/100% + NRB. Per SLP, pt is not appropriate for po intake d/t worsening respiratory status. PO intake has been  inadequate for at least 9 days,but potentially longer. Nutrition options are limited as respiratory status would also limit FT placement. Pt does not have a central line access for CPN and only minimal nutrition could be provided via PIV d/t osmolality and volume constraints.     If pt requires intubation and FT is placed, please order nutrition consult for TF management  If PN pusured, please order nutrition consult for PN management.      Electronically signed by Heather eDan RD on 9/9/2021 at 3:04 PM    Contact: [phone]

## 2021-09-09 NOTE — PROGRESS NOTES
Pt stable on Cpap. Breathing unlabored at rest laying on left side in bed, SaO2 95%. Pt reports she is more comfortable on Cpap. Pt with minimal PO intake today due to respiratory status/oxygen requirements, occasional sips of juice and water. All insulin held today per Dr Ambika Moreno. PD running at this time. Pt denies pain. Family updated.

## 2021-09-10 NOTE — CONSULTS
History and Physical Initial Visit NOTE           9/10/2021    Jerad Farrell                        Date of Admission:  2021    The patient's chart is reviewed and the patient is discussed with the staff. Subjective:     Patient is a 59 y.o.  female seen and evaluated at the request of Dr. Chris Brooks. She has history of ESRD on PD, Class III Obesity, IDDM, CAD s/p CABG and stents who presented with increasing SOB diagnosed with covid x 10 days ago. Briefly hospitalized and discharged stable on RA on . Sudden onset SOB and coughing on 21. Presented to [facility] on  via EMS who and she was found to be hypoxic at 77-80%. She was placed on 10LNC and readmitted. She has been doing peritoneal dialysis and has continued to decline slowly with her oxygenation. She is not on CPAP 100% with sats 95%. She is having back pain and asking for water. She denies chest pain. Her H&P says she would only want to be on a ventilator for 5 days. Date of COVID-19 symptom onset:   COVID-19 Meds:  Dexamethasone 6mg daily: change to IV  Remdesivir: PD, not a candidate  Actemra:   Vaccination:   Not vaccinated   Results in Past 30 Days  Result Component Current Result Ref Range Previous Result Ref Range   COVID-19 rapid test Detected (AA) (2021) NOTD   Not in Time Range    Specimen source Nasopharyngeal (2021)   Not in Time Range      Review of Systems  A comprehensive review of systems was negative except for that written in the HPI. Prior to Admission Medications   Prescriptions Last Dose Informant Patient Reported? Taking? Blood-Glucose Meter,Continuous (Dexcom G6 ) misc   No No   Sig: As dir   Blood-Glucose Meter,Continuous (Dexcom G6 ) misc   No No   Si Units by Does Not Apply route two (2) times a day.    Blood-Glucose Sensor (Dexcom G6 Sensor) jazmyn   No No   Sig: Change sensor every 10 days   Blood-Glucose Transmitter (Dexcom G6 Transmitter) jazmyn No No   Sig: CGM   Tresiba FlexTouch U-100 100 unit/mL (3 mL) inpn 2021 at Unknown time  No Yes   Sig: INJECT 70 UNITS UNDER THE SKIN DAILY FOR 90 DAYS. Patient taking differently: 100 Units. acetaminophen (TYLENOL) 325 mg tablet 2021 at Unknown time  Yes Yes   Sig: Take 2 Tabs by mouth every six (6) hours as needed for Pain. amLODIPine (NORVASC) 5 mg tablet 2021 at Unknown time  No Yes   Sig: Take 1 Tablet by mouth daily. aspirin delayed-release 81 mg tablet 2021 at Unknown time  Yes Yes   Sig: Take 81 mg by mouth every morning. clopidogreL (Plavix) 75 mg tab 2021 at Unknown time  No Yes   Sig: Take 1 Tablet by mouth daily. dexAMETHasone (DECADRON) 6 mg tablet 2021 at Unknown time  No Yes   Sig: Take 1 Tablet by mouth Daily (before breakfast). escitalopram oxalate (Lexapro) 20 mg tablet 2021 at Unknown time  No Yes   Sig: Take 1 Tablet by mouth two (2) times a day. evolocumab (REPATHA SURECLICK) pen injection 9260 at Unknown time  No Yes   Si mL by SubCUTAneous route every fourteen (14) days. furosemide (LASIX) 40 mg tablet 2021 at Unknown time  No Yes   Sig: Take 1.5 Tablets by mouth two (2) times a day. hydrALAZINE (APRESOLINE) 25 mg tablet 2021 at Unknown time  No Yes   Sig: Take 1 Tablet by mouth three (3) times daily. isosorbide mononitrate ER (IMDUR) 120 mg CR tablet 2021 at Unknown time  No Yes   Sig: Take 1 Tab by mouth daily. levothyroxine (SYNTHROID) 200 mcg tablet 2021 at Unknown time  No Yes   Sig: TAKE 1 TABLET BY MOUTH DAILY BEFORE BREAKFAST FOR THYROID   Patient taking differently: 125 mcg. TAKE 1 TABLET BY MOUTH DAILY BEFORE BREAKFAST FOR THYROID   levothyroxine (SYNTHROID) 25 mcg tablet 2021 at Unknown time  No Yes   Sig: TAKE 1 TAB BY MOUTH DAILY (BEFORE BREAKFAST) FOR THYROID.   metOLazone (ZAROXOLYN) 5 mg tablet Not Taking at Unknown time  No No   Sig: Take 1 Tab by mouth daily as needed for Other (weight gain). Patient not taking: Reported on 2021   metoprolol succinate (TOPROL-XL) 50 mg XL tablet 2021 at Unknown time  No Yes   Sig: Take 1 Tab by mouth daily. nitroglycerin (NITROSTAT) 0.4 mg SL tablet Unknown at Unknown time  No No   Si Tab by SubLINGual route every five (5) minutes as needed for Chest Pain. Up to 3 doses. ranolazine ER (RANEXA) 500 mg SR tablet 2021 at Unknown time  No Yes   Sig: Take 1 Tab by mouth every twelve (12) hours. Facility-Administered Medications: None     Past Medical History:   Diagnosis Date    Acute bronchitis     Acute pharyngitis     Allergic rhinitis due to other allergen     Chronic kidney disease     ESRD    Coronary atherosclerosis of native coronary artery     CABG x3  ~2006, 19- stent X1, 10/7/19 stent X3, 19 stent X2    Depressive disorder, not elsewhere classified     daily meds    Essential hypertension, benign     daily meds    Ischemic ulcer of foot due to atherosclerosis of native artery of extremity ([address]     Mixed hyperlipidemia     Obesity (BMI 30-39. 9)     Osteomyelitis of toe ([address]     forth toe of left foot    Other specified acquired hypothyroidism     Thromboembolus ([address]     possible to back of left knee???, superficial    Type II or unspecified type diabetes mellitus without mention of complication, uncontrolled     checks QD, normal 100, hyposymptoms at 70     Past Surgical History:   Procedure Laterality Date    HX BREAST BIOPSY Left 2017    stereo    HX CORONARY ARTERY BYPASS GRAFT  2006    Three    HX HEART CATHETERIZATION  2019    stent X1    HX HEART CATHETERIZATION  10/07/2019    stent X3    HX HEART CATHETERIZATION  2019    stents X2    IR INSERT TUNL CVC W/O PORT OVER 5 YR  10/12/2020    IR PLC CATH AV SHUNT INT W SI LT  10/21/2020    IR REMOVE TUNL CVAD W/O PORT / PUMP  2021    CA CARDIAC SURG PROCEDURE UNLIST  2006    CABG x3    VASCULAR SURGERY PROCEDURE UNLIST 08/10/2016    arteriogram of leg    VASCULAR SURGERY PROCEDURE UNLIST Left 2016     fem pop endarterectomy    VASCULAR SURGERY PROCEDURE UNLIST Left 2018    4th toe amp    VASCULAR SURGERY PROCEDURE UNLIST Right 2019     Right second toe amputation    VASCULAR SURGERY PROCEDURE UNLIST Left 10/21/2020     Left radiocephalic arteriovenous fistulogram and central venogram.Coil embolization of left radiocephalic arteriovenous fistula venous branch. Retrograde PTA of proximal radiocephalic arteriovenous fistula. Social History     Socioeconomic History    Marital status:      Spouse name: Not on file    Number of children: Not on file    Years of education: Not on file    Highest education level: Not on file   Occupational History    Not on file   Tobacco Use    Smoking status: Former Smoker     Packs/day: 2.00     Years: 15.00     Pack years: 30.00     Types: Cigarettes     Quit date: 2/3/1992     Years since quittin.6    Smokeless tobacco: Never Used   Substance and Sexual Activity    Alcohol use: No    Drug use: No    Sexual activity: Not on file   Other Topics Concern    Not on file   Social History Narrative    Not on file     Social Determinants of Health     Financial Resource Strain:     Difficulty of Paying Living Expenses:    Food Insecurity:     Worried About [address] in the Last Year:     [address] in the Last Year:    Transportation Needs:     Lack of Transportation (Medical):      Lack of Transportation (Non-Medical):    Physical Activity:     Days of Exercise per Week:     Minutes of Exercise per Session:    Stress:     Feeling of Stress :    Social Connections:     Frequency of Communication with Friends and Family:     Frequency of Social Gatherings with Friends and Family:     Attends Mandaen Services:     Active Member of Clubs or Organizations:     Attends Club or Organization Meetings:     Marital Status:    Intimate Partner Violence:     Fear of Current or Ex-Partner:     Emotionally Abused:     Physically Abused:     Sexually Abused:      Family History   Problem Relation Age of Onset    Diabetes Other     Hypertension Other     Heart Disease Other     Diabetes Mother     Hypertension Mother     Cancer Father     Diabetes Sister     Heart Disease Sister     Diabetes Brother     Diabetes Sister     Hypertension Sister     Diabetes Sister     Hypertension Sister     Diabetes Sister     Hypertension Sister     Diabetes Brother      No Known Allergies  Current Facility-Administered Medications   Medication Dose Route Frequency    piperacillin-tazobactam (ZOSYN) 3.375 g in 0.9% sodium chloride (MBP/ADV) 100 mL MBP  3.375 g IntraVENous Q12H    morphine injection 2 mg  2 mg IntraVENous Q6H PRN    insulin glargine (LANTUS) injection 40 Units  40 Units SubCUTAneous BID    ciprofloxacin (CETRAXAL) 0.2 % otic solution 0.25 mL  0.25 mL Left Ear Q12H    dexAMETHasone (DECADRON) tablet 4 mg  4 mg Oral Q12H    azithromycin (ZITHROMAX) tablet 500 mg  500 mg Oral DAILY    sodium chloride (NS) flush 5-10 mL  5-10 mL IntraVENous Q8H    sodium chloride (NS) flush 5-10 mL  5-10 mL IntraVENous PRN    ergocalciferol capsule 50,000 Units  50,000 Units Oral Q7D    cetirizine (ZYRTEC) tablet 10 mg  10 mg Oral DAILY    fluticasone propionate (FLONASE) 50 mcg/actuation nasal spray 2 Spray  2 Spray Both Nostrils DAILY    amLODIPine (NORVASC) tablet 5 mg  5 mg Oral DAILY    albuterol (PROVENTIL HFA, VENTOLIN HFA, PROAIR HFA) inhaler 2 Puff  2 Puff Inhalation QID RT    budesonide-formoteroL (SYMBICORT) 160-4.5 mcg/actuation HFA inhaler 2 Puff  2 Puff Inhalation BID RT    sodium chloride (NS) flush 5-40 mL  5-40 mL IntraVENous Q8H    sodium chloride (NS) flush 5-40 mL  5-40 mL IntraVENous PRN    acetaminophen (TYLENOL) tablet 650 mg  650 mg Oral Q6H PRN    Or    acetaminophen (TYLENOL) suppository 650 mg  650 mg Rectal Q6H PRN    polyethylene glycol (MIRALAX) packet 17 g  17 g Oral DAILY PRN    ondansetron (ZOFRAN ODT) tablet 4 mg  4 mg Oral Q8H PRN    Or    ondansetron (ZOFRAN) injection 4 mg  4 mg IntraVENous Q6H PRN    alum-mag hydroxide-simeth (MYLANTA) oral suspension 15 mL  15 mL Oral Q6H PRN    heparin (porcine) injection 7,500 Units  7,500 Units SubCUTAneous Q8H    guaiFENesin-dextromethorphan (ROBITUSSIN DM) 100-10 mg/5 mL syrup 5 mL  5 mL Oral Q4H PRN    clopidogreL (PLAVIX) tablet 75 mg  75 mg Oral DAILY    escitalopram oxalate (LEXAPRO) tablet 20 mg  20 mg Oral BID    nitroglycerin (NITROSTAT) tablet 0.4 mg  0.4 mg SubLINGual Q5MIN PRN    ranolazine ER (RANEXA) tablet 500 mg  500 mg Oral Q12H    isosorbide mononitrate ER (IMDUR) tablet 120 mg  120 mg Oral DAILY    [Held by provider] hydrALAZINE (APRESOLINE) tablet 25 mg  25 mg Oral TID    furosemide (LASIX) tablet 60 mg  60 mg Oral BID    aspirin delayed-release tablet 81 mg  81 mg Oral 7am    traZODone (DESYREL) tablet 100 mg  100 mg Oral QHS    LORazepam (ATIVAN) tablet 0.5 mg  0.5 mg Oral Q4H PRN    insulin lispro (HUMALOG) injection   SubCUTAneous AC&HS    dextrose 40% (GLUTOSE) oral gel 1 Tube  15 g Oral PRN    glucagon (GLUCAGEN) injection 1 mg  1 mg IntraMUSCular PRN    dextrose (D50W) injection syrg 12.5-25 g  25-50 mL IntraVENous PRN    metoprolol succinate (TOPROL-XL) XL tablet 50 mg  50 mg Oral DAILY    levothyroxine (SYNTHROID) tablet 125 mcg  125 mcg Oral ACB    insulin lispro (HUMALOG) injection 24 Units  0.2 Units/kg SubCUTAneous TID WITH MEALS     Objective:   Blood pressure (!) 80/71, pulse 77, temperature 97.5 °F (36.4 °C), resp. rate 24, height 5' 8\" (1.727 m), weight 249 lb 4.8 oz (113.1 kg), SpO2 97 %.      Intake/Output Summary (Last 24 hours) at 9/10/2021 1257  Last data filed at 9/10/2021 9164  Gross per 24 hour   Intake 0 ml   Output 3190 ml   Net -3190 ml     PHYSICAL EXAM   Constitutional:  the patient is well developed and in no acute distress  EENMT:  Sclera clear, pupils equal, oral mucosa moist  Respiratory: mildly coarse  Cardiovascular:  RRR without M,G,R  Gastrointestinal: soft and non-tender; with positive bowel sounds. Musculoskeletal: warm without cyanosis. There is no lower extremity edema. Skin:  no jaundice or rashes, no wounds   Neurologic: no gross neuro deficits     Psychiatric:  alert and oriented x 3    CXR:  9/9: low lung volumes      Recent Labs     09/09/21  1047 09/09/21  0639 09/08/21  0659   WBC  --  22.3* 17.7*   HGB  --  14.8 14.4   HCT  --  43.6 42.7   PLT  --  334 288   PCT 0.38  --   --    NA  --  136 138   K  --  4.7 4.5   CL  --  99 101   CO2  --  19* 21   GLU  --  75 125*   BUN  --  116* 113*   CREA  --  8.94* 8.19*   CA  --  8.2* 8.4     ECHO: No results found for this or any previous visit. Results     Procedure Component Value Units Date/Time    EBENEZER Geigerangie Martin, UR/CSF [[id number]] Collected: 09/02/21 1425    Order Status: Canceled     EBENEZER Geigerangie Martin UR/CSF [[id number]] Collected: 09/02/21 1421    Order Status: Canceled     MSSA/MRSA SC BY PCR, NASAL SWAB [[id number]] Collected: 09/02/21 1054    Order Status: Completed Specimen: Nasal swab Updated: 09/02/21 1253     Special Requests: NO SPECIAL REQUESTS        Culture result:       SA target not detected. A MRSA NEGATIVE, SA NEGATIVE test result does not preclude MRSA or SA nasal colonization. EBENEZER Geigerangie Martin, UR/CSF [[id number]] Collected: 09/02/21 0653    Order Status: Canceled     COVID-19 RAPID TEST [[id number]]  (Abnormal) Collected: 08/28/21 0108    Order Status: Completed Specimen: Nasopharyngeal Updated: 08/28/21 0135     Specimen source Nasopharyngeal        COVID-19 rapid test Detected        Comment:      The specimen is POSITIVE for SARS-CoV-2, the novel coronavirus associated with COVID-19.        This test has been authorized by the FDA under an Emergency Use Authorization (EUA) for use by authorized laboratories. Fact sheet for Healthcare Providers: ConventionUpdate.co.nz  Fact sheet for Patients: ConventionUpdate.co.nz       Methodology: Isothermal Nucleic Acid Amplification         BLOOD CULTURE [[id number]]  (Abnormal) Collected: 08/27/21 2323    Order Status: Completed Specimen: Blood Updated: 09/01/21 0835     Special Requests: --        RIGHT  HAND       GRAM STAIN GRAM POSITIVE RODS         AEROBIC BOTTLE POSITIVE               Perfect[employer] message response from: [address] ON 8/31/21 @1153, TA.  MESSAGE FORWARDED TO DR. VILLEGAS. Culture result:       DIPHTHEROIDS This organism may be indicative of culture contamination. Clinical correlation needs to be evaluated as each case is unique. BLOOD CULTURE ID PANEL [[id number]] Collected: 08/27/21 2323    Order Status: Canceled     BLOOD CULTURE [[id number]] Collected: 08/27/21 2124    Order Status: Completed Specimen: Blood Updated: 09/01/21 0844     Special Requests: --        NO SPECIAL REQUESTS  RIGHT  Antecubital       Culture result: NO GROWTH 5 DAYS           Inpat Anti-Infectives (From admission, onward)     Start     Ordered Stop    09/09/21 1300  piperacillin-tazobactam (ZOSYN) 3.375 g in 0.9% sodium chloride (MBP/ADV) 100 mL MBP  3.375 g,   IntraVENous,   EVERY 12 HOURS     Note to Pharmacy: Please renally dose with PD transitioning to HD    09/09/21 1206 --    09/06/21 2100  ciprofloxacin (CETRAXAL) 0.2 % otic solution 0.25 mL  0.25 mL,   Left Ear,   EVERY 12 HOURS      09/06/21 1510 --    09/04/21 0900  azithromycin (ZITHROMAX) tablet 500 mg  500 mg,   Oral,   DAILY      09/03/21 1525 --              Assessment and Plan:  (Medical Decision Making)   Principal Problem:    Acute respiratory failure with hypoxia ([address] (9/2/2021)    On CPAP 100% now, awake and responsive, but appears acutely ill and eyes closed. Answers questions verbally, but hard to hear over mask.     Active Problems:    Type 2 diabetes with nephropathy ([facility] (5/2/2018)    Lantus +SSI, may need to cut back on Lantus as likely not to eat today. Class 3 severe obesity with serious comorbidity and body mass index (BMI) of 40.0 to 44.9 in adult [facility]) (5/2/2018)    High risk for poor outcome with COVID      ESRD on peritoneal dialysis ([facility] (8/28/2021)    On PD, done today, may need to change to hemodialysis, doesn't look overloaded. Nephro following      COVID-19 in immunocompromised patient ([facility] (9/2/2021)    Not vaccinated, given dexa, change to IV, not a remdesivir candidate, sp Toci      COVID-19 vaccination not done (9/2/2021)    Unfortunate      Leukocytosis (9/8/2021)    Check cultures, doesn't make any urine, on Cipro, Zosyn. Full Code    Needs to move to ICU, looks very high risk for decompensation in the short term. Asked nurse to try to get daughters to talk with her. She previously said she only wanted to be on ventilator for 5 days maximum which is unlikely to get her improved and she is very high risk for poor outcome. The patient is critically ill with respiratory failure, circulatory failure and requires high complexity decision making for assessment and support including frequent ventilator adjustment , frequent evaluation and titration of therapies , application of advanced monitoring technologies and extensive interpretation of multiple databases  Time devoted to patient care services described in this note- 15 min face to face/ 30 min total evaluation time.      Cumulative time devoted to patient care services by me for day of service -39 min     Mary Mejia MD

## 2021-09-10 NOTE — PROGRESS NOTES
Problem: Gas Exchange - Impaired  Goal: Promote optimal lung function  Outcome: Progressing Towards Goal     Problem: Gas Exchange - Impaired  Goal: Absence of hypoxia  Outcome: Progressing Towards Goal

## 2021-09-10 NOTE — PROGRESS NOTES
MSN, CM:  Patient requiring CPap at this time. Patient has transfer orders to ICU. Patient is currently on PD and may possibly be changed to HD. Labs and PPD already ordered. Nephrology watching closely. Patient may need SNF upon discharge. Case Management will continue to follow.

## 2021-09-10 NOTE — ADT AUTH CERT NOTES
Utilization Reviews       Viral Illness, Acute - Care Day 8 (9/9/2021) by Stefania Johnson RN       Review Status Review Entered   Completed 9/9/2021 12:35      Criteria Review      Care Day: 8 Care Date: 9/9/2021 Level of Care: Inpatient Floor    Guideline Day 3    Level Of Care    (X) Floor to discharge    9/9/2021 12:35:39 EDT by [facility]      floor    Clinical Status    ( ) * Hemodynamic stability    ( ) * Afebrile or temperature acceptable for next level of care    ( ) * Tachypnea absent    ( ) * Hypoxemia absent    ( ) * Mental status at baseline    ( ) * Renal function at baseline, or stable and acceptable for next level of care    ( ) * Discharge plans and education understood    Activity    (X) * Ambulatory or acceptable for next level of care    9/9/2021 12:35:39 EDT by [facility]      Activity as tolerated w/assist    Routes    ( ) * Oral hydration    ( ) * Oral medications or regimen acceptable for next level of care    (X) * Oral diet or acceptable for next level of care    9/9/2021 12:35:39 EDT by [facility]      adult diet regular    Interventions    ( ) * Isolation not indicated, or is performable at next level of care    Medications    ( ) * Antimicrobial medication absent or regimen established for next level of care    * Milestone   Additional Notes         emodynamically stable            Review of Systems   Cardio-vascular: no chest pain, + SOB, + cough   GI: no N/V/D   : no dysuria, no hematuria   Ext: no edema       Objective:       Patient Vitals for the past 8 hrs:     BP Temp Pulse Resp SpO2 Weight   09/09/21 0939 - - - - 90 % -   09/09/21 0929 104/75 - - - - -   09/09/21 0816 - - - - 90 % -   09/09/21 0749 105/68 96.8 °F (36 °C) 73 24 (!) 88 % -   09/09/21 0342 109/62 97.4 °F (36.3 °C) 72 19 90 % 117.1 kg (258 lb 1.6 oz)       No intake/output data recorded.           Physical Exam:    General: alert and oriented   On O2 supplement   NARD   Lung: diffuse bronchi   CV: RR, no rub   Abd: soft, not tender   Ext: trace edema    PD exit site: intact, some erythema, no exudate                  Data Review       Recent Results   Recent Results (from the past 8 hour(s))   METABOLIC PANEL, BASIC     Collection Time: 09/09/21  6:39 AM   Result Value Ref Range     Sodium 136 136 - 145 mmol/L     Potassium 4.7 3.5 - 5.1 mmol/L     Chloride 99 98 - 107 mmol/L     CO2 19 (L) 21 - 32 mmol/L     Anion gap 18 (H) 7 - 16 mmol/L     Glucose 75 65 - 100 mg/dL      (H) 8 - 23 MG/DL     Creatinine 8.94 (H) 0.6 - 1.0 MG/DL     GFR est AA 6 (L) >60 ml/min/1.73m2     GFR est non-AA 5 (L) >60 ml/min/1.73m2     Calcium 8.2 (L) 8.3 - 10.4 MG/DL   CBC W/O DIFF     Collection Time: 09/09/21  6:39 AM   Result Value Ref Range     WBC 22.3 (H) 4.3 - 11.1 K/uL     RBC 4.69 4.05 - 5.2 M/uL     HGB 14.8 11.7 - 15.4 g/dL     HCT 43.6 35.8 - 46.3 %     MCV 93.0 79.6 - 97.8 FL     MCH 31.6 26.1 - 32.9 PG     MCHC 33.9 31.4 - 35.0 g/dL     RDW 13.0 11.9 - 14.6 %     PLATELET 676 416 - 087 K/uL     MPV 11.1 9.4 - 12.3 FL     ABSOLUTE NRBC 0.07 0.0 - 0.2 K/uL   GLUCOSE, POC     Collection Time: 09/09/21  7:37 AM   Result Value Ref Range     Glucose (POC) 77 65 - 100 mg/dL     Performed by Gibson     GLUCOSE, POC     Collection Time: 09/09/21  9:19 AM   Result Value Ref Range     Glucose (POC) 74 65 - 100 mg/dL     Performed by Bess                      Assessment:       Principal Problem:     Acute respiratory failure with hypoxia (HCC) (9/2/2021)       Active Problems:     Mixed hyperlipidemia ()         Acquired hypothyroidism ()         Type 2 diabetes with nephropathy (HCC) (5/2/2018)         Class 3 severe obesity with serious comorbidity and body mass index (BMI) of 40.0 to 44.9 in adult [facility]) (5/2/2018)         Coronary artery disease with stable angina pectoris ([facility] (2/3/2020)         ESRD on peritoneal dialysis ([facility] [address] [facility] (8/28/2021)         Acute hypokalemia (8/28/2021)       COVID-19 in immunocompromised patient ([facility] [address] [facility] (9/2/2021)         Cigarette nicotine dependence in remission (9/2/2021)         COVID-19 vaccination not done (9/2/2021)         Leukocytosis (9/8/2021)        Results for Sienna Burger" (MRN [id number]) as of 9/9/2021 12:39      9/9/2021 06:39   Sodium: 136   Potassium: 4.7   Chloride: 99   CO2: 19 (L)   Anion gap: 18 (H)   Glucose: 75   BUN: 116 (H)   Creatinine: 8.94 (H)   Calcium: 8.2 (L)   GFR est non-AA: 5 (L)   GFR est AA: 6 (L)      9/9/2021 10:47   Procalcitonin: 0.38   Results for Sienna Burger" (MRN [id number]) as of 9/9/2021 12:39      9/9/2021 06:39   WBC: 22.3 (H)   RBC: 4.69   HGB: 14.8   HCT: 43.6   MCV: 93.0   MCH: 31.6   MCHC: 33.9   RDW: 13.0   PLATELET: 475   MPV: 11.1   ABSOLUTE NRBC: 0.07             Plan:       1. ESRD on PD all 2.5% PD fluid   PD tonight            2. COVID 19+/acute resp failure with hypoxemia       Medications,   albuterol (PROVENTIL HFA, VENTOLIN HFA, PROAIR HFA) inhaler 2 Puff    Dose: 2 Puff   Freq: 4 TIMES DAILY RESP Route: IN   Start: 09/02/21 0826    Admin Instructions:      aspirin delayed-release tablet 81 mg    Dose: 81 mg   Freq: 7AM Route: PO   Start: 09/03/21 0700    Admin Instructions:   Do not crush, break or chew.  Swallow whole.       budesonide-formoteroL (SYMBICORT) 160-4.5 mcg/actuation HFA inhaler 2 Puff    Dose: 2 Puff   Freq: 2 TIMES DAILY RESP Route: IN   Start: 09/02/21 0826    Admin Instructions:      ciprofloxacin (CETRAXAL) 0.2 % otic solution 0.25 mL    Dose: 0.25 mL   Freq: EVERY 12 HOURS Route: LEFT EAR   Start: 09/06/21 2100    Admin Instructions:      dexAMETHasone (DECADRON) tablet 4 mg    Dose: 4 mg   Freq: EVERY 12 HOURS Route: PO   Start: 09/03/21 2100    Admin Instructions:               Viral Illness, Acute - Care Day 7 (9/8/2021) by Michael Garg RN       Review Status Review Entered   Completed 9/9/2021 12:33      Criteria Review      Care Day: 7 Care Date: 9/8/2021 Level of Care: Inpatient Floor    Guideline Day 3    Level Of Care    (X) Floor to discharge    9/9/2021 12:33:33 EDT by Drew Cortez      floor    Clinical Status    ( ) * Hemodynamic stability    ( ) * Afebrile or temperature acceptable for next level of care    ( ) * Tachypnea absent    ( ) * Hypoxemia absent    ( ) * Mental status at baseline    ( ) * Renal function at baseline, or stable and acceptable for next level of care    ( ) * Discharge plans and education understood    Activity    ( ) * Ambulatory or acceptable for next level of care    Routes    ( ) * Oral hydration    ( ) * Oral medications or regimen acceptable for next level of care    ( ) * Oral diet or acceptable for next level of care    Interventions    ( ) * Isolation not indicated, or is performable at next level of care    Medications    ( ) * Antimicrobial medication absent or regimen established for next level of care    * Milestone   Additional Notes       Chief Complaint: Shortness of Breath   Reason(s) for Admission: Acute hypoxemic respiratory failure due to COVID-19 ([facility] [address] [U07.1, J96.01]        Hospital Course & Interval History:   59 y. o. female with medical history of ESRD on PD, Class III Obesity, IDDM, CAD s/p CABG and stents who presented with increasing SOB diagnosed with covid x 10 days ago. Briefly hospitalized and discharged stable on RA on 8/29. Sudden onset SOB and coughing on 9/1/21. Presented to [facility] on 9/2 via EMS who and she was found to be hypoxic at 77-80%. She was placed on 10LNC and readmitted.        Subjective (09/08/21): She is asking for something for her ear pain. SOB improving. Maddie CP. Maddie F/C.  Denies N/V/D.       Review of systems negative except stated above.       Assessment & Plan:       Principal Problem:     Acute respiratory failure with hypoxia ([facility] [address] (9/2/2021)   - Due to COVID   - Currently unstable on 60L/100% Airvo   - Continue Decadron   - Continue Lasix   - Continue aerosols   - Wean oxygen as appropriate       Active Problems:     COVID-19 in immunocompromised patient ([facility] [address] (9/2/2021)   - First symptoms 8/21, positive test 8/23   - Continue Decadron   - Not a candidate for Remdesivir due to >7 days and on PD   - Does not meet criteria for Actemra   - CRP 9.8         Acute hypokalemia (8/28/2021)   - Per Nephrology         Leukocytosis (9/8/2021)   - Likely due to steroids   - Worse today   - Check CBC daily         Type 2 diabetes with nephropathy ([facility] (5/2/2018)   - Glucose well controlled   - Continue Lantus   - Continue Humalog         ESRD on peritoneal dialysis ([facility] (8/28/2021)   - Per Nephrology         Class 3 severe obesity with serious comorbidity and body mass index (BMI) of 40.0 to 44.9 in adult [facility]) (5/2/2018)       Diet:  ADULT DIET Regular; 4 carb choices (60 gm/meal); Low Fat/Low Chol/High Fiber/BERNIE; Low Potassium (Less than 3000 mg/day);  Low Phosphorus (Less than 1000 mg)   DVT PPx: Heparin   Code status: Full Code       Hospital Problems as of 9/8/2021 Date Reviewed: 9/2/2021     Codes Class Noted - Resolved POA     * (Principal) Acute respiratory failure with hypoxia ([facility] ICD-10-CM: J96.01   ICD-9-CM: 518.81   9/2/2021 - Present Yes           COVID-19 in immunocompromised patient [facility]) ICD-10-CM: U07.1, D84.9   ICD-9-CM: 079.89, 279.9   9/2/2021 - Present Yes           Leukocytosis ICD-10-CM: F85.281   ICD-9-CM: 288.60   9/8/2021 - Present Yes           Acute hypokalemia ICD-10-CM: E87.6   ICD-9-CM: 276.8   8/28/2021 - Present Yes           Type 2 diabetes with nephropathy ([facility] ICD-10-CM: E11.21   ICD-9-CM: 250.40, 583.81   5/2/2018 - Present Yes           ESRD on peritoneal dialysis ([address] (Chronic) ICD-10-CM: N18.6, Z99.2   ICD-9-CM: 585.6, V45.11   8/28/2021 - Present Yes           Coronary artery disease with stable angina pectoris (HCC) ICD-10-CM: I25.118   ICD-9-CM: 414.00, 413.9   2/3/2020 - Present Yes           Cigarette nicotine dependence in remission ICD-10-CM: E60.359   ICD-9-CM: V15.82   9/2/2021 - Present Yes           COVID-19 vaccination not done ICD-10-CM: Z28.9   ICD-9-CM: V64.00   9/2/2021 - Present Yes           Class 3 severe obesity with serious comorbidity and body mass index (BMI) of 40.0 to 44.9 in adult [facility]) ICD-10-CM: E66.01, Z68.41   ICD-9-CM: 278.01, V85.41   5/2/2018 - Present Yes           Mixed hyperlipidemia ICD-10-CM: E78.2   ICD-9-CM: 272.2   Unknown - Present Yes           Acquired hypothyroidism ICD-10-CM: E03.9   ICD-9-CM: 244. 9   Unknown - Present Yes           PVD (peripheral vascular disease) ([facility] ICD-10-CM: I73.9   ICD-9-CM: 443. 9   8/18/2016 - Present                       Objective:       Patient Vitals for the past 24 hrs:     Temp Pulse Resp BP SpO2   09/08/21 1140 - - - - 90 %   09/08/21 1125 98 °F (36.7 °C) 72 20 95/65 91 %   09/08/21 0941 - - - - 90 %   09/08/21 0830 97.6 °F (36.4 °C) 78 20 115/80 91 %   09/08/21 0242 97.6 °F (36.4 °C) 76 17 (!) 104/57 93 %   09/07/21 2321 97.4 °F (36.3 °C) 71 18 (!) 93/59 93 %   09/07/21 2211 - - - - 92 %   09/07/21 2012 - - - - 92 %   09/07/21 2007 97.5 °F (36.4 °C) 78 18 116/70 91 %   09/1958 97.7 °F (36.5 °C) 72 20 (!) 145/72 -       Oxygen Therapy   O2 Sat (%): 90 % (09/08/21 1140)   Pulse via Oximetry: 67 beats per minute (09/08/21 1140)   O2 Device: Hi flow nasal cannula; Heated (nrb) (09/08/21 1140)   Skin Assessment: Clean, dry, & intact (09/08/21 0830)   Skin Protection for O2 Device: No (09/08/21 0830)   O2 Flow Rate (L/min): 60 l/min (09/08/21 1140)   O2 Temperature: 87.8 °F (31 °C) (09/08/21 0941)   FIO2 (%): 100 % (09/08/21 1140)       Estimated body mass index is 39.85 kg/m² as calculated from the following:     Height as of this encounter: 5' 8\" (1.727 m).     Weight as of this encounter: 118.9 kg (262 lb 1.6 oz).        Intake/Output Summary (Last 24 hours) at 9/8/2021 1301   Last data filed at 9/8/2021 0926   Gross per 24 hour   Intake 0 ml   Output 464 ml   Net -464 ml            Physical Exam:    General:        Well nourished.  No overt distress   Head:             Normocephalic, atraumatic   Eyes:             Sclerae appear normal.  Pupils equally round. ENT:              Nares appear normal, no drainage.  Moist oral mucosa   Neck:             No restricted ROM.  Trachea midline          CV:                RRR.  No m/r/g.  No jugular venous distension. Lungs:           Scattered rhonchi bilaterally.  No wheezing.  Respirations even, unlabored   Abdomen:      Bowel sounds present.  Soft, nontender, nondistended.    Extremities:   No cyanosis or clubbing.  No edema   Skin:              No rashes and normal coloration.   Warm and dry.     Neuro:           Cranial nerves II-XII grossly intact.   Sensation intact   Psych:           Normal mood and affect.  Alert and oriented x3       I have reviewed ordered lab tests and independently visualized imaging below:       Last 24hr Labs:   Recent Results   Recent Results (from the past 24 hour(s))   GLUCOSE, POC     Collection Time: 09/07/21  4:07 PM   Result Value Ref Range     Glucose (POC) 192 (H) 65 - 100 mg/dL     Performed by Yasmani     GLUCOSE, POC     Collection Time: 09/07/21  9:19 PM   Result Value Ref Range     Glucose (POC) 190 (H) 65 - 100 mg/dL     Performed by Lulu     CBC W/O DIFF     Collection Time: 09/08/21  6:59 AM   Result Value Ref Range     WBC 17.7 (H) 4.3 - 11.1 K/uL     RBC 4.74 4.05 - 5.2 M/uL     HGB 14.4 11.7 - 15.4 g/dL     HCT 42.7 35.8 - 46.3 %     MCV 90.1 79.6 - 97.8 FL     MCH 30.4 26.1 - 32.9 PG     MCHC 33.7 31.4 - 35.0 g/dL     RDW 12.4 11.9 - 14.6 %     PLATELET 977 254 - 252 K/uL     MPV 11.1 9.4 - 12.3 FL     ABSOLUTE NRBC 0.00 0.0 - 0.2 K/uL   METABOLIC PANEL, BASIC     Collection Time: 09/08/21  6:59 AM   Result Value Ref Range     Sodium 138 136 - 145 mmol/L     Potassium 4.5 3.5 - 5.1 mmol/L     Chloride 101 98 - 107 mmol/L     CO2 21 21 - 32 mmol/L     Anion gap 16 7 - 16 mmol/L     Glucose 125 (H) 65 - 100 mg/dL      (H) 8 - 23 MG/DL     Creatinine 8.19 (H) 0.6 - 1.0 MG/DL     GFR est AA 6 (L) >60 ml/min/1.73m2     GFR est non-AA 5 (L) >60 ml/min/1.73m2     Calcium 8.4 8.3 - 10.4 MG/DL   GLUCOSE, POC     Collection Time: 09/08/21  7:11 AM   Result Value Ref Range     Glucose (POC) 115 (H) 65 - 100 mg/dL     Performed by [facility]     GLUCOSE, POC     Collection Time: 09/08/21 12:04 PM   Result Value Ref Range     Glucose (POC) 165 (H) 65 - 100 mg/dL     Performed by [facility]                All Micro Results      Procedure Component Value Units Date/Time     EBENEZER Jang, UR/CSF [[id number]] Collected: 09/02/21 1425     Order Status: Canceled       EBENEZER Jang UR/CSF [[id number]] Collected: 09/02/21 1421     Order Status: Canceled       MSSA/MRSA SC BY PCR, NASAL SWAB [[id number]] Collected: 09/02/21 1054     Order Status: Completed Specimen: Nasal swab Updated: 09/02/21 1253       Special Requests: NO SPECIAL REQUESTS         Culture result:           SA target not detected.                                 A MRSA NEGATIVE, SA NEGATIVE test result does not preclude MRSA or SA nasal colonization.            EBENEZER Jang UR/CSF [[id number]] Collected: 09/02/21 0653     Order Status: Canceled                 SARS-CoV-2 Lab Results   \"Novel Coronavirus\" Test: No results found for: COV2NT    \"Emergent Disease\" Test: No results found for: EDPR   \"SARS-COV-2\" Test: No results found for: XGCOVT   \"Precision Labs\" Test: No results found for: RSLT   Rapid Test:       Lab Results   Component Value Date/Time     COVR Detected (AA) 08/28/2021 01:08 AM               Imaging:   No results found.    No results found for this visit on 09/02/21.       Current Meds:   Current Facility-Administered Medications   Medication Dose Route Frequency   · insulin glargine (LANTUS) injection 40 Units 40 Units SubCUTAneous BID   · ciprofloxacin (CETRAXAL) 0.2 % otic solution 0.25 mL 0.25 mL Left Ear Q12H   · dexAMETHasone (DECADRON) tablet 4 mg 4 mg Oral Q12H   · azithromycin (ZITHROMAX) tablet 500 mg 500 mg Oral DAILY   · sodium chloride (NS) flush 5-10 mL 5-10 mL IntraVENous Q8H   · sodium chloride (NS) flush 5-10 mL 5-10 mL IntraVENous PRN   · ergocalciferol capsule 50,000 Units 50,000 Units Oral Q7D   · cetirizine (ZYRTEC) tablet 10 mg 10 mg Oral DAILY   · fluticasone propionate (FLONASE) 50 mcg/actuation nasal spray 2 Spray 2 Spray Both Nostrils DAILY   · amLODIPine (NORVASC) tablet 5 mg 5 mg Oral DAILY   · albuterol (PROVENTIL HFA, VENTOLIN HFA, PROAIR HFA) inhaler 2 Puff 2 Puff Inhalation QID RT   · budesonide-formoteroL (SYMBICORT) 160-4.5 mcg/actuation HFA inhaler 2 Puff 2 Puff Inhalation BID RT   · sodium chloride (NS) flush 5-40 mL 5-40 mL IntraVENous Q8H   · sodium chloride (NS) flush 5-40 mL 5-40 mL IntraVENous PRN   · acetaminophen (TYLENOL) tablet 650 mg 650 mg Oral Q6H PRN     Or   · acetaminophen (TYLENOL) suppository 650 mg 650 mg Rectal Q6H PRN   · polyethylene glycol (MIRALAX) packet 17 g 17 g Oral DAILY PRN   · ondansetron (ZOFRAN ODT) tablet 4 mg 4 mg Oral Q8H PRN     Or   · ondansetron (ZOFRAN) injection 4 mg 4 mg IntraVENous Q6H PRN   · alum-mag hydroxide-simeth (MYLANTA) oral suspension 15 mL 15 mL Oral Q6H PRN   · heparin (porcine) injection 7,500 Units 7,500 Units SubCUTAneous Q8H   · guaiFENesin-dextromethorphan (ROBITUSSIN DM) 100-10 mg/5 mL syrup 5 mL 5 mL Oral Q4H PRN   · [START ON 9/9/2021] cholecalciferol (VITAMIN D3) (1000 Units /25 mcg) tablet 2,000 Units 2,000 Units Oral DAILY   · clopidogreL (PLAVIX) tablet 75 mg 75 mg Oral DAILY   · escitalopram oxalate (LEXAPRO) tablet 20 mg 20 mg Oral BID   · nitroglycerin (NITROSTAT) tablet 0.4 mg 0.4 mg SubLINGual Q5MIN PRN   · ranolazine ER (RANEXA) tablet 500 mg 500 mg Oral Q12H   · isosorbide mononitrate ER (IMDUR) tablet 120 mg 120 mg Oral DAILY   · [Held by provider] hydrALAZINE (APRESOLINE) tablet 25 mg 25 mg Oral TID   · furosemide (LASIX) tablet 60 mg 60 mg Oral BID   · aspirin delayed-release tablet 81 mg 81 mg Oral 7am   · traZODone (DESYREL) tablet 100 mg 100 mg Oral QHS   · LORazepam (ATIVAN) tablet 0.5 mg 0.5 mg Oral Q4H PRN   · insulin lispro (HUMALOG) injection SubCUTAneous AC&HS   · dextrose 40% (GLUTOSE) oral gel 1 Tube 15 g Oral PRN   · glucagon (GLUCAGEN) injection 1 mg 1 mg IntraMUSCular PRN   · dextrose (D50W) injection syrg 12.5-25 g 25-50 mL IntraVENous PRN   · metoprolol succinate (TOPROL-XL) XL tablet 50 mg 50 mg Oral DAILY   · levothyroxine (SYNTHROID) tablet 125 mcg 125 mcg Oral ACB   · insulin lispro (HUMALOG) injection 24 Units 0.2 Units/kg SubCUTAneous TID WITH MEALS             Viral Illness, Acute - Care Day 6 (9/7/2021) by Florinda Sarah RN       Review Status Review Entered   Completed 9/9/2021 12:32      Criteria Review      Care Day: 6 Care Date: 9/7/2021 Level of Care: Inpatient Floor    Guideline Day 3    Level Of Care    (X) Floor to discharge    9/9/2021 12:32:13 EDT by Lazaro Chacko      floor    Clinical Status    ( ) * Hemodynamic stability    ( ) * Afebrile or temperature acceptable for next level of care    ( ) * Tachypnea absent    ( ) * Hypoxemia absent    ( ) * Mental status at baseline    ( ) * Renal function at baseline, or stable and acceptable for next level of care    ( ) * Discharge plans and education understood    Activity    ( ) * Ambulatory or acceptable for next level of care    Routes    ( ) * Oral hydration    ( ) * Oral medications or regimen acceptable for next level of care    ( ) * Oral diet or acceptable for next level of care    Interventions    ( ) * Isolation not indicated, or is performable at next level of care    Medications    ( ) * Antimicrobial medication absent or regimen established for next level of care    * Milestone   Additional Notes   Presenting Complaint: Shortness of Breath       Reason(s) for Admission: Acute hypoxemic respiratory failure due to COVID-19 ([facility] [U07.1, J96.01]        Hospital Course & Interval History:   Sondra Mccall a 59 y. o. female with medical history of ESRD on PD (started this year), Class III Obesity, IDDM, CAD s/p CABG and stents who presented with increasing SOB diagnosed with covid x 10 days ago. Briefly hospitalized and discharged stable on RA on 8/29. Sudden onset SOB and coughing yesterday. Presented to [facility] on 9/2 via EMS who  notes patient to be hypoxic at 77-80%. 7.48/28.8/57/21.5 spo2 92% on 15L        PD f/b Dr Crane Risk        CAD s/p CABG and stents f/b Dr Gerardo Hdz        Former smoker. 30 pack year history        Wants only 5 days on the vent if intubated.        Subjective (09/07/21): Patient seen and examined.  Patient complains of left ear pain.  Patient complains of shortness of breath.  Patient denies fever chills chest pain       Assessment & Plan:   Acute hypoxemic respiratory failure 2/2 COVID    +COVID 8/28   Unvaccinated    15L HF. Anticipate escalation to airvo.    OK with intubation but only wants max 5 days on mechanical vent.    Not a canididate for rem    Consented to Actemra (CRP 9.8)    Pseudo hypocalcemia - corrected 8.4    Procal 0.19. low suspicion for HAP at this time. High risk for ARDS, VTE, septic shock and invasive infections   9/3-continue Actemra.  Clinically not improving and continues to require 15 L nasal cannula.  Will transition patient to oral Decadron and add azithromycin to current regimen and continue albuterol with guaifenesin and Symbicort. 9/4-patient is status post Actemra.  Patient rapidly declining and now on 60 L of heated high flow.  Continue oral Decadron azithromycin albuterol and guaifenesin.  Patient continues to be at high risk for further decompensation. 9/5-patient status post Actemra.  Continue Decadron azithromycin and albuterol.  Not clinically improving and continues to be at risk for rapid decompensation requiring mechanical ventilation. 9/6-continues to require 60 L of oxygen supplementation.  Patient at risk for rapid decompensation.  Patient status post at term a continue oral Decadron and azithromycin   9/7-now requiring heated high flow at 50 L.  Continue Decadron and azithromycin.  Patient now on day 5 of Decadron.  Patient is status post Actemra.  Patient at significant risk for rapid decompensation.  Not clinically improving           T2DM w/ DM nephropathy, h/o OSTEO, PVD - high insulin needs. lantus plus prandal and SSI. Titrate <190 BGL    9/3-patient's blood sugars currently to goal on Lantus 48 units subcu daily and mealtime coverage at 24 units with each meal and sliding scale.  We will continue to monitor. 9/4-poorly controlled.  Will increase patient's Lantus to 60 units subcu daily and continue mealtime coverage of 24 units with each meal and monitor blood sugars. 9/5-blood sugars controlled on Lantus 60 units daily and 24 units with each meal.  Continue to monitor on steroid therapy. 9/6-poorly controlled.  Will adjust patient's Lantus to 40 units twice daily and continue 24 units with each meal and sliding scale and monitor. 9/7-blood sugar significantly improved on Lantus 40 units twice daily and 24 units with meals and sliding scale.  Continue to monitor           ESRD on PD - 2/2 DM. Started in January. Consult nephrology.     9/3-nephrology consulted and managing. 9/4-Per nephrology   9/7-peritoneal dialysis per nephrology           CAD s/p CABG and stents - DAPT, BB,    Vitamin D deficiency - level 11. Start high dose replacement. 9/3-stable with no acute process.  Continue dual antiplatelet therapy beta-blocker and vitamin D replacement       Uncontrolled HTN - restart home meds.    9/3-to goal on home regimen continue to monitor. 9/4-patient's blood pressures downtrending and now low normal.  Will hold 3 times daily hydralazine and monitor.    9/5-blood pressures improved with holding hydralazine.  Continue to monitor off hydralazine. 9/6-continue holding hydralazine blood pressure currently to goal   9/7-blood pressures improving.  We will continue monitoring off hydralazine.       Otitis media-   9/6 we will start patient on Cipro otic drops and monitor   9/7 Cipro Cipro otic.  Patient continues to complain of severe ear pain.  Unfortunately hospital does not carry any otic numbing agent for patient's ear pain.  Patient's family asked to bring in medication from outpatient pharmacy and will provide to patient.  This was discussed with pharmacy.           Diet: ADULT DIET Regular; 4 carb choices (60 gm/meal); Low Fat/Low Chol/High Fiber/BERNIE; Low Potassium (Less than 3000 mg/day);  Low Phosphorus (Less than 1000 mg)   VTE ppx: heparin 7500 K q8h    Code status: Full Code               Active Hospital Problems     Diagnosis Date Noted   · Acute hypoxemic respiratory failure due to COVID-19 ([facility] 09/02/2021   · COVID-19 in immunocompromised patient ([facility] 09/02/2021   · Cigarette nicotine dependence in remission 09/02/2021   · COVID-19 vaccination not done 09/02/2021   · ESRD on peritoneal dialysis ([facility] 08/28/2021   · Acute hypokalemia 08/28/2021   · Coronary artery disease with stable angina pectoris ([facility] 02/03/2020   · Class 3 severe obesity with serious comorbidity and body mass index (BMI) of 40.0 to 44.9 in adult [facility]) 05/02/2018   · Type 2 diabetes with nephropathy ([facility] 05/02/2018   · PVD (peripheral vascular disease) ([facility] 08/18/2016   · Acquired hypothyroidism     · Mixed hyperlipidemia             Objective:       Patient Vitals for the past 24 hrs:     Temp Pulse Resp BP SpO2   09/07/21 1110 97.7 °F (36.5 °C) 72 - - 93 %   09/07/21 0853 - - - - 92 %   09/07/21 0722 97.3 °F (36.3 °C) 78 20 (!) 145/72 90 %   09/07/21 0323 97.5 °F (36.4 °C) 69 22 (!) 122/56 92 %   09/06/21 2251 97.9 °F (36.6 °C) 85 18 (!) 132/51 94 %   09/06/21 2032 - - - - 90 %   09/06/21 1927 97.6 °F (36.4 °C) 66 24 (!) 129/57 94 %   09/06/21 1524 - - - - 96 %   09/06/21 1511 97.7 °F (36.5 °C) 64 20 (!) 120/56 92 %       Oxygen Therapy   O2 Sat (%): 93 % (09/07/21 1110)   Pulse via Oximetry: 72 beats per minute (09/07/21 0853)   O2 Device: Heated; Hi flow nasal cannula (09/07/21 0853)   Skin Assessment: Clean, dry, & intact (09/07/21 1873)   Skin Protection for O2 Device: No (09/07/21 0722)   O2 Flow Rate (L/min): 60 l/min (09/07/21 0853)   O2 Temperature: 87.8 °F (31 °C) (09/07/21 0722)   FIO2 (%): 100 % (09/07/21 0853)       Estimated body mass index is 39.85 kg/m² as calculated from the following:     Height as of this encounter: 5' 8\" (1.727 m).     Weight as of this encounter: 118.9 kg (262 lb 1.6 oz).     Intake/Output Summary (Last 24 hours) at 9/7/2021 1131   Last data filed at 9/7/2021 1054   Gross per 24 hour   Intake 480 ml   Output 400 ml   Net 80 ml            Physical Exam:        General:          Well nourished.  No overt distress   Head:               Normocephalic, atraumatic   Eyes:               Sclerae appear normal.  Pupils equally round. ENT:                Nares appear normal, no drainage.  Moist oral mucosa.  Left ear tympanic membrane intact and erythematous with fluid collection   Neck:               No restricted ROM.  Trachea midline    CV:                  RRR.  No m/r/g.  No jugular venous distension. Lungs:             Crackles bilaterally. no wheezing, rhonchi, or rales.  Respirations even, unlabored   Abdomen:        Bowel sounds present.  Soft, nontender, nondistended.    Extremities:     No cyanosis or clubbing.  No edema   Skin:                No rashes and normal coloration.   Warm and dry.     Neuro:             Cranial nerves II-XII grossly intact.   Sensation intact   Psych:             Normal mood and affect.  Alert and oriented x3       I have reviewed ordered lab tests and independently visualized imaging below:       Last 24hr Labs:   Recent Results   Recent Results (from the past 24 hour(s))   GLUCOSE, POC     Collection Time: 09/06/21 12:11 PM   Result Value Ref Range     Glucose (POC) 242 (H) 65 - 100 mg/dL     Performed by Ashutosh     GLUCOSE, POC     Collection Time: 09/06/21  4:03 PM   Result Value Ref Range     Glucose (POC) 263 (H) 65 - 100 mg/dL     Performed by Ashutosh     GLUCOSE, POC     Collection Time: 09/06/21  8:45 PM   Result Value Ref Range     Glucose (POC) 299 (H) 65 - 100 mg/dL     Performed by Pedrito     GLUCOSE, POC     Collection Time: 09/07/21  7:36 AM   Result Value Ref Range     Glucose (POC) 182 (H) 65 - 100 mg/dL     Performed by Yasmani     GLUCOSE, POC     Collection Time: 09/07/21 11:07 AM   Result Value Ref Range     Glucose (POC) 194 (H) 65 - 100 mg/dL     Performed by Yasmani                All Micro Results      Procedure Component Value Units Date/Time     EBENEZER Castañeda, UR/CSF [[id number]] Collected: 09/02/21 1425     Order Status: Canceled       S. Mack Castañeda, UR/CSF [[id number]] Collected: 09/02/21 1421     Order Status: Canceled       MSSA/MRSA SC BY PCR, NASAL SWAB [[id number]] Collected: 09/02/21 1054     Order Status: Completed Specimen: Nasal swab Updated: 09/02/21 1253       Special Requests: NO SPECIAL REQUESTS         Culture result:           SA target not detected.                                 A MRSA NEGATIVE, SA NEGATIVE test result does not preclude MRSA or SA nasal colonization.            EBENEZER Castañeda, UR/CSF [[id number]] Collected: 09/02/21 0653     Order Status: Canceled                 Other Studies:   No results found.       Current Meds:   Current Facility-Administered Medications   Medication Dose Route Frequency   · insulin glargine (LANTUS) injection 40 Units 40 Units SubCUTAneous BID   · ciprofloxacin (CETRAXAL) 0.2 % otic solution 0.25 mL 0.25 mL Left Ear Q12H   · dexAMETHasone (DECADRON) tablet 4 mg 4 mg Oral Q12H   · azithromycin (ZITHROMAX) tablet 500 mg 500 mg Oral DAILY   · sodium chloride (NS) flush 5-10 mL 5-10 mL IntraVENous Q8H   · sodium chloride (NS) flush 5-10 mL 5-10 mL IntraVENous PRN   · ergocalciferol capsule 50,000 Units 50,000 Units Oral Q7D   · cetirizine (ZYRTEC) tablet 10 mg 10 mg Oral DAILY   · fluticasone propionate (FLONASE) 50 mcg/actuation nasal spray 2 Spray 2 Spray Both Nostrils DAILY   · amLODIPine (NORVASC) tablet 5 mg 5 mg Oral DAILY   · albuterol (PROVENTIL HFA, VENTOLIN HFA, PROAIR HFA) inhaler 2 Puff 2 Puff Inhalation QID RT   · budesonide-formoteroL (SYMBICORT) 160-4.5 mcg/actuation HFA inhaler 2 Puff 2 Puff Inhalation BID RT   · sodium chloride (NS) flush 5-40 mL 5-40 mL IntraVENous Q8H   · sodium chloride (NS) flush 5-40 mL 5-40 mL IntraVENous PRN   · acetaminophen (TYLENOL) tablet 650 mg 650 mg Oral Q6H PRN     Or   · acetaminophen (TYLENOL) suppository 650 mg 650 mg Rectal Q6H PRN   · polyethylene glycol (MIRALAX) packet 17 g 17 g Oral DAILY PRN   · ondansetron (ZOFRAN ODT) tablet 4 mg 4 mg Oral Q8H PRN     Or   · ondansetron (ZOFRAN) injection 4 mg 4 mg IntraVENous Q6H PRN   · alum-mag hydroxide-simeth (MYLANTA) oral suspension 15 mL 15 mL Oral Q6H PRN   · heparin (porcine) injection 7,500 Units 7,500 Units SubCUTAneous Q8H   · guaiFENesin-dextromethorphan (ROBITUSSIN DM) 100-10 mg/5 mL syrup 5 mL 5 mL Oral Q4H PRN   · cholecalciferol (VITAMIN D3) tablet 6,000 Units 6,000 Units Oral DAILY     Followed by   · [START ON 9/9/2021] cholecalciferol (VITAMIN D3) (1000 Units /25 mcg) tablet 2,000 Units 2,000 Units Oral DAILY   · clopidogreL (PLAVIX) tablet 75 mg 75 mg Oral DAILY   · escitalopram oxalate (LEXAPRO) tablet 20 mg 20 mg Oral BID   · nitroglycerin (NITROSTAT) tablet 0.4 mg 0.4 mg SubLINGual Q5MIN PRN   · ranolazine ER (RANEXA) tablet 500 mg 500 mg Oral Q12H   · isosorbide mononitrate ER (IMDUR) tablet 120 mg 120 mg Oral DAILY   · [Held by provider] hydrALAZINE (APRESOLINE) tablet 25 mg 25 mg Oral TID   · furosemide (LASIX) tablet 60 mg 60 mg Oral BID   · aspirin delayed-release tablet 81 mg 81 mg Oral 7am   · traZODone (DESYREL) tablet 100 mg 100 mg Oral QHS   · LORazepam (ATIVAN) tablet 0.5 mg 0.5 mg Oral Q4H PRN   · insulin lispro (HUMALOG) injection SubCUTAneous AC&HS   · dextrose 40% (GLUTOSE) oral gel 1 Tube 15 g Oral PRN   · glucagon (GLUCAGEN) injection 1 mg 1 mg IntraMUSCular PRN   · dextrose (D50W) injection syrg 12.5-25 g 25-50 mL IntraVENous PRN   · metoprolol succinate (TOPROL-XL) XL tablet 50 mg 50 mg Oral DAILY   · levothyroxine (SYNTHROID) tablet 125 mcg 125 mcg Oral ACB   · insulin lispro (HUMALOG) injection 24 Units 0.2 Units/kg SubCUTAneous TID WITH MEALS             [facility], Acute - Care Day 5 (9/6/2021) by Aurea Garcia RN       Review Status Review Entered   Completed 9/9/2021 12:30      Criteria Review      Care Day: 5 Care Date: 9/6/2021 Level of Care: Inpatient Floor    Guideline Day 3    Level Of Care    (X) Floor to discharge    9/9/2021 12:30:31 EDT by Dyllan Biswas      floor    Clinical Status    ( ) * Hemodynamic stability    ( ) * Afebrile or temperature acceptable for next level of care    ( ) * Tachypnea absent    ( ) * Hypoxemia absent    ( ) * Mental status at baseline    ( ) * Renal function at baseline, or stable and acceptable for next level of care    ( ) * Discharge plans and education understood    Activity    ( ) * Ambulatory or acceptable for next level of care    Routes    ( ) * Oral hydration    ( ) * Oral medications or regimen acceptable for next level of care    ( ) * Oral diet or acceptable for next level of care    Interventions    ( ) * Isolation not indicated, or is performable at next level of care    Medications    ( ) * Antimicrobial medication absent or regimen established for next level of care    * Milestone   Additional Notes   Presenting Complaint: Shortness of Breath       Reason(s) for Admission: Acute hypoxemic respiratory failure due to COVID-19 [facility]) [U07.1, J96.01]        Hospital Course & Interval History:   Nathaniel Aragon Jaylan Mckinney a 59 y. o. female with medical history of ESRD on PD (started this year), Class III Obesity, IDDM, CAD s/p CABG and stents who presented with increasing SOB diagnosed with covid x 10 days ago. Briefly hospitalized and discharged stable on RA on 8/29. Sudden onset SOB and coughing yesterday. Presented to [facility] on 9/2 via EMS who  notes patient to be hypoxic at 77-80%. 7.48/28.8/57/21.5 spo2 92% on 15L        PD f/b Dr Reji Neri        CAD s/p CABG and stents f/b Dr Romaine Traylor        Former smoker. 30 pack year history        Wants only 5 days on the vent if intubated.        Subjective (09/06/21): Patient seen and examined.  Patient complaining of left ear pain and ear fullness.  Patient denies fever chills chest pain.  Patient continues to complain of shortness of breath.       Assessment & Plan:   Acute hypoxemic respiratory failure 2/2 COVID    +COVID 8/28   Unvaccinated    15L HF. Anticipate escalation to airvo.    OK with intubation but only wants max 5 days on mechanical vent.    Not a canididate for rem    Consented to Actemra (CRP 9.8)    Pseudo hypocalcemia - corrected 8.4    Procal 0.19. low suspicion for HAP at this time. High risk for ARDS, VTE, septic shock and invasive infections   9/3-continue Actemra.  Clinically not improving and continues to require 15 L nasal cannula.  Will transition patient to oral Decadron and add azithromycin to current regimen and continue albuterol with guaifenesin and Symbicort. 9/4-patient is status post Actemra.  Patient rapidly declining and now on 60 L of heated high flow.  Continue oral Decadron azithromycin albuterol and guaifenesin.  Patient continues to be at high risk for further decompensation. 9/5-patient status post Actemra.  Continue Decadron azithromycin and albuterol.  Not clinically improving and continues to be at risk for rapid decompensation requiring mechanical ventilation. 9/6-continues to require 60 L of oxygen supplementation.  Patient at risk for rapid decompensation.  Patient status post at term a continue oral Decadron and azithromycin       T2DM w/ DM nephropathy, h/o OSTEO, PVD - high insulin needs. lantus plus prandal and SSI. Titrate <190 BGL    9/3-patient's blood sugars currently to goal on Lantus 48 units subcu daily and mealtime coverage at 24 units with each meal and sliding scale.  We will continue to monitor. 9/4-poorly controlled.  Will increase patient's Lantus to 60 units subcu daily and continue mealtime coverage of 24 units with each meal and monitor blood sugars. 9/5-blood sugars controlled on Lantus 60 units daily and 24 units with each meal.  Continue to monitor on steroid therapy. 9/6-poorly controlled.  Will adjust patient's Lantus to 40 units twice daily and continue 24 units with each meal and sliding scale and monitor.       ESRD on PD - 2/2 DM. Started in January. Consult nephrology.     9/3-nephrology consulted and managing. 9/4-Per nephrology       CAD s/p CABG and stents - DAPT, BB,    Vitamin D deficiency - level 11. Start high dose replacement. 9/3-stable with no acute process.  Continue dual antiplatelet therapy beta-blocker and vitamin D replacement       Uncontrolled HTN - restart home meds.    9/3-to goal on home regimen continue to monitor. 9/4-patient's blood pressures downtrending and now low normal.  Will hold 3 times daily hydralazine and monitor. 9/5-blood pressures improved with holding hydralazine.  Continue to monitor off hydralazine. 9/6-continue holding hydralazine blood pressure currently to goal           Otitis media-we will start patient on Cipro otic drops and monitor   Diet: ADULT DIET Regular; 4 carb choices (60 gm/meal); Low Fat/Low Chol/High Fiber/BERNIE; Low Potassium (Less than 3000 mg/day);  Low Phosphorus (Less than 1000 mg)   VTE ppx: heparin 7500 K q8h    Code status: Full Code               Active Hospital Problems     Diagnosis Date Noted   · Acute hypoxemic respiratory failure due to COVID-19 [facility]) 09/02/2021   · COVID-19 in immunocompromised patient ([facility] 09/02/2021   · Cigarette nicotine dependence in remission 09/02/2021   · COVID-19 vaccination not done 09/02/2021   · ESRD on peritoneal dialysis ([place] [id number].) 08/28/2021   · Acute hypokalemia 08/28/2021   · Coronary artery disease with stable angina pectoris ([place] [address] 02/03/2020   · Class 3 severe obesity with serious comorbidity and body mass index (BMI) of 40.0 to 44.9 in adult [facility]) 05/02/2018   · Type 2 diabetes with nephropathy ([place] [address] 05/02/2018   · PVD (peripheral vascular disease) ([place] [address] 08/18/2016   · Acquired hypothyroidism     · Mixed hyperlipidemia             Objective:       Patient Vitals for the past 24 hrs:     Temp Pulse Resp BP SpO2   09/06/21 1114 98.4 °F (36.9 °C) 67 20 123/85 92 %   09/06/21 0800 - - - - 90 %   09/06/21 0753 98 °F (36.7 °C) 71 20 134/74 93 %   09/06/21 0313 98.1 °F (36.7 °C) 86 20 119/66 93 %   09/05/21 2252 98 °F (36.7 °C) 79 20 124/73 92 %   09/05/21 2150 - - - - 96 %   09/05/21 2112 97.7 °F (36.5 °C) 66 20 135/73 95 %   09/05/21 2005 97.7 °F (36.5 °C) 66 20 135/73 95 %   09/05/21 1823 - - - - 95 %   09/05/21 1700 - - - - 95 %   09/05/21 1651 98.3 °F (36.8 °C) 80 20 (!) 147/70 91 %   09/05/21 1641 - - - - 90 %       Oxygen Therapy   O2 Sat (%): 92 % (09/06/21 1114)   Pulse via Oximetry: 87 beats per minute (09/06/21 0800)   O2 Device: Heated; Hi flow nasal cannula (09/06/21 0800)   Skin Assessment: Clean, dry, & intact (09/05/21 5739)   Skin Protection for O2 Device: No (09/03/21 0750)   O2 Flow Rate (L/min): 60 l/min (09/06/21 0800)   O2 Temperature: 87.8 °F (31 °C) (09/06/21 0800)   FIO2 (%): 100 % (09/06/21 0800)       Estimated body mass index is 39.85 kg/m² as calculated from the following:     Height as of this encounter: 5' 8\" (1.727 m).     Weight as of this encounter: 118.9 kg (262 lb 1.6 oz).        Intake/Output Summary (Last 24 hours) at 9/6/2021 1507   Last data filed at 9/6/2021 1005   Gross per 24 hour   Intake 240 ml   Output 1346 ml   Net -1106 ml            Physical Exam:        General:          Well nourished.  No overt distress   Head:               Normocephalic, atraumatic   Eyes:               Sclerae appear normal.  Pupils equally round. ENT:                Nares appear normal, no drainage.  Moist oral mucosa.  Left ear tympanic membrane intact and erythematous with fluid collection   Neck:               No restricted ROM.  Trachea midline    CV:                  RRR.  No m/r/g.  No jugular venous distension. Lungs:             Crackles bilaterally. no wheezing, rhonchi, or rales.  Respirations even, unlabored   Abdomen:        Bowel sounds present.  Soft, nontender, nondistended. Extremities:     No cyanosis or clubbing.  No edema   Skin:                No rashes and normal coloration.   Warm and dry.     Neuro:             Cranial nerves II-XII grossly intact.   Sensation intact   Psych:             Normal mood and affect.  Alert and oriented x3       I have reviewed ordered lab tests and independently visualized imaging below:       Last 24hr Labs:   Recent Results   Recent Results (from the past 24 hour(s))   GLUCOSE, POC     Collection Time: 09/05/21  4:45 PM   Result Value Ref Range     Glucose (POC) 228 (H) 65 - 100 mg/dL     Performed by Anthony     GLUCOSE, POC     Collection Time: 09/05/21  8:58 PM   Result Value Ref Range     Glucose (POC) 302 (H) 65 - 100 mg/dL     Performed by Nohemy     GLUCOSE, POC     Collection Time: 09/06/21  7:25 AM   Result Value Ref Range     Glucose (POC) 209 (H) 65 - 100 mg/dL     Performed by PlaneTiffany     GLUCOSE, POC     Collection Time: 09/06/21 12:11 PM   Result Value Ref Range     Glucose (POC) 242 (H) 65 - 100 mg/dL     Performed by [email or web address]                All Micro Results      Procedure Component Value Units Date/Time     EBENEZER Wray, UR/CSF [[id number]] Collected: 09/02/21 1425     Order Status: Canceled       EBENEZER Dukes, UR/CSF [[id number]] Collected: 09/02/21 1421     Order Status: Canceled       MSSA/MRSA SC BY PCR, NASAL SWAB [[id number]] Collected: 09/02/21 1054     Order Status: Completed Specimen: Nasal swab Updated: 09/02/21 125       Special Requests: NO SPECIAL REQUESTS         Culture result:           SA target not detected.                                 A MRSA NEGATIVE, SA NEGATIVE test result does not preclude MRSA or SA nasal colonization.            EBENEZER Dukes, UR/CSF [[id number]] Collected: 09/02/21 0653     Order Status: Canceled                 Other Studies:   No results found.       Current Meds:   Current Facility-Administered Medications   Medication Dose Route Frequency   · insulin glargine (LANTUS) injection 40 Units 40 Units SubCUTAneous BID   · dexAMETHasone (DECADRON) tablet 4 mg 4 mg Oral Q12H   · azithromycin (ZITHROMAX) tablet 500 mg 500 mg Oral DAILY   · sodium chloride (NS) flush 5-10 mL 5-10 mL IntraVENous Q8H   · sodium chloride (NS) flush 5-10 mL 5-10 mL IntraVENous PRN   · ergocalciferol capsule 50,000 Units 50,000 Units Oral Q7D   · cetirizine (ZYRTEC) tablet 10 mg 10 mg Oral DAILY   · fluticasone propionate (FLONASE) 50 mcg/actuation nasal spray 2 Spray 2 Spray Both Nostrils DAILY   · amLODIPine (NORVASC) tablet 5 mg 5 mg Oral DAILY   · albuterol (PROVENTIL HFA, VENTOLIN HFA, PROAIR HFA) inhaler 2 Puff 2 Puff Inhalation QID RT   · budesonide-formoteroL (SYMBICORT) 160-4.5 mcg/actuation HFA inhaler 2 Puff 2 Puff Inhalation BID RT   · sodium chloride (NS) flush 5-40 mL 5-40 mL IntraVENous Q8H   · sodium chloride (NS) flush 5-40 mL 5-40 mL IntraVENous PRN   · acetaminophen (TYLENOL) tablet 650 mg 650 mg Oral Q6H PRN     Or   · acetaminophen (TYLENOL) suppository 650 mg 650 mg Rectal Q6H PRN   · polyethylene glycol (MIRALAX) packet 17 g 17 g Oral DAILY PRN   · ondansetron (ZOFRAN ODT) tablet 4 mg 4 mg Oral Q8H PRN     Or   · ondansetron (ZOFRAN) injection 4 mg 4 mg IntraVENous Q6H PRN   · alum-mag hydroxide-simeth (MYLANTA) oral suspension 15 mL 15 mL Oral Q6H PRN   · heparin (porcine) injection 7,500 Units 7,500 Units SubCUTAneous Q8H   · guaiFENesin-dextromethorphan (ROBITUSSIN DM) 100-10 mg/5 mL syrup 5 mL 5 mL Oral Q4H PRN   · cholecalciferol (VITAMIN D3) tablet 6,000 Units 6,000 Units Oral DAILY     Followed by   · [START ON 9/9/2021] cholecalciferol (VITAMIN D3) (1000 Units /25 mcg) tablet 2,000 Units 2,000 Units Oral DAILY   · clopidogreL (PLAVIX) tablet 75 mg 75 mg Oral DAILY   · escitalopram oxalate (LEXAPRO) tablet 20 mg 20 mg Oral BID   · nitroglycerin (NITROSTAT) tablet 0.4 mg 0.4 mg SubLINGual Q5MIN PRN   · ranolazine ER (RANEXA) tablet 500 mg 500 mg Oral Q12H   · isosorbide mononitrate ER (IMDUR) tablet 120 mg 120 mg Oral DAILY   · [Held by provider] hydrALAZINE (APRESOLINE) tablet 25 mg 25 mg Oral TID   · furosemide (LASIX) tablet 60 mg 60 mg Oral BID   · aspirin delayed-release tablet 81 mg 81 mg Oral 7am   · traZODone (DESYREL) tablet 100 mg 100 mg Oral QHS   · LORazepam (ATIVAN) tablet 0.5 mg 0.5 mg Oral Q4H PRN   · insulin lispro (HUMALOG) injection SubCUTAneous AC&HS   · dextrose 40% (GLUTOSE) oral gel 1 Tube 15 g Oral PRN   · glucagon (GLUCAGEN) injection 1 mg 1 mg IntraMUSCular PRN   · dextrose (D50W) injection syrg 12.5-25 g 25-50 mL IntraVENous PRN   · metoprolol succinate (TOPROL-XL) XL tablet 50 mg 50 mg Oral DAILY   · levothyroxine (SYNTHROID) tablet 125 mcg 125 mcg Oral ACB   · insulin lispro (HUMALOG) injection 24 Units 0.2 Units/kg SubCUTAneous TID WITH MEALS

## 2021-09-10 NOTE — PROGRESS NOTES
Pt in bed resting on CPAP, continuous pulse ox on reading 92-95%, in no apparent distress, call light in reach, SBAR given to [employer] [place], RN and NENA Cevallos.

## 2021-09-10 NOTE — PROGRESS NOTES
Admit Date: 9/2/2021    Follow up ESRD  Subjective:   Patient seen and examined on rounds, on cpap 100 % O2, + sob, no issues with PD overnight, PD UF 1806 cc       Review of Systems  Cardio-vascular: no chest pain, + SOB, + cough  GI: no N/V/D  : no dysuria, no hematuria  Ext: no edema    Objective:     Patient Vitals for the past 8 hrs:   BP Temp Pulse Resp SpO2 Weight   09/10/21 0407 (!) 86/61 97.6 °F (36.4 °C) 78 19 96 % 113.1 kg (249 lb 4.8 oz)     No intake/output data recorded.       Physical Exam:   General: alert and oriented  On O2 supplement  NARD  Lung: diffuse bronchi  CV: RR, no rub  Abd: soft, not tender  Ext: trace edema   PD exit site: intact, some erythema, no exudate           Data Review   Recent Results (from the past 8 hour(s))   BLOOD GAS, ARTERIAL POC    Collection Time: 09/10/21  4:57 AM   Result Value Ref Range    Device: BIPAP MASK      FIO2 (POC) 100 %    pH (POC) 7.33 (L) 7.35 - 7.45      pCO2 (POC) 27.4 (L) 35 - 45 MMHG    pO2 (POC) 90 75 - 100 MMHG    HCO3 (POC) 14.3 (L) 22 - 26 MMOL/L    sO2 (POC) 96.5 95 - 98 %    Base deficit (POC) 10.3 mmol/L    Mode CPAP/PS      Allens test (POC) Positive      Site LEFT RADIAL      Specimen type (POC) ARTERIAL      Performed by Kulwant    GLUCOSE, POC    Collection Time: 09/10/21  7:46 AM   Result Value Ref Range    Glucose (POC) 232 (H) 65 - 100 mg/dL    Performed by Edita Kapadia            Assessment:     Principal Problem:    Acute respiratory failure with hypoxia ([address] (9/2/2021)    Active Problems:    Mixed hyperlipidemia ()      Acquired hypothyroidism ()      Type 2 diabetes with nephropathy ([address] (5/2/2018)      Class 3 severe obesity with serious comorbidity and body mass index (BMI) of 40.0 to 44.9 in [facility]) (5/2/2018)      Chest pain (11/25/2019)      Coronary artery disease with stable angina pectoris ([address] (2/3/2020)      ESRD on peritoneal dialysis ([facility] (8/28/2021)      Acute hypokalemia (8/28/2021) COVID-19 in immunocompromised patient ([facility] (9/2/2021)      Cigarette nicotine dependence in remission (9/2/2021)      COVID-19 vaccination not done (9/2/2021)      Leukocytosis (9/8/2021)        Plan:     1. ESRD on PD all 2.5% PD fluid increased # of exchanges- good UF with PD  Requiring increased O2 on CPAP and with hypotension this AM transfer to ICU per Dr. Ximena Goode     2.  COVID 19+/acute resp failure with hypoxemia    Discussed possible transition to HD for outpt rehab       Michel Caceres NP

## 2021-09-10 NOTE — PROGRESS NOTES
Pt on CPAP with BP 81/64, O2 94%, RR 24, HR 77. Very difficult to obtain vitals manually or with equipment. Pt vomited while staff in room, able to remove mask quickly. Zofran 4mg given IV. RT and MD Cortes at bedside. Pt becoming more lethargic and complaining of significant back pain. Will continue to closely monitor.

## 2021-09-10 NOTE — PROGRESS NOTES
Hospitalist Progress Note   Admit Date:  2021  6:44 AM   LOS: 8 days   Name:  Jack Robertson   Age:  59 y.o. Sex:  female  :  1960   MRN:  [id number]     Chief Complaint: Shortness of Breath  Reason(s) for Admission: Acute hypoxemic respiratory failure due to COVID-19 ([facility] [U07.1, J96.01]     Hospital Course & Interval History:   59 y. o. female with medical history of ESRD on PD, Class III Obesity, IDDM, CAD s/p CABG and stents who presented with increasing SOB diagnosed with covid x 10 days ago. Briefly hospitalized and discharged stable on RA on . Sudden onset SOB and coughing on 21. Presented to [facility] on  via EMS who and she was found to be hypoxic at 77-80%. She was placed on 10LNC and readmitted.     Subjective (09/10/21): She complains of right hip pain this AM. More sleepy and lethargic today. SOB worse today. Had one episode of emesis into CPAP mask this AM. [place] [facility]. Review of systems negative except stated above.     Assessment & Plan:     Principal Problem:    Acute respiratory failure with hypoxia ([facility] (2021)  - Due to COVID  - Currently unstable on CPAP 100%  - Was placed on CPAP on   - Continue Decadron  - Continue Lasix  - Continue aerosols  - Wean oxygen as appropriate    Active Problems:    COVID-19 in immunocompromised patient ([facility] [address] (2021)  - First symptoms , positive test   - Continue Decadron  - Not a candidate for Remdesivir due to >7 days and on PD  - Does not meet criteria for Actemra due to period of symptoms  - CRP 9.8      Chest Pain (2021)  - Patient with substernal chest pain this AM  - Repeat CXR  with worsening infiltrates  - D-dimer more elevated so may need Heparin gtt since unstable for CT  - EKG with signs of lateral MI, but CP resolved quickly      Acute hypokalemia (2021)  - Resolved  - Per Nephrology      Leukocytosis (2021)  - Likely due to steroids  - Worse today  - No fevers  - Check CBC daily Type 2 diabetes with nephropathy (HCC) (5/2/2018)  - Glucose well controlled  - Adjust Lantus down due to NPO  - Continue Humalog      ESRD on peritoneal dialysis ([facility] (8/28/2021)  - Per Nephrology      Class 3 severe obesity with serious comorbidity and body mass index (BMI) of 40.0 to 44.9 in adult [facility]) (5/2/2018)    Diet:  ADULT DIET Regular; 4 carb choices (60 gm/meal)  ADULT ORAL NUTRITION SUPPLEMENT Breakfast, Lunch, Dinner; Renal Supplement  DVT PPx: Heparin  Code status: Full Code    Hospital Problems as of 9/10/2021 Date Reviewed: 9/2/2021        Codes Class Noted - Resolved POA    * (Principal) Acute respiratory failure with hypoxia ([facility] ICD-10-CM: J96.01  ICD-9-CM: 518.81  9/2/2021 - Present Yes        COVID-19 in immunocompromised patient ([facility] ICD-10-CM: U07.1, D84.9  ICD-9-CM: 079.89, 279.9  9/2/2021 - Present Yes        Chest pain ICD-10-CM: R07.9  ICD-9-CM: 786.50  11/25/2019 - Present Yes        Leukocytosis ICD-10-CM: L17.693  ICD-9-CM: 288.60  9/8/2021 - Present Yes        Acute hypokalemia ICD-10-CM: E87.6  ICD-9-CM: 276.8  8/28/2021 - Present Yes        Type 2 diabetes with nephropathy ([facility] ICD-10-CM: E11.21  ICD-9-CM: 250.40, 583.81  5/2/2018 - Present Yes        ESRD on peritoneal dialysis ([facility] (Chronic) ICD-10-CM: N18.6, Z99.2  ICD-9-CM: 585.6, V45.11  8/28/2021 - Present Yes        Coronary artery disease with stable angina pectoris ([facility] ICD-10-CM: I25.118  ICD-9-CM: 414.00, 413.9  2/3/2020 - Present Yes        Cigarette nicotine dependence in remission ICD-10-CM: F17.211  ICD-9-CM: V15.82  9/2/2021 - Present Yes        COVID-19 vaccination not done ICD-10-CM: Z28.9  ICD-9-CM: V64.00  9/2/2021 - Present Yes        Class 3 severe obesity with serious comorbidity and body mass index (BMI) of 40.0 to 44.9 in adult [facility]) ICD-10-CM: E66.01, Z68.41  ICD-9-CM: 278.01, V85.41  5/2/2018 - Present Yes        Mixed hyperlipidemia ICD-10-CM: E78.2  ICD-9-CM: 272.2  Unknown - Present Yes Acquired hypothyroidism ICD-10-CM: E03.9  ICD-9-CM: 244.9  Unknown - Present Yes        PVD (peripheral vascular disease) ([facility] ICD-10-CM: I73.9  ICD-9-CM: 443.9  8/18/2016 - Present               Objective:     Patient Vitals for the past 24 hrs:   Temp Pulse Resp BP SpO2   09/10/21 1159  77  (!) 80/71 97 %   09/10/21 1144     99 %   09/10/21 1125  77 24 (!) 81/64 94 %   09/10/21 0823     94 %   09/10/21 0820  76  (!) 93/58 93 %   09/10/21 0815 97.5 °F (36.4 °C) 94 22  97 %   09/10/21 0407 97.6 °F (36.4 °C) 78 19 (!) 86/61 96 %   09/09/21 2354    (!) 84/60    09/09/21 2339 97.6 °F (36.4 °C) 84 20 (!) 87/60 96 %   09/09/21 2040     98 %   09/09/21 2011 97.4 °F (36.3 °C) 79 22 91/70 94 %   09/09/21 1513 97.1 °F (36.2 °C) 96 23 99/62 96 %     Oxygen Therapy  O2 Sat (%): 97 % (09/10/21 1159)  Pulse via Oximetry: 78 beats per minute (09/10/21 1144)  O2 Device: CPAP mask (09/10/21 1144)  Skin Assessment: Clean, dry, & intact (09/10/21 0756)  Skin Protection for O2 Device: No (09/10/21 0756)  O2 Flow Rate (L/min): 60 l/min (09/09/21 1204)  O2 Temperature: 87.8 °F (31 °C) (09/08/21 2148)  FIO2 (%): 100 % (09/09/21 2040)    Estimated body mass index is 37.91 kg/m² as calculated from the following:    Height as of this encounter: 5' 8\" (1.727 m). Weight as of this encounter: 113.1 kg (249 lb 4.8 oz). Intake/Output Summary (Last 24 hours) at 9/10/2021 1328  Last data filed at 9/10/2021 0838  Gross per 24 hour   Intake 0 ml   Output 3190 ml   Net -3190 ml         Physical Exam:   General:    Drowsy. Well nourished. Moderate resp distress  Head:  Normocephalic, atraumatic  Eyes:  Sclerae appear normal.  Pupils equally round. ENT:  Nares appear normal, no drainage. Moist oral mucosa  Neck:  No restricted ROM. Trachea midline   CV:   RRR. No m/r/g. No jugular venous distension. Lungs:   Scattered rhonchi bilaterally. No wheezing. Respirations labored  Abdomen: Bowel sounds present.   Soft, nontender, nondistended. Extremities: No cyanosis or clubbing. No edema  Skin:     No rashes and normal coloration. Warm and dry. Neuro: Cranial nerves II-XII grossly intact. Drowsy  Psych: Normal mood and affect.   Alert and oriented x3    I have reviewed ordered lab tests and independently visualized imaging below:    Last 24hr Labs:  Recent Results (from the past 24 hour(s))   EKG, 12 LEAD, SUBSEQUENT    Collection Time: 09/09/21  4:14 PM   Result Value Ref Range    Ventricular Rate 80 BPM    Atrial Rate 80 BPM    P-R Interval 196 ms    QRS Duration 106 ms    Q-T Interval 442 ms    QTC Calculation (Bezet) 509 ms    Calculated P Axis 23 degrees    Calculated R Axis -44 degrees    Calculated T Axis 116 degrees    Diagnosis       Normal sinus rhythm  Left axis deviation  Left ventricular hypertrophy with repolarization abnormality  Possible Lateral infarct (cited on or before 10-AUG-2019)and anterior  Inferior infarct (cited on or before 10-AUG-2019)  Prolonged QT  Abnormal ECG  When compared with ECG of 02-SEP-2021 06:50,  Serial changes of Lateral infarct Present  Confirmed by Lawrence Collins MD (), BUSHRA HIRSCH ([id number]) on 9/10/2021 7:23:40 AM     GLUCOSE, POC    Collection Time: 09/09/21  4:28 PM   Result Value Ref Range    Glucose (POC) 137 (H) 65 - 100 mg/dL    Performed by Gibson    GLUCOSE, POC    Collection Time: 09/09/21  9:25 PM   Result Value Ref Range    Glucose (POC) 226 (H) 65 - 100 mg/dL    Performed by Mary    BLOOD GAS, ARTERIAL POC    Collection Time: 09/10/21  4:57 AM   Result Value Ref Range    Device: BIPAP MASK      FIO2 (POC) 100 %    pH (POC) 7.33 (L) 7.35 - 7.45      pCO2 (POC) 27.4 (L) 35 - 45 MMHG    pO2 (POC) 90 75 - 100 MMHG    HCO3 (POC) 14.3 (L) 22 - 26 MMOL/L    sO2 (POC) 96.5 95 - 98 %    Base deficit (POC) 10.3 mmol/L    Mode CPAP/PS      Allens test (POC) Positive      Site LEFT RADIAL      Specimen type (POC) ARTERIAL      Performed by Jl GLUCOSE, POC    Collection Time: 09/10/21  7:46 AM   Result Value Ref Range    Glucose (POC) 232 (H) 65 - 100 mg/dL    Performed by [facility] [address], POC    Collection Time: 09/10/21 11:27 AM   Result Value Ref Range    Glucose (POC) 196 (H) 65 - 100 mg/dL    Performed by [employer] PPD TEST IN 24 HRS    Collection Time: 09/10/21 11:29 AM   Result Value Ref Range    PPD Negative Negative    mm Induration 0 0 - 5 mm       All Micro Results     Procedure Component Value Units Date/Time    EBENEZER Escalante, UR/CSF [[id number]] Collected: 09/02/21 1425    Order Status: Canceled     EBENEZER Escalante UR/CSF [[id number]] Collected: 09/02/21 1421    Order Status: Canceled     MSSA/MRSA SC BY PCR, NASAL SWAB [[id number]] Collected: 09/02/21 1054    Order Status: Completed Specimen: Nasal swab Updated: 09/02/21 1253     Special Requests: NO SPECIAL REQUESTS        Culture result:       SA target not detected. A MRSA NEGATIVE, SA NEGATIVE test result does not preclude MRSA or SA nasal colonization. EBENEZER Escalante, UR/CSF [[id number]] Collected: 09/02/21 0608    Order Status: Canceled           SARS-CoV-2 Lab Results  \"Novel Coronavirus\" Test: No results found for: COV2NT   \"Emergent Disease\" Test: No results found for: EDPR  \"SARS-COV-2\" Test: No results found for: XGCOVT  \"Precision Labs\" Test: No results found for: RSLT  Rapid Test:   Lab Results   Component Value Date/Time    COVR Detected (AA) 08/28/2021 01:08 AM          Imaging:  No results found. No results found for this visit on 09/02/21.     Current Meds:  Current Facility-Administered Medications   Medication Dose Route Frequency    piperacillin-tazobactam (ZOSYN) 3.375 g in 0.9% sodium chloride (MBP/ADV) 100 mL MBP  3.375 g IntraVENous Q12H    morphine injection 2 mg  2 mg IntraVENous Q6H PRN    insulin glargine (LANTUS) injection 40 Units  40 Units SubCUTAneous BID    ciprofloxacin (CETRAXAL) 0.2 % otic solution 0.25 mL  0.25 mL Left Ear Q12H    dexAMETHasone (DECADRON) tablet 4 mg  4 mg Oral Q12H    azithromycin (ZITHROMAX) tablet 500 mg  500 mg Oral DAILY    sodium chloride (NS) flush 5-10 mL  5-10 mL IntraVENous Q8H    sodium chloride (NS) flush 5-10 mL  5-10 mL IntraVENous PRN    ergocalciferol capsule 50,000 Units  50,000 Units Oral Q7D    cetirizine (ZYRTEC) tablet 10 mg  10 mg Oral DAILY    fluticasone propionate (FLONASE) 50 mcg/actuation nasal spray 2 Spray  2 Spray Both Nostrils DAILY    amLODIPine (NORVASC) tablet 5 mg  5 mg Oral DAILY    albuterol (PROVENTIL HFA, VENTOLIN HFA, PROAIR HFA) inhaler 2 Puff  2 Puff Inhalation QID RT    budesonide-formoteroL (SYMBICORT) 160-4.5 mcg/actuation HFA inhaler 2 Puff  2 Puff Inhalation BID RT    sodium chloride (NS) flush 5-40 mL  5-40 mL IntraVENous Q8H    sodium chloride (NS) flush 5-40 mL  5-40 mL IntraVENous PRN    acetaminophen (TYLENOL) tablet 650 mg  650 mg Oral Q6H PRN    Or    acetaminophen (TYLENOL) suppository 650 mg  650 mg Rectal Q6H PRN    polyethylene glycol (MIRALAX) packet 17 g  17 g Oral DAILY PRN    ondansetron (ZOFRAN ODT) tablet 4 mg  4 mg Oral Q8H PRN    Or    ondansetron (ZOFRAN) injection 4 mg  4 mg IntraVENous Q6H PRN    alum-mag hydroxide-simeth (MYLANTA) oral suspension 15 mL  15 mL Oral Q6H PRN    heparin (porcine) injection 7,500 Units  7,500 Units SubCUTAneous Q8H    guaiFENesin-dextromethorphan (ROBITUSSIN DM) 100-10 mg/5 mL syrup 5 mL  5 mL Oral Q4H PRN    clopidogreL (PLAVIX) tablet 75 mg  75 mg Oral DAILY    escitalopram oxalate (LEXAPRO) tablet 20 mg  20 mg Oral BID    nitroglycerin (NITROSTAT) tablet 0.4 mg  0.4 mg SubLINGual Q5MIN PRN    ranolazine ER (RANEXA) tablet 500 mg  500 mg Oral Q12H    isosorbide mononitrate ER (IMDUR) tablet 120 mg  120 mg Oral DAILY    [Held by provider] hydrALAZINE (APRESOLINE) tablet 25 mg  25 mg Oral TID    furosemide (LASIX) tablet 60 mg  60 mg Oral BID    aspirin delayed-release tablet 81 mg  81 mg Oral 7am    traZODone (DESYREL) tablet 100 mg  100 mg Oral QHS    LORazepam (ATIVAN) tablet 0.5 mg  0.5 mg Oral Q4H PRN    insulin lispro (HUMALOG) injection   SubCUTAneous AC&HS    dextrose 40% (GLUTOSE) oral gel 1 Tube  15 g Oral PRN    glucagon (GLUCAGEN) injection 1 mg  1 mg IntraMUSCular PRN    dextrose (D50W) injection syrg 12.5-25 g  25-50 mL IntraVENous PRN    metoprolol succinate (TOPROL-XL) XL tablet 50 mg  50 mg Oral DAILY    levothyroxine (SYNTHROID) tablet 125 mcg  125 mcg Oral ACB    insulin lispro (HUMALOG) injection 24 Units  0.2 Units/kg SubCUTAneous TID WITH MEALS       Discharge Plan:     TBD    Signed By: Suzzette Boeck, DO     September 10, 2021        There is a high probability of acute organ impairment or life-threatening deterioration in the patient's condition from: Worsening hypoxia    Critical care interventions: CPAP    Total critical care time spent: 31 minutes. Time is indicative of direct patient attendance at bedside and on the patient's floor nearby. Includes time spent at bedside performing history and exam, performing chart review, discussing findings and treatment plan with patient and/or family, discussing patient with consultants and colleagues, ordering and reviewing pertinent laboratory and radiographic evaluations, and discussing patient with nursing staff. Time excludes procedures.

## 2021-09-10 NOTE — PROGRESS NOTES
Pt sleeping on CPAP mask. SpO2 97% per continuous pulse ox. RR are even and unlabored, no apparent distress noted. Call light within reach, safety measures in place. Will continue to monitor.

## 2021-09-10 NOTE — PROGRESS NOTES
Pt on CPAP machine, SpO2 92% per continuous pulse ox. RR are even. Pt extremely drowsy and lethargic but alert when verbally stimulated. House supervisor aware of ICU transfer, still awaiting bed placement. Safety measures in place. Will prepare for bedside shift report to oncoming RN.

## 2021-09-10 NOTE — PROGRESS NOTES
Pt on CPAP machine, SpO2 94% per continuous pulse ox. RR are even. Pt very drowsy and lethargic c/o hip and back pain. MD Jovanna Romero and MD Montaño aware of pt condition. Waiting on ICU transfer. Will continue to closely monitor.

## 2021-09-10 NOTE — DIALYSIS
Disconnected PD cycler from patient. Betadine cap intact. Patient tolerated well. UF amount -1806mls. Effluent: clear, yellow.

## 2021-09-10 NOTE — PROGRESS NOTES
SPEECH PATHOLOGY NOTE:    Patient still on CPAP. Continues to not be appropriate for a po intake due to respiratory status.  Recommend continuing to hold po intake except critical meds until respiratory status improves and patient can be re-evaluated by [address], CCC-SLP

## 2021-09-11 NOTE — DIALYSIS
Peritoneal dialysis initiated as ordered. Peritoneal catheter exit site cleaned with Chlorhexidine scrub and Gentamicin cream applied to PD cath exit site. Dressing changed, site has no s/s of redness, swelling, or exudate. Patient states no complaints at this time. Report given to primary NENA LI.

## 2021-09-11 NOTE — PROGRESS NOTES
PT Discharge Note:  Patient is being discharged from PT services at this time due to a decline in medical status and subsequent discharge to the ICU. Please reorder PT when patient would benefit from our services in the future.   Thank you,  Nita Martinez, PTA

## 2021-09-11 NOTE — PROCEDURES
ARTERIAL LINE (A-Line) PLACEMENT  Date: 9-10-21  Time: 2330  Indication: Hemodynamic monitoring        A time-out was completed verifying correct patient, procedure, site, positioning, and special equipment if applicable. Omeros test was performed to ensure adequate perfusion. The patients right wrist was prepped and draped in sterile fashion. 1% Lidocaine was used to anesthetize the area. A 20G Arrow arterial line was introduced into the radial artery. The catheter was threaded over the guide wire and the needle was removed with appropriate pulsatile blood return. The catheter was then sutured in place to the skin and a sterile dressing applied. Perfusion to the extremity distal to the point of catheter insertion was checked and found to be adequate.        The patient tolerated the procedure well and there were no complications

## 2021-09-11 NOTE — PROGRESS NOTES
TRANSFER - IN REPORT:    Verbal report received from [facility]) on Ahmad Cos  being received from [facility](unit) for urgent transfer      Report consisted of patients Situation, Background, Assessment and   Recommendations(SBAR). Information from the following report(s) SBAR, Kardex, Intake/Output, MAR and Recent Results was reviewed with the receiving nurse. Opportunity for questions and clarification was provided. Assessment completed upon patients arrival to unit and care assumed.

## 2021-09-11 NOTE — PROGRESS NOTES
TRANSFER - OUT REPORT:    Verbal report given to Amina(name) on Swati Whitney  being transferred to [facility](unit) for routine progression of care       Report consisted of patients Situation, Background, Assessment and   Recommendations(SBAR). Information from the following report(s) SBAR, Kardex, [facility] and Recent Results was reviewed with the receiving nurse. Lines:   Peripheral IV 09/02/21 Right Antecubital (Active)   Site Assessment Clean, dry, & intact 09/10/21 1459   Phlebitis Assessment 0 09/10/21 1459   Infiltration Assessment 0 09/10/21 1459   Dressing Status Clean, dry, & intact 09/10/21 1459   Dressing Type Transparent;Tape 09/10/21 1459   Hub Color/Line Status Patent; Flushed 09/10/21 1459   Alcohol Cap Used No 09/10/21 1459        Opportunity for questions and clarification was provided.       Patient transported with:   Patient's medications from home  Registered Nurse

## 2021-09-11 NOTE — PROGRESS NOTES
BIS 59, Paralyzed with bolus of Tracrium and Tracrium gtt initiated at 5 mcg/kg/min. VSS, O2 sat 100%.

## 2021-09-11 NOTE — PROGRESS NOTES
[facility] PROGRESS NOTE    Follow up for: ESRD on PD     Subjective:   Patient seen and examined. Chart, notes, labs, imaging, results all reviewed.    Seen in ICU , transferred last night for hypoxia     Intubated     ROS:  UTO   Objective:   Exam:  Vitals:    09/11/21 0722 09/11/21 0730 09/11/21 0745 09/11/21 0747   BP:       Pulse: 77 78 78 78   Resp: 11 17 19 24   Temp:       SpO2: 91% (!) 87% (!) 77% 95%   Weight:       Height:             Intake/Output Summary (Last 24 hours) at 9/11/2021 0948  Last data filed at 9/11/2021 0858  Gross per 24 hour   Intake 2037.75 ml   Output 0 ml   Net 2037.75 ml       Current Facility-Administered Medications   Medication Dose Route Frequency    insulin lispro (HUMALOG) injection   SubCUTAneous Q4H    atracurium (TRACRIUM) injection 45.1 mg  0.4 mg/kg IntraVENous ONCE    atracurium (TRACRIUM) 1,000 mg in 0.9% sodium chloride 250 mL infusion  0-15 mcg/kg/min IntraVENous TITRATE    NOREPINephrine (LEVOPHED) 16,000 mcg in 0.9% sodium chloride 250 mL infusion  0.5-30 mcg/min IntraVENous TITRATE    dexamethasone (DECADRON) 10 mg/mL injection 6 mg  6 mg IntraVENous Q24H    albumin human 25% (BUMINATE) solution 25 g  25 g IntraVENous Q6H    midazolam (PF) in saline (VERSED) 1 mg/mL infusion  0-10 mg/hr IntraVENous TITRATE    fentaNYL in normal saline (pf) 25 mcg/mL infusion  0-200 mcg/hr IntraVENous TITRATE    sodium bicarbonate (8.4%) 150 mEq in dextrose 5% 1,000 mL infusion   IntraVENous CONTINUOUS    azithromycin (ZITHROMAX) tablet 500 mg  500 mg Per NG tube DAILY    cetirizine (ZYRTEC) tablet 10 mg  10 mg Per NG tube DAILY    clopidogreL (PLAVIX) tablet 75 mg  75 mg Per NG tube DAILY    [START ON 9/16/2021] ergocalciferol capsule 50,000 Units  50,000 Units Per NG tube Q7D    escitalopram oxalate (LEXAPRO) tablet 20 mg  20 mg Per NG tube BID    levothyroxine (SYNTHROID) tablet 125 mcg  125 mcg Per NG tube ACB    acetaminophen (TYLENOL) tablet 650 mg  650 mg Per NG tube Q6H PRN    Or    acetaminophen (TYLENOL) suppository 650 mg  650 mg Rectal Q6H PRN    piperacillin-tazobactam (ZOSYN) 3.375 g in 0.9% sodium chloride (MBP/ADV) 100 mL MBP  3.375 g IntraVENous Q12H    morphine injection 2 mg  2 mg IntraVENous Q6H PRN    insulin glargine (LANTUS) injection 40 Units  40 Units SubCUTAneous BID    ciprofloxacin (CETRAXAL) 0.2 % otic solution 0.25 mL  0.25 mL Left Ear Q12H    sodium chloride (NS) flush 5-10 mL  5-10 mL IntraVENous Q8H    sodium chloride (NS) flush 5-10 mL  5-10 mL IntraVENous PRN    fluticasone propionate (FLONASE) 50 mcg/actuation nasal spray 2 Spray  2 Spray Both Nostrils DAILY    [Held by provider] amLODIPine (NORVASC) tablet 5 mg  5 mg Oral DAILY    albuterol (PROVENTIL HFA, VENTOLIN HFA, PROAIR HFA) inhaler 2 Puff  2 Puff Inhalation QID RT    budesonide-formoteroL (SYMBICORT) 160-4.5 mcg/actuation HFA inhaler 2 Puff  2 Puff Inhalation BID RT    sodium chloride (NS) flush 5-40 mL  5-40 mL IntraVENous Q8H    sodium chloride (NS) flush 5-40 mL  5-40 mL IntraVENous PRN    polyethylene glycol (MIRALAX) packet 17 g  17 g Oral DAILY PRN    ondansetron (ZOFRAN ODT) tablet 4 mg  4 mg Oral Q8H PRN    Or    ondansetron (ZOFRAN) injection 4 mg  4 mg IntraVENous Q6H PRN    alum-mag hydroxide-simeth (MYLANTA) oral suspension 15 mL  15 mL Oral Q6H PRN    heparin (porcine) injection 7,500 Units  7,500 Units SubCUTAneous Q8H    guaiFENesin-dextromethorphan (ROBITUSSIN DM) 100-10 mg/5 mL syrup 5 mL  5 mL Oral Q4H PRN    nitroglycerin (NITROSTAT) tablet 0.4 mg  0.4 mg SubLINGual Q5MIN PRN    ranolazine ER (RANEXA) tablet 500 mg  500 mg Oral Q12H    [Held by provider] isosorbide mononitrate ER (IMDUR) tablet 120 mg  120 mg Oral DAILY    [Held by provider] hydrALAZINE (APRESOLINE) tablet 25 mg  25 mg Oral TID    [Held by provider] furosemide (LASIX) tablet 60 mg  60 mg Oral BID    aspirin delayed-release tablet 81 mg  81 mg Oral 7am    LORazepam (ATIVAN) tablet 0.5 mg  0.5 mg Oral Q4H PRN    dextrose 40% (GLUTOSE) oral gel 1 Tube  15 g Oral PRN    glucagon (GLUCAGEN) injection 1 mg  1 mg IntraMUSCular PRN    dextrose (D50W) injection syrg 12.5-25 g  25-50 mL IntraVENous PRN    [Held by provider] metoprolol succinate (TOPROL-XL) XL tablet 50 mg  50 mg Oral DAILY       EXAM  GEN - Intubated   CV - S1, S2, RRR, no rub, murmur, or gallop  Lung - clear to auscultation bilaterally  Abd - soft, nontender, BS present  Ext - no edema    Recent Labs     09/11/21  0308 09/10/21  2222 09/10/21  1657   WBC 30.5* 29.2* 26.8*   HGB 11.2* 12.1 13.4   HCT 33.8* 36.8 40.4    280 288        Recent Labs     09/11/21  0307 09/10/21  2222 09/10/21  1657   * 138 136   K 6.0* 6.3* 6.4*   CL 95* 99 99   CO2 17* 16* 15*   * 144* 140*   CREA 11.00* 10.90* 10.50*   CA 7.8* 7.7* 8.0*   * 195* 200*   MG  --  3.6*  --    PHOS 15.9*  --  16.5*       Assessment and Plan:     1. ESRD on PD all 2.5% PD fluid increased # of exchanges- good UF with PD  Unable to do PD last night   Will start her on PD now     2.  COVID 19+/acute resp failure with hypoxemia - intubate d    Discussed possible transition to HD for outpt rehab     Kelly Moulton MD

## 2021-09-11 NOTE — PROGRESS NOTES
Ventilator check complete; patient has a #7.5 ET tube secured at the 23 at the lip. Patient is sedated. Patient is not able to follow commands. Breath sounds are diminished. Trachea is midline, Negative for subcutaneous air, and chest excursion is symmetric. Patient is also Negative for cyanosis and is Negative for pitting edema. All alarms are set and audible. Resuscitation bag is at the head of the bed. Ventilator Settings  Mode FIO2 Rate Tidal Volume Pressure PEEP I:E Ratio   SIMV, PRVC  60 %   24 450 ml  12 cm H2O  12 cm H20  1:2      Peak airway pressure: 18.1 cm H2O   Minute ventilation: 8.8 l/min     ABG: No results for input(s): PH, PCO2, PO2, HCO3 in the last 72 hours.       Tonia Gillette, RT

## 2021-09-11 NOTE — PROGRESS NOTES
Speech Pathology Note:    Chart reviewed for speech therapy follow up. Patient now intubated. Will follow for re-assessment s/p extubation. Adolfo Ahmadi.  Santiago Rodgers MS, CCC-SLP         Speech Language Pathologist          [facility]                   Contact: Jennifer

## 2021-09-11 NOTE — PROCEDURES
Central Venous Catheter (CVC, Central Line) Placement  Date: 9-10-21  Time: 2300  Indication: Hemodynamic monitoring/Intravenous access    A time-out was completed verifying correct patient, procedure, site, positioning, and special equipment if applicable. The patient was placed in a dependent position appropriate for central line placement based on the vein to be cannulated. The patients left neck was prepped and draped in sterile fashion. 1% Lidocaine was used to anesthetize the surrounding skin area. A quad lumen 8.5-Azeri Cordis catheter was introduced into the the left internal jugular vein using the Seldinger technique and under ultrasound guidance. The catheter was threaded smoothly over the guide wire and appropriate blood return was obtained. Each lumen of the catheter was evacuated of air and flushed with sterile saline. The catheter was then sutured in place to the skin and a sterile dressing applied. Perfusion to the extremity distal to the point of catheter insertion was checked and found to be adequate. Estimated Blood Loss: 5cc  The patient tolerated the procedure well and there were no complications  PCXR shows good placement of left IJ.

## 2021-09-11 NOTE — PROGRESS NOTES
Patient arrived to floor on 100% bipap. Patient bathed upon arrival. Scattered Abrasions to BLE and scattered bruising to abdomen. PD access clean, dry, intact.

## 2021-09-11 NOTE — PROGRESS NOTES
[facility] Critical Care Note[de-identified] 9/11/2021  Jose Marte  Admission Date: 9/2/2021     Length of Stay: 9 days    Background: Patient is a 59 y.o.  female seen and evaluated at the request of Dr. Les Costa. She has history of ESRD on PD, Class III Obesity, IDDM, CAD s/p CABG and stents who presented with increasing SOB diagnosed with covid x 10 days ago. Briefly hospitalized and discharged stable on RA on 8/29. Sudden onset SOB and coughing on 9/1/21. Presented to [facility] on 9/2 via EMS who and she was found to be hypoxic at 77-80%. She was placed on 10LNC and readmitted. She has been doing peritoneal dialysis and has continued to decline slowly with her oxygenation. She is not on CPAP 100% with sats 95%. She is having back pain and asking for water. She denies chest pain. Her H&P says she would only want to be on a ventilator for 5 days. 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 She also has no past medical history of Adverse effect of anesthesia, Difficult intubation, Malignant hyperthermia due to anesthesia, Nausea & vomiting, or Pseudocholinesterase deficiency. 24 Hour events: intubated yesterday  Sedated on vent support on 60% and 12 of peep    ROS: unable to obtain/negative except as listed elsewhere.      Lines: (insertion date)   ETT: 9/10  [place]: (9/)  OGT: (9/10)  Central line: (9/10)  Arterial Line (9/10)    Drips: current dose (range)  Tracrium Dose (mcg/kg/min): 5 mcg/kg/min  Versed Dose (mg/hr): 7 mg/hr  Levophed Dose (mcg/kg/min): 0.1061 mcg/kg/min     Pertinent Exam:         Blood pressure (!) 150/43, pulse 73, temperature 97.6 °F (36.4 °C), resp. rate 24, height 5' 8\" (1.727 m), weight 248 lb 7.3 oz (112.7 kg), SpO2 100 %. Intake/Output Summary (Last 24 hours) at 9/11/2021 1008  Last data filed at 9/11/2021 0858  Gross per 24 hour   Intake 2037.75 ml   Output 0 ml   Net 2037.75 ml     Constitutional:  intubated and mechanically ventilated. EENMT:  Sclera clear, pupils equal, oral mucosa moist  Respiratory: crackles bilateral  Cardiovascular:  RR  Gastrointestinal:  soft with no tenderness; positive bowel sounds present  Musculoskeletal:  warm with no cyanosis, no lower extremity edema  Skin:  no jaundice or ecchymosis  Neurologic:sedated  Psychiatric: sedated and paralyzed    CXR:       Recent Labs     09/11/21  0308 09/10/21  2222 09/10/21  1657 09/09/21  1047 09/09/21  0639   WBC 30.5* 29.2* 26.8*  --    < >   HGB 11.2* 12.1 13.4  --    < >   HCT 33.8* 36.8 40.4  --    < >    280 288  --    < >   INR  --  1.1  --   --   --    PCT  --  1.00  --  0.38  --    LAC  --  2.1*  --   --   --     < > = values in this interval not displayed. Recent Labs     09/11/21  0307 09/10/21  2222 09/10/21  1657   * 138 136   K 6.0* 6.3* 6.4*   CL 95* 99 99   CO2 17* 16* 15*   * 195* 200*   * 144* 140*   CREA 11.00* 10.90* 10.50*   MG  --  3.6*  --    CA 7.8* 7.7* 8.0*   PHOS 15.9*  --  16.5*   ALB 3.0*  --  2.1*     Recent Labs     09/10/21  2222   LAC 2.1*   TROPHS 76.4*     Recent Labs     09/11/21  0856 09/11/21  0439 09/11/21  0026   GLUCPOC 432* 347* 171*     ECHO: No results found for this or any previous visit.      Results     Procedure Component Value Units Date/Time    CULTURE, BLOOD [[id number]] Collected: 09/10/21 1658    Order Status: Completed Specimen: Blood Updated: 09/10/21 1903    CULTURE, BLOOD [[id number]] Collected: 09/10/21 1657    Order Status: Completed Specimen: Blood Updated: 09/10/21 1720    CULTURE, RESPIRATORY/SPUTUM/BRONCH Kellee Little STAIN [[id number]]     Order Status: Sent Specimen: Sputum     S. Chase Roles, UR/CSF [[id number]] Collected: 09/02/21 1425    Order Status: Canceled     S. Chase Roles, UR/CSF [[id number]] Collected: 09/02/21 1421    Order Status: Canceled     MSSA/MRSA SC BY PCR, NASAL SWAB [[id number]] Collected: 09/02/21 1054    Order Status: Completed Specimen: Nasal swab Updated: 09/02/21 1253     Special Requests: NO SPECIAL REQUESTS        Culture result:       SA target not detected. A MRSA NEGATIVE, SA NEGATIVE test result does not preclude MRSA or SA nasal colonization. S. Chase Roles, UR/CSF [[id number]] Collected: 09/02/21 8801    Order Status: Canceled         Inpat Anti-Infectives (From admission, onward)     Start     Ordered Stop    09/11/21 0900  azithromycin (ZITHROMAX) tablet 500 mg  500 mg,   Per NG tube,   DAILY      09/10/21 2357 --    09/09/21 1300  piperacillin-tazobactam (ZOSYN) 3.375 g in 0.9% sodium chloride (MBP/ADV) 100 mL MBP  3.375 g,   IntraVENous,   EVERY 12 HOURS     Note to Pharmacy: Please renally dose with PD transitioning to HD    09/09/21 1206 --    09/06/21 2100  ciprofloxacin (CETRAXAL) 0.2 % otic solution 0.25 mL  0.25 mL,   Left Ear,   EVERY 12 HOURS      09/06/21 1510 --              Ventilator Settings:  Ideal body weight: 63.9 kg (140 lb 14 oz)   Mode FIO2 Rate Tidal Volume Pressure PEEP   Assist control, PRVC (mode change; patient desatting)  85 %    450 ml  12 cm H2O  12 cm H20    Peak airway pressure: 28.3 cm H2O       Minute ventilation: 10.7 l/min  ABG:  Recent Labs     09/11/21  0332 09/10/21  2303 09/10/21  2047   PHI 7.19* 7.17* 7.21*   PCO2I 41.5 40.7 35.8   PO2I 82 89 73*   HCO3I 15.8* 15.0* 14.3*     Assessment and Plan:  (Medical Decision Making)   Impression: 59 y.o. y/o female with ESRD on peritoneal dialysis, covid pneumonia and respiratory failure    NEURO:   Sedation:  Versed,  Analgesia: fentanyl  Paralytics: starting  CV:   Volume Status:  Fairly balanced  Septic shock:  No pressors    PULM:   Acute hypoxemic/hypercapneic respiratory failure:  Intubated yesterday  Severe ARDS 2/2 COVID:  RENAL:  esrd  Will be started back on periponeal dialysis  GI:   Nutrition: npo for now  HEME:   Anemia: hg 11.3  Thrombocytopenia: 270Anticoagulation:   ID: getting cipro, zosyn  COVID-19:   COVID-19 in immunocompromised patient ([facility] [address] (9/2/2021)    Not vaccinated, given dexa, change to IV, not a remdesivir candidate, sp Toci  ENDO:   DM:  lantus  Skin: no decub, turns, preventive care  Prophy:     Family updated by phone after rounds.       Full Code    The patient is critically ill with respiratory failure, circulatory failure and requires high complexity decision making for assessment and support including frequent ventilator adjustment , frequent evaluation and titration of therapies , application of advanced monitoring technologies and extensive interpretation of multiple databases  Time devoted to patient care services described in this note- 15 min face to face/ 20 min total evaluation time    Cumulative time devoted to patient care services by me for day of service -41 min     Ewa Riley MD

## 2021-09-11 NOTE — PROGRESS NOTES
Spoke with family and updated all medications and plan of care. Verbalized understanding from daughter in law Neal Dixon.

## 2021-09-11 NOTE — PROGRESS NOTES
09/1960   Oxygen Therapy   O2 Sat (%) 94 %   Pulse via Oximetry 80 beats per minute   O2 Device BIPAP   FIO2 (%) 100 %   Respiratory   Respiratory (WDL) X   Respiratory Pattern Shallow   CPAP/BIPAP   CPAP/BIPAP Start/Stop On   Device Mode BIPAP   $$ Bipap Daily Yes   Mask Type and Size Full face; Medium   Skin Condition intact   PIP Observed 11 cm H20   IPAP (cm H2O) 14 cm H2O   EPAP (cm H2O) 10 cm H2O   Inspiratory Time (sec) 1 seconds   Vt Spont (ml) 755 ml   Ve Observed (l/min) 17.3 l/min   Backup Rate 16   Total RR (Spontaneous) 23 breaths per minute   Insp Rise Time (sec) 4   Leak (Estimated) 1 L/min   Pt's Home Machine No   Biomedical Check Performed Yes   Settings Verified Yes   Alarm Settings   High Pressure 30   Low Pressure 5   Apnea 20   Low Ve 3   High Rate 50   Low Rate 8   Pulmonary Toilet   Pulmonary Toilet H. O.B elevated     Patient switched to BiPAP due to increased WOB. Patient tolerated well.

## 2021-09-11 NOTE — PROGRESS NOTES
Patient was intubated with a number 7.5 ET Tube. Tube placement verified by auscultation, by CXR and ETCO2 monitor. ET Tube is secured at the 24 cm jagdish at the teeth and on the bilateral side. Patient was intubated by Nancy Bridges NP on the 1st attempt. Breath sounds are clear. Patient is Negative for subcutaneous air and chest excursion is symmetrical. Trachea is midline. Patient is also Negative for cyanosis and is Negative for pitting edema. Patient placed on ventilator on documented settings. All alarms are set and audible. Resuscitation bag and mask are at the head of the bed.       Ventilator Settings  Mode FIO2 Rate Tidal Volume Pressure PEEP I:E Ratio    PRVC  100 %   24   450    12 cm H20  1:2      Peak airway pressure: 39.9 cm H2O   Minute ventilation: 10.1 l/min

## 2021-09-11 NOTE — PROCEDURES
Endotracheal Intubation  Date: 9-10-21  Time: 2145    Indication: Respiratory Distress      Attending: Dr. Nayana Canchola    A time-out was completed verifying correct patient, procedure, site, positioning, and special equipment if applicable. The patient was placed in a flat position. Sedation was obtained using fentanyl 50mcg, and additionally with Etomidate 20mg and rocuronium 50mg. The patient was easily ventilated using an ambu bag. The GL[employer]Y/ MAC 3 BLADE was used and inserted into the oropharynx at which time there was a Grade 1 view of the vocal cords. A 7.5-Lithuanian endotracheal tube was inserted and visualized going through the vocal cords. The stylette was removed. Colorimetric change was visualized on the CO2 meter. Breath sounds were heard in both lung fields equally. The endotracheal tube was placed at 23 cm, measured at the teeth. dry secretions and blood noted in airway, suctioned and intubated without difficulty. A chest x-ray was ordered to assess for pneumothorax and verify endotracheal tube placement. The patient tolerated the procedure well and there were no complications.

## 2021-09-12 NOTE — PROGRESS NOTES
Assisted with proning the patient at this time. No distress is noted upon leaving the patient's room.

## 2021-09-12 NOTE — PROGRESS NOTES
Bedside shift change report given to Jonny Rivas RN (oncoming nurse) by Danette Serrano RN (offgoing nurse). Report included the following information SBAR, Kardex, Intake/Output, Recent Results, Med Rec Status and Cardiac Rhythm NSR.

## 2021-09-12 NOTE — PROGRESS NOTES
Patient is on an infusion of Novolin R. BS is currently 71. She does have a Bicarb gtt infusing with D5 in it at 100 ml/hr. Gap is still 18 so not ready to be transitioned off of gtt at this time. Spoke with pharmacy who recommended adding another D5 or D10 gtt at 100 ml/hr on top of the Bicarb gtt. Patient is a PD patient and in volume overload and does not need more added volume if can be avoided. Spoke with intensivist, for now will give an amp of D50 and if this does not stabilize BS may start D10 gtt at that time.

## 2021-09-12 NOTE — PROGRESS NOTES
Ventilator check complete; patient has a #7.5 ET tube secured at the 23 at the lip. Patient is sedated. Patient is not able to follow commands. Breath sounds are diminished. Trachea is midline, Negative for subcutaneous air, and chest excursion is symmetric. Patient is also Negative for cyanosis and is Negative for pitting edema. All alarms are set and audible. Resuscitation bag is at the head of the bed.       Ventilator Settings  Mode FIO2 Rate Tidal Volume Pressure PEEP I:E Ratio   Assist control, PRVC  85 %   24 450 ml  12 cm H2O  12 cm H20  1:2      Peak airway pressure: 29.1 cm H2O   Minute ventilation: 10.8 l/min       Miles Tian RT

## 2021-09-12 NOTE — PROGRESS NOTES
Bedside, Verbal and Written shift change report given to Howard Martinez RN (oncoming nurse) by Reyes Campbell, RN (offgoing nurse). Report included the following information SBAR, Intake/Output, MAR, Recent Results and Cardiac Rhythm NSR.

## 2021-09-12 NOTE — PROGRESS NOTES
Formerly [facility] Critical Care Note[de-identified] 9/12/2021  Ana María Marte  Admission Date: 9/2/2021     Length of Stay: 10 days    Background: . Patient is U 6 y.o.  female seen and evaluated at the request of Dr. Mac Estevez has history of ESRD on PD, Class III Obesity, IDDM, CAD s/p CABG and stents who presented with increasing SOB diagnosed with covid x 10 days ago. Briefly hospitalized and discharged stable on RA on 8/29. Sudden onset SOB and coughing on 9/1/21. Presented to [facility] on 9/2 via EMS who and she was found to be hypoxic at 77-80%. She was placed on 10LNC and readmitted. She has been doing peritoneal dialysis and has continued to decline slowly with her oxygenation. She is not on CPAP 100% with sats 95%. She is having back pain and asking for water. She denies chest pain. Her H&P says she would only want to be on a ventilator for 5 days. 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 She also has no past medical history of Adverse effect of anesthesia, Difficult intubation, Malignant hyperthermia due to anesthesia, Nausea & vomiting, or Pseudocholinesterase deficiency. 24 Hour events: day 3 on vent, peritoneal dialysis yesterday, on 85% fio2    ROS: unable to obtain/negative except as listed elsewhere.      Lines: (insertion date)     ETT: 9/10  [place]: (9/)  OGT: (9/10)  Central line: (9/10)  Arterial Line (9/10)  Drips: current dose (range)  Tracrium Dose (mcg/kg/min): 6 mcg/kg/min (RR 28, with set rate of 24)  Versed Dose (mg/hr): 6 mg/hr  Levophed Dose (mcg/kg/min): 0.0266 mcg/kg/min (MAP: 79)     Pertinent Exam:         Blood pressure (!) 162/46, pulse 62, temperature (!) 94.1 °F (34.5 °C), resp. rate 27, height 5' 8\" (1.727 m), weight 248 lb 7.3 oz (112.7 kg), SpO2 100 %. Intake/Output Summary (Last 24 hours) at 9/12/2021 0820  Last data filed at 9/12/2021 0700  Gross per 24 hour   Intake 1812.33 ml   Output 0 ml   Net 1812.33 ml     Constitutional:  intubated and mechanically ventilated. EENMT:  Sclera clear, pupils equal, oral mucosa moist  Respiratory:  crackles bilateral  Cardiovascular:  RRR  Gastrointestinal:  soft with no tenderness; positive bowel sounds present  Musculoskeletal:  warm with no cyanosis, no lower extremity edema  Skin:  no jaundice or ecchymosis  Neurologic:sedated  Psychiatric: sedated and paralyzed    CXR: worse today      Recent Labs     09/12/21 0312 09/11/21  0308 09/10/21  2222 09/10/21  1657 09/09/21  1047   WBC 13.1* 30.5* 29.2*   < >  --    HGB 9.0* 11.2* 12.1   < >  --    HCT 27.0* 33.8* 36.8   < >  --    * 270 280   < >  --    INR  --   --  1.1  --   --    PCT  --   --  1.00  --  0.38   LAC  --   --  2.1*  --   --     < > = values in this interval not displayed.      Recent Labs     09/12/21 0312 09/11/21  2352 09/11/21 1920 09/11/21  1553 09/11/21  1553 09/11/21  0307 09/11/21  0307 09/10/21  2222 09/10/21  1657    138 134*   < > 133*   < > 135*   < > 136   K 3.8 4.0 4.4   < > 4.5   < > 6.0*   < > 6.4*   CL 94* 93* 92*   < > 91*   < > 95*   < > 99   CO2 25 24 23   < > 22   < > 17*   < > 15*   * 383* 497*   < > 541*   < > 385*   < > 200*   * 121* 130*   < > 135*   < > 142*   < > 140*   CREA 9.59* 9.91* 10.10*   < > 10.30*   < > 11.00*   < > 10.50*   MG 3.3* 3.3* 3.3*   < > 3.3*  --   --    < >  --    CA 7.1* 7.1* 6.7*   < > 7.0*   < > 7.8*   < > 8.0*   PHOS 12.6*  --   -- --  >9.0*  --  15.9*  --  16.5*   ALB 3.5  --   --   --   --   --  3.0*  --  2.1*    < > = values in this interval not displayed. Recent Labs     09/10/21  2222   LAC 2.1*   TROPHS 76.4*     Recent Labs     09/12/21  0719 09/12/21  0609 09/12/21  0501   GLUCPOC 172* 223* 277*     ECHO: No results found for this or any previous visit. Results     Procedure Component Value Units Date/Time    CULTURE, RESPIRATORY/SPUTUM/BRONCH Beverley Davis [[id number]] Collected: 09/10/21 1823    Order Status: Completed Specimen: Sputum Updated: 09/11/21 2005    CULTURE, BLOOD [[id number]] Collected: 09/10/21 1658    Order Status: Completed Specimen: Blood Updated: 09/11/21 1355     Special Requests: --        RIGHT  HAND       Culture result: NO GROWTH AFTER 18 HOURS       CULTURE, BLOOD [[id number]] Collected: 09/10/21 1657    Order Status: Completed Specimen: Blood Updated: 09/11/21 1355     Special Requests: --        RIGHT  Antecubital       Culture result: NO GROWTH AFTER 20 HOURS       S. Meri Gan, UR/CSF [[id number]] Collected: 09/02/21 1425    Order Status: Canceled     S. Andrecintia Gan, UR/CSF [[id number]] Collected: 09/02/21 1421    Order Status: Canceled     MSSA/MRSA SC BY PCR, NASAL SWAB [[id number]] Collected: 09/02/21 1054    Order Status: Completed Specimen: Nasal swab Updated: 09/02/21 1253     Special Requests: NO SPECIAL REQUESTS        Culture result:       SA target not detected. A MRSA NEGATIVE, SA NEGATIVE test result does not preclude MRSA or SA nasal colonization. S. Meri Gan, UR/CSF [[id number]] Collected: 09/02/21 0179    Order Status: Canceled         Inpat Anti-Infectives (From admission, onward)     Start     Ordered Stop    09/11/21 0900  azithromycin (ZITHROMAX) tablet 500 mg  500 mg,   Per NG tube,   DAILY      09/10/21 2357 --    09/09/21 1300  piperacillin-tazobactam (ZOSYN) 3.375 g in 0.9% sodium chloride (MBP/ADV) 100 mL MBP  3.375 g,   IntraVENous,   EVERY 12 HOURS     Note to Pharmacy: Please renally dose with PD transitioning to HD    09/09/21 1206 --    09/06/21 2100  ciprofloxacin (CETRAXAL) 0.2 % otic solution 0.25 mL  0.25 mL,   Left Ear,   EVERY 12 HOURS      09/06/21 1510 --              Ventilator Settings:  Ideal body weight: 63.9 kg (140 lb 14 oz)   Mode FIO2 Rate Tidal Volume Pressure PEEP   PRVC  85 %    450 ml  12 cm H2O  12 cm H20    Peak airway pressure: 32.5 cm H2O       Minute ventilation: 10.9 l/min  ABG:  Recent Labs     09/12/21  0442 09/11/21  1729 09/11/21  0332   PHI 7.29* 7.33* 7.19*   PCO2I 56.1* 40.5 41.5   PO2I 75 91 82   HCO3I 26.7* 21.2* 15.8*     Assessment and Plan:  (Medical Decision Making)     Impression: 59 y.o. y/o female with ESRD on peritoneal dialysis, covid pneumonia and respiratory failure     NEURO:   Sedation:  Versed,  Analgesia: fentanyl  Paralytics: starting  CV:   Volume Status:  Fairly balanced  Septic shock:  now on levophed    PULM:   Acute hypoxemic/hypercapneic respiratory failure:  Intubated yesterday now up to 85%  Severe ARDS 2/2 COVID: needs to be proned but have to work around PD-cxr worse  RENAL:  esrd   periponeal dialysis, getting na hco3  GI:   Nutrition: npo for now  HEME:   Anemia: hg 11.3  Thrombocytopenia: 270Anticoagulation:   ID: getting cipro, zosyn  COVID-19:   COVID-19 in immunocompromised patient ([address] (9/2/2021)    Not vaccinated, given dexa, change to IV, not a remdesivir candidate, sp Toci  ENDO:   DM:  lantus  Skin: no decub, turns, preventive care  Prophy: heparin 5000 sub q , add pepcid  Family updated by phone after rounds.  .    Full Code    The patient is critically ill with respiratory failure, circulatory failure and requires high complexity decision making for assessment and support including frequent ventilator adjustment , frequent evaluation and titration of therapies , application of advanced monitoring technologies and extensive interpretation of multiple databases  Time devoted to patient care services described in this note- 15 min face to face/ 20 min total evaluation time    Cumulative time devoted to patient care services by me for day of service -36 min     Guillermo Kimball MD

## 2021-09-12 NOTE — PROGRESS NOTES
Ventilator check complete; patient has a #7.5 ET tube secured at the 23 at the lip. Patient is sedated. Patient is not able to follow commands. Breath sounds are coarse. Trachea is midline, Negative for subcutaneous air, and chest excursion is symmetric. Patient is also Negative for cyanosis and is Negative for pitting edema. All alarms are set and audible. Resuscitation bag is at the head of the bed.       Ventilator Settings  Mode FIO2 Rate Tidal Volume Pressure PEEP I:E Ratio   PRVC  90 %   24 bpm 450 ml    14 cm H20  1:2      Peak airway pressure: 33 cm H2O   Minute ventilation: 11.6 l/min           Brittny Kirk, RT

## 2021-09-13 NOTE — PROGRESS NOTES
Ventilator check complete; patient has a #7.5 ET tube secured at the 24 at the lip. Patient is sedated. Patient is not able to follow commands. Breath sounds are diminished. Trachea is midline, Negative for subcutaneous air, and chest excursion is symmetric. Patient is also Negative for cyanosis and is Positive for pitting edema. All alarms are set and audible. Resuscitation bag is at the head of the bed. Ventilator Settings  Mode FIO2 Rate Tidal Volume Pressure PEEP I:E Ratio   PRVC  80 %  26 bpm  380 ml    14 cm H20  1:1.54      Peak airway pressure: 32.1 cm H2O   Minute ventilation: 9.8 l/min     ABG: No results for input(s): PH, PCO2, PO2, HCO3 in the last 72 hours.       [address]

## 2021-09-13 NOTE — PROGRESS NOTES
Pt unproned at this time; tolerated well - immediate O2 desaturation to 85% after turn with rapid return to 100%. No issues noted.

## 2021-09-13 NOTE — PROGRESS NOTES
Ventilator check complete; patient has a #7.5 ET tube secured at the 23at the lip. Patient is sedated. Patient is not able to follow commands. Breath sounds are diminished. Trachea is midline, Negative for subcutaneous air, and chest excursion is symmetric. Patient is also Negative for cyanosis and is Positive for pitting edema. All alarms are set and audible. Resuscitation bag is at the head of the bed.       Ventilator Settings  Mode FIO2 Rate Tidal Volume Pressure PEEP I:E Ratio   PRVC  85 %   26 450 ml  12 cm H2O  14 cm H20  1:2      Peak airway pressure: 33.3 cm H2O   Minute ventilation: 11.6 l/min       Cristobal Bell RT

## 2021-09-13 NOTE — PROGRESS NOTES
[facility] Critical Care Note[de-identified] 9/13/2021  Ewa Marte  Admission Date: 9/2/2021     Length of Stay: 11 days    Background: . Patient is Y 69 y.o.  female seen and evaluated at the request of Dr. Argelia Romero has history of ESRD on PD, Class III Obesity, IDDM, CAD s/p CABG and stents who presented with increasing SOB diagnosed with covid x 10 days ago. Briefly hospitalized and discharged stable on RA on 8/29. Sudden onset SOB and coughing on 9/1/21. Presented to [facility] on 9/2 via EMS who and she was found to be hypoxic at 77-80%. She was placed on 10LNC and readmitted. She has been doing peritoneal dialysis and has continued to decline slowly with her oxygenation. Her H&P says she would only want to be on a ventilator for 5 days. 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 She also has no past medical history of Adverse effect of anesthesia, Difficult intubation, Malignant hyperthermia due to anesthesia, Nausea & vomiting, or Pseudocholinesterase deficiency. 24 Hour events: day 3 on vent, peritoneal dialysis yesterday, on 85% fiO2 P:F 96 from 162 yesterday    ROS: unable to obtain/negative except as listed elsewhere.      Lines: (insertion date)     ETT: 9/10  [place]: (9/)  OGT: (9/10)  Central line: (9/10)  Arterial Line (9/10)  Drips: current dose (range)  Tracrium Dose (mcg/kg/min): 5 mcg/kg/min (systolic in the 180s)  Versed Dose (mg/hr): 6 mg/hr  Levophed Dose (mcg/kg/min): 0.0266 mcg/kg/min     Pertinent Exam:         Blood pressure (!) 151/48, pulse (!) 57, temperature (!) 94.5 °F (34.7 °C), resp. rate 26, height 5' 8\" (1.727 m), weight 248 lb 7.3 oz (112.7 kg), SpO2 98 %. Intake/Output Summary (Last 24 hours) at 9/13/2021 0848  Last data filed at 9/13/2021 0845  Gross per 24 hour   Intake 6044.41 ml   Output 960 ml   Net 5084.41 ml     Constitutional:  intubated and mechanically ventilated. EENMT:  Sclera clear, pupils equal, oral mucosa moist  Respiratory:  crackles bilateral  Cardiovascular:  RRR  Gastrointestinal:  soft with no tenderness; positive bowel sounds present  Musculoskeletal:  warm with no cyanosis, no lower extremity edema  Skin:  no jaundice or ecchymosis  Neurologic:sedated  Psychiatric: sedated and paralyzed    CXR: worse today      Recent Labs     09/13/21  0544 09/12/21  0312 09/11/21  0308 09/10/21  2222 09/10/21  2222   WBC 15.0* 13.1* 30.5*   < > 29.2*   HGB 8.3* 9.0* 11.2*   < > 12.1   HCT 24.2* 27.0* 33.8*   < > 36.8   * 145* 270   < > 280   INR  --   --   --   --  1.1   PCT  --   --   --   --  1.00   LAC  --   --   --   --  2.1*    < > = values in this interval not displayed.      Recent Labs     09/13/21  0544 09/13/21  0223 09/12/21  1903 09/12/21  0746 09/12/21  0312 09/11/21  1920 09/11/21  1553 09/11/21  0307 09/11/21  0307   * 137 139   < > 137   < > 133*   < > 135*   K 4.1 4.1 4.4   < > 3.8   < > 4.5   < > 6.0*   CL 88* 89* 90*   < > 94*   < > 91*   < > 95*   CO2 29 30 28   < > 25   < > 22   < > 17*   * 190* 202*   < > 277*   < > 541*   < > 385*   * 119* 120*   < > 121*   < > 135*   < > 142*   CREA 9.90* 9.94* 9.78*   < > 9.59*   < > 10.30*   < > 11.00*   MG 3.0* 2.9* 2.9*   < > 3.3*   < > 3.3*  --   --    CA 5.8* 5.7* 6.1*   < > 7.1*   < > 7.0*   < > 7.8*   PHOS 10.6*  --   --   --  12.6*  --  >9.0*   < > 15.9*   ALB 2.5*  --   --   --  3.5  --   --   -- 3.0*    < > = values in this interval not displayed. Recent Labs     09/10/21  2222   LAC 2.1*   TROPHS 76.4*     Recent Labs     09/13/21  0834 09/13/21  0729 09/13/21  0625   GLUCPOC 155* 132* 138*     ECHO: No results found for this or any previous visit. Results     Procedure Component Value Units Date/Time    CULTURE, RESPIRATORY/SPUTUM/BRONCH Christine Speed STAIN [[id number]]  (Abnormal) Collected: 09/10/21 1823    Order Status: Completed Specimen: Sputum Updated: 09/13/21 0845     Special Requests: NO SPECIAL REQUESTS        GRAM STAIN 0 TO 10 WBCS SEEN PER OIF      0 TO 1 EPITHELIAL CELLS SEEN PER OIF      FEW YEAST         1+ MUCUS PRESENT        Culture result:       MODERATE YEAST IDENTIFICATION TO FOLLOW          CULTURE, BLOOD [[id number]] Collected: 09/10/21 1658    Order Status: Completed Specimen: Blood Updated: 09/13/21 0737     Special Requests: --        RIGHT  HAND       Culture result: NO GROWTH 3 DAYS       CULTURE, BLOOD [[id number]] Collected: 09/10/21 1657    Order Status: Completed Specimen: Blood Updated: 09/13/21 0737     Special Requests: --        RIGHT  Antecubital       Culture result: NO GROWTH 3 DAYS       EBENEZER Herron, UR/CSF [[id number]] Collected: 09/02/21 1425    Order Status: Canceled     EBENEZER Herron, UR/CSF [[id number]] Collected: 09/02/21 1421    Order Status: Canceled     MSSA/MRSA SC BY PCR, NASAL SWAB [[id number]] Collected: 09/02/21 1054    Order Status: Completed Specimen: Nasal swab Updated: 09/02/21 1253     Special Requests: NO SPECIAL REQUESTS        Culture result:       SA target not detected. A MRSA NEGATIVE, SA NEGATIVE test result does not preclude MRSA or SA nasal colonization. EBENEZER Herron, UR/CSF [[id number]] Collected: 09/02/21 5640    Order Status: Canceled         Inpat Anti-Infectives (From admission, onward)     Start     Ordered Stop    09/11/21 0900  azithromycin (ZITHROMAX) tablet 500 mg  500 mg,   Per NG tube,   DAILY 09/10/21 0497 --    09/09/21 1300  piperacillin-tazobactam (ZOSYN) 3.375 g in 0.9% sodium chloride (MBP/ADV) 100 mL MBP  3.375 g,   IntraVENous,   EVERY 12 HOURS     Note to Pharmacy: Please renally dose with PD transitioning to HD    09/09/21 1206 --    09/06/21 2100  ciprofloxacin (CETRAXAL) 0.2 % otic solution 0.25 mL  0.25 mL,   Left Ear,   EVERY 12 HOURS      09/06/21 1510 --              Ventilator Settings:  Ideal body weight: 63.9 kg (140 lb 14 oz)   Mode FIO2 Rate Tidal Volume Pressure PEEP   PRVC  75 %    450 ml  0 cm H2O  14 cm H20    Peak airway pressure: 33.3 cm H2O       Minute ventilation: 11.7 l/min  ABG:  Recent Labs     09/13/21  0418 09/12/21  1402 09/12/21  0442   PHI 7.39 7.35 7.29*   PCO2I 44.9 51.2* 56.1*   PO2I 82 162* 75   HCO3I 27.3* 28.5* 26.7*     Assessment and Plan:  (Medical Decision Making)     Impression: 59 y.o. y/o female with ESRD on peritoneal dialysis, covid pneumonia and respiratory failure     NEURO:   Sedation:  Versed stopped and switched to ketamine to avoid BZD redistribution syndrome  Analgesia: fentanyl  Paralytics: starting  CV:   Volume Status:  Fairly balanced although slightly on there positive side  Septic shock:  now on levophed maintain MAP of 65mmHg +    PULM:   Acute hypoxemic/hypercapneic respiratory failure:  Intubated yesterday now up to 85%  Severe ARDS 2/2 COVID: needs to be proned but have to work around PD-cxr worse  RENAL:  esrd   periponeal dialysis, getting na hco3- stop bicarbonate  GI:   Nutrition: npo for now  HEME:   Anemia: hb 8.3  Thrombocytopenia: 270>>145>>121  Anticoagulation:   ID: getting , zosyn and azithromycin- stop both - no evidence for bacterial infection  COVID-19:   COVID-19 in immunocompromised patient ([facility] [address] (9/2/2021)    Not vaccinated, given dexa, changed to IV, not a remdesivir candidate, sp Toci  ENDO:   DM:   On insulin drip- BG controlled  Start tube feedings    Skin: no decub, turns, preventive care  Prophy: heparin 5000 sub q, pepcid    Daughter in law Whitney Ruffin updated by phone- [phone]    Full Code    The patient is critically ill with respiratory failure, circulatory failure and requires high complexity decision making for assessment and support including frequent ventilator adjustment , frequent evaluation and titration of therapies , application of advanced monitoring technologies and extensive interpretation of multiple databases  Time devoted to patient care services described in this note- 15 min face to face/ 20 min total evaluation time    Cumulative time devoted to patient care services by me for day of service -35 min     Javi Caputo MD

## 2021-09-13 NOTE — DIABETES MGMT
Patient admitted with acute respiratory failure with hypoxia. Admitting blood glucose 388. HbA1c 8.3 (). Blood glucose ranged  yesterday with patient on insulin gtt via GlucoStabilizer. Blood glucose this morning 186. Patient remains on insulin gtt via Wilene Pen. Patient has Decadron 6mg IV daily ordered. Patient intubated on 9/10 remains on vent. Patient has been receiving peritoneal dialysis. Anion gap 19. CO2- 28. Creatinine 9.97. GFR 4. Dextrose 5% infusion started this AM. Per GlucoStabilizer patient on 140-180 target blood glucose goals. Given patient condition will continue to follow along loosely.

## 2021-09-13 NOTE — CONSULTS
Comprehensive Nutrition Assessment    Type and Reason for Visit: Reassess  Tube Feeding Management (Pulmonary)    Nutrition Recommendations/Plan:   Enteral Nutrition:   Enteral Access: Orogastric  Formula: Nepro with Carbsteady (Renal)  Delivery: Continuous feeding: Initiate at  10 ml/hour, progress by 10ml/6 hours to goal rate of 50 ml/hour. Water flush 75 ml every 4 hours  Modulars: None (May consider once TF to goal and tolerating)  Enteral regimen at goal will provide 1440 calories (91% estimated calorie needs), 65 grams protein (51% estimated protein needs) and 1032 ml free fluid calculations based on 16 hour infusion. IV Fluids:  Per MD.   Nutritional Supplement Therapy:   Defer to Nephrology  Labs:   EN labs: BMP daily, Mg MWF and Phos MWF. Malnutrition Assessment:  Malnutrition Status: At risk for malnutrition (specify) (Decreased po intake for unkown time frame, COVID since ~8/23)    Nutrition Assessment:   Nutrition History: 9/8: Unable to obtain. Could only understand small amounts of what pt was communicating via mobile phone. Darlene yao could not say whether she had lost any weight anf that she started PD in January 2021. Nutrition Background: H/O:ESRD on PD ( started this year), DM, HTN, CAD s/p CABG and stents. Presented with increasing SOB diagnosed with covid x 10 days ago. Admitted with acute hypoxemic respiratory failure due to COVID-19  Daily Update:  Patient seen via window and discussed with RN. Her respiratory status continued to decline and was intubated 9/10. OGT placed at that time with confirmation via KUB. Patient currently on PD run. Will prone again after PD. Goal 2.5 L removal.  Abdominal Status (last documented): Obese, Rotund, Intact, Other (comment) (ecchymosis) abdomen with Hypoactive  bowel sounds. Last BM  (unknown).   Pertinent Medications: Tracrium, Fentanyl, Insulins gtt, Ketamine, Levo @ 4, Ciproflaxacin, Decadron  IVF: D5 @ 100 ml/hr  Pertinent Labs: Lab Results   Component Value Date/Time    Sodium 136 09/13/2021 09:37 AM    Potassium 4.0 09/13/2021 09:37 AM    Chloride 89 (L) 09/13/2021 09:37 AM    CO2 28 09/13/2021 09:37 AM    Anion gap 19 (H) 09/13/2021 09:37 AM    Glucose 178 (H) 09/13/2021 09:37 AM     (H) 09/13/2021 09:37 AM    Creatinine 9.97 (H) 09/13/2021 09:37 AM    Calcium 6.0 (L) 09/13/2021 09:37 AM    Albumin 2.5 (L) 09/13/2021 05:44 AM    Magnesium 3.1 (H) 09/13/2021 09:37 AM    Phosphorus 10.6 (H) 09/13/2021 05:44 AM   Labs remarkable for: hyperglycemia, hypermagnesemia, hyperphosphatemia    Nutrition Related Findings:   NFPE deferred d/t isolation and remote assessment. 9/8: SLP: \"functional oropharyngeal swallow, however remains at increased risk of dysphagia due to respiratory status and poor endurance. Patients sats drop to 85% with intake requiring rest breaks and intermittent placement of non re-breather to improve sats between bites/sips. \"  Noted declining PO through admission and only tolerating bites at last RD assessment (9/8). Current Nutrition Therapies:  DIET NPO    Current Intake:   Average Meal Intake: NPO (declined to bites prior to intubation) Average Supplement Intake: NPO Additional Caloric Sources: CCPD 12.5L/d of 2.5% ~300-540 kcal absorbed per day    Anthropometric Measures:  Height: 5' 8\" (172.7 cm)  Current Body Wt: 112.7 kg (248 lb 7.3 oz) (9/11), Weight source: Bed scale  BMI: 37.8, Obese class 2 (BMI 35.0-39. 9)  Admission Body Weight: 264 lb 15.9 oz (source not specified)  Ideal Body Weight (lbs) (Calculated): 140 lbs (64 kg),    Usual Body Wt: 115.7 kg (255 lb) (stated), Percent weight change: 2.8          Edema: No data recorded   Estimated Daily Nutrient Needs:  Energy (kcal/day): 2874-5388 (Kcal/kg (25-30), Weight Used: Ideal (63.6 kg))  Protein (g/day): 127-159 (2-2.5 g/kg) Weight Used: (Ideal (63.6 kg))  Fluid (ml/day):   (Standard renal)    Nutrition Diagnosis:   · Inadequate oral intake related to impaired respiratory function as evidenced by NPO or clear liquid status due to medical condition, intubation    Nutrition Interventions:   Food and/or Nutrient Delivery: Continue NPO, Start tube feeding     Coordination of Nutrition Care: Continue to monitor while inpatient  Plan of Care discussed with Nakia Linder and Dawna Jin, and Dr. Iqbal    Goals:   Previous Goal Met: Progress towards goal(s) declining  Active Goal: Tolerate TF at goal within 3 days    Nutrition Monitoring and Evaluation:      Food/Nutrient Intake Outcomes: Enteral nutrition intake/tolerance  Physical Signs/Symptoms Outcomes: Biochemical data, GI status, Hemodynamic status, Weight    Discharge Planning:     Too soon to determine    [address] CLAUDIA Juan on 9/13/2021 at 11:37 AM  Contact: [phone]        Disaster Mode Active

## 2021-09-13 NOTE — DIALYSIS
Pt is prone, nephrology aware. Will resume PD Monday morning (Day shift) when turned again per Dr. Deloris Ross. Loli Schulte RN notified.

## 2021-09-13 NOTE — DIALYSIS
Disconnected PD cycler from patient. Betadine cap intact. Patient tolerated well. UF amount -960mls. Effluent: clear, yellow    Patient did not run PD on Sunday, this data was left from Saturday night.

## 2021-09-13 NOTE — PROGRESS NOTES
Patient anion gap remains 20. D10 gtt will continue at 100 ml/hr with BMP, MG, Phos q4h. TF will start now as gap is not coming down or changing and insulin gtt with Valentino Susans continues.

## 2021-09-13 NOTE — PROGRESS NOTES
SPEECH PATHOLOGY NOTE:    Speech therapy to sign off due to decline in status. Please consider re-consult when medically appropriate for re-evaluation.     GUANACO Clarke, CCC-SLP  Speech Language Pathologist  Acute Rehabilitation Services  Contact: Jennifer

## 2021-09-13 NOTE — PROGRESS NOTES
This is a follow-up visit to the patient, providing a spiritual presence, emotional support and prayer. The patient appears to be resting.     Yoan Farrell, [address] [facility], [facility]

## 2021-09-13 NOTE — DIABETES MGMT
Spoke with primary RN. Dietician planning to start TF. Noted anion gap 19 at 0937. Patient was started on D5 fluids this AM. Patient would likely benefit from transition to D10 fluids as patient gap remains open. Primary RN to discuss with provider. If tube feedings are started while patient is on Marlou Fam will not be able to use GlucoStabilizer subcutaneous insulin recommendations when transitioned off insulin gtt as program unable to take into account tube feeding during titration of insulin gtt.

## 2021-09-13 NOTE — PROGRESS NOTES
Chart reviewed as pt tx to ICU covid positive isolation from 8th floor. Now intubated/vent day 3, 85% fio2 per MD. Paralyzed, sedated, insulin, and levo gtts. PD per Nephrology. Could switch to HD for rehab needs per Nephrology. CM following for any assist and d/c needs/POC when stable per MD. LOS 11 days. **Spouse now admitted, as well, with covid positive. Children would be legal NOK if spouse can not make decisions.

## 2021-09-13 NOTE — PROGRESS NOTES
09/13/21 2823   Patient Observations   Pulse (Heart Rate) (!) 57   Resp Rate 26   O2 Sat (%) 98 %   Airway - Endotracheal Tube 09/10/21 Oral   Placement Date/Time: 09/10/21 2150   Number of Attempts: 1  Inserted By: Teresa Allan NP  Location: Oral  Placement Verified: Auscultation;BBS;Chest x-ray;EtCO2  Airway Types: Endotracheal, cuffed  Airway Tube Size: 7.5 mm  Anesthesia ETT Insertion D...    Insertion Depth (cm) 23 cm   Line Chad Lips   Side Secured Taped   Cuff Pressure 34 cmH20   Respiratory   Respiratory (WDL) X   Respiratory Pattern Regular   Breath Sounds Bilateral Coarse   Cough Non-productive   Airway Clearance   Suction ET Tube   Vent Settings   FIO2 (%) 75 %   SpO2/FIO2 Ratio 130.67   CMV Rate Set 26   SIMV Rate Set 0   Vt Set (ml) 450 ml   Pressure Support (cm H2O) 0 cm H2O   PEEP/VENT (cm H2O) 14 cm H20   I:E Ratio 1:2   Insp Time (sec) 0.77 sec   Insp Rise Time (sec) 0.12   Insp Rise Time % 5 %   Flow Trigger 5   Ventilator Measurements   Resp Rate Observed 26   Vt Exhaled (Machine Breath) (ml) 445 ml   Vt Spont (ml) 0 ml   Ve Observed (l/min) 11.7 l/min   PIP Observed (cm H2O) 33.3 cm H2O   MAP (cm H2O) 20.1   I:E Ratio Actual 1:1.96   Dynamic Compliance (ml/cm H20) 23 ml/cm H20   Safety & Alarms   Pressure Max 50 cm H2O   Pressure Min 5 cm H2O   Ve Min 2   Ve Max 22   RR Min 5   RR Max 50   Ambu Bag Yes   Ambu Mask Yes   Vent Method/Mode   Ventilation Method Conventional   Ventilator Mode PRVC   Ventilator Mode ID 4

## 2021-09-14 NOTE — PROGRESS NOTES
Bedside and verbal shift change report received from [address] (offgoing nurse). Report included the following information SBAR, Kardex, ED Summary, Procedure Summary, Intake/Output, MAR, Recent Results, Med Rec Status, Cardiac Rhythm NSR and Alarm Parameters .      Dual skin assessment completed at bedside: scattered abd bruising (list pertinent skin assessment findings)    Dual verification of gtts completed (name of gtts verified): Fentanyl, Insulin, Ketamine

## 2021-09-14 NOTE — PROGRESS NOTES
Renal Progress Note    Admission Date: 9/2/2021   Subjective:       In ICU, intubed, prone    Objective:     Physical Exam:    Patient Vitals for the past 8 hrs:   Temp Pulse Resp SpO2   09/14/21 0630 97.7 °F (36.5 °C) 79 (!) 41 100 %   09/14/21 0615 97.8 °F (36.6 °C) 79 (!) 32 100 %   09/14/21 0600 97.8 °F (36.6 °C) 78 (!) 32 100 %   09/14/21 0545 98 °F (36.7 °C) 79 (!) 32 100 %   09/14/21 0530 98 °F (36.7 °C) 79 (!) 32 100 %   09/14/21 0515 98.2 °F (36.8 °C) 78 (!) 32 100 %   09/14/21 0500 98.2 °F (36.8 °C) 79 (!) 32 100 %   09/14/21 0445 98.4 °F (36.9 °C) 82 (!) 32 96 %   09/14/21 0430 98.2 °F (36.8 °C) 81 (!) 32 97 %   09/14/21 0415 98.2 °F (36.8 °C) 80 (!) 32 97 %   09/14/21 0400 97.5 °F (36.4 °C) 81 (!) 32 97 %   09/14/21 0345 98 °F (36.7 °C) 82 (!) 32 96 %   09/14/21 0330 97.8 °F (36.6 °C) 82 (!) 32 96 %   09/14/21 0315 97.7 °F (36.5 °C) 81 (!) 32 96 %   09/14/21 0300 97.5 °F (36.4 °C) 82 (!) 32 96 %   09/14/21 0245 97.3 °F (36.3 °C) 82 (!) 32 96 %   09/14/21 0230 97.1 °F (36.2 °C) 83 (!) 32 96 %   09/14/21 0215 96.8 °F (36 °C) 83 (!) 32 96 %   09/14/21 0200 (!) 96.6 °F (35.9 °C) 82 (!) 32 96 %   09/14/21 0147  80 (!) 32 100 %   09/14/21 0130 (!) 96.2 °F (35.7 °C) 78 26 100 %   09/14/21 0115 (!) 95.9 °F (35.5 °C) 78 26 100 %   09/14/21 0100 (!) 95.7 °F (35.4 °C) 77 26 100 %   09/14/21 0045 (!) 95.1 °F (35.1 °C) 76 26 100 %   09/14/21 0030 (!) 95 °F (35 °C) 75 26 100 %   09/14/21 0015 (!) 30.2 °F (-1 °C) 75 26 100 %   09/14/21 0000 (!) 94.8 °F (34.9 °C) 75 26 100 %   09/13/21 2358 (!) 94.6 °F (34.8 °C)      09/13/21 2345  75 26 100 %   09/13/21 2330  75 26 100 %          Current Facility-Administered Medications   Medication Dose Route Frequency    aspirin chewable tablet 81 mg  81 mg Oral DAILY    levothyroxine (SYNTHROID) tablet 125 mcg  125 mcg Per NG tube 6am    ketamine (KETALAR) 2 mg/mL infusion  0.05-3 mg/kg/hr IntraVENous TITRATE    albuterol (PROVENTIL VENTOLIN) nebulizer solution 2.5 mg  2.5 mg Nebulization Q6H RT    NOREPINephrine (LEVOPHED) 16,000 mcg in 0.9% sodium chloride 250 mL infusion  0.5-30 mcg/min IntraVENous TITRATE    atracurium (TRACRIUM) 1,000 mg in 0.9% sodium chloride 250 mL infusion  0-15 mcg/kg/min IntraVENous TITRATE    fentaNYL in normal saline (pf) 25 mcg/mL infusion  0-200 mcg/hr IntraVENous TITRATE    dextrose 40% (GLUTOSE) oral gel 1 Tube  15 g Oral PRN    glucagon (GLUCAGEN) injection 1 mg  1 mg IntraMUSCular PRN    dextrose (D50W) injection syrg 12.5-25 g  25-50 mL IntraVENous PRN    insulin regular (NOVOLIN R, HUMULIN R) 100 Units in 0.9% sodium chloride 100 mL infusion  0-50 Units/hr IntraVENous TITRATE    dexamethasone (DECADRON) 10 mg/mL injection 6 mg  6 mg IntraVENous Q24H    cetirizine (ZYRTEC) tablet 10 mg  10 mg Per NG tube DAILY    clopidogreL (PLAVIX) tablet 75 mg  75 mg Per NG tube DAILY    [START ON 9/16/2021] ergocalciferol capsule 50,000 Units  50,000 Units Per NG tube Q7D    escitalopram oxalate (LEXAPRO) tablet 20 mg  20 mg Per NG tube BID    acetaminophen (TYLENOL) tablet 650 mg  650 mg Per NG tube Q6H PRN    Or    acetaminophen (TYLENOL) suppository 650 mg  650 mg Rectal Q6H PRN    morphine injection 2 mg  2 mg IntraVENous Q6H PRN    ciprofloxacin (CETRAXAL) 0.2 % otic solution 0.25 mL  0.25 mL Left Ear Q12H    sodium chloride (NS) flush 5-10 mL  5-10 mL IntraVENous Q8H    sodium chloride (NS) flush 5-10 mL  5-10 mL IntraVENous PRN    fluticasone propionate (FLONASE) 50 mcg/actuation nasal spray 2 Spray  2 Spray Both Nostrils DAILY    [Held by provider] amLODIPine (NORVASC) tablet 5 mg  5 mg Oral DAILY    polyethylene glycol (MIRALAX) packet 17 g  17 g Oral DAILY PRN    ondansetron (ZOFRAN ODT) tablet 4 mg  4 mg Oral Q8H PRN    Or    ondansetron (ZOFRAN) injection 4 mg  4 mg IntraVENous Q6H PRN    alum-mag hydroxide-simeth (MYLANTA) oral suspension 15 mL  15 mL Oral Q6H PRN    heparin (porcine) injection 7,500 Units  7,500 Units SubCUTAneous Q8H    guaiFENesin-dextromethorphan (ROBITUSSIN DM) 100-10 mg/5 mL syrup 5 mL  5 mL Oral Q4H PRN    nitroglycerin (NITROSTAT) tablet 0.4 mg  0.4 mg SubLINGual Q5MIN PRN    [Held by provider] isosorbide mononitrate ER (IMDUR) tablet 120 mg  120 mg Oral DAILY    [Held by provider] hydrALAZINE (APRESOLINE) tablet 25 mg  25 mg Oral TID    [Held by provider] furosemide (LASIX) tablet 60 mg  60 mg Oral BID    [Held by provider] metoprolol succinate (TOPROL-XL) XL tablet 50 mg  50 mg Oral DAILY            Data Review:     LABS:   Recent Results (from the past 12 hour(s))   GLUCOSE, POC    Collection Time: 09/13/21  8:07 PM   Result Value Ref Range    Glucose (POC) 286 (H) 65 - 100 mg/dL    Performed by [facility]    GLUCOSTABILIZER    Collection Time: 09/13/21  8:07 PM   Result Value Ref Range    Glucose 286 mg/dL    Insulin order 31.2 units/hour    Insulin adminstered 31.2 units/hour    Multiplier 0.138     Low target 140 mg/dL    High target 180 mg/dL    D50 order 0.0 ml    D50 administered 0.00 ml    Minutes until next BG 60 min    Order initials      Administered initials      GLSCOM Comments     GLUCOSE, POC    Collection Time: 09/13/21  9:09 PM   Result Value Ref Range    Glucose (POC) 295 (H) 65 - 100 mg/dL    Performed by Tish Olea    Collection Time: 09/13/21  9:09 PM   Result Value Ref Range    Glucose 295 mg/dL    Insulin order 34.8 units/hour    Insulin adminstered 34.8 units/hour    Multiplier 0.148     Low target 140 mg/dL    High target 180 mg/dL    D50 order 0.0 ml    D50 administered 0.00 ml    Minutes until next BG 60 min    Order initials      Administered initials      GLSCBELKYS Comments     GLUCOSE, POC    Collection Time: 09/13/21 10:11 PM   Result Value Ref Range    Glucose (POC) 264 (H) 65 - 100 mg/dL    Performed by [facility]    GLUCOSTABILIZER    Collection Time: 09/13/21 10:11 PM   Result Value Ref Range    Glucose 264 mg/dL    Insulin order 32.2 units/hour    Insulin adminstered 32.2 units/hour    Multiplier 0.158     Low target 140 mg/dL    High target 180 mg/dL    D50 order 0.0 ml    D50 administered 0.00 ml    Minutes until next BG 60 min    Order initials      Administered initials      GLSCOM Comments     GLUCOSE, POC    Collection Time: 09/13/21 11:15 PM   Result Value Ref Range    Glucose (POC) 280 (H) 65 - 100 mg/dL    Performed by [facility]    GLUCOSTABILIZER    Collection Time: 09/13/21 11:15 PM   Result Value Ref Range    Glucose 280 mg/dL    Insulin order 37.0 units/hour    Insulin adminstered 37.0 units/hour    Multiplier 0.168     Low target 140 mg/dL    High target 180 mg/dL    D50 order 0.0 ml    D50 administered 0.00 ml    Minutes until next BG 60 min    Order initials      Administered initials      GLSCOM Comments     GLUCOSE, POC    Collection Time: 09/14/21 12:19 AM   Result Value Ref Range    Glucose (POC) 242 (H) 65 - 100 mg/dL    Performed by [facility]    GLUCOSTABILIZER    Collection Time: 09/14/21 12:19 AM   Result Value Ref Range    Glucose 242 mg/dL    Insulin order 32.4 units/hour    Insulin adminstered 32.4 units/hour    Multiplier 0.178     Low target 140 mg/dL    High target 180 mg/dL    D50 order 0.0 ml    D50 administered 0.00 ml    Minutes until next BG 60 min    Order initials      Administered initials      GLSCOM Comments     METABOLIC PANEL, BASIC    Collection Time: 09/14/21 12:50 AM   Result Value Ref Range    Sodium 137 136 - 145 mmol/L    Potassium 4.1 3.5 - 5.1 mmol/L    Chloride 89 (L) 98 - 107 mmol/L    CO2 29 21 - 32 mmol/L    Anion gap 19 (H) 7 - 16 mmol/L    Glucose 244 (H) 65 - 100 mg/dL     (H) 8 - 23 MG/DL    Creatinine 9.39 (H) 0.6 - 1.0 MG/DL    GFR est AA 5 (L) >60 ml/min/1.73m2    GFR est non-AA 4 (L) >60 ml/min/1.73m2    Calcium 6.0 (L) 8.3 - 10.4 MG/DL   MAGNESIUM    Collection Time: 09/14/21 12:50 AM   Result Value Ref Range    Magnesium 3.1 (H) 1.8 - 2.4 mg/dL   GLUCOSE, POC    Collection Time: 09/14/21  1:20 AM   Result Value Ref Range    Glucose (POC) 247 (H) 65 - 100 mg/dL    Performed by HornSharon    GLUCOSTABILIZER    Collection Time: 09/14/21  1:21 AM   Result Value Ref Range    Glucose 247 mg/dL    Insulin order 35.2 units/hour    Insulin adminstered 35.2 units/hour    Multiplier 0.188     Low target 140 mg/dL    High target 180 mg/dL    D50 order 0.0 ml    D50 administered 0.00 ml    Minutes until next BG 60 min    Order initials      Administered initials      GLSCOM Comments     BLOOD GAS, ARTERIAL POC    Collection Time: 09/14/21  1:44 AM   Result Value Ref Range    Device: ADULT VENT      FIO2 (POC) 80 %    pH (POC) 7.29 (L) 7.35 - 7.45      pCO2 (POC) 60.7 (HH) 35 - 45 MMHG    pO2 (POC) 106 (H) 75 - 100 MMHG    HCO3 (POC) 29.2 (H) 22 - 26 MMOL/L    sO2 (POC) 97.2 95 - 98 %    Base excess (POC) 2.1 mmol/L    Mode ASSIST CONTROL      Tidal volume 380 ml    PEEP/CPAP (POC) 14 cmH2O    Mean Airway Pressure 21 cmH2O    PIP (POC) 31      Allens test (POC) NOT APPLICABLE      Inspiratory Time 0.91 sec    Site DRAWN FROM ARTERIAL LINE      Specimen type (POC) ARTERIAL      Performed by Yue     Respiratory comment: 10.0    GLUCOSE, POC    Collection Time: 09/14/21  2:23 AM   Result Value Ref Range    Glucose (POC) 238 (H) 65 - 100 mg/dL    Performed by [facility]    GLUCOSTABILIZER    Collection Time: 09/14/21  2:23 AM   Result Value Ref Range    Glucose 238 mg/dL    Insulin order 35.2 units/hour    Insulin adminstered 35.2 units/hour    Multiplier 0.198     Low target 140 mg/dL    High target 180 mg/dL    D50 order 0.0 ml    D50 administered 0.00 ml    Minutes until next BG 60 min    Order initials      Administered initials      GLSCOM Comments     RENAL FUNCTION PANEL    Collection Time: 09/14/21  3:12 AM   Result Value Ref Range    Sodium 135 (L) 136 - 145 mmol/L    Potassium 4.2 3.5 - 5.1 mmol/L    Chloride 90 (L) 98 - 107 mmol/L    CO2 29 21 - 32 mmol/L    Anion gap 16 7 - 16 mmol/L    Glucose 196 (H) 65 - 100 mg/dL     (H) 8 - 23 MG/DL    Creatinine 9.64 (H) 0.6 - 1.0 MG/DL    GFR est AA 5 (L) >60 ml/min/1.73m2    GFR est non-AA 4 (L) >60 ml/min/1.73m2    Calcium 6.1 (L) 8.3 - 10.4 MG/DL    Phosphorus 9.9 (H) 2.3 - 3.7 MG/DL    Albumin 2.4 (L) 3.2 - 4.6 g/dL   CBC W/O DIFF    Collection Time: 09/14/21  3:12 AM   Result Value Ref Range    WBC 14.6 (H) 4.3 - 11.1 K/uL    RBC 2.52 (L) 4.05 - 5.2 M/uL    HGB 7.9 (L) 11.7 - 15.4 g/dL    HCT 23.8 (L) 35.8 - 46.3 %    MCV 94.4 79.6 - 97.8 FL    MCH 31.3 26.1 - 32.9 PG    MCHC 33.2 31.4 - 35.0 g/dL    RDW 13.9 11.9 - 14.6 %    PLATELET 915 (L) 137 - 450 K/uL    MPV 10.5 9.4 - 12.3 FL    ABSOLUTE NRBC 0.08 0.0 - 0.2 K/uL   MAGNESIUM    Collection Time: 09/14/21  3:12 AM   Result Value Ref Range    Magnesium 3.0 (H) 1.8 - 2.4 mg/dL   GLUCOSE, POC    Collection Time: 09/14/21  3:16 AM   Result Value Ref Range    Glucose (POC) 208 (H) 65 - 100 mg/dL    Performed by Benigno    GLUCOSTABILIZER    Collection Time: 09/14/21  3:17 AM   Result Value Ref Range    Glucose 208 mg/dL    Insulin order 30.8 units/hour    Insulin adminstered 30.8 units/hour    Multiplier 0.208     Low target 140 mg/dL    High target 180 mg/dL    D50 order 0.0 ml    D50 administered 0.00 ml    Minutes until next BG 60 min    Order initials      Administered initials      GLSCOM Comments     GLUCOSE, POC    Collection Time: 09/14/21  4:22 AM   Result Value Ref Range    Glucose (POC) 191 (H) 65 - 100 mg/dL    Performed by Benigno    GLUCOSTABILIZER    Collection Time: 09/14/21  4:23 AM   Result Value Ref Range    Glucose 191 mg/dL    Insulin order 28.6 units/hour    Insulin adminstered 28.6 units/hour    Multiplier 0.218     Low target 140 mg/dL    High target 180 mg/dL    D50 order 0.0 ml    D50 administered 0.00 ml    Minutes until next BG 60 min    Order initials bh     Administered initials  GLSCOM Comments     GLUCOSE, POC    Collection Time: 09/14/21  5:26 AM   Result Value Ref Range    Glucose (POC) 160 (H) 65 - 100 mg/dL    Performed by [email or web address]    GLUCOSTABILIZER    Collection Time: 09/14/21  5:26 AM   Result Value Ref Range    Glucose 160 mg/dL    Insulin order 21.8 units/hour    Insulin adminstered 21.8 units/hour    Multiplier 0.218     Low target 140 mg/dL    High target 180 mg/dL    D50 order 0.0 ml    D50 administered 0.00 ml    Minutes until next BG 60 min    Order initials      Administered initials      GLSCOM Comments     GLUCOSE, POC    Collection Time: 09/14/21  6:24 AM   Result Value Ref Range    Glucose (POC) 141 (H) 65 - 100 mg/dL    Performed by [facility]orneBrittanyRN    GLUCOSTABILIZER    Collection Time: 09/14/21  6:25 AM   Result Value Ref Range    Glucose 141 mg/dL    Insulin order 17.7 units/hour    Insulin adminstered 17.7 units/hour    Multiplier 0.218     Low target 140 mg/dL    High target 180 mg/dL    D50 order 0.0 ml    D50 administered 0.00 ml    Minutes until next BG 60 min    Order initials      Administered initials      GLSCOM Comments           Plan:     Principal Problem:    Acute respiratory failure with hypoxia ([address] (9/2/2021)    Active Problems:    Mixed hyperlipidemia ()      Acquired hypothyroidism ()      Type 2 diabetes with nephropathy ([address] (5/2/2018)      Class 3 severe obesity with serious comorbidity and body mass index (BMI) of 40.0 to 44.9 in adult [facility]) (5/2/2018)      Chest pain (11/25/2019)      Coronary artery disease with stable angina pectoris ([address] (2/3/2020)      ESRD on peritoneal dialysis ([address] (8/28/2021)      Acute hypokalemia (8/28/2021)      COVID-19 in immunocompromised patient ([address] (9/2/2021)      Cigarette nicotine dependence in remission (9/2/2021)      COVID-19 vaccination not done (9/2/2021)      Leukocytosis (9/8/2021)       1. ESRD on PD all 2.5% PD fluid,  increased # of exchanges- good UF with PD    Will time the PD depending on proning position .     2. COVID 19+/acute resp failure with hypoxemia - intubated     Discussed possible transition to HD for outpt rehab , if needed.

## 2021-09-14 NOTE — PROGRESS NOTES
LOS 12d   Chart reviewed for discharge planning. Patient currently on the Vent intubated sedated Vent day 4  Per previous cm notes patient is on PD but Neph is agreeable to HD if patient should need rehab. Peritoneal dialysis followed at the [facility]. Discharge Plan is undetermined at this time. Will continue to follow for discharge planning needs  Please consult  if any new issues arise

## 2021-09-14 NOTE — PROGRESS NOTES
Pending sale to [facility] Critical Care Note[de-identified] 9/14/2021  Alphonse Marte  Admission Date: 9/2/2021     Length of Stay: 12 days    Background: . Patient is F 66 y.o.  female seen and evaluated at the request of Dr. Lino Settle has history of ESRD on PD, Class III Obesity, IDDM, CAD s/p CABG and stents who presented with increasing SOB diagnosed with covid x 10 days ago. Briefly hospitalized and discharged stable on RA on 8/29. Sudden onset SOB and coughing on 9/1/21. Presented to [facility] on 9/2 via EMS who and she was found to be hypoxic at 77-80%. She was placed on 10LNC and readmitted. She has been doing peritoneal dialysis and has continued to decline slowly with her oxygenation. Her H&P says she would only want to be on a ventilator for 5 days. 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 She also has no past medical history of Adverse effect of anesthesia, Difficult intubation, Malignant hyperthermia due to anesthesia, Nausea & vomiting, or Pseudocholinesterase deficiency. 24 Hour events: day 4 on vent, peritoneal dialysis yesterday, on 55% fiO2; proned paralyzed, sedated. ROS: unable to obtain/negative except as listed elsewhere.      Lines: (insertion date)     ETT: 9/10  [place]: (9/)  OGT: (9/10)  Central line: (9/10)  Arterial Line (9/10)    Drips: current dose (range)  Tracrium Dose (mcg/kg/min): 6 mcg/kg/min  Versed Dose (mg/hr): 0 mg/hr  Ketamine Dose (mcg/kg/min): 0.8333 mcg/kg/min  Levophed Dose (mcg/kg/min): 0.1331 mcg/kg/min     Pertinent Exam:         Blood pressure (!) 139/49, pulse 82, temperature 97.5 °F (36.4 °C), resp. rate (!) 32, height 5' 8\" (1.727 m), weight 248 lb 7.3 oz (112.7 kg), SpO2 93 %. Intake/Output Summary (Last 24 hours) at 9/14/2021 1151  Last data filed at 9/14/2021 0845  Gross per 24 hour   Intake 3722.39 ml   Output    Net 3722.39 ml     Constitutional:  intubated and mechanically ventilated. EENMT:  Sclera clear, pupils equal, oral mucosa moist  Respiratory:  crackles bilateral  Cardiovascular:  RRR  Gastrointestinal:  soft with no tenderness; positive bowel sounds present  Musculoskeletal:  warm with no cyanosis, no lower extremity edema  Skin:  no jaundice or ecchymosis  Neurologic:sedated  Psychiatric: sedated and paralyzed    CXR: stable bilateral infiltrates, prone XRay      Recent Labs     09/14/21 0312 09/13/21  0544 09/12/21  0312   WBC 14.6* 15.0* 13.1*   HGB 7.9* 8.3* 9.0*   HCT 23.8* 24.2* 27.0*   * 121* 145*     Recent Labs     09/14/21  0659 09/14/21  0312 09/14/21  0050 09/13/21  0937 09/13/21  0544 09/12/21  0746 09/12/21  0312   * 135* 137   < > 135*   < > 137   K 4.3 4.2 4.1   < > 4.1   < > 3.8   CL 88* 90* 89*   < > 88*   < > 94*   CO2 28 29 29   < > 29   < > 25   * 196* 244*   < > 147*   < > 277*   * 107* 106*   < > 118*   < > 121*   CREA 9.61* 9.64* 9.39*   < > 9.90*   < > 9.59*   MG 3.0* 3.0* 3.1*   < > 3.0*   < > 3.3*   CA 6.0* 6.1* 6.0*   < > 5.8*   < > 7.1*   PHOS  --  9.9*  --   --  10.6*  --  12.6*   ALB  --  2.4*  --   --  2.5*  --  3.5    < > = values in this interval not displayed. No results for input(s): LAC, TROPHS, BNPNT, CRP in the last 72 hours. No lab exists for component: ESR  Recent Labs     09/14/21  1120 09/14/21  1013 09/14/21  0926   GLUCPOC 123* 108* 117*     ECHO: No results found for this or any previous visit. Results     Procedure Component Value Units Date/Time    CULTURE, RESPIRATORY/SPUTUM/BRONCH Cinda Baron STAIN [[id number]]  (Abnormal) Collected: 09/10/21 1823    Order Status: Completed Specimen: Sputum Updated: 09/14/21 0756     Special Requests: NO SPECIAL REQUESTS        GRAM STAIN 0 TO 10 WBCS SEEN PER OIF      0 TO 1 EPITHELIAL CELLS SEEN PER OIF      FEW YEAST         1+ MUCUS PRESENT        Culture result: MODERATE DELIO ALBICANS               NO NORMAL RESPIRATORY RENEE ISOLATED          CULTURE, BLOOD [[id number]] Collected: 09/10/21 1658    Order Status: Completed Specimen: Blood Updated: 09/14/21 0709     Special Requests: --        RIGHT  HAND       Culture result: NO GROWTH 4 DAYS       CULTURE, BLOOD [[id number]] Collected: 09/10/21 1657    Order Status: Completed Specimen: Blood Updated: 09/14/21 0709     Special Requests: --        RIGHT  Antecubital       Culture result: NO GROWTH 4 DAYS       SMonica Lay, UR/CSF [[id number]] Collected: 09/02/21 1425    Order Status: Canceled     SMonica Lay UR/CSF [[id number]] Collected: 09/02/21 1421    Order Status: Canceled     MSSA/MRSA SC BY PCR, NASAL SWAB [[id number]] Collected: 09/02/21 1054    Order Status: Completed Specimen: Nasal swab Updated: 09/02/21 1253     Special Requests: NO SPECIAL REQUESTS        Culture result:       SA target not detected. A MRSA NEGATIVE, SA NEGATIVE test result does not preclude MRSA or SA nasal colonization. SMonica Lay UR/CSF [[id number]] Collected: 09/02/21 5602    Order Status: Canceled         Inpat Anti-Infectives (From admission, onward)     Start     Ordered Stop    09/11/21 0900  azithromycin (ZITHROMAX) tablet 500 mg  500 mg,   Per NG tube,   DAILY      09/10/21 2357 --    09/09/21 1300  piperacillin-tazobactam (ZOSYN) 3.375 g in 0.9% sodium chloride (MBP/ADV) 100 mL MBP  3.375 g,   IntraVENous,   EVERY 12 HOURS     Note to Pharmacy: Please renally dose with PD transitioning to HD    09/09/21 1206 --    09/06/21 2100  ciprofloxacin (CETRAXAL) 0.2 % otic solution 0.25 mL  0.25 mL,   Left Ear,   EVERY 12 HOURS      09/06/21 1510 --              Ventilator Settings:  Ideal body weight: 63.9 kg (140 lb 14 oz)   Mode FIO2 Rate Tidal Volume Pressure PEEP   PRVC  55 %    380 ml  0 cm H2O  14 cm H20    Peak airway pressure: 31.2 cm H2O       Minute ventilation: 12.3 l/min  ABG:  Recent Labs     09/14/21  0144 09/13/21  1113 09/13/21  0418   PHI 7.29* 7.38 7.39   PCO2I 60.7* 49.4* 44.9   PO2I 106* 53* 82   HCO3I 29.2* 29.0* 27.3*     Assessment and Plan:  (Medical Decision Making)     Impression: 59 y.o. y/o female with ESRD on peritoneal dialysis, covid pneumonia and respiratory failure     NEURO:   Sedation:  continue ketamine  Analgesia: fentanyl  Paralytics: continue for now while proning  CV:   Volume Status:  ?; no dialysis numbers recorded yesterday  Septic shock:  on Levo 15 currently    PULM:   Acute hypoxemic/hypercapneic respiratory failure:  Intubated now 55%, LTVV  Severe ARDS 2/2 COVID: continue paralytics and proning  RENAL:  ESRD: on PD, tolerating okay and I/O have been adequate. GI:   Nutrition: TF  HEME:   Anemia: hb 8.3  Thrombocytopenia: 270>>145>>121  Anticoagulation:   ID: getting , zosyn and azithromycin- stop both - no evidence for bacterial infection  COVID-19 in immunocompromised patient ([facility] (9/2/2021): Not vaccinated, given dexa, changed to IV, not a remdesivir candidate, sp Toci  ENDO:   DM:  On insulin drip- add lantus 30 BID, wean drip and start SSI in addition  Skin: no decub, turns, preventive care  Prophy: heparin 5000 sub q, pepcid    Daughter in law Doni Morales updated by phone- [phone]     Full Code    The patient is critically ill with respiratory failure, circulatory failure and requires high complexity decision making for assessment and support including frequent ventilator adjustment , frequent evaluation and titration of therapies , application of advanced monitoring technologies and extensive interpretation of multiple databases  Time devoted to patient care services described in this note- 15 min face to face/ 20 min total evaluation time    Cumulative time devoted to patient care services by me for day of service -35 min     Sulema Schlatter, MD

## 2021-09-14 NOTE — PROGRESS NOTES
09/14/21 0818   Patient Observations   Pulse (Heart Rate) 79   Resp Rate (!) 32   O2 Sat (%) 99 %   Airway - Endotracheal Tube 09/10/21 Oral   Placement Date/Time: 09/10/21 2150   Number of Attempts: 1  Inserted By: Shaila Lpoez NP  Location: Oral  Placement Verified: Auscultation;BBS;Chest x-ray;EtCO2  Airway Types: Endotracheal, cuffed  Airway Tube Size: 7.5 mm  Anesthesia ETT Insertion D...    Insertion Depth (cm) 24 cm   Line Chad Lips   Side Secured Taped   Cuff Pressure 34 cmH20   Respiratory   Respiratory (WDL) X   Breath Sounds Bilateral Diminished   Cough Non-productive   Airway Clearance   Suction ET Tube   Vent Settings   FIO2 (%) 50 %   SpO2/FIO2 Ratio 198   CMV Rate Set 32   Vt Set (ml) 380 ml   PEEP/VENT (cm H2O) 14 cm H20   I:E Ratio 1:1.54   Insp Time (sec) 0.74 sec   Insp Rise Time (sec) 0.09   Insp Rise Time % 5 %   Flow Trigger 2   Ventilator Measurements   Resp Rate Observed 32   Vt Exhaled (Machine Breath) (ml) 394 ml   Ve Observed (l/min) 12.3 l/min   PIP Observed (cm H2O) 31.2 cm H2O   MAP (cm H2O) 21.2   I:E Ratio Actual 1:1.52   Dynamic Compliance (ml/cm H20) 23 ml/cm H20   Safety & Alarms   Pressure Max 50 cm H2O   Pressure Min 5 cm H2O   Ve Min 2   Ve Max 22   RR Min 5   RR Max 50   Ambu Bag Yes   Ambu Mask Yes   Weaning Parameters   Spontaneous Breathing Trial Complete No (Comments)   Vent Method/Mode   Ventilation Method Conventional   Ventilator Mode PRVC   Ventilator Mode ID 4   Procedures   $$ Subsequent Procedure Aerosol   Delivery Source In-line nebulizer

## 2021-09-14 NOTE — PROGRESS NOTES
A follow up visit was made to the patient. Emotional support, spiritual presence and   prayer were provided for the patient.       Devon Kanner, [facility], [facility]

## 2021-09-14 NOTE — DIALYSIS
Peritoneal dialysis initiated as ordered. Peritoneal catheter exit site cleaned with Chlorhexidine scrub and Gentamicin cream applied to PD cath exit site. Dressing changed, site has bleeding noted ,  redness, no s/s of swelling, or exudate. Patient states no complaints at this time. Report given to primary RN Amrik Roa.

## 2021-09-14 NOTE — DIALYSIS
PD treatment completed. Observed pt disconnected from PD cycler. Verified betadine cap intact. Patient tolerated well. UF amount:  2149 ml. Effluent: bright yellow, clear, no fibrin.

## 2021-09-14 NOTE — DIABETES MGMT
Patient admitted with acute respiratory failure with hypoxia, positive for COVID-19. Blood glucose ranged 132-295 yesterday with patient on insulin gtt via "[employer] started on tube feedings and D10 infusion yesterday afternoon. Blood glucose this morning was 122. Insulin gtt infusing at 10.8 units/hr. Noted D10 infusion order complete. Anion gap at 0312 was 16. CO2- 29. Would recommend continuing D10 infusion with insulin gtt as gap remains open. Noted creatinine 9.61. GFR 4. Discussed with primary RN who plans to discuss with provider in interdisciplinary rounds.

## 2021-09-15 NOTE — PROGRESS NOTES
Bedside and verbal shift change report received from [address] (offgoing nurse). Report included the following information SBAR, Kardex, ED Summary, Procedure Summary, Intake/Output, MAR, Recent Results, Med Rec Status, Cardiac Rhythm NSR/ST and Alarm Parameters .      Dual skin assessment completed at bedside: N/A (list pertinent skin assessment findings)    Dual verification of gtts completed (name of gtts verified): Fentanyl, insulin, Ketamine

## 2021-09-15 NOTE — ADT AUTH CERT NOTES
Utilization Reviews       Viral Illness, Acute - Care Day 13 (9/14/2021) by Florinda Sarah RN       Review Entered Review Status   9/14/2021 14:24 Completed      Criteria Review      Care Day: 13 Care Date: 9/14/2021 Level of Care: ICU    Guideline Day 3    Level Of Care    (X) Floor to discharge    Clinical Status    ( ) * Hemodynamic stability    ( ) * Afebrile or temperature acceptable for next level of care    ( ) * Tachypnea absent    ( ) * Hypoxemia absent    ( ) * Mental status at baseline    ( ) * Renal function at baseline, or stable and acceptable for next level of care    ( ) * Discharge plans and education understood    Activity    ( ) * Ambulatory or acceptable for next level of care    Routes    ( ) * Oral hydration    ( ) * Oral medications or regimen acceptable for next level of care    ( ) * Oral diet or acceptable for next level of care    Interventions    ( ) * Isolation not indicated, or is performable at next level of care    Medications    ( ) * Antimicrobial medication absent or regimen established for next level of care    * Milestone   Additional Notes   [facility] Critical Care Note[de-identified] 9/14/2021   Chao Marte   Admission Date: 9/2/2021     Length of Stay: 12 days       Background: . Patient is R 27 y.o.  female seen and evaluated at the request of Dr. Ludy Denton has history of ESRD on PD, Class III Obesity, IDDM, CAD s/p CABG and stents who presented with increasing SOB diagnosed with covid x 10 days ago. Briefly hospitalized and discharged stable on RA on 8/29. Sudden onset SOB and coughing on 9/1/21. Presented to [facility] on 9/2 via EMS who and she was found to be hypoxic at 77-80%. She was placed on 10LNC and readmitted. She has been doing peritoneal dialysis and has continued to decline slowly with her oxygenation. Her H&P says she would only want to be on a ventilator for 5 days.    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 She also has no past medical history of Adverse effect of anesthesia, Difficult intubation, Malignant hyperthermia due to anesthesia, Nausea & vomiting, or Pseudocholinesterase deficiency.       24 Hour events: day 4 on vent, peritoneal dialysis yesterday, on 55% fiO2; proned paralyzed, sedated.        ROS: unable to obtain/negative except as listed elsewhere. Lines: (insertion date)        ETT: 9/10   [place]: (9/)   OGT: (9/10)   Central line: (9/10)   Arterial Line (9/10)       Drips: current dose (range)   Tracrium Dose (mcg/kg/min): 6 mcg/kg/min   Versed Dose (mg/hr): 0 mg/hr   Ketamine Dose (mcg/kg/min): 0.8333 mcg/kg/min   Levophed Dose (mcg/kg/min): 0.1331 mcg/kg/min       Pertinent Exam:         Blood pressure (!) 139/49, pulse 82, temperature 97.5 °F (36.4 °C), resp. rate (!) 32, height 5' 8\" (1.727 m), weight 248 lb 7.3 oz (112.7 kg), SpO2 93 %.        Intake/Output Summary (Last 24 hours) at 9/14/2021 1151   Last data filed at 9/14/2021 0845   Gross per 24 hour   Intake 3722.39 ml   Output -   Net 3722.39 ml       Constitutional:  intubated and mechanically ventilated.    EENMT:  Sclera clear, pupils equal, oral mucosa moist   Respiratory:  crackles bilateral   Cardiovascular:  RRR   Gastrointestinal:  soft with no tenderness; positive bowel sounds present   Musculoskeletal:  warm with no cyanosis, no lower extremity edema   Skin:  no jaundice or ecchymosis Neurologic:sedated   Psychiatric: sedated and paralyzed       CXR: stable bilateral infiltrates, prone XRay          Recent Labs     09/14/21 0312 09/13/21   0544 09/12/21 0312   WBC 14.6* 15.0* 13.1*   HGB 7.9* 8.3* 9.0*   HCT 23.8* 24.2* 27.0*   * 121* 145*       Recent Labs     09/14/21   0659 09/14/21   0312 09/14/21   0050 09/13/21   0937 09/13/21   0544 09/12/21   0746 09/12/21 0312   * 135* 137  < > 135*  < > 137   K 4.3 4.2 4.1  < > 4.1  < > 3.8   CL 88* 90* 89*  < > 88*  < > 94*   CO2 28 29 29  < > 29  < > 25   * 196* 244*  < > 147*  < > 277*   * 107* 106*  < > 118*  < > 121*   CREA 9.61* 9.64* 9.39*  < > 9.90*  < > 9.59*   MG 3.0* 3.0* 3.1*  < > 3.0*  < > 3.3*   CA 6.0* 6.1* 6.0*  < > 5.8*  < > 7.1*   PHOS -- 9.9* -- -- 10.6* -- 12.6*   ALB -- 2.4* -- -- 2.5* -- 3.5    < > = values in this interval not displayed.       No results for input(s): LAC, TROPHS, BNPNT, CRP in the last 72 hours.       No lab exists for component: ESR   Recent Labs     09/14/21   1120 09/14/21   1013 09/14/21   0926   GLUCPOC 123* 108* 117*       ECHO: No results found for this or any previous visit.       Results      Procedure Component Value Units Date/Time     CULTURE, RESPIRATORY/SPUTUM/BRONCH Monica Clos STAIN [[id number]]  (Abnormal) Collected: 09/10/21 5373     Order Status: Completed Specimen: Sputum Updated: 09/14/21 5535       Special Requests: NO SPECIAL REQUESTS         GRAM STAIN 0 TO 10 WBCS SEEN PER OIF         0 TO 1 EPITHELIAL CELLS SEEN PER OIF         FEW YEAST           1+ MUCUS PRESENT         Culture result: MODERATE DELIO ALBICANS                     NO NORMAL RESPIRATORY RENEE ISOLATED            CULTURE, BLOOD [[id number]] Collected: 09/10/21 1658     Order Status: Completed Specimen: Blood Updated: 09/14/21 0709       Special Requests: --         RIGHT   HAND           Culture result: NO GROWTH 4 DAYS       CULTURE, BLOOD [[id number]] Collected: 09/10/21 1657     Order Status: Completed Specimen: Blood Updated: 09/14/21 0709       Special Requests: --         RIGHT   Antecubital           Culture result: NO GROWTH 4 DAYS       EBENEZER Estrada, UR/CSF [[id number]] Collected: 09/02/21 1425     Order Status: Canceled       EBENEZER Estrada UR/CSF [[id number]] Collected: 09/02/21 1421     Order Status: Canceled       MSSA/MRSA SC BY PCR, NASAL SWAB [[id number]] Collected: 09/02/21 1054     Order Status: Completed Specimen: Nasal swab Updated: 09/02/21 1253       Special Requests: NO SPECIAL REQUESTS         Culture result:           SA target not detected.                                 A MRSA NEGATIVE, SA NEGATIVE test result does not preclude MRSA or SA nasal colonization.            EBENEZER Estrada, UR/CSF [[id number]] Collected: 09/02/21 9200     Order Status: Canceled             Inpat Anti-Infectives (From admission, onward)          Start Ordered Stop      09/11/21 0900      azithromycin (ZITHROMAX) tablet 500 mg  500 mg,   Per NG tube,   DAILY     09/10/21 2357 --      09/09/21 1300      piperacillin-tazobactam (ZOSYN) 3.375 g in 0.9% sodium chloride (MBP/ADV) 100 mL MBP  3.375 g,   IntraVENous,   EVERY 12 HOURS     Note to Pharmacy: Please renally dose with PD transitioning to HD   09/09/21 1206 --      09/06/21 2100      ciprofloxacin (CETRAXAL) 0.2 % otic solution 0.25 mL  0.25 mL,   Left Ear,   EVERY 12 HOURS     09/06/21 1510 --                   Ventilator Settings:  Ideal body weight: 63.9 kg (140 lb 14 oz)    Mode FIO2 Rate Tidal Volume Pressure PEEP   PRVC 55 %   380 ml 0 cm H2O 14 cm H20    Peak airway pressure: 31.2 cm H2O       Minute ventilation: 12.3 l/min   ABG:   Recent Labs     09/14/21   0144 09/13/21   1113 09/13/21   0418   PHI 7.29* 7.38 7.39   PCO2I 60.7* 49.4* 44.9   PO2I 106* 53* 82   HCO3I 29.2* 29.0* 27.3*       Assessment and Plan:  (Medical Decision Making)       Impression: 61 y.o. y/o female with ESRD on peritoneal dialysis, covid pneumonia and respiratory failure       NEURO:    Sedation:  continue ketamine   Analgesia: fentanyl   Paralytics: continue for now while proning   CV:    Volume Status:  ?; no dialysis numbers recorded yesterday   Septic shock:  on Levo 15 currently      PULM:    Acute hypoxemic/hypercapneic respiratory failure:  Intubated now 55%, LTVV   Severe ARDS 2/2 COVID: continue paralytics and proning   RENAL:   ESRD: on PD, tolerating okay and I/O have been adequate. GI:    Nutrition: TF   HEME:    Anemia: hb 8.3   Thrombocytopenia: 270>>145>>121   Anticoagulation:    ID: getting , zosyn and azithromycin- stop both - no evidence for bacterial infection   COVID-19 in immunocompromised patient ([facility] [address] (9/2/2021): Not vaccinated, given dexa, changed to IV, not a remdesivir candidate, sp Toci   ENDO:    DM:  On insulin drip- add lantus 30 BID, wean drip and start SSI in addition   Skin: no decub, turns, preventive care   Prophy: heparin 5000 sub q, pepcid       Subjective:       In ICU, intubed, prone       Objective:       Physical Exam:     Patient Vitals for the past 8 hrs:     Temp Pulse Resp SpO2   09/14/21 0630 97.7 °F (36.5 °C) 79 (!) 41 100 %   09/14/21 0615 97.8 °F (36.6 °C) 79 (!) 32 100 %   09/14/21 0600 97.8 °F (36.6 °C) 78 (!) 32 100 %   09/14/21 0545 98 °F (36.7 °C) 79 (!) 32 100 %   09/14/21 0530 98 °F (36.7 °C) 79 (!) 32 100 %   09/14/21 0515 98.2 °F (36.8 °C) 78 (!) 32 100 %   09/14/21 0500 98.2 °F (36.8 °C) 79 (!) 32 100 %   09/14/21 0445 98.4 °F (36.9 °C) 82 (!) 32 96 %   09/14/21 0430 98.2 °F (36.8 °C) 81 (!) 32 97 %   09/14/21 0415 98.2 °F (36.8 °C) 80 (!) 32 97 %   09/14/21 0400 97.5 °F (36.4 °C) 81 (!) 32 97 %   09/14/21 0345 98 °F (36.7 °C) 82 (!) 32 96 %   09/14/21 0330 97.8 °F (36.6 °C) 82 (!) 32 96 %   09/14/21 0315 97.7 °F (36.5 °C) 81 (!) 32 96 %   09/14/21 0300 97.5 °F (36.4 °C) 82 (!) 32 96 %   09/14/21 0245 97.3 °F (36.3 °C) 82 (!) 32 96 %   09/14/21 0230 97.1 °F (36.2 °C) 83 (!) 32 96 %   09/14/21 0215 96.8 °F (36 °C) 83 (!) 32 96 %   09/14/21 0200 (!) 96.6 °F (35.9 °C) 82 (!) 32 96 %   09/14/21 0147 - 80 (!) 32 100 %   09/14/21 0130 (!) 96.2 °F (35.7 °C) 78 26 100 %   09/14/21 0115 (!) 95.9 °F (35.5 °C) 78 26 100 %   09/14/21 0100 (!) 95.7 °F (35.4 °C) 77 26 100 %   09/14/21 0045 (!) 95.1 °F (35.1 °C) 76 26 100 %   09/14/21 0030 (!) 95 °F (35 °C) 75 26 100 %   09/14/21 0015 (!) 30.2 °F (-1 °C) 75 26 100 %   09/14/21 0000 (!) 94.8 °F (34.9 °C) 75 26 100 %   09/13/21 2358 (!) 94.6 °F (34.8 °C) - - -   09/13/21 2345 - 75 26 100 %   09/13/21 2330 - 75 26 100 %               Current Facility-Administered Medications   Medication Dose Route Frequency   · aspirin chewable tablet 81 mg 81 mg Oral DAILY   · levothyroxine (SYNTHROID) tablet 125 mcg 125 mcg Per NG tube 6am   · ketamine (KETALAR) 2 mg/mL infusion 0.05-3 mg/kg/hr IntraVENous TITRATE   · albuterol (PROVENTIL VENTOLIN) nebulizer solution 2.5 mg 2.5 mg Nebulization Q6H RT   · NOREPINephrine (LEVOPHED) 16,000 mcg in 0.9% sodium chloride 250 mL infusion 0.5-30 mcg/min IntraVENous TITRATE   · atracurium (TRACRIUM) 1,000 mg in 0.9% sodium chloride 250 mL infusion 0-15 mcg/kg/min IntraVENous TITRATE   · fentaNYL in normal saline (pf) 25 mcg/mL infusion 0-200 mcg/hr IntraVENous TITRATE   · dextrose 40% (GLUTOSE) oral gel 1 Tube 15 g Oral PRN   · glucagon (GLUCAGEN) injection 1 mg 1 mg IntraMUSCular PRN   · dextrose (D50W) injection syrg 12.5-25 g 25-50 mL IntraVENous PRN   · insulin regular (NOVOLIN R, HUMULIN R) 100 Units in 0.9% sodium chloride 100 mL infusion 0-50 Units/hr IntraVENous TITRATE   · dexamethasone (DECADRON) 10 mg/mL injection 6 mg 6 mg IntraVENous Q24H   · cetirizine (ZYRTEC) tablet 10 mg 10 mg Per NG tube DAILY   · clopidogreL (PLAVIX) tablet 75 mg 75 mg Per NG tube DAILY   · [START ON 9/16/2021] ergocalciferol capsule 50,000 Units 50,000 Units Per NG tube Q7D   · escitalopram oxalate (LEXAPRO) tablet 20 mg 20 mg Per NG tube BID   · acetaminophen (TYLENOL) tablet 650 mg 650 mg Per NG tube Q6H PRN     Or   · acetaminophen (TYLENOL) suppository 650 mg 650 mg Rectal Q6H PRN   · morphine injection 2 mg 2 mg IntraVENous Q6H PRN   · ciprofloxacin (CETRAXAL) 0.2 % otic solution 0.25 mL 0.25 mL Left Ear Q12H   · sodium chloride (NS) flush 5-10 mL 5-10 mL IntraVENous Q8H   · sodium chloride (NS) flush 5-10 mL 5-10 mL IntraVENous PRN   · fluticasone propionate (FLONASE) 50 mcg/actuation nasal spray 2 Spray 2 Spray Both Nostrils DAILY   · [Held by provider] amLODIPine (NORVASC) tablet 5 mg 5 mg Oral DAILY   · polyethylene glycol (MIRALAX) packet 17 g 17 g Oral DAILY PRN   · ondansetron (ZOFRAN ODT) tablet 4 mg 4 mg Oral Q8H PRN     Or   · ondansetron (ZOFRAN) injection 4 mg 4 mg IntraVENous Q6H PRN   · alum-mag hydroxide-simeth (MYLANTA) oral suspension 15 mL 15 mL Oral Q6H PRN   · heparin (porcine) injection 7,500 Units 7,500 Units SubCUTAneous Q8H   · guaiFENesin-dextromethorphan (ROBITUSSIN DM) 100-10 mg/5 mL syrup 5 mL 5 mL Oral Q4H PRN   · nitroglycerin (NITROSTAT) tablet 0.4 mg 0.4 mg SubLINGual Q5MIN PRN   · [Held by provider] isosorbide mononitrate ER (IMDUR) tablet 120 mg 120 mg Oral DAILY   · [Held by provider] hydrALAZINE (APRESOLINE) tablet 25 mg 25 mg Oral TID   · [Held by provider] furosemide (LASIX) tablet 60 mg 60 mg Oral BID   · [Held by provider] metoprolol succinate (TOPROL-XL) XL tablet 50 mg 50 mg Oral DAILY                   Data Review:        LABS:    Recent Results   Recent Results (from the past 12 hour(s))   GLUCOSE, POC     Collection Time: 09/13/21  8:07 PM   Result Value Ref Range     Glucose (POC) 286 (H) 65 - 100 mg/dL     Performed by Benigno     GLUCOSTABILIZER     Collection Time: 09/13/21  8:07 PM   Result Value Ref Range     Glucose 286 mg/dL     Insulin order 31.2 units/hour     Insulin adminstered 31.2 units/hour     Multiplier 0.138       Low target 140 mg/dL     High target 180 mg/dL     D50 order 0.0 ml     D50 administered 0.00 ml     Minutes until next BG 60 min     Order initials        Administered initials        GLSCOM Comments       GLUCOSE, POC     Collection Time: 09/13/21  9:09 PM   Result Value Ref Range     Glucose (POC) 295 (H) 65 - 100 mg/dL     Performed by Jocelyne Anne     GLUCOSTABILIZER     Collection Time: 09/13/21  9:09 PM   Result Value Ref Range     Glucose 295 mg/dL     Insulin order 34.8 units/hour     Insulin adminstered 34.8 units/hour     Multiplier 0.148       Low target 140 mg/dL     High target 180 mg/dL     D50 order 0.0 ml     D50 administered 0.00 ml     Minutes until next BG 60 min     Order initials        Administered initials        GLSCOM Comments       GLUCOSE, POC     Collection Time: 09/13/21 10:11 PM   Result Value Ref Range     Glucose (POC) 264 (H) 65 - 100 mg/dL     Performed by Benigno     GLUCOSTABILIZER     Collection Time: 09/13/21 10:11 PM   Result Value Ref Range     Glucose 264 mg/dL     Insulin order 32.2 units/hour     Insulin adminstered 32.2 units/hour     Multiplier 0.158       Low target 140 mg/dL     High target 180 mg/dL     D50 order 0.0 ml     D50 administered 0.00 ml     Minutes until next BG 60 min     Order initials        Administered initials        GLSCOM Comments       GLUCOSE, POC     Collection Time: 09/13/21 11:15 PM   Result Value Ref Range     Glucose (POC) 280 (H) 65 - 100 mg/dL     Performed by Benigno     GLUCOSTABILIZER     Collection Time: 09/13/21 11:15 PM   Result Value Ref Range     Glucose 280 mg/dL     Insulin order 37.0 units/hour     Insulin adminstered 37.0 units/hour     Multiplier 0.168       Low target 140 mg/dL     High target 180 mg/dL     D50 order 0.0 ml     D50 administered 0.00 ml     Minutes until next BG 60 min     Order initials        Administered initials        GLSCOM Comments       GLUCOSE, POC     Collection Time: 09/14/21 12:19 AM   Result Value Ref Range     Glucose (POC) 242 (H) 65 - 100 mg/dL     Performed by Benigno     GLUCOSTABILIZER     Collection Time: 09/14/21 12:19 AM   Result Value Ref Range     Glucose 242 mg/dL     Insulin order 32.4 units/hour     Insulin adminstered 32.4 units/hour     Multiplier 0.178       Low target 140 mg/dL     High target 180 mg/dL     D50 order 0.0 ml     D50 administered 0.00 ml     Minutes until next BG 60 min     Order initials        Administered initials        GLSCOM Comments       METABOLIC PANEL, BASIC     Collection Time: 09/14/21 12:50 AM   Result Value Ref Range     Sodium 137 136 - 145 mmol/L     Potassium 4.1 3.5 - 5.1 mmol/L     Chloride 89 (L) 98 - 107 mmol/L     CO2 29 21 - 32 mmol/L     Anion gap 19 (H) 7 - 16 mmol/L     Glucose 244 (H) 65 - 100 mg/dL      (H) 8 - 23 MG/DL     Creatinine 9.39 (H) 0.6 - 1.0 MG/DL     GFR est AA 5 (L) >60 ml/min/1.73m2     GFR est non-AA 4 (L) >60 ml/min/1.73m2     Calcium 6.0 (L) 8.3 - 10.4 MG/DL   MAGNESIUM     Collection Time: 09/14/21 12:50 AM   Result Value Ref Range     Magnesium 3.1 (H) 1.8 - 2.4 mg/dL   GLUCOSE, POC     Collection Time: 09/14/21  1:20 AM   Result Value Ref Range     Glucose (POC) 247 (H) 65 - 100 mg/dL     Performed by Benigno     GLUCOSTABILIZER     Collection Time: 09/14/21  1:21 AM   Result Value Ref Range     Glucose 247 mg/dL     Insulin order 35.2 units/hour     Insulin adminstered 35.2 units/hour     Multiplier 0.188       Low target 140 mg/dL     High target 180 mg/dL     D50 order 0.0 ml     D50 administered 0.00 ml     Minutes until next BG 60 min     Order initials        Administered initials        GLSCOM Comments       BLOOD GAS, ARTERIAL POC     Collection Time: 09/14/21  1:44 AM   Result Value Ref Range     Device: ADULT VENT      FIO2 (POC) 80 %     pH (POC) 7.29 (L) 7.35 - 7.45       pCO2 (POC) 60.7 (HH) 35 - 45 MMHG     pO2 (POC) 106 (H) 75 - 100 MMHG     HCO3 (POC) 29.2 (H) 22 - 26 MMOL/L     sO2 (POC) 97.2 95 - 98 %     Base excess (POC) 2.1 mmol/L     Mode ASSIST CONTROL      Tidal volume 380 ml     PEEP/CPAP (POC) 14 cmH2O     Mean Airway Pressure 21 cmH2O     PIP (POC) 31      Allens test (POC) NOT APPLICABLE      Inspiratory Time 0.91 sec     Site DRAWN FROM ARTERIAL LINE      Specimen type (POC) ARTERIAL      Performed by Yue       Respiratory comment: 10.0     GLUCOSE, POC     Collection Time: 09/14/21  2:23 AM   Result Value Ref Range     Glucose (POC) 238 (H) 65 - 100 mg/dL     Performed by Benigno     GLUCOSTABILIZER     Collection Time: 09/14/21  2:23 AM   Result Value Ref Range     Glucose 238 mg/dL     Insulin order 35.2 units/hour     Insulin adminstered 35.2 units/hour     Multiplier 0.198       Low target 140 mg/dL     High target 180 mg/dL     D50 order 0.0 ml     D50 administered 0.00 ml     Minutes until next BG 60 min     Order initials bh       Administered initials bh       GLSCOM Comments       RENAL FUNCTION PANEL     Collection Time: 09/14/21  3:12 AM   Result Value Ref Range     Sodium 135 (L) 136 - 145 mmol/L     Potassium 4.2 3.5 - 5.1 mmol/L     Chloride 90 (L) 98 - 107 mmol/L     CO2 29 21 - 32 mmol/L     Anion gap 16 7 - 16 mmol/L     Glucose 196 (H) 65 - 100 mg/dL      (H) 8 - 23 MG/DL     Creatinine 9.64 (H) 0.6 - 1.0 MG/DL     GFR est AA 5 (L) >60 ml/min/1.73m2     GFR est non-AA 4 (L) >60 ml/min/1.73m2     Calcium 6.1 (L) 8.3 - 10.4 MG/DL     Phosphorus 9.9 (H) 2.3 - 3.7 MG/DL     Albumin 2.4 (L) 3.2 - 4.6 g/dL   CBC W/O DIFF     Collection Time: 09/14/21  3:12 AM   Result Value Ref Range     WBC 14.6 (H) 4.3 - 11.1 K/uL     RBC 2.52 (L) 4.05 - 5.2 M/uL     HGB 7.9 (L) 11.7 - 15.4 g/dL     HCT 23.8 (L) 35.8 - 46.3 %     MCV 94.4 79.6 - 97.8 FL     MCH 31.3 26.1 - 32.9 PG     MCHC 33.2 31.4 - 35.0 g/dL     RDW 13.9 11.9 - 14.6 %     PLATELET 079 (L) 353 - 450 K/uL     MPV 10.5 9.4 - 12.3 FL     ABSOLUTE NRBC 0.08 0.0 - 0.2 K/uL   MAGNESIUM     Collection Time: 09/14/21  3:12 AM   Result Value Ref Range     Magnesium 3.0 (H) 1.8 - 2.4 mg/dL   GLUCOSE, POC     Collection Time: 09/14/21  3:16 AM   Result Value Ref Range     Glucose (POC) 208 (H) 65 - 100 mg/dL     [facility]     GLUCOSTABILIZER     Collection Time: 09/14/21  3:17 AM   Result Value Ref Range     Glucose 208 mg/dL     Insulin order 30.8 units/hour     Insulin adminstered 30.8 units/hour     Multiplier 0.208       Low target 140 mg/dL     High target 180 mg/dL     D50 order 0.0 ml     D50 administered 0.00 ml     Minutes until next BG 60 min     Order initials        Administered initials        GLSCOM Comments       GLUCOSE, POC     Collection Time: 09/14/21  4:22 AM   Result Value Ref Range     Glucose (POC) 191 (H) 65 - 100 mg/dL     [facility]     GLUCOSTABILIZER     Collection Time: 09/14/21  4:23 AM   Result Value Ref Range     Glucose 191 mg/dL     Insulin order 28.6 units/hour     Insulin adminstered 28.6 units/hour     Multiplier 0.218       Low target 140 mg/dL     High target 180 mg/dL     D50 order 0.0 ml     D50 administered 0.00 ml     Minutes until next BG 60 min     Order initials        Administered initials        GLSCOM Comments       GLUCOSE, POC     Collection Time: 09/14/21  5:26 AM   Result Value Ref Range     Glucose (POC) 160 (H) 65 - 100 mg/dL     [facility]     GLUCOSTABILIZER     Collection Time: 09/14/21  5:26 AM   Result Value Ref Range     Glucose 160 mg/dL     Insulin order 21.8 units/hour     Insulin adminstered 21.8 units/hour     Multiplier 0.218       Low target 140 mg/dL     High target 180 mg/dL     D50 order 0.0 ml     D50 administered 0.00 ml     Minutes until next BG 60 min     Order initials        Administered initials        GLSCOM Comments       GLUCOSE, POC     Collection Time: 09/14/21  6:24 AM   Result Value Ref Range     Glucose (POC) 141 (H) 65 - 100 mg/dL     Performed by [address] [place] [address]     Collection Time: 09/14/21  6:25 AM   Result Value Ref Range     Glucose 141 mg/dL     Insulin order 17.7 units/hour     Insulin adminstered 17.7 units/hour     Multiplier 0.218       Low target 140 mg/dL     High target 180 mg/dL     D50 order 0.0 ml     D50 administered 0.00 ml     Minutes until next BG 60 min     Order initials bh       Administered initials bh       GLSCOM Comments                      Plan:       Principal Problem:     Acute respiratory failure with hypoxia ([facility] [address] (9/2/2021)       Active Problems:     Mixed hyperlipidemia ()         Acquired hypothyroidism ()         Type 2 diabetes with nephropathy ([facility] [address] (5/2/2018)         Class 3 severe obesity with serious comorbidity and body mass index (BMI) of 40.0 to 44.9 in adult [facility]) (5/2/2018)         Chest pain (11/25/2019)         Coronary artery disease with stable angina pectoris ([facility] [address] (2/3/2020)         ESRD on peritoneal dialysis ([facility] [address] (8/28/2021)         Acute hypokalemia (8/28/2021)         COVID-19 in immunocompromised patient ([address] (9/2/2021)         Cigarette nicotine dependence in remission (9/2/2021)         COVID-19 vaccination not done (9/2/2021)         Leukocytosis (9/8/2021)           1. ESRD on PD all 2.5% PD fluid,  increased # of exchanges- good UF with PD       Will time the PD depending on proning position .       2. COVID 19+/acute resp failure with hypoxemia - intubated       Discussed possible transition to HD for outpt rehab , if needed.         [facility], Acute - Care Day 12 (9/13/2021) by Melia Estevez RN       Review Entered Review Status   9/14/2021 14:21 Completed      Criteria Review      Care Day: 12 Care Date: 9/13/2021 Level of Care: ICU    Guideline Day 3    Level Of Care    (X) Floor to discharge    9/14/2021 14:21:30 EDT by Jhoan Mendoza      ICU    Clinical Status    ( ) * Hemodynamic stability    ( ) * Afebrile or temperature acceptable for next level of care    ( ) * Tachypnea absent    ( ) * Hypoxemia absent    ( ) * Mental status at baseline    ( ) * Renal function at baseline, or stable and acceptable for next level of care    ( ) * Discharge plans and education understood    Activity    ( ) * Ambulatory or acceptable for next level of care    Routes    ( ) * Oral hydration    ( ) * Oral medications or regimen acceptable for next level of care    ( ) * Oral diet or acceptable for next level of care    Interventions    ( ) * Isolation not indicated, or is performable at next level of care    Medications    ( ) * Antimicrobial medication absent or regimen established for next level of care    * Milestone   Additional Notes   [facility] Critical Care Note[de-identified] 9/13/2021   Jose Marte   Admission Date: 9/2/2021     Length of Stay: 11 days       Background: . Patient is I 89 y.o.  female seen and evaluated at the request of Dr. Salome Salinas has history of ESRD on PD, Class III Obesity, IDDM, CAD s/p CABG and stents who presented with increasing SOB diagnosed with covid x 10 days ago. Briefly hospitalized and discharged stable on RA on 8/29. Sudden onset SOB and coughing on 9/1/21. Presented to [facility] on 9/2 via EMS who and she was found to be hypoxic at 77-80%. She was placed on 10LNC and readmitted. She has been doing peritoneal dialysis and has continued to decline slowly with her oxygenation. Her H&P says she would only want to be on a ventilator for 5 days.    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 She also has no past medical history of Adverse effect of anesthesia, Difficult intubation, Malignant hyperthermia due to anesthesia, Nausea & vomiting, or Pseudocholinesterase deficiency.       24 Hour events: day 3 on vent, peritoneal dialysis yesterday, on 85% fiO2 P:F 96 from 162 yesterday       ROS: unable to obtain/negative except as listed elsewhere. Lines: (insertion date)        ETT: 9/10   [place]: (9/)   OGT: (9/10)   Central line: (9/10)   Arterial Line (9/10)   Drips: current dose (range)   Tracrium Dose (mcg/kg/min): 5 mcg/kg/min (systolic in the 223Z)   Versed Dose (mg/hr): 6 mg/hr   Levophed Dose (mcg/kg/min): 0.0266 mcg/kg/min       Pertinent Exam:         Blood pressure (!) 151/48, pulse (!) 57, temperature (!) 94.5 °F (34.7 °C), resp. rate 26, height 5' 8\" (1.727 m), weight 248 lb 7.3 oz (112.7 kg), SpO2 98 %.        Intake/Output Summary (Last 24 hours) at 9/13/2021 0848   Last data filed at 9/13/2021 0845   Gross per 24 hour   Intake 6044.41 ml   Output 960 ml   Net 5084.41 ml       Constitutional:  intubated and mechanically ventilated.    EENMT:  Sclera clear, pupils equal, oral mucosa moist   Respiratory:  crackles bilateral   Cardiovascular:  RRR   Gastrointestinal:  soft with no tenderness; positive bowel sounds present   Musculoskeletal:  warm with no cyanosis, no lower extremity edema   Skin:  no jaundice or ecchymosis   Neurologic:sedated   Psychiatric: sedated and paralyzed       CXR: worse today         Recent Labs     09/13/21   0544 09/12/21   0312 09/11/21   0308 09/10/21   2222 09/10/21   2222   WBC 15.0* 13.1* 30.5*  < > 29.2*   HGB 8.3* 9.0* 11.2*  < > 12.1   HCT 24.2* 27.0* 33.8*  < > 36.8   * 145* 270  < > 280   INR -- -- -- -- 1.1   PCT -- -- -- -- 1.00   LAC -- -- -- -- 2.1*    < > = values in this interval not displayed.       Recent Labs     09/13/21   0544 09/13/21   0223 09/12/21   1903 09/12/21   0782 09/12/21   0312 09/11/21   1920 09/11/21   1553 09/11/21   0307 09/11/21   0307   * 137 139  < > 137  < > 133*  < > 135*   K 4.1 4.1 4.4  < > 3.8  < > 4.5  < > 6.0*   CL 88* 89* 90*  < > 94*  < > 91*  < > 95*   CO2 29 30 28  < > 25  < > 22  < > 17*   * 190* 202*  < > 277*  < > 541*  < > 385*   * 119* 120*  < > 121*  < > 135*  < > 142*   CREA 9.90* 9.94* 9.78*  < > 9.59*  < > 10.30*  < > 11.00*   MG 3.0* 2.9* 2.9*  < > 3.3*  < > 3.3* -- --    CA 5.8* 5.7* 6.1*  < > 7.1*  < > 7.0*  < > 7.8*   PHOS 10.6* -- -- -- 12.6* -- >9.0*  < > 15.9*   ALB 2.5* -- -- -- 3.5 -- -- -- 3.0*    < > = values in this interval not displayed.       Recent Labs     09/10/21   2222   LAC 2.1*   TROPHS 76.4*       Recent Labs     09/13/21   0834 09/13/21   0729 09/13/21   0625   GLUCPOC 155* 132* 138*       ECHO: No results found for this or any previous visit.       Results      Procedure Component Value Units Date/Time     CULTURE, RESPIRATORY/SPUTUM/BRONCH [place] STAIN [[id number]]  (Abnormal) Collected: 09/10/21 1823     Order Status: Completed Specimen: Sputum Updated: 09/13/21 0876       Special Requests: NO SPECIAL REQUESTS         GRAM STAIN 0 TO 10 WBCS SEEN PER OIF         0 TO 1 EPITHELIAL CELLS SEEN PER OIF         FEW YEAST           1+ MUCUS PRESENT         Culture result:           MODERATE YEAST IDENTIFICATION TO FOLLOW            CULTURE, BLOOD [[id number]] Collected: 09/10/21 1658     Order Status: Completed Specimen: Blood Updated: 09/13/21 0737       Special Requests: --         RIGHT   HAND           Culture result: NO GROWTH 3 DAYS       CULTURE, BLOOD [[id number]] Collected: 09/10/21 1657     Order Status: Completed Specimen: Blood Updated: 09/13/21 0737       Special Requests: --         RIGHT   Antecubital           Culture result: NO GROWTH 3 DAYS       S. Danielle Black, UR/CSF [[id number]] Collected: 09/02/21 1425     Order Status: Canceled       S. Danielle Black UR/CSF [[id number]] Collected: 09/02/21 1421   Order Status: Canceled       MSSA/MRSA SC BY PCR, NASAL SWAB [[id number]] Collected: 09/02/21 1054     Order Status: Completed Specimen: Nasal swab Updated: 09/02/21 1253       Special Requests: NO SPECIAL REQUESTS         Culture result:           SA target not detected.                                 A MRSA NEGATIVE, SA NEGATIVE test result does not preclude MRSA or SA nasal colonization.            EBENEZER [place], UR/CSF [[id number]] Collected: 09/02/21 0888     Order Status: Canceled             Inpat Anti-Infectives (From admission, onward)          Start Ordered Stop      09/11/21 0900      azithromycin (ZITHROMAX) tablet 500 mg  500 mg,   Per NG tube,   DAILY     09/10/21 2357 --      09/09/21 1300      piperacillin-tazobactam (ZOSYN) 3.375 g in 0.9% sodium chloride (MBP/ADV) 100 mL MBP  3.375 g,   IntraVENous,   EVERY 12 HOURS     Note to Pharmacy: Please renally dose with PD transitioning to HD   09/09/21 1206 --      09/06/21 2100      ciprofloxacin (CETRAXAL) 0.2 % otic solution 0.25 mL  0.25 mL,   Left Ear,   EVERY 12 HOURS     09/06/21 1510 --                   Ventilator Settings:  Ideal body weight: 63.9 kg (140 lb 14 oz)    Mode FIO2 Rate Tidal Volume Pressure PEEP   PRVC 75 %   450 ml 0 cm H2O 14 cm H20    Peak airway pressure: 33.3 cm H2O       Minute ventilation: 11.7 l/min   ABG:   Recent Labs     09/13/21   0418 09/12/21   1402 09/12/21   0442   PHI 7.39 7.35 7.29*   PCO2I 44.9 51.2* 56.1*   PO2I 82 162* 75   HCO3I 27.3* 28.5* 26.7*       Assessment and Plan:  (Medical Decision Making)       Impression: 61 y.o. y/o female with ESRD on peritoneal dialysis, covid pneumonia and respiratory failure       NEURO:    Sedation:  Versed stopped and switched to ketamine to avoid BZD redistribution syndrome   Analgesia: fentanyl   Paralytics: starting   CV:    Volume Status:  Fairly balanced although slightly on there positive side   Septic shock:  now on levophed maintain MAP of 65mmHg +      PULM: Acute hypoxemic/hypercapneic respiratory failure:  Intubated yesterday now up to 85%   Severe ARDS 2/2 COVID: needs to be proned but have to work around PD-cxr worse   RENAL:   esrd    periponeal dialysis, getting na hco3- stop bicarbonate   GI:    Nutrition: npo for now   HEME:    Anemia: hb 8.3   Thrombocytopenia: 270>>145>>121   Anticoagulation:    ID: getting , zosyn and azithromycin- stop both - no evidence for bacterial infection   COVID-19:    COVID-19 in immunocompromised patient ([facility] [address] (9/2/2021)     Not vaccinated, given dexa, changed to IV, not a remdesivir candidate, sp Toci   ENDO:    DM:    On insulin drip- BG controlled   Start tube feedings      Skin: no decub, turns, preventive care   Prophy: heparin 5000 sub q, pepcid

## 2021-09-15 NOTE — PROGRESS NOTES
Comprehensive Nutrition Assessment    Type and Reason for Visit: Reassess  Tube Feeding Management (Pulmonary)    Nutrition Recommendations/Plan:   · Enteral Nutrition: Hold now. Resume TF when able pending GI status and pressor support. If unable to resume feeds in next 24-48 hrs, may need to consider PN. · Enteral Access: Orogastric  · Formula: Nepro with Carbsteady (Renal)  · Delivery: Continuous feeding: Initiate at  10 ml/hour, progress by 10ml/6 hours to goal rate of 50 ml/hour. · Water flush 75 ml every 4 hours  · Modulars: None (May consider once TF to goal and tolerating)  · Enteral regimen at goal will provide 1440 calories (91% estimated calorie needs), 65 grams protein (51% estimated protein needs) and 1032 ml free fluid calculations based on 16 hour infusion. · IV Fluids:  · Per MD.   · Nutritional Supplement Therapy:   · Defer to Nephrology  · Labs:   · EN labs: BMP daily, Mg MWF and Phos MWF. · Bowel regimen while on fentanyl drip: 2 tabs Pericolace daily and Miralax once daily. Malnutrition Assessment:  Malnutrition Status: At risk for malnutrition (specify) (Decreased po intake for unkown time frame, COVID since ~8/23)    Nutrition Assessment:   Nutrition History: 9/8: Unable to obtain. Could only understand small amounts of what pt was communicating via mobile phone. Darlene yao could not say whether she had lost any weight anf that she started PD in January 2021. Nutrition Background: H/O:ESRD on PD ( started this year), DM, HTN, CAD s/p CABG and stents. Presented with increasing SOB diagnosed with covid x 10 days ago. Admitted with acute hypoxemic respiratory failure due to COVID-19  Daily Update:  Patient seen and discussed with RN. TF held yesterday for increased GRV. Resumed yesterday evening, but held again overnight due to increasing pressor support and repeat high GRV. RN checked GRV this am with no TF ~7-8 hrs and GVR >1000.  Pressor support continued to trend up and unable to sustain MAP >65. Discussed with RD adding TF hold order. May need to consider PN if appropriate and in line with GOC. Noted palliative following as patient stated she would not want to maintained on vent for more than 5 days. Family has now asked that it be extended. She continues on daily PD. She continues to prone daily. Insulin was changed from gtt to long acting + SSI, but gtt had to be resumed early this am.  Abdominal Status (last documented): Intact, Obese, Pannus abdomen with Active  bowel sounds. Last BM  (pta). Pertinent Medications: Tracrium, Fentanyl, Ketamine, Insulin gtt, Levo @ 24 mcg, Ciprofloxan, Decadron, ergocalciferol, Lasix held, Miralax PRN  Pertinent Labs:   Lab Results   Component Value Date/Time    Sodium 130 (L) 09/15/2021 04:07 AM    Potassium 5.1 09/15/2021 04:07 AM    Chloride 87 (L) 09/15/2021 04:07 AM    CO2 22 09/15/2021 04:07 AM    Anion gap 21 (H) 09/15/2021 04:07 AM    Glucose 452 (HH) 09/15/2021 04:07 AM     (H) 09/15/2021 04:07 AM    Creatinine 9.86 (H) 09/15/2021 04:07 AM    Calcium 6.4 (L) 09/15/2021 04:07 AM    Albumin 2.5 (L) 09/15/2021 04:07 AM    Magnesium 3.0 (H) 09/14/2021 11:28 AM    Phosphorus >9.0 (H) 09/15/2021 04:07 AM     Lab Results   Component Value Date/Time    Glucose 452 (HH) 09/15/2021 04:07 AM    Glucose (POC) 302 (H) 09/15/2021 09:48 AM    Glucose (POC) 382 (H) 09/15/2021 09:00 AM    Glucose (POC) 383 (H) 09/15/2021 07:55 AM    Glucose (POC) 439 (H) 09/15/2021 06:53 AM    Glucose (POC) 421 (H) 09/15/2021 05:50 AM    Glucose (POC) 510 ([facility]) 09/15/2021 04:45 AM       Nutrition Related Findings:   NFPE deferred d/t isolation and remote assessment. 9/8: SLP: \"functional oropharyngeal swallow, however remains at increased risk of dysphagia due to respiratory status and poor endurance. Patients sats drop to 85% with intake requiring rest breaks and intermittent placement of non re-breather to improve sats between bites/sips. \"  Noted declining PO through admission and only tolerating bites at last RD assessment (9/8). TF initiated 9/13. Current Nutrition Therapies:  ADULT TUBE FEEDING Orogastric; Renal; Delivery Method: Continuous; Continuous Initial Rate (mL/hr): 10; Continuous Advance Tube Feeding: Yes; Advancement Volume (mL/hr): 10; Advancement Frequency: Q 6 hours; Continuous Goal Rate (mL/hr): 50; Water. .. DIET NPO  Current Tube Feeding (TF) Orders:   · Feeding Route: Orogastric  · Formula: Renal  · Schedule:Continuous    · Regimen: 50 ml/hr  · Additives/Modulars:  (none)  · Water Flushes: 75 ml Q4  · Current TF & Flush Orders Provides: on hold    Current Intake:   Average Meal Intake: NPO (declined to bites prior to intubation) Average Supplement Intake: NPO Additional Caloric Sources: CCPD 12.5L/d of 2.5% ~300-540 kcal absorbed per day    Anthropometric Measures:  Height: 5' 8\" (172.7 cm)  Current Body Wt: 112.7 kg (248 lb 7.3 oz) (9/11), Weight source: Bed scale  BMI: 37.8, Obese class 2 (BMI 35.0-39. 9)  Admission Body Weight: 264 lb 15.9 oz (source not specified)  Ideal Body Weight (lbs) (Calculated): 140 lbs (64 kg),    Usual Body Wt: 115.7 kg (255 lb) (stated), Percent weight change: 2.8          Edema: Generalized: 1+;Non-pitting (9/15/2021  7:00 AM)     Estimated Daily Nutrient Needs:  Energy (kcal/day): 5336-1377 (Kcal/kg (25-30), Weight Used: Ideal (63.6 kg))  Protein (g/day): 127-159 (2-2.5 g/kg) Weight Used: (Ideal (63.6 kg))  Fluid (ml/day):   (Standard renal)    Nutrition Diagnosis:   · Inadequate oral intake related to impaired respiratory function as evidenced by NPO or clear liquid status due to medical condition, intubation    Nutrition Interventions:   Food and/or Nutrient Delivery: Continue NPO (resume TF when able vs start TPN)     Coordination of Nutrition Care: Continue to monitor while inpatient  Plan of Care discussed with Venita Nobles RN    Goals:   Previous Goal Met: Progress towards goal(s) declining  Active Goal: Tolerate TF at goal within 3 days    Nutrition Monitoring and Evaluation:      Food/Nutrient Intake Outcomes: Enteral nutrition intake/tolerance  Physical Signs/Symptoms Outcomes: Biochemical data, GI status, Hemodynamic status, Weight    Discharge Planning:     Too soon to determine    [address] CLAUDIA Juan on 9/15/2021 at 10:52 AM  Contact: [phone]        Disaster Mode Active

## 2021-09-15 NOTE — PROGRESS NOTES
[facility] Critical Care Note[de-identified] 9/15/2021  Erin Marte  Admission Date: 9/2/2021     Length of Stay: 13 days    Background: . Patient is A 86 y.o.  female seen and evaluated at the request of Dr. Bandar Gallardo has history of ESRD on PD, Class III Obesity, IDDM, CAD s/p CABG and stents who presented with increasing SOB diagnosed with covid x 10 days ago. Briefly hospitalized and discharged stable on RA on 8/29. Sudden onset SOB and coughing on 9/1/21. Presented to [facility] on 9/2 via EMS who and she was found to be hypoxic at 77-80%. She was placed on 10LNC and readmitted. She has been doing peritoneal dialysis and has continued to decline slowly with her oxygenation. Her H&P says she would only want to be on a ventilator for 5 days. Intubated on 09/10/21. 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 She also has no past medical history of Adverse effect of anesthesia, Difficult intubation, Malignant hyperthermia due to anesthesia, Nausea & vomiting, or Pseudocholinesterase deficiency. 24 Hour events: day 5 on vent, peritoneal dialysis yesterday, on 65% FiO2, unproned overnight and doing PD. Was down to 50% yesterday prone, but up to 75% while supine. ROS: unable to obtain/negative except as listed elsewhere.      Lines: (insertion date)   ETT: 9/10  [place]: (9/)  OGT: (9/10)  Central line: (9/10)  Arterial Line (9/10)    Drips: current dose (range)  Tracrium Dose (mcg/kg/min): 6 mcg/kg/min  Versed Dose (mg/hr): 0 mg/hr  Ketamine Dose (mcg/kg/min): 0.8333 mcg/kg/min  Levophed Dose (mcg/kg/min): 0.1952 mcg/kg/min     Pertinent Exam:         Blood pressure (!) 136/46, pulse (!) 103, temperature 98.7 °F (37.1 °C), resp. rate (!) 34, height 5' 8\" (1.727 m), weight 248 lb 7.3 oz (112.7 kg), SpO2 95 %. Intake/Output Summary (Last 24 hours) at 9/15/2021 0923  Last data filed at 9/15/2021 0824  Gross per 24 hour   Intake 2322.75 ml   Output 2149 ml   Net 173.75 ml     Constitutional:  intubated and mechanically ventilated. EENMT:  Sclera clear, pupils equal, oral mucosa moist  Respiratory:  crackles bilateral  Cardiovascular:  RRR  Gastrointestinal:  soft with no tenderness; positive bowel sounds present  Musculoskeletal:  warm with no cyanosis, no lower extremity edema  Skin:  no jaundice or ecchymosis  Neurologic:sedated  Psychiatric: sedated and paralyzed    CXR: 9/15: increased basilar infiltrates      Recent Labs     09/15/21  0407 09/14/21 0312 09/13/21  0544   WBC 22.5* 14.6* 15.0*   HGB 8.0* 7.9* 8.3*   HCT 24.8* 23.8* 24.2*   * 102* 121*     Recent Labs     09/15/21  0407 09/14/21  1128 09/14/21  0659 09/14/21  0312 09/14/21  0312 09/13/21  0937 09/13/21  0544   * 133* 133*   < > 135*   < > 135*   K 5.1 4.5 4.3   < > 4.2   < > 4.1   CL 87* 87* 88*   < > 90*   < > 88*   CO2 22 28 28   < > 29   < > 29   * 111* 125*   < > 196*   < > 147*   * 107* 107*   < > 107*   < > 118*   CREA 9.86* 9.71* 9.61*   < > 9.64*   < > 9.90*   MG  --  3.0* 3.0*  --  3.0*   < > 3.0*   CA 6.4* 6.1* 6.0*   < > 6.1*   < > 5.8*   PHOS >9.0*  --   --   --  9.9*  --  10.6*   ALB 2.5*  --   --   --  2.4*  --  2.5*    < > = values in this interval not displayed. No results for input(s): LAC, TROPHS, BNPNT, CRP in the last 72 hours.     No lab exists for component: ESR  Recent Labs     09/15/21  0755 09/15/21  0653 09/15/21  0550   GLUCPOC 383* 439* 421*     ECHO: No results found for this or any previous visit. Results     Procedure Component Value Units Date/Time    CULTURE, RESPIRATORY/SPUTUM/BRONCH Clau Mcbride STAIN [[id number]]  (Abnormal) Collected: 09/10/21 1823    Order Status: Completed Specimen: Sputum Updated: 09/14/21 0756     Special Requests: NO SPECIAL REQUESTS        GRAM STAIN 0 TO 10 WBCS SEEN PER OIF      0 TO 1 EPITHELIAL CELLS SEEN PER OIF      FEW YEAST         1+ MUCUS PRESENT        Culture result: MODERATE DELIO ALBICANS               NO NORMAL RESPIRATORY RENEE ISOLATED          CULTURE, BLOOD [[id number]] Collected: 09/10/21 1658    Order Status: Completed Specimen: Blood Updated: 09/15/21 0746     Special Requests: --        RIGHT  HAND       Culture result: NO GROWTH 5 DAYS       CULTURE, BLOOD [[id number]] Collected: 09/10/21 1657    Order Status: Completed Specimen: Blood Updated: 09/15/21 0746     Special Requests: --        RIGHT  Antecubital       Culture result: NO GROWTH 5 DAYS       S. Calos Dukes, UR/CSF [[id number]] Collected: 09/02/21 1425    Order Status: Canceled     EBENEZER [place], UR/CSF [[id number]] Collected: 09/02/21 1421    Order Status: Canceled     MSSA/MRSA SC BY PCR, NASAL SWAB [[id number]] Collected: 09/02/21 1054    Order Status: Completed Specimen: Nasal swab Updated: 09/02/21 1253     Special Requests: NO SPECIAL REQUESTS        Culture result:       SA target not detected. A MRSA NEGATIVE, SA NEGATIVE test result does not preclude MRSA or SA nasal colonization. EBENEZER [place] Dukes, UR/CSF [[id number]] Collected: 09/02/21 7027    Order Status: Canceled         Inpat Anti-Infectives (From admission, onward)     Start     Ordered Stop    09/11/21 0900  azithromycin (ZITHROMAX) tablet 500 mg  500 mg,   Per NG tube,   DAILY      09/10/21 2357 --    09/09/21 1300  piperacillin-tazobactam (ZOSYN) 3.375 g in 0.9% sodium chloride (MBP/ADV) 100 mL MBP  3.375 g,   IntraVENous,   EVERY 12 HOURS Note to Pharmacy: Please renally dose with PD transitioning to HD    09/09/21 1206 --    09/06/21 2100  ciprofloxacin (CETRAXAL) 0.2 % otic solution 0.25 mL  0.25 mL,   Left Ear,   EVERY 12 HOURS      09/06/21 1510 --              Ventilator Settings:  Ideal body weight: 63.9 kg (140 lb 14 oz)   Mode FIO2 Rate Tidal Volume Pressure PEEP   PRVC  75 %    380 ml  0 cm H2O  15 cm H20    Peak airway pressure: 33 cm H2O       Minute ventilation: 12.7 l/min  ABG:  Recent Labs     09/15/21  0413 09/14/21  0144 09/13/21  1113   PHI 7.24* 7.29* 7.38   PCO2I 57.1* 60.7* 49.4*   PO2I 70* 106* 53*   HCO3I 24.2 29.2* 29.0*     Assessment and Plan:  (Medical Decision Making)   Impression: 59 y.o. y/o female with ESRD on peritoneal dialysis, covid pneumonia and respiratory failure     NEURO:   Sedation:  continue ketamine  Analgesia: fentanyl  Paralytics: continue for now while proning  CV:   Volume Status: running net even  Septic shock:  Levo 24 currently  PULM:    Acute hypoxemic/hypercapneic respiratory failure:  Intubated now 55%, LTVV  Severe ARDS 2/2 COVID: continue paralytics and proning  RENAL:  ESRD: on PD, tolerating okay and I/O have been adequate. GI:   Nutrition: TF  HEME:   Anemia: hb 8.3  Thrombocytopenia: 270>>145>>121  Anticoagulation:   ID: getting , zosyn and azithromycin- stop both - no evidence for bacterial infection  COVID-19 in immunocompromised patient ([facility] [address] (9/2/2021): Not vaccinated, given dexa, changed to IV, not a remdesivir candidate, sp Toci  ENDO:   DM: On insulin drip- add lantus 30 BID, wean drip and start SSI in addition  Skin: no decub, turns, preventive care  Prophy: heparin 5000 sub q, pepcid     Daughter in law Hui Moser ([phone]). Palliative care to discuss ongoing care with her today. Patient previously only wanted 5 days on ventilator and is no where near weaning at this time.       Full Code    The patient is critically ill with respiratory failure, circulatory failure and requires high complexity decision making for assessment and support including frequent ventilator adjustment , frequent evaluation and titration of therapies , application of advanced monitoring technologies and extensive interpretation of multiple databases  Time devoted to patient care services described in this note- 11 min face to face/ 20 min total evaluation time    Cumulative time devoted to patient care services by me for day of service -31 min     Charisse Smyth MD

## 2021-09-15 NOTE — PROGRESS NOTES
Case Management continues to follow for discharge planning. DC plan pending improvement in medical status.

## 2021-09-15 NOTE — PROGRESS NOTES
A follow up visit was made to the patient. Emotional support, spiritual presence and   prayer were provided for the patient.       Ashli Prince, [address] [facility], [facility]

## 2021-09-15 NOTE — PROGRESS NOTES
Ventilator check complete; patient has a #7.5 ET tube secured at the 24 at the lip. Patient is sedated. Patient is not able to follow commands. Breath sounds are diminished. Trachea is midline, Negative for subcutaneous air, and chest excursion is symmetrical. Patient is also Negative for cyanosis and is Negative for pitting edema. All alarms are set and audible.   Resuscitation bag and mask are at the head of the bed.       Ventilator Settings  Mode FIO2 Rate Tidal Volume Pressure PEEP I:E Ratio   PRVC  75 %   34 430 ml    15 cm H20  1:1.33       Peak airway pressure: 33 cm H2O   Minute ventilation: 12.7 l/min

## 2021-09-15 NOTE — DIALYSIS
Disconnected PD cycler from patient. Betadine cap intact. Patient tolerated well. UF amount: 942ml. Effluent: yellow, clear.

## 2021-09-15 NOTE — PROGRESS NOTES
Ventilator check complete; patient has a #7.5 ET tube secured at the 24 at the lip. Patient is sedated. Patient is not able to follow commands. Breath sounds are diminished. Trachea is midline, Negative for subcutaneous air, and chest excursion is symmetric. Patient is also Negative for cyanosis and is Negative for pitting edema. All alarms are set and audible. Resuscitation bag is at the head of the bed.       Ventilator Settings  Mode FIO2 Rate Tidal Volume Pressure PEEP I:E Ratio   PRVC  75 %    380 ml  0 cm H2O  15 cm H20  1:1.33      Peak airway pressure: 33 cm H2O   Minute ventilation: 12.7 l/min       [facility], RT

## 2021-09-15 NOTE — DIABETES MGMT
Patient's blood glucose ranged 108-297 yesterday with patient receiving dexamethasone 6 mg and Lantus 60 units, regular 23 units after insulin drip was stopped. Blood glucose elevated to 510 this morning and insulin drip was restarted. Hgb 8. Na+ 130. Cr 9.86. Ca+ 6. 4. Phos>9. 0. GFR 4. Patient remains on vent with TF. Will follow along.

## 2021-09-15 NOTE — PROGRESS NOTES
Renal Progress Note    Admission Date: 9/2/2021   Subjective:       In ICU, intubed, supine but about to go to prone position     Objective:     Physical Exam:    Patient Vitals for the past 8 hrs:   Temp Pulse Resp SpO2   09/15/21 0844  (!) 103 (!) 34 95 %   09/15/21 0700  (!) 108 (!) 34 95 %   09/15/21 0645  (!) 108 (!) 34 95 %   09/15/21 0630 98.7 °F (37.1 °C) (!) 108 (!) 34 95 %   09/15/21 0615  (!) 104 (!) 34 91 %   09/15/21 0600  (!) 104 (!) 34 96 %   09/15/21 0545  (!) 101 (!) 34 98 %   09/15/21 0530  (!) 102 (!) 34 95 %   09/15/21 0515  97 (!) 34 93 %   09/15/21 0500  97 (!) 34 97 %   09/15/21 0459 99 °F (37.2 °C)      09/15/21 0445  97 (!) 34 98 %   09/15/21 0444  97 (!) 34 99 %   09/15/21 0430  96 (!) 34 98 %   09/15/21 0415  93 (!) 33 96 %   09/15/21 0400  96 (!) 32 93 %   09/15/21 0345  95 (!) 32 93 %   09/15/21 0330  93 (!) 32 92 %   09/15/21 0315  93 (!) 32 92 %   09/15/21 0300  93 (!) 32 93 %   09/15/21 0245  97 (!) 32 93 %   09/15/21 0230  97 (!) 32 99 %   09/15/21 0215  96 (!) 32 97 %   09/15/21 0200  98 (!) 32 96 %   09/15/21 0145  99 (!) 32 97 %      Gen: ET tube in place    Extem: 1+ edema     Current Facility-Administered Medications   Medication Dose Route Frequency    white petrolatum-mineral oiL (LACRILUBE S.O.P.) ointment   Both Eyes Q6H    insulin regular (NOVOLIN R, HUMULIN R) 100 Units in 0.9% sodium chloride 100 mL infusion  1-50 Units/hr IntraVENous TITRATE    aspirin chewable tablet 81 mg  81 mg Oral DAILY    levothyroxine (SYNTHROID) tablet 125 mcg  125 mcg Per NG tube 6am    dextrose (D50W) injection syrg 12.5-25 g  25-50 mL IntraVENous PRN    ketamine (KETALAR) 2 mg/mL infusion  0.05-3 mg/kg/hr IntraVENous TITRATE    albuterol (PROVENTIL VENTOLIN) nebulizer solution 2.5 mg  2.5 mg Nebulization Q6H RT    NOREPINephrine (LEVOPHED) 16,000 mcg in 0.9% sodium chloride 250 mL infusion  0.5-30 mcg/min IntraVENous TITRATE    atracurium (TRACRIUM) 1,000 mg in 0.9% sodium chloride 250 mL infusion  0-15 mcg/kg/min IntraVENous TITRATE    fentaNYL in normal saline (pf) 25 mcg/mL infusion  0-200 mcg/hr IntraVENous TITRATE    dextrose 40% (GLUTOSE) oral gel 1 Tube  15 g Oral PRN    glucagon (GLUCAGEN) injection 1 mg  1 mg IntraMUSCular PRN    dexamethasone (DECADRON) 10 mg/mL injection 6 mg  6 mg IntraVENous Q24H    cetirizine (ZYRTEC) tablet 10 mg  10 mg Per NG tube DAILY    clopidogreL (PLAVIX) tablet 75 mg  75 mg Per NG tube DAILY    [START ON 9/16/2021] ergocalciferol capsule 50,000 Units  50,000 Units Per NG tube Q7D    escitalopram oxalate (LEXAPRO) tablet 20 mg  20 mg Per NG tube BID    acetaminophen (TYLENOL) tablet 650 mg  650 mg Per NG tube Q6H PRN    Or    acetaminophen (TYLENOL) suppository 650 mg  650 mg Rectal Q6H PRN    morphine injection 2 mg  2 mg IntraVENous Q6H PRN    ciprofloxacin (CETRAXAL) 0.2 % otic solution 0.25 mL  0.25 mL Left Ear Q12H    sodium chloride (NS) flush 5-10 mL  5-10 mL IntraVENous Q8H    sodium chloride (NS) flush 5-10 mL  5-10 mL IntraVENous PRN    fluticasone propionate (FLONASE) 50 mcg/actuation nasal spray 2 Spray  2 Spray Both Nostrils DAILY    [Held by provider] amLODIPine (NORVASC) tablet 5 mg  5 mg Oral DAILY    polyethylene glycol (MIRALAX) packet 17 g  17 g Oral DAILY PRN    ondansetron (ZOFRAN ODT) tablet 4 mg  4 mg Oral Q8H PRN    Or    ondansetron (ZOFRAN) injection 4 mg  4 mg IntraVENous Q6H PRN    alum-mag hydroxide-simeth (MYLANTA) oral suspension 15 mL  15 mL Oral Q6H PRN    heparin (porcine) injection 7,500 Units  7,500 Units SubCUTAneous Q8H    guaiFENesin-dextromethorphan (ROBITUSSIN DM) 100-10 mg/5 mL syrup 5 mL  5 mL Oral Q4H PRN    nitroglycerin (NITROSTAT) tablet 0.4 mg  0.4 mg SubLINGual Q5MIN PRN    [Held by provider] isosorbide mononitrate ER (IMDUR) tablet 120 mg  120 mg Oral DAILY    [Held by provider] hydrALAZINE (APRESOLINE) tablet 25 mg  25 mg Oral TID    [Held by provider] furosemide (LASIX) tablet 60 mg  60 mg Oral BID    [Held by provider] metoprolol succinate (TOPROL-XL) XL tablet 50 mg  50 mg Oral DAILY            Data Review:     LABS:   Recent Results (from the past 12 hour(s))   GLUCOSE, POC    Collection Time: 09/15/21 12:13 AM   Result Value Ref Range    Glucose (POC) 379 (H) 65 - 100 mg/dL    Performed by Fabrice Herrera    GLUCOSE, POC    Collection Time: 09/15/21  4:04 AM   Result Value Ref Range    Glucose (POC) 485 (HH) 65 - 100 mg/dL    Performed by Benigno    RENAL FUNCTION PANEL    Collection Time: 09/15/21  4:07 AM   Result Value Ref Range    Sodium 130 (L) 136 - 145 mmol/L    Potassium 5.1 3.5 - 5.1 mmol/L    Chloride 87 (L) 98 - 107 mmol/L    CO2 22 21 - 32 mmol/L    Anion gap 21 (H) 7 - 16 mmol/L    Glucose 452 (HH) 65 - 100 mg/dL     (H) 8 - 23 MG/DL    Creatinine 9.86 (H) 0.6 - 1.0 MG/DL    GFR est AA 5 (L) >60 ml/min/1.73m2    GFR est non-AA 4 (L) >60 ml/min/1.73m2    Calcium 6.4 (L) 8.3 - 10.4 MG/DL    Phosphorus >9.0 (H) 2.3 - 3.7 MG/DL    Albumin 2.5 (L) 3.2 - 4.6 g/dL   CBC W/O DIFF    Collection Time: 09/15/21  4:07 AM   Result Value Ref Range    WBC 22.5 (H) 4.3 - 11.1 K/uL    RBC 2.50 (L) 4.05 - 5.2 M/uL    HGB 8.0 (L) 11.7 - 15.4 g/dL    HCT 24.8 (L) 35.8 - 46.3 %    MCV 99.2 (H) 79.6 - 97.8 FL    MCH 32.0 26.1 - 32.9 PG    MCHC 32.3 31.4 - 35.0 g/dL    RDW 15.4 (H) 11.9 - 14.6 %    PLATELET 759 (L) 894 - 450 K/uL    MPV 11.7 9.4 - 12.3 FL    ABSOLUTE NRBC 0.34 (H) 0.0 - 0.2 K/uL   BLOOD GAS, ARTERIAL POC    Collection Time: 09/15/21  4:13 AM   Result Value Ref Range    Device: ADULT VENT      FIO2 (POC) 75 %    pH (POC) 7.24 (LL) 7.35 - 7.45      pCO2 (POC) 57.1 (H) 35 - 45 MMHG    pO2 (POC) 70 (L) 75 - 100 MMHG    HCO3 (POC) 24.2 22 - 26 MMOL/L    sO2 (POC) 89.9 (L) 95 - 98 %    Base deficit (POC) 3.4 mmol/L    Mode ASSIST CONTROL      Tidal volume 380 ml    PEEP/CPAP (POC) 15 cmH2O    Allens test (POC) NOT APPLICABLE      Total resp. rate 32      Site DRAWN FROM ARTERIAL LINE      Specimen type (POC) ARTERIAL      Performed by Bautista     Respiratory comment: Ve12.1    GLUCOSE, POC    Collection Time: 09/15/21  4:45 AM   Result Value Ref Range    Glucose (POC) 510 (HH) 65 - 100 mg/dL    [facility]    GLUCOSTABILIZER    Collection Time: 09/15/21  4:47 AM   Result Value Ref Range    Glucose 510 mg/dL    Insulin order 9.0 units/hour    Insulin adminstered 9.0 units/hour    Multiplier 0.020     Low target 150 mg/dL    High target 250 mg/dL    D50 order 0.0 ml    D50 administered 0.00 ml    Minutes until next BG 60 min    Order initials bh     Administered initials      GLSCOM Comments     GLUCOSE, POC    Collection Time: 09/15/21  5:50 AM   Result Value Ref Range    Glucose (POC) 421 (H) 65 - 100 mg/dL    Per[facility] by [facility]    GLUCOSTABILIZER    Collection Time: 09/15/21  5:50 AM   Result Value Ref Range    Glucose 421 mg/dL    Insulin order 7.2 units/hour    Insulin adminstered 7.2 units/hour    Multiplier 0.020     Low target 150 mg/dL    High target 250 mg/dL    D50 order 0.0 ml    D50 administered 0.00 ml    Minutes until next BG 60 min    Order initials bh     Administered initials      GLSCOM Comments     GLUCOSE, POC    Collection Time: 09/15/21  6:53 AM   Result Value Ref Range    Glucose (POC) 439 (H) 65 - 100 mg/dL    [facility] by [facility]    GLUCOSTABILIZER    Collection Time: 09/15/21  6:53 AM   Result Value Ref Range    Glucose 439 mg/dL    Insulin order 11.4 units/hour    Insulin adminstered 11.4 units/hour    Multiplier 0.030     Low target 150 mg/dL    High target 250 mg/dL    D50 order 0.0 ml    D50 administered 0.00 ml    Minutes until next BG 60 min    Order initials TK     Administered initials TK     GLSCOM Comments     GLUCOSE, POC    Collection Time: 09/15/21  7:55 AM   Result Value Ref Range    Glucose (POC) 383 (H) 65 - 100 mg/dL    Performed by Mony Hunter Collection Time: 09/15/21  7:56 AM   Result Value Ref Range    Glucose 383 mg/dL    Insulin order 12.9 units/hour    Insulin adminstered 12.9 units/hour    Multiplier 0.040     Low target 150 mg/dL    High target 250 mg/dL    D50 order 0.0 ml    D50 administered 0.00 ml    Minutes until next BG 60 min    Order initials TK     Administered initials TK     GLSCOM Comments     GLUCOSTABILIZER    Collection Time: 09/15/21  9:00 AM   Result Value Ref Range    Glucose 382 mg/dL    Insulin order 16.1 units/hour    Insulin adminstered 16.1 units/hour    Multiplier 0.050     Low target 150 mg/dL    High target 250 mg/dL    D50 order 0.0 ml    D50 administered 0.00 ml    Minutes until next BG 60 min    Order initials jr     Administered initials jr     Rios Dang Comments           Plan:     Principal Problem:    Acute respiratory failure with hypoxia ([address] (9/2/2021)    Active Problems:    Mixed hyperlipidemia ()      Acquired hypothyroidism ()      Type 2 diabetes with nephropathy ([address] (5/2/2018)      Class 3 severe obesity with serious comorbidity and body mass index (BMI) of 40.0 to 44.9 in adult ([address] (5/2/2018)      Chest pain (11/25/2019)      Coronary artery disease with stable angina pectoris ([address] (2/3/2020)      ESRD on peritoneal dialysis ([address] (8/28/2021)      Acute hypokalemia (8/28/2021)      COVID-19 in immunocompromised patient ([address] (9/2/2021)      Cigarette nicotine dependence in remission (9/2/2021)      COVID-19 vaccination not done (9/2/2021)      Leukocytosis (9/8/2021)       1. ESRD on PD all 2.5% PD fluid,  increased # of exchanges- good UF with PD    Will time the PD depending on proning position .  About to prone and can start her PD first thing in the AM      2. COVID 19+/acute resp failure with hypoxemia - intubated  - shock on levophed

## 2021-09-15 NOTE — CONSULTS
Palliative Care    Patient: Zane Richey MRN: [id number]  SSN: [id number]    YOB: 1960  Age: 59 y.o. Sex: female       Date of Request: 9/14/2021  Date of Consult:  9/15/2021  Reason for Consult:  goals of care  Requesting Physician: Dr Lynn Bartlett      Assessment/Plan:     Principal Diagnosis:    Respiratory Failure, Acute  J96.00    Additional Diagnoses:   · Advance Care Planning Counseling Z71.89  · Debility, Unspecified  R53.81  · Sepsis, Unspecified Organism:  A41.9  · Counseling, Encounter for Medical Advice  Z71.9  · Encounter for Palliative Care  Z51.5    Palliative Performance Scale (PPS):       Medical Decision Making:   Reviewed and summarized notes from admission to present   Discussed case with appropriate providers- Dr Lynn Bartlett; Darrion Melendez RN   Reviewed laboratory and x-ray data from admission to present     Pt paralyzed, sedated, proned, and intubated. Per notes, pt had expressed desire to only be intubated for 5 days, and today is day 5. I spoke with her daughter in law, Lucila, via phone. Introduced role of PC and reviewed events of hospitalization, as well as pt's stated wishes at admission. Lucila states that pt's son (her ), daughter, and herself, had discussed pt's stated wishes, and had decided that they will  extend the time pt is intubated as long as she is showing improvements. Counseled that although we have seen some very small improvements in pt's oxygen requirements, overall, she is not improving. Lucila asked appropriate questions about how long pt can be intubated, her chances of improving/being extubated, and options for long term support. Counseled that it is very difficulty to predict if she will be able to be extubated in the next 10 days, and that once pt is no longer paralyzed, weaning trials will be started if able. Lucila states they would like to continue current efforts for now, and re-visit options if pt fails to wean, or worsens, in the next week.   She voiced appreciation for the call, and for the care pt is receiving. Will continue to follow. Will discuss findings with members of the interdisciplinary team.      Thank you for this referral.          .    Subjective:     History obtained from:  Family, Care Provider and Chart    Chief Complaint: COVID-19, respiratory failure   History of Present Illness:  Ms Sharath Shannon is a 58 yo  female with PMH of ESRD, HTN, HLD, obesity, DM, and other conditions listed below, who presented to the ER from home on 9/2/2021 with c/o increasing dyspnea since being diagnosed with COVID-19 two weeks prior. Pt used inhalers with no improvement in symptoms. EMS noted pt's sat was 77% on RA. Work up in the ER revealed hypoxia, and pt was admitted for further management. Pt had progressive respiratory failure, and required intubation on 9/10/2021. She has required pressors for hypotension. Pt is currently paralyzed, sedated, intubated, and in the prone position. Advance Directive: No       Code Status:  Full Code            Health Care Power of : No - Patient does not have a [facility]. 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 9)     Osteomyelitis of toe (HCC)     forth toe of left foot    Other specified acquired hypothyroidism     Thromboembolus ([address]     possible to back of left knee???, superficial    Type II or unspecified type diabetes mellitus without mention of complication, uncontrolled     checks QD, normal 100, hyposymptoms at 70      Past Surgical History:   Procedure Laterality Date    HX BREAST BIOPSY Left 2017    stereo    HX CORONARY ARTERY BYPASS GRAFT  2006    Three    HX HEART CATHETERIZATION  2019    stent X1    HX HEART CATHETERIZATION  10/07/2019    stent X3    HX HEART CATHETERIZATION  2019    stents X2    IR INSERT TUNL CVC W/O PORT OVER 5 YR  10/12/2020    IR PLC CATH AV SHUNT INT W SI LT  10/21/2020    IR REMOVE TUNL CVAD W/O PORT / PUMP  2021    NM CARDIAC SURG PROCEDURE UNLIST  2006    CABG x3    VASCULAR SURGERY PROCEDURE UNLIST  08/10/2016    arteriogram of leg    VASCULAR SURGERY PROCEDURE UNLIST Left 2016     fem pop endarterectomy    VASCULAR SURGERY PROCEDURE UNLIST Left 2018    4th toe amp    VASCULAR SURGERY PROCEDURE UNLIST Right 2019     Right second toe amputation    VASCULAR SURGERY PROCEDURE UNLIST Left 10/21/2020     Left radiocephalic arteriovenous fistulogram and central venogram.Coil embolization of left radiocephalic arteriovenous fistula venous branch. Retrograde PTA of proximal radiocephalic arteriovenous fistula. Family History   Problem Relation Age of Onset    Diabetes Other     Hypertension Other     Heart Disease Other     Diabetes Mother     Hypertension Mother     Cancer Father     Diabetes Sister     Heart Disease Sister     Diabetes Brother     Diabetes Sister     Hypertension Sister     Diabetes Sister     Hypertension Sister     Diabetes Sister     Hypertension Sister     Diabetes Brother       Social History     Tobacco Use    Smoking status: Former Smoker     Packs/day: 2.00     Years: 15.00     Pack years: 30.00     Types: Cigarettes     Quit date: 2/3/1992     Years since quittin.6    Smokeless tobacco: Never Used   Substance Use Topics    Alcohol use: No     Prior to Admission medications    Medication Sig Start Date End Date Taking?  Authorizing Provider   dexAMETHasone (DECADRON) 6 mg tablet Take 1 Tablet by mouth Daily (before breakfast). 8/29/21  Yes Cristine Cortes,    levothyroxine (SYNTHROID) 25 mcg tablet TAKE 1 TAB BY MOUTH DAILY (BEFORE BREAKFAST) FOR THYROID. 7/20/21  Yes Jayant Chauhan, DO   escitalopram oxalate (Lexapro) 20 mg tablet Take 1 Tablet by mouth two (2) times a day. 7/20/21  Yes Jayant Gasca,    evolocumab (REPATHA SURECLICK) pen injection 1 mL by SubCUTAneous route every fourteen (14) days. 6/18/21  Yes Suni Yang MD   hydrALAZINE (APRESOLINE) 25 mg tablet Take 1 Tablet by mouth three (3) times daily. 6/18/21  Yes Suni Yang MD   furosemide (LASIX) 40 mg tablet Take 1.5 Tablets by mouth two (2) times a day. 6/18/21  Yes Suni Yang MD   amLODIPine (NORVASC) 5 mg tablet Take 1 Tablet by mouth daily. 6/18/21  Yes Suni Yang MD   clopidogreL (Plavix) 75 mg tab Take 1 Tablet by mouth daily. 6/18/21  Yes Suni Yang MD   ranolazine ER (RANEXA) 500 mg SR tablet Take 1 Tab by mouth every twelve (12) hours. 1/7/21  Yes Suni Yang MD   isosorbide mononitrate ER (IMDUR) 120 mg CR tablet Take 1 Tab by mouth daily. 12/17/20  Yes Suni Yang MD   levothyroxine (SYNTHROID) 200 mcg tablet TAKE 1 TABLET BY MOUTH DAILY BEFORE BREAKFAST FOR THYROID  Patient taking differently: 125 mcg. TAKE 1 TABLET BY MOUTH DAILY BEFORE BREAKFAST FOR THYROID 11/23/20  Yes Nikki Caputo NP   Nichole Puraditya FlexTouch U-100 100 unit/mL (3 mL) inpn INJECT 70 UNITS UNDER THE SKIN DAILY FOR 90 DAYS. Patient taking differently: 100 Units. 11/23/20  Yes Nikki Caputo NP   metoprolol succinate (TOPROL-XL) 50 mg XL tablet Take 1 Tab by mouth daily. 10/20/20  Yes Suni Yang MD   acetaminophen (TYLENOL) 325 mg tablet Take 2 Tabs by mouth every six (6) hours as needed for Pain. 8/14/19  Yes Jimmy Carlson NP   aspirin delayed-release 81 mg tablet Take 81 mg by mouth every morning.    Yes Provider, Historical   Blood-Glucose Transmitter (Dexcom G6 Transmitter) jazmyn CGM 5/25/21   Ha Lias C, DO   Blood-Glucose Meter,Continuous (Dexcom G6 ) misc As dir 4/20/21   Ha Lias C, DO   Blood-Glucose Meter,Continuous (Dexcom G6 ) misc 1 Units by Does Not Apply route two (2) times a day. 4/20/21   Kaylan Haro, DO   Blood-Glucose Sensor (Dexcom G6 Sensor) jazmyn Change sensor every 10 days 4/20/21   Ha Lias C, DO   metOLazone (ZAROXOLYN) 5 mg tablet Take 1 Tab by mouth daily as needed for Other (weight gain). Patient not taking: Reported on 9/2/2021 10/20/20   Patrick Orosco MD   nitroglycerin (NITROSTAT) 0.4 mg SL tablet 1 Tab by SubLINGual route every five (5) minutes as needed for Chest Pain. Up to 3 doses. 8/14/19   Shawnee Spence NP       No Known Allergies     Review of Systems:  Review of systems not obtained due to patient factors- paralyzed, sedated, intubated      Objective:     Visit Vitals  BP (!) 136/46   Pulse (!) 103   Temp 98.7 °F (37.1 °C)   Resp (!) 34   Ht 5' 8\" (1.727 m)   Wt 248 lb 7.3 oz (112.7 kg)   SpO2 95%   BMI 37.78 kg/m²        Physical Exam:    General:  Paralyzed, sedated, intubated, proned    Eyes:  Deferred due to proning    Nose: Nares normal. Septum midline.    Neck: Deferred due to proning    Lungs:   Coarse bilaterally    Heart:  Regular rate and rhythm   Abdomen:   Deferred due to proning    Extremities: Normal, atraumatic, no cyanosis or edema   Skin: Skin color, texture, turgor normal.   Neurologic: Paralyzed and sedated    Psych: Sedated       Assessment:     Hospital Problems  Date Reviewed: 9/2/2021        Codes Class Noted POA    Leukocytosis ICD-10-CM: E75.583  ICD-9-CM: 288.60  9/8/2021 Yes        * (Principal) Acute respiratory failure with hypoxia [facility]) ICD-10-CM: J96.01  ICD-9-CM: 518.81  9/2/2021 Yes        COVID-19 in immunocompromised patient [facility]) ICD-10-CM: U07.1, D84.9  ICD-9-CM: 079.89, 279.9  9/2/2021 Yes        Cigarette nicotine dependence in remission ICD-10-CM: F17.211  ICD-9-CM: V15.82  9/2/2021 Yes        COVID-19 vaccination not done ICD-10-CM: Z28.9  ICD-9-CM: V64.00  9/2/2021 Yes        ESRD on peritoneal dialysis [facility]) (Chronic) ICD-10-CM: N18.6, Z99.2  ICD-9-CM: 585.6, V45.11  8/28/2021 Yes        Acute hypokalemia ICD-10-CM: E87.6  ICD-9-CM: 276.8  8/28/2021 Yes        Coronary artery disease with stable angina pectoris ([facility] ICD-10-CM: I25.118  ICD-9-CM: 414.00, 413.9  2/3/2020 Yes        Chest pain ICD-10-CM: R07.9  ICD-9-CM: 786.50  11/25/2019 Yes        Type 2 diabetes with nephropathy [facility]) ICD-10-CM: E11.21  ICD-9-CM: 250.40, 583.81  5/2/2018 Yes        Class 3 severe obesity with serious comorbidity and body mass index (BMI) of 40.0 to 44.9 in adult [facility]) ICD-10-CM: E66.01, Z68.41  ICD-9-CM: 278.01, V85.41  5/2/2018 Yes        Mixed hyperlipidemia ICD-10-CM: E78.2  ICD-9-CM: 272.2  Unknown Yes        Acquired hypothyroidism ICD-10-CM: E03.9  ICD-9-CM: 244.9  Unknown Yes              Signed By: Valentina Glasgow NP     September 15, 2021

## 2021-09-16 NOTE — PROGRESS NOTES
Ventilator check complete; patient has a #7.5 ET tube secured at the 24 at the lip. Breath sounds are diminished. Trachea is midline, Negative for subcutaneous air, and chest excursion is symmetric. Patient is also Negative for cyanosis and is Negative for pitting edema. All alarms are set and audible. Resuscitation bag at the head of the bed. Ventilator Settings  Mode FIO2 Rate Tidal Volume Pressure PEEP I:E Ratio   PRVC  50 %    430 ml  0 cm H2O  15 cm H20  1:1.33      Peak airway pressure: 31.4 cm H2O   Minute ventilation: 14.8 l/min     ABG: No results for input(s): PH, PCO2, PO2, HCO3 in the last 72 hours.       Nathanael Avila, RT

## 2021-09-16 NOTE — CONSULTS
Comprehensive Nutrition Assessment    Type and Reason for Visit: Reassess, Consult  Tube Feeding Management (Pulmonary)  TPN Management (Pulmonary)    Nutrition Recommendations/Plan:   Parenteral Nutrition:  Start TPN  5%DEX/ 4.25%AA at 65 ml/hr (1.56L), no lipids  To provide: 530 kcal/d (33% of needs), 66 grams of protein/d (51% of needs), 78 grams of CHO/d and 1560 ml of total volume/d. Lytes/L:   Sodium 75 meq (75 meq NaCl), Potassium omit meq, omiy meq Mg, 4.5 meq Calcium  Other additives: MTE, MVI  Vitamin and Mineral Supplement Therapy: Will defer to nephrology   Labs:    BMP daily, Phos MWF and Mg Q4 hrs, Triglyceride tomorrow am.  POC Glucoses/SSI if Glucose > 180 mg/dl on AM BMP.  Enteral Nutrition: Discontinue tube feed order     Malnutrition Assessment:  Malnutrition Status: At risk for malnutrition (specify) (Decreased po intake for unkown time frame, COVID since ~8/23)    Nutrition Assessment:   Nutrition History: 9/8: Unable to obtain. Could only understand small amounts of what pt was communicating via mobile phone. Darlene yao could not say whether she had lost any weight anf that she started PD in January 2021. Nutrition Background: H/O:ESRD on PD ( started this year), DM, HTN, CAD s/p CABG and stents. Presented with increasing SOB diagnosed with covid x 10 days ago. Admitted with acute hypoxemic respiratory failure due to COVID-19  Daily Update:  Patient seen through window and discussed with MD and RN. TF have continued to be held secondary to high GRV and/or high pressor support. She continues on SSI. D10 initiated last evening @ 100 ml/hr (? BG trending low 100s) and stopped today and BF increasing back into 300s. She continues on PD almost daily. Abdominal Status (last documented): Obese abdomen with Active  bowel sounds. Last BM  (PTA).   Pertinent Medications: Tracrium, Fentanyl, Insulin gtt, Ketamine, Levo @ 8 mcg, Phoslo, Decadron, ergocalciferol, Lantus, Miralax, Pericolace  Pertinent Labs:   Lab Results   Component Value Date/Time    Sodium 129 (L) 09/16/2021 11:35 AM    Potassium 4.3 09/16/2021 11:35 AM    Chloride 88 (L) 09/16/2021 11:35 AM    CO2 23 09/16/2021 11:35 AM    Anion gap 18 (H) 09/16/2021 11:35 AM    Glucose 289 (H) 09/16/2021 11:35 AM     (H) 09/16/2021 11:35 AM    Creatinine 10.40 (H) 09/16/2021 11:35 AM    Calcium 6.2 (L) 09/16/2021 11:35 AM    Albumin 2.5 (L) 09/15/2021 04:07 AM    Magnesium 3.1 (H) 09/16/2021 11:35 AM    Phosphorus >9.0 (H) 09/15/2021 04:07 AM     Lab Results   Component Value Date/Time    Glucose 289 (H) 09/16/2021 11:35 AM    Glucose (POC) 320 (H) 09/16/2021 12:01 PM    Glucose (POC) 309 (H) 09/16/2021 10:51 AM    Glucose (POC) 307 (H) 09/16/2021 09:52 AM    Glucose (POC) 296 (H) 09/16/2021 08:42 AM    Glucose (POC) 295 (H) 09/16/2021 07:42 AM    Glucose (POC) 305 (H) 09/16/2021 06:24 AM   Labs remarkable for: K now within range (previously 4.9-5.3) - will omit from PN this evening as dextrose containing IVF have been stopped, hyponatremia (hypertonic) - anticipate improvement with glucose correction, hyperglycemia, hyperphosphatemia, hypermagnesemia       Nutrition Related Findings:   NFPE deferred d/t isolation and remote assessment. 9/8: SLP: \"functional oropharyngeal swallow, however remains at increased risk of dysphagia due to respiratory status and poor endurance. Patients sats drop to 85% with intake requiring rest breaks and intermittent placement of non re-breather to improve sats between bites/sips. \"  Noted declining PO through admission and only tolerating bites at last RD assessment (9/8). TF initiated 9/13. Current Nutrition Therapies:  ADULT TUBE FEEDING Orogastric; Renal; Delivery Method: Continuous; Continuous Initial Rate (mL/hr): 10; Continuous Advance Tube Feeding: Yes; Advancement Volume (mL/hr): 10; Advancement Frequency: Q 6 hours; Continuous Goal Rate (mL/hr): 50; Water. ..   DIET NPO  Current Tube Feeding (TF) Orders:   · Feeding Route: Orogastric  · Formula: Renal  · Schedule:Continuous    · Regimen: 50 ml/hr  · Additives/Modulars:  (none)  · Water Flushes: 75 ml Q4  · Current TF & Flush Orders Provides: on hold    Current Intake:   Average Meal Intake: NPO Average Supplement Intake: NPO Additional Caloric Sources: CCPD 12.5L/d of 2.5% ~300-540 kcal absorbed per day    Anthropometric Measures:  Height: 5' 8\" (172.7 cm)  Current Body Wt: 112.7 kg (248 lb 7.3 oz) (9/11), Weight source: Bed scale  BMI: 37.8, Obese class 2 (BMI 35.0-39. 9)  Admission Body Weight: 264 lb 15.9 oz (source not specified)  Ideal Body Weight (lbs) (Calculated): 140 lbs (64 kg),    Usual Body Wt: 115.7 kg (255 lb) (stated), Percent weight change: 2.8          Edema: Generalized: Non-pitting;1+ (9/15/2021  7:00 PM)     Estimated Daily Nutrient Needs:  Energy (kcal/day): 0214-4315 (Kcal/kg (25-30), Weight Used: Ideal (63.6 kg))  Protein (g/day): 127-159 (2-2.5 g/kg) Weight Used: (Ideal (63.6 kg))  Fluid (ml/day):   (Standard renal)    Nutrition Diagnosis:   · Inadequate oral intake related to impaired respiratory function as evidenced by NPO or clear liquid status due to medical condition, intubation    Nutrition Interventions:   Food and/or Nutrient Delivery: Continue NPO, Discontinue tube feeding, Start parenteral nutrition     Coordination of Nutrition Care: Continue to monitor while inpatient  Plan of Care discussed with Wanda Garrido RN and Dr. Lui Islas    Goals:   Previous Goal Met: No progress toward goal(s)  Active Goal: Tolerate PN at goal within 3 days    Nutrition Monitoring and Evaluation:      Food/Nutrient Intake Outcomes: Enteral nutrition intake/tolerance  Physical Signs/Symptoms Outcomes: Biochemical data, GI status, Fluid status or edema, Weight    Discharge Planning:     Too soon to determine    Too [place], LD on 9/16/2021 at 12:56 PM  Contact: [phone]        Disaster Mode Active

## 2021-09-16 NOTE — PROGRESS NOTES
Palliative Care Progress Note    Patient: Tina Frazier MRN: [id number]  SSN: [id number]    YOB: 1960  Age: 59 y.o. Sex: female       Assessment/Plan:     Chief Complaint/Interval History: supine, remains paralyzed, sedated,   intubated     Principal Diagnosis:    · Respiratory Failure, Acute  J96.00    Additional Diagnoses:   · Debility, Unspecified  R53.81  · Sepsis, Unspecified Organism:  A41.9  · Encounter for Palliative Care  Z51.5    Palliative Performance Scale (PPS)       Medical Decision Making:   Reviewed and summarized notes over last 24 hours   Discussed case with appropriate providersMary Cunningham RN  Reviewed laboratory and x-ray data over last 24 hours     Pt remains paralyzed, sedated, intubated and ventilated. She was turned supine this morning, and tolerated very well. Pt currently doing PD, and is on 60% FiO2. She remains on Levophed, but is requiring less than yesterday. Discussed with primary RN. Attempted to speak with Caridad FERRARO, but no answer. VM left asking for return call. Will continue to follow. Will discuss findings with members of the interdisciplinary team.         More than 50% of this 35 minute visit was spent counseling and coordination of care as outlined above. Subjective:     Review of Systems:  Review of systems not obtained due to patient factors- paralyzed, sedated, intubated      Objective:     Visit Vitals  BP (!) 130/46   Pulse 78   Temp 97.8 °F (36.6 °C)   Resp (!) 34   Ht 5' 8\" (1.727 m)   Wt 248 lb 7.3 oz (112.7 kg)   SpO2 96%   BMI 37.78 kg/m²       Physical Exam:    General:  Paralyzed, sedated, intubated and ventilated   Eyes:  Conjunctivae/corneas clear    Nose: Nares normal. Septum midline.    Neck: Supple, symmetrical, trachea midline   Lungs:   Ventilated    Heart:  Regular rate and rhythm    Abdomen:   PD catheter   Extremities: Normal, atraumatic, no cyanosis or edema   Skin: Skin color, texture, turgor normal   Neurologic: Paralyzed and sedated    Psych: Sedated      Signed By: Charan Gilmore NP     September 16, 2021

## 2021-09-16 NOTE — PROGRESS NOTES
[facility] Critical Care Note[de-identified] 9/16/2021  Alphonse Marte  Admission Date: 9/2/2021     Length of Stay: 14 days    Background: . Patient is X 75 y.o.  female seen and evaluated at the request of Dr. Zoie Parikh has history of ESRD on PD, Class III Obesity, IDDM, CAD s/p CABG and stents who presented with increasing SOB diagnosed with covid x 10 days ago. Briefly hospitalized and discharged stable on RA on 8/29. Sudden onset SOB and coughing on 9/1/21. Presented to [facility] on 9/[facility] via EMS who and she was found to be hypoxic at 77-80%. She was placed on 10LNC and readmitted. She has been doing peritoneal dialysis and has continued to decline slowly with her oxygenation. Her H&P says she would only want to be on a ventilator for 5 days. Intubated on 09/10/21. 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 She also has no past medical history of Adverse effect of anesthesia, Difficult intubation, Malignant hyperthermia due to anesthesia, Nausea & vomiting, or Pseudocholinesterase deficiency. 24 Hour events: day 6 on vent, peritoneal dialysis ongoing. On 70%. BP lower and going up on pressors. Also getting blood this morning. Since Hg was in the 6 range. Still hon high dose insulin. ROS: unable to obtain/negative except as listed elsewhere.      Lines: (insertion date)   ETT: 9/10  [place]: (9/)  OGT: (9/10)  Central line: (9/10)  Arterial Line (9/10)    Drips: current dose (range)  Tracrium Dose (mcg/kg/min): 6 mcg/kg/min  Versed Dose (mg/hr): 0 mg/hr  Ketamine Dose (mcg/kg/min): 0.8333 mcg/kg/min  Levophed Dose (mcg/kg/min): 0.1242 mcg/kg/min   Fentanyl 200    Pertinent Exam:         Blood pressure (!) 125/42, pulse 76, temperature 98.2 °F (36.8 °C), resp. rate (!) 34, height 5' 8\" (1.727 m), weight 248 lb 7.3 oz (112.7 kg), SpO2 99 %. Intake/Output Summary (Last 24 hours) at 9/16/2021 0853  Last data filed at 9/16/2021 0714  Gross per 24 hour   Intake 2034.76 ml   Output 0 ml   Net 2034.76 ml     Ventilator Settings  Mode FIO2 Rate Tidal Volume Pressure PEEP   PRVC  70 %    430 ml  0 cm H2O  15 cm H20      Peak airway pressure: 31.8 cm H2O   Minute ventilation: 14.5 l/min       Constitutional:  intubated and mechanically ventilated. EENMT:  Sclera clear, pupils equal, oral mucosa moist  Respiratory:  crackles bilateral  Cardiovascular:  RRR  Gastrointestinal:  soft with no tenderness; positive bowel sounds present  Musculoskeletal:  warm with no cyanosis, no lower extremity edema  Skin:  no jaundice . Left abdomen with PD catheter.  ecchymotic areas  Neurologic:sedated  Psychiatric: sedated and paralyzed    CXR 9/16 with b/l infiltrates -- less dense in left base     CXR: 9/15: increased basilar infiltrates      Recent Labs     09/16/21  0432 09/15/21  0407 09/14/21  0312   WBC 17.0* 22.5* 14.6*   HGB 6.4* 8.0* 7.9*   HCT 20.1* 24.8* 23.8*   PLT 85* 119* 102*     Recent Labs     09/16/21  0432 09/16/21  0100 09/15/21  1857 09/15/21  1127 09/15/21  0407 09/14/21  0659 09/14/21  0312   * 133* 132*   < > 130*   < > 135*   K 5.1 5.3* 5.3*   < > 5.1   < > 4.2   CL 87* 89* 90*   < > 87*   < > 90*   CO2 25 25 26   < > 22   < > 29   * 239* 86   < > 452*   < > 196*   * 123* 114*   < > 103*   < > 107*   CREA 10.60* 10.40* 10.30*   < > 9.86*   < > 9.64*   MG 3.2* 3.1* 3.3*   < >  --    < > 3.0*   CA 6.1* 6.2* 6.3*   < > 6.4*   < > 6.1* PHOS  --   --   --   --  >9.0*  --  9.9*   ALB  --   --   --   --  2.5*  --  2.4*    < > = values in this interval not displayed. No results for input(s): LAC, TROPHS, BNPNT, CRP in the last 72 hours. No lab exists for component: ESR  Recent Labs     09/16/21  0742 09/16/21  0624 09/16/21  0534   GLUCPOC 295* 305* 318*     ECHO: No results found for this or any previous visit. Results     Procedure Component Value Units Date/Time    CULTURE, RESPIRATORY/SPUTUM/BRONCH Chadwick Asa STAIN [[id number]]  (Abnormal) Collected: 09/10/21 1823    Order Status: Completed Specimen: Sputum Updated: 09/14/21 0756     Special Requests: NO SPECIAL REQUESTS        GRAM STAIN 0 TO 10 WBCS SEEN PER OIF      0 TO 1 EPITHELIAL CELLS SEEN PER OIF      FEW YEAST         1+ MUCUS PRESENT        Culture result: MODERATE DELIO ALBICANS               NO NORMAL RESPIRATORY RENEE ISOLATED          CULTURE, BLOOD [[id number]] Collected: 09/10/21 1658    Order Status: Completed Specimen: Blood Updated: 09/15/21 0746     Special Requests: --        RIGHT  HAND       Culture result: NO GROWTH 5 DAYS       CULTURE, BLOOD [[id number]] Collected: 09/10/21 1657    Order Status: Completed Specimen: Blood Updated: 09/15/21 0746     Special Requests: --        RIGHT  Antecubital       Culture result: NO GROWTH 5 DAYS       [place], UR/CSF [[id number]] Collected: 09/02/21 1425    Order Status: Canceled     S. [place], UR/CSF [[id number]] Collected: 09/02/21 1421    Order Status: Canceled     MSSA/MRSA SC BY PCR, NASAL SWAB [[id number]] Collected: 09/02/21 1054    Order Status: Completed Specimen: Nasal swab Updated: 09/02/21 1253     Special Requests: NO SPECIAL REQUESTS        Culture result:       SA target not detected. A MRSA NEGATIVE, SA NEGATIVE test result does not preclude MRSA or SA nasal colonization.               Inpat Anti-Infectives (From admission, onward)     Start     Ordered Stop    09/11/21 0900 azithromycin (ZITHROMAX) tablet 500 mg  500 mg,   Per NG tube,   DAILY      09/10/21 2357 --    09/09/21 1300  piperacillin-tazobactam (ZOSYN) 3.375 g in 0.9% sodium chloride (MBP/ADV) 100 mL MBP  3.375 g,   IntraVENous,   EVERY 12 HOURS     Note to Pharmacy: Please renally dose with PD transitioning to HD    09/09/21 1206 --    09/06/21 2100  ciprofloxacin (CETRAXAL) 0.2 % otic solution 0.25 mL  0.25 mL,   Left Ear,   EVERY 12 HOURS      09/06/21 1510 --              Ventilator Settings:  Ideal body weight: 63.9 kg (140 lb 14 oz)   Mode FIO2 Rate Tidal Volume Pressure PEEP   PRVC  70 %    430 ml  0 cm H2O  15 cm H20    Peak airway pressure: 31.8 cm H2O       Minute ventilation: 14.5 l/min  ABG:  Recent Labs     09/16/21  0203 09/15/21  1404 09/15/21  1109   PHI 7.38 7.31* 7.17*   PCO2I 41.1 47.6* 67.7*   PO2I 81 70* 84   HCO3I 24.4 24.1 24.9     Assessment and Plan:  (Medical Decision Making)   Impression: 59 y.o. y/o female with ESRD on peritoneal dialysis, covid pneumonia and respiratory failure     NEURO:   Sedation:  continue ketamine  Analgesia: fentanyl  Paralytics: continue for now while proning  CV:   Volume Status: running net even  Septic shock:  going up on  Levo  And partlyly likely from lower hg  PULM:    Acute hypoxemic/hypercapneic respiratory failure:  Intubated now 70%, and did taper to 60%  LTVV on Day 5  Severe ARDS 2/2 COVID: continue paralytics and proning  RENAL:  ESRD: on PD, tolerating okay and I/O have been adequate. GI:   Nutrition: TF  HEME:   Anemia: hb in 6 range and getting transfusion and f/u. On Asa/plavix and heparin SQ and will have to monitor since may need to stop. Thrombocytopenia: -- stable in low 110's now. Anticoagulation: see above. ID: getting , zosyn and azithromycin- stop both - no evidence for bacterial infection  COVID-19 in immunocompromised patient ([facility] (9/2/2021): Not vaccinated, given dexa, changed to IV, not a remdesivir candidate, sp Toci  ENDO:   DM:  On insulin drip- add lantus 15 BID, wean drip and start SSI in addition  Skin: no decub, turns, preventive care  Prophy: heparin 5000 sub q, pepcid     Daughter in law Litzy Quintero ([phone]). Palliative care to discuss ongoing care with her today. Patient previously only wanted 5 days on ventilator and we are on day 5 and doing worse -- lower hg, high pressors needs, high vent needs, surgars, etc. I tried to call the number and did not work.        Full Code    The patient is critically ill with respiratory failure, circulatory failure and requires high complexity decision making for assessment and support including frequent ventilator adjustment , frequent evaluation and titration of therapies , application of advanced monitoring technologies and extensive interpretation of multiple databases  Time devoted to patient care services described in this note- 13 min face to face/ 22 min total evaluation time    Cumulative time devoted to patient care services by me for day of service -35 min     Lisa Shaver MD

## 2021-09-16 NOTE — PROGRESS NOTES
Ventilator check complete; patient has a #7.5 ET tube secured at the 24 at the lip. Breath sounds are diminished with some rales throughout. Trachea is midline, Negative for subcutaneous air, and chest excursion is symmetrical. Patient is also Negative for cyanosis and is Negative for pitting edema. All alarms are set and audible.   Resuscitation bag and mask are the head of the bed.       Ventilator Settings  Mode FIO2 Rate Tidal Volume Pressure PEEP I:E Ratio   PRVC  50 % 34   430 ml  0 cm H2O  15 cm H20  1:1.33       Peak airway pressure: 31.4 cm H2O   Minute ventilation: 11.8 l/min

## 2021-09-16 NOTE — DIALYSIS
Peritoneal dialysis initiated as ordered. Peritoneal catheter exit site cleaned with Chlorhexidine scrub and Gentamicin cream applied to PD cath exit site. Dressing changed, bleeding at site. Redressed. Currently clean, dry and intact. Will continue to monitor. Report given to primary RN, Tim Kearney.

## 2021-09-16 NOTE — PROGRESS NOTES
Renal Progress Note    Admission Date: 9/2/2021   Subjective:      Supine position.   Connected to PD cycler    Objective:     Physical Exam:    Patient Vitals for the past 8 hrs:   Temp Pulse Resp SpO2   09/16/21 0630  76 (!) 34 99 %   09/16/21 0615  77 (!) 34 99 %   09/16/21 0600  77 (!) 34 99 %   09/16/21 0548  77 (!) 34 99 %   09/16/21 0545  77 (!) 34 99 %   09/16/21 0530 98.2 °F (36.8 °C) 78 (!) 34 99 %   09/16/21 0515  79 (!) 34 99 %   09/16/21 0500  79 (!) 34 99 %   09/16/21 0445  76 (!) 34 98 %   09/16/21 0430  78 (!) 34 98 %   09/16/21 0415  79 (!) 34 98 %   09/16/21 0400  77 (!) 34 97 %   09/16/21 0345  79 (!) 34 97 %   09/16/21 0330  78 (!) 33 97 %   09/16/21 0315  80 15 99 %   09/16/21 0300  79 (!) 33 99 %   09/16/21 0245  79 (!) 34 99 %   09/16/21 0230  79 (!) 35 99 %   09/16/21 0216  80 (!) 33 99 %   09/16/21 0215  80 (!) 35 99 %   09/16/21 0200  80 (!) 35 99 %   09/16/21 0157    99 %   09/16/21 0145  79 (!) 35 100 %   09/16/21 0130  79 (!) 35 100 %   09/16/21 0115  80 (!) 33 100 %   09/16/21 0100  80 (!) 35 100 %      Gen: ET tube in place    Extem: 1+ edema     Current Facility-Administered Medications   Medication Dose Route Frequency    0.9% sodium chloride infusion 250 mL  250 mL IntraVENous PRN    white petrolatum-mineral oiL (LACRILUBE S.O.P.) ointment   Both Eyes Q6H    insulin regular (NOVOLIN R, HUMULIN R) 100 Units in 0.9% sodium chloride 100 mL infusion  1-50 Units/hr IntraVENous TITRATE    polyethylene glycol (MIRALAX) packet 17 g  17 g Oral DAILY    senna-docusate (PERICOLACE) 8.6-50 mg per tablet 2 Tablet  2 Tablet Oral QHS    dextrose 10% infusion  100 mL/hr IntraVENous CONTINUOUS    aspirin chewable tablet 81 mg  81 mg Oral DAILY    levothyroxine (SYNTHROID) tablet 125 mcg  125 mcg Per NG tube 6am    dextrose (D50W) injection syrg 12.5-25 g  25-50 mL IntraVENous PRN    ketamine (KETALAR) 2 mg/mL infusion  0.05-3 mg/kg/hr IntraVENous TITRATE    albuterol (PROVENTIL VENTOLIN) nebulizer solution 2.5 mg  2.5 mg Nebulization Q6H RT    NOREPINephrine (LEVOPHED) 16,000 mcg in 0.9% sodium chloride 250 mL infusion  0.5-30 mcg/min IntraVENous TITRATE    atracurium (TRACRIUM) 1,000 mg in 0.9% sodium chloride 250 mL infusion  0-15 mcg/kg/min IntraVENous TITRATE    fentaNYL in normal saline (pf) 25 mcg/mL infusion  0-200 mcg/hr IntraVENous TITRATE    dextrose 40% (GLUTOSE) oral gel 1 Tube  15 g Oral PRN    glucagon (GLUCAGEN) injection 1 mg  1 mg IntraMUSCular PRN    dexamethasone (DECADRON) 10 mg/mL injection 6 mg  6 mg IntraVENous Q24H    cetirizine (ZYRTEC) tablet 10 mg  10 mg Per NG tube DAILY    clopidogreL (PLAVIX) tablet 75 mg  75 mg Per NG tube DAILY    ergocalciferol capsule 50,000 Units  50,000 Units Per NG tube Q7D    escitalopram oxalate (LEXAPRO) tablet 20 mg  20 mg Per NG tube BID    acetaminophen (TYLENOL) tablet 650 mg  650 mg Per NG tube Q6H PRN    Or    acetaminophen (TYLENOL) suppository 650 mg  650 mg Rectal Q6H PRN    morphine injection 2 mg  2 mg IntraVENous Q6H PRN    ciprofloxacin (CETRAXAL) 0.2 % otic solution 0.25 mL  0.25 mL Left Ear Q12H    sodium chloride (NS) flush 5-10 mL  5-10 mL IntraVENous Q8H    sodium chloride (NS) flush 5-10 mL  5-10 mL IntraVENous PRN    fluticasone propionate (FLONASE) 50 mcg/actuation nasal spray 2 Spray  2 Spray Both Nostrils DAILY    [Held by provider] amLODIPine (NORVASC) tablet 5 mg  5 mg Oral DAILY    ondansetron (ZOFRAN ODT) tablet 4 mg  4 mg Oral Q8H PRN    Or    ondansetron (ZOFRAN) injection 4 mg  4 mg IntraVENous Q6H PRN    alum-mag hydroxide-simeth (MYLANTA) oral suspension 15 mL  15 mL Oral Q6H PRN    heparin (porcine) injection 7,500 Units  7,500 Units SubCUTAneous Q8H    guaiFENesin-dextromethorphan (ROBITUSSIN DM) 100-10 mg/5 mL syrup 5 mL  5 mL Oral Q4H PRN    nitroglycerin (NITROSTAT) tablet 0.4 mg  0.4 mg SubLINGual Q5MIN PRN    [Held by provider] isosorbide mononitrate ER (IMDUR) tablet 120 mg  120 mg Oral DAILY    [Held by provider] hydrALAZINE (APRESOLINE) tablet 25 mg  25 mg Oral TID    [Held by provider] furosemide (LASIX) tablet 60 mg  60 mg Oral BID    [Held by provider] metoprolol succinate (TOPROL-XL) XL tablet 50 mg  50 mg Oral DAILY            Data Review:     LABS:   Recent Results (from the past 12 hour(s))   GLUCOSE, POC    Collection Time: 09/15/21  9:06 PM   Result Value Ref Range    Glucose (POC) 124 (H) 65 - 100 mg/dL    Performed by Benigno    GLUCOSTABILIZER    Collection Time: 09/15/21  9:06 PM   Result Value Ref Range    Glucose 124 mg/dL    Insulin order 1.6 units/hour    Insulin adminstered 1.6 units/hour    Multiplier 0.025     Low target 150 mg/dL    High target 250 mg/dL    D50 order 0.0 ml    D50 administered 0.00 ml    Minutes until next BG 60 min    Order initials bh     Administered initials bh     GLSCOM Comments     GLUCOSE, POC    Collection Time: 09/15/21 10:10 PM   Result Value Ref Range    Glucose (POC) 149 (H) 65 - 100 mg/dL    Performed by Ben Carbajal    Collection Time: 09/15/21 10:10 PM   Result Value Ref Range    Glucose 149 mg/dL    Insulin order 1.3 units/hour    Insulin adminstered 1.3 units/hour    Multiplier 0.015     Low target 150 mg/dL    High target 250 mg/dL    D50 order 0.0 ml    D50 administered 0.00 ml    Minutes until next BG 60 min    Order initials mg     Administered initials mg     GLSCOM Comments     GLUCOSE, POC    Collection Time: 09/15/21 11:05 PM   Result Value Ref Range    Glucose (POC) 201 (H) 65 - 100 mg/dL    Performed by Ben Carbajal    Collection Time: 09/15/21 11:06 PM   Result Value Ref Range    Glucose 201 mg/dL    Insulin order 2.1 units/hour    Insulin adminstered 2.1 units/hour    Multiplier 0.015     Low target 150 mg/dL    High target 250 mg/dL    D50 order 0.0 ml    D50 administered 0.00 ml    Minutes until next BG 60 min    Order initials bh Administered initials bh     GLSCOM Comments     GLUCOSE, POC    Collection Time: 09/16/21 12:06 AM   Result Value Ref Range    Glucose (POC) 201 (H) 65 - 100 mg/dL    Performed by Benigno    GLUCOSTABILIZER    Collection Time: 09/16/21 12:07 AM   Result Value Ref Range    Glucose 201 mg/dL    Insulin order 2.1 units/hour    Insulin adminstered 2.1 units/hour    Multiplier 0.015     Low target 150 mg/dL    High target 250 mg/dL    D50 order 0.0 ml    D50 administered 0.00 ml    Minutes until next BG 60 min    Order initials bh     Administered initials bh     GLSCOM Comments     METABOLIC PANEL, BASIC    Collection Time: 09/16/21  1:00 AM   Result Value Ref Range    Sodium 133 (L) 136 - 145 mmol/L    Potassium 5.3 (H) 3.5 - 5.1 mmol/L    Chloride 89 (L) 98 - 107 mmol/L    CO2 25 21 - 32 mmol/L    Anion gap 19 (H) 7 - 16 mmol/L    Glucose 239 (H) 65 - 100 mg/dL     (H) 8 - 23 MG/DL    Creatinine 10.40 (H) 0.6 - 1.0 MG/DL    GFR est AA 5 (L) >60 ml/min/1.73m2    GFR est non-AA 4 (L) >60 ml/min/1.73m2    Calcium 6.2 (L) 8.3 - 10.4 MG/DL   MAGNESIUM    Collection Time: 09/16/21  1:00 AM   Result Value Ref Range    Magnesium 3.1 (H) 1.8 - 2.4 mg/dL   GLUCOSE, POC    Collection Time: 09/16/21  1:09 AM   Result Value Ref Range    Glucose (POC) 221 (H) 65 - 100 mg/dL    Performed by Kelton Herring    Collection Time: 09/16/21  1:09 AM   Result Value Ref Range    Glucose 221 mg/dL    Insulin order 2.4 units/hour    Insulin adminstered 2.4 units/hour    Multiplier 0.015     Low target 150 mg/dL    High target 250 mg/dL    D50 order 0.0 ml    D50 administered 0.00 ml    Minutes until next BG 60 min    Order initials mg     Administered initials mg     GLSCOM Comments     BLOOD GAS, ARTERIAL POC    Collection Time: 09/16/21  2:03 AM   Result Value Ref Range    Device: ADULT VENT      FIO2 (POC) 70 %    pH (POC) 7.38 7.35 - 7.45      pCO2 (POC) 41.1 35 - 45 MMHG    pO2 (POC) 81 75 - 100 MMHG HCO3 (POC) 24.4 22 - 26 MMOL/L    sO2 (POC) 95.7 95 - 98 %    Base deficit (POC) 0.7 mmol/L    Mode Pressure regulated volume control      Tidal volume 430 ml    PEEP/CPAP (POC) 15 cmH2O    Allens test (POC) NOT APPLICABLE      Total resp.  rate 34      Site DRAWN FROM ARTERIAL LINE      Specimen type (POC) ARTERIAL      Performed by Isai     Respiratory comment: [id number]    GLUCOSE, POC    Collection Time: 09/16/21  2:08 AM   Result Value Ref Range    Glucose (POC) 235 (H) 65 - 100 mg/dL    [facility]    GLUCOSTABILIZER    Collection Time: 09/16/21  2:08 AM   Result Value Ref Range    Glucose 235 mg/dL    Insulin order 2.6 units/hour    Insulin adminstered 2.6 units/hour    Multiplier 0.015     Low target 150 mg/dL    High target 250 mg/dL    D50 order 0.0 ml    D50 administered 0.00 ml    Minutes until next BG 60 min    Order initials      Administered initials      GLSCOM Comments     GLUCOSE, POC    Collection Time: 09/16/21  3:19 AM   Result Value Ref Range    Glucose (POC) 258 (H) 65 - 100 mg/dL    Performed by [facility]    GLUCOSTABILIZER    Collection Time: 09/16/21  3:19 AM   Result Value Ref Range    Glucose 258 mg/dL    Insulin order 5.0 units/hour    Insulin adminstered 5.0 units/hour    Multiplier 0.025     Low target 150 mg/dL    High target 250 mg/dL    D50 order 0.0 ml    D50 administered 0.00 ml    Minutes until next BG 60 min    Order initials      Administered initials      GLSCOM Comments     GLUCOSE, POC    Collection Time: 09/16/21  4:27 AM   Result Value Ref Range    Glucose (POC) 296 (H) 65 - 100 mg/dL    Performed by [facility]    GLUCOSTABILIZER    Collection Time: 09/16/21  4:28 AM   Result Value Ref Range    Glucose 296 mg/dL    Insulin order 8.3 units/hour    Insulin adminstered 8.3 units/hour    Multiplier 0.035     Low target 150 mg/dL    High target 250 mg/dL    D50 order 0.0 ml    D50 administered 0.00 ml    Minutes until next BG 60 min Order initials      Administered initials      GLSCOM Comments     METABOLIC PANEL, BASIC    Collection Time: 09/16/21  4:32 AM   Result Value Ref Range    Sodium 130 (L) 136 - 145 mmol/L    Potassium 5.1 3.5 - 5.1 mmol/L    Chloride 87 (L) 98 - 107 mmol/L    CO2 25 21 - 32 mmol/L    Anion gap 18 (H) 7 - 16 mmol/L    Glucose 277 (H) 65 - 100 mg/dL     (H) 8 - 23 MG/DL    Creatinine 10.60 (H) 0.6 - 1.0 MG/DL    GFR est AA 5 (L) >60 ml/min/1.73m2    GFR est non-AA 4 (L) >60 ml/min/1.73m2    Calcium 6.1 (L) 8.3 - 10.4 MG/DL   MAGNESIUM    Collection Time: 09/16/21  4:32 AM   Result Value Ref Range    Magnesium 3.2 (H) 1.8 - 2.4 mg/dL   CBC W/O DIFF    Collection Time: 09/16/21  4:32 AM   Result Value Ref Range    WBC 17.0 (H) 4.3 - 11.1 K/uL    RBC 2.03 (L) 4.05 - 5.2 M/uL    HGB 6.4 (LL) 11.7 - 15.4 g/dL    HCT 20.1 (L) 35.8 - 46.3 %    MCV 99.0 (H) 79.6 - 97.8 FL    MCH 31.5 26.1 - 32.9 PG    MCHC 31.8 31.4 - 35.0 g/dL    RDW 16.2 (H) 11.9 - 14.6 %    PLATELET 85 (L) 657 - 450 K/uL    MPV 11.7 9.4 - 12.3 FL    ABSOLUTE NRBC 0.27 (H) 0.0 - 0.2 K/uL   GLUCOSE, POC    Collection Time: 09/16/21  5:34 AM   Result Value Ref Range    Glucose (POC) 318 (H) 65 - 100 mg/dL    Performed by Benigno    GLUCOSTABILIZER    Collection Time: 09/16/21  5:34 AM   Result Value Ref Range    Glucose 318 mg/dL    Insulin order 11.6 units/hour    Insulin adminstered 11.6 units/hour    Multiplier 0.045     Low target 150 mg/dL    High target 250 mg/dL    D50 order 0.0 ml    D50 administered 0.00 ml    Minutes until next BG 60 min    Order initials bh     Administered initials      GLSCOM Comments     GLUCOSE, POC    Collection Time: 09/16/21  6:24 AM   Result Value Ref Range    Glucose (POC) 305 (H) 65 - 100 mg/dL    Performed by Benigno    GLUCOSTABILIZER    Collection Time: 09/16/21  6:24 AM   Result Value Ref Range    Glucose 305 mg/dL    Insulin order 13.5 units/hour    Insulin adminstered 13.5 units/hour Multiplier 0.055     Low target 150 mg/dL    High target 250 mg/dL    D50 order 0.0 ml    D50 administered 0.00 ml    Minutes until next BG 60 min    Order initials bh     Administered initials bh     GLSCOM Comments     RBC, ALLOCATE    Collection Time: 09/16/21  6:30 AM   Result Value Ref Range    HISTORY CHECKED?  Historical check performed    GLUCOSE, POC    Collection Time: 09/16/21  7:42 AM   Result Value Ref Range    Glucose (POC) 295 (H) 65 - 100 mg/dL    Performed by [employer]    Collection Time: 09/16/21  7:43 AM   Result Value Ref Range    Glucose 295 mg/dL    Insulin order 15.3 units/hour    Insulin adminstered 15.3 units/hour    Multiplier 0.065     Low target 150 mg/dL    High target 250 mg/dL    D50 order 0.0 ml    D50 administered 0.00 ml    Minutes until next BG 60 min    Order initials gk     Administered initials gk     GLSCOM Comments     GLUCOSTABILIZER    Collection Time: 09/16/21  8:43 AM   Result Value Ref Range    Glucose 296 mg/dL    Insulin order 17.7 units/hour    Insulin adminstered 17.7 units/hour    Multiplier 0.075     Low target 150 mg/dL    High target 250 mg/dL    D50 order 0.0 ml    D50 administered 0.00 ml    Minutes until next BG 60 min    Order initials gk     Administered initials gk     GLSCOM Comments           Plan:     Principal Problem:    Acute respiratory failure with hypoxia ([facility] (9/2/2021)    Active Problems:    Mixed hyperlipidemia ()      Acquired hypothyroidism ()      Type 2 diabetes with nephropathy ([facility] (5/2/2018)      Class 3 severe obesity with serious comorbidity and body mass index (BMI) of 40.0 to 44.9 in adult [facility]) (5/2/2018)      Chest pain (11/25/2019)      Coronary artery disease with stable angina pectoris ([facility] (2/3/2020)      ESRD on peritoneal dialysis ([facility] (8/28/2021)      Acute hypokalemia (8/28/2021)      COVID-19 in immunocompromised patient ([facility] (9/2/2021)      Cigarette nicotine dependence in remission (9/2/2021)      COVID-19 vaccination not done (9/2/2021)      Leukocytosis (9/8/2021)       1. ESRD on PD all 2.5% PD fluid,  increased # of exchanges- good UF with PD    Will time the PD depending on proning position . currently on PD and supine.      2. COVID 19+/acute resp failure with hypoxemia - intubated  - shock on levophed    3. Anemia- transfuse prn    4. Hyperphosphatemia/ hypocalcemia.  Will add calcium acetate to tube feed tid

## 2021-09-17 NOTE — DISCHARGE SUMMARY
Death Summary    [address] [facility]  Admission date:  9/2/2021  Discharge date:      Admitting Diagnosis:  Acute hypoxemic respiratory failure due to COVID-19 [facility]) [U07.1, J96.01]  Discharge Diagnosis:    Problem List as of 9/17/2021 Date Reviewed: 9/2/2021        Codes Class Noted - Resolved    Leukocytosis ICD-10-CM: D72.829  ICD-9-CM: 288.60  9/8/2021 - Present        * (Principal) Acute respiratory failure with hypoxia (HCC) ICD-10-CM: J96.01  ICD-9-CM: 518.81  9/2/2021 - Present        COVID-19 in immunocompromised patient [facility]) ICD-10-CM: U07.1, D84.9  ICD-9-CM: 079.89, 279.9  9/2/2021 - Present        Cigarette nicotine dependence in remission ICD-10-CM: F17.211  ICD-9-CM: V15.82  9/2/2021 - Present        COVID-19 vaccination not done ICD-10-CM: Z28.9  ICD-9-CM: V64.00  9/2/2021 - Present        COVID-19 virus infection ICD-10-CM: U07.1  ICD-9-CM: 079.89  8/28/2021 - Present        ESRD on peritoneal dialysis [facility]) (Chronic) ICD-10-CM: N18.6, Z99.2  ICD-9-CM: 585.6, V45.11  8/28/2021 - Present        Acute hypokalemia ICD-10-CM: E87.6  ICD-9-CM: 276.8  8/28/2021 - Present        Hyponatremia ICD-10-CM: E87.1  ICD-9-CM: 276.1  8/28/2021 - Present        Arteriovenous fistula ([facility] ICD-10-CM: I77.0  ICD-9-CM: 447.0  8/24/2020 - Present        Coronary artery disease with stable angina pectoris ([facility] ICD-10-CM: I25.118  ICD-9-CM: 414.00, 413.9  2/3/2020 - Present        Chest pain ICD-10-CM: R07.9  ICD-9-CM: 786.50  11/25/2019 - Present        CKD (chronic kidney disease) stage 3, GFR 30-59 ml/min (HCC) (Chronic) ICD-10-CM: N18.30  ICD-9-CM: 585.3  10/8/2019 - Present        Leg swelling ICD-10-CM: M79.89  ICD-9-CM: 729.81  5/1/2019 - Present        Type 2 diabetes with nephropathy [facility] ICD-10-CM: E11.21  ICD-9-CM: 250.40, 583.81  5/2/2018 - Present        Class 3 severe obesity with serious comorbidity and body mass index (BMI) of 40.0 to 44.9 in adult [facility]) ICD-10-CM: E66.01, Z68.41  ICD-9-CM: 278.01, V85.41  5/2/2018 - Present        PVD (peripheral vascular disease) ([facility] ICD-10-CM: I73.9  ICD-9-CM: 443.9  8/18/2016 - Present        Essential hypertension, benign ICD-10-CM: I10  ICD-9-CM: 401.1  Unknown - Present        Mixed hyperlipidemia ICD-10-CM: E78.2  ICD-9-CM: 272.2  Unknown - Present        Acquired hypothyroidism ICD-10-CM: E03.9  ICD-9-CM: 244.9  Unknown - Present        Coronary atherosclerosis of native coronary artery ICD-10-CM: I25.10  ICD-9-CM: 414.01  Unknown - Present        RESOLVED: Lactic acidosis ICD-10-CM: E87.2  ICD-9-CM: 276.2  8/28/2021 - 8/28/2021        RESOLVED: NSTEMI (non-ST elevated myocardial infarction) ([facility] ICD-10-CM: I21.4  ICD-9-CM: 410.70  10/7/2019 - 11/25/2019        RESOLVED: Dissection of left subclavian artery ([facility] ICD-10-CM: I77.79  ICD-9-CM: 443.29  8/13/2019 - 11/25/2019        RESOLVED: Non-STEMI (non-ST elevated myocardial infarction) ([facility] ICD-10-CM: I21.4  ICD-9-CM: 410.70  8/10/2019 - 11/25/2019        RESOLVED: NSTEMI (non-ST elevated myocardial infarction) ([facility] ICD-10-CM: I21.4  ICD-9-CM: 410.70  8/10/2019 - 8/12/2019        RESOLVED: Diabetic ulcer of toe of right foot ([facility] ICD-10-CM: U48.343, L97.519  ICD-9-CM: 250.80, 707.15  7/16/2019 - 11/25/2019    Overview Signed 8/22/2019  4:32 PM by Romain Grant NP     8/1/19 (Dr. Ritika Espana) Right second toe amputation             RESOLVED: Right leg pain ICD-10-CM: M79.604  ICD-9-CM: 729.5  5/1/2019 - 11/25/2019        RESOLVED: S/P amputation of lesser toe, left ([facility] ICD-10-CM: A06.270  ICD-9-CM: V49.72  3/26/2018 - 11/25/2019        RESOLVED: Toe ulcer ([facility] ICD-10-CM: L97.509  ICD-9-CM: 707.15  3/8/2018 - 11/25/2019        RESOLVED: Osteomyelitis of toe of left foot ([facility] ICD-10-CM: M86.9  ICD-9-CM: 730.27  3/8/2018 - 11/25/2019    Overview Signed 3/26/2018  3:10 PM by Romain Grant NP     3/12/18 (Dr. Ritika Espana) left fourth toe amputation             RESOLVED: Ischemia of left lower extremity ICD-10-CM: I99.8  ICD-9-CM: 459.9  2016 - 2019        RESOLVED: Ischemic ulcer of foot due to atherosclerosis of native artery of extremity (HCC) (Chronic) ICD-10-CM: O80.86, L97.509  ICD-9-CM: 440.23, 707.15  2016 - 2019    Overview Addendum 2016  2:38 PM by Roxane Upton NP     16 (Dr Jhon Rasmussen) Left popliteal endarterectomy with vein patch angioplasty. 8/10/16 LLE arteriogram             RESOLVED: Type 2 diabetes, uncontrolled, with cellulitis of foot [facility]) ICD-10-CM: E11.628, L03.119, E11.65  ICD-9-CM: 250.82, 682.7  Unknown - 2019              Consultants: Nephrology, Palliative Care, Hospitalist    Studies/Procedures: intubation, CVC, arterial line, multiple CXRs, multiple labs, ABGs, EKG, duplex    Hospital course: Presented to ER with increasing shortness of breath. She was diagnosed with COVID 10 days prior to admission. She developed sudden onset SOB and cough and was found to be hypoxic by EMS. She was placed initially on 10L NC. She progressively became more hypoxic and required intubation. She was receiving peritoneal dialysis. She was paralyzed and proned. She received decadron and tocilizumab, she was not a candidate for remdesivir due to renal function. She was started on zosyn and azithromycin, cultures were negative and no signs of bacterial component so antibiotics stopped. Her hemoglobin trended down and she required blood transfusion. She required insulin drip to control blood sugars. She required vasopressors for hypotension. Patient's daughter updated on progress and have decided to pursue comfort measures. She was compassionately extubated and  at 17:30 with family at bedside. Final:  --Pronounced dead at 17:30  --Total discharge greater than 30 minutes in duration.     Sophy Hollis MD

## 2021-09-17 NOTE — PROGRESS NOTES
Support and prayer given to patient and family in death. Listened at length as family shared stories. Rhoda is a great source of strength. Sivakumar Staples M.Div.

## 2021-09-17 NOTE — PROGRESS NOTES
Nutrition Note    Plan is for comfort care and TPN order has been discontinued. Nutrition signing off.     Electronically signed by Manjula Tarango RD on 9/17/2021 at 9:34 AM    Contact: [phone]

## 2021-09-17 NOTE — DISCHARGE SUMMARY
Death Summary    [address] [facility]  Admission date:  9/2/2021  Discharge date:      Admitting Diagnosis:  Acute hypoxemic respiratory failure due to COVID-19 [facility]) [U07.1, J96.01]  Discharge Diagnosis:    Problem List as of 9/17/2021 Date Reviewed: 9/2/2021        Codes Class Noted - Resolved    Leukocytosis ICD-10-CM: D72.829  ICD-9-CM: 288.60  9/8/2021 - Present        * (Principal) Acute respiratory failure with hypoxia (HCC) ICD-10-CM: J96.01  ICD-9-CM: 518.81  9/2/2021 - Present        COVID-19 in immunocompromised patient [facility]) ICD-10-CM: U07.1, D84.9  ICD-9-CM: 079.89, 279.9  9/2/2021 - Present        Cigarette nicotine dependence in remission ICD-10-CM: F17.211  ICD-9-CM: V15.82  9/2/2021 - Present        COVID-19 vaccination not done ICD-10-CM: Z28.9  ICD-9-CM: V64.00  9/2/2021 - Present        COVID-19 virus infection ICD-10-CM: U07.1  ICD-9-CM: 079.89  8/28/2021 - Present        ESRD on peritoneal dialysis [facility]) (Chronic) ICD-10-CM: N18.6, Z99.2  ICD-9-CM: 585.6, V45.11  8/28/2021 - Present        Acute hypokalemia ICD-10-CM: E87.6  ICD-9-CM: 276.8  8/28/2021 - Present        Hyponatremia ICD-10-CM: E87.1  ICD-9-CM: 276.1  8/28/2021 - Present        Arteriovenous fistula ([facility] ICD-10-CM: I77.0  ICD-9-CM: 447.0  8/24/2020 - Present        Coronary artery disease with stable angina pectoris ([facility] ICD-10-CM: I25.118  ICD-9-CM: 414.00, 413.9  2/3/2020 - Present        Chest pain ICD-10-CM: R07.9  ICD-9-CM: 786.50  11/25/2019 - Present        CKD (chronic kidney disease) stage 3, GFR 30-59 ml/min (HCC) (Chronic) ICD-10-CM: N18.30  ICD-9-CM: 585.3  10/8/2019 - Present        Leg swelling ICD-10-CM: M79.89  ICD-9-CM: 729.81  5/1/2019 - Present        Type 2 diabetes with nephropathy ([facility] ICD-10-CM: E11.21  ICD-9-CM: 250.40, 583.81  5/2/2018 - Present        Class 3 severe obesity with serious comorbidity and body mass index (BMI) of 40.0 to 44.9 in adult [facility]) ICD-10-CM: E66.01, Z68.41  ICD-9-CM: 278.01, V85.41  5/2/2018 - Present        PVD (peripheral vascular disease) ([facility] ICD-10-CM: I73.9  ICD-9-CM: 443.9  8/18/2016 - Present        Essential hypertension, benign ICD-10-CM: I10  ICD-9-CM: 401.1  Unknown - Present        Mixed hyperlipidemia ICD-10-CM: E78.2  ICD-9-CM: 272.2  Unknown - Present        Acquired hypothyroidism ICD-10-CM: E03.9  ICD-9-CM: 244.9  Unknown - Present        Coronary atherosclerosis of native coronary artery ICD-10-CM: I25.10  ICD-9-CM: 414.01  Unknown - Present        RESOLVED: Lactic acidosis ICD-10-CM: E87.2  ICD-9-CM: 276.2  8/28/2021 - 8/28/2021        RESOLVED: NSTEMI (non-ST elevated myocardial infarction) ([facility] ICD-10-CM: I21.4  ICD-9-CM: 410.70  10/7/2019 - 11/25/2019        RESOLVED: Dissection of left subclavian artery ([facility] ICD-10-CM: I77.79  ICD-9-CM: 443.29  8/13/2019 - 11/25/2019        RESOLVED: Non-STEMI (non-ST elevated myocardial infarction) ([facility] ICD-10-CM: I21.4  ICD-9-CM: 410.70  8/10/2019 - 11/25/2019        RESOLVED: NSTEMI (non-ST elevated myocardial infarction) ([facility] ICD-10-CM: I21.4  ICD-9-CM: 410.70  8/10/2019 - 8/12/2019        RESOLVED: Diabetic ulcer of toe of right foot ([facility] ICD-10-CM: A89.631, L97.519  ICD-9-CM: 250.80, 707.15  7/16/2019 - 11/25/2019    Overview Signed 8/22/2019  4:32 PM by Luis M Soto NP     8/1/19 (Dr. Millie Sanchez) Right second toe amputation             RESOLVED: Right leg pain ICD-10-CM: M79.604  ICD-9-CM: 729.5  5/1/2019 - 11/25/2019        RESOLVED: S/P amputation of lesser toe, left ([facility] ICD-10-CM: I45.522  ICD-9-CM: V49.72  3/26/2018 - 11/25/2019        RESOLVED: Toe ulcer ([facility] ICD-10-CM: L97.509  ICD-9-CM: 707.15  3/8/2018 - 11/25/2019        RESOLVED: Osteomyelitis of toe of left foot ([facility] ICD-10-CM: M86.9  ICD-9-CM: 730.27  3/8/2018 - 11/25/2019    Overview Signed 3/26/2018  3:10 PM by Luis M Soto NP     3/12/18 (Dr. Millie Sanchez) left fourth toe amputation             RESOLVED: Ischemia of left lower extremity ICD-10-CM: I99.8  ICD-9-CM: 459.9  2016 - 2019        RESOLVED: Ischemic ulcer of foot due to atherosclerosis of native artery of extremity (HCC) (Chronic) ICD-10-CM: C59.03, L97.509  ICD-9-CM: 440.23, 707.15  2016 - 2019    Overview Addendum 2016  2:38 PM by Bill Shrestha NP     16 (Dr Mcdonnell) Left popliteal endarterectomy with vein patch angioplasty. 8/10/16 LLE arteriogram             RESOLVED: Type 2 diabetes, uncontrolled, with cellulitis of foot [facility]) ICD-10-CM: E11.628, L03.119, E11.65  ICD-9-CM: 250.82, 682.7  Unknown - 2019              Consultants: Nephrology, Palliative Care, Hospitalist    Studies/Procedures: intubation, CVC, arterial line, multiple CXRs, multiple labs, ABGs, EKG, duplex    Hospital course: Presented to ER with increasing shortness of breath. She was diagnosed with COVID 10 days prior to admission. She developed sudden onset SOB and cough and was found to be hypoxic by EMS. She was placed initially on 10L NC. She progressively became more hypoxic and required intubation. She was receiving peritoneal dialysis. She was paralyzed and proned. She received decadron and tocilizumab, she was not a candidate for remdesivir due to renal function. She was started on zosyn and azithromycin, cultures were negative and no signs of bacterial component so antibiotics stopped. Her hemoglobin trended down and she required blood transfusion. She required insulin drip to control blood sugars. She required vasopressors for hypotension. Patient's daughter updated on progress and have decided to pursue comfort measures. She was compassionately extubated and  at 17:30 with family at bedside. Final:  --Pronounced dead at 17:30  --Total discharge greater than 30 minutes in duration.     Seth Reyes NP

## 2021-09-17 NOTE — PROGRESS NOTES
Sharing hope notified. The patient will not be a candidate unless she survives to be ruled COVID recovered. Please inform sharing hope with time of death to allow the case to be closed.

## 2021-09-17 NOTE — PROGRESS NOTES
Renal Progress Note    Admission Date: 9/2/2021   Subjective:      Supine position.      Objective:     Physical Exam:    Patient Vitals for the past 8 hrs:   Temp Pulse Resp SpO2   09/17/21 0826  72 (!) 34 96 %   09/17/21 0700  75 (!) 34 96 %   09/17/21 0645  74 (!) 34 96 %   09/17/21 0630  75 (!) 34 97 %   09/17/21 0615  75 (!) 34 96 %   09/17/21 0600  75 (!) 34 97 %   09/17/21 0545  75 (!) 34 96 %   09/17/21 0530  75 (!) 34 96 %   09/17/21 0515  77 (!) 34 96 %   09/17/21 0510 97.8 °F (36.6 °C) 75 (!) 34 97 %   09/17/21 0500  72 (!) 34 97 %   09/17/21 0445  74 (!) 34 98 %   09/17/21 0430  71 (!) 34 98 %   09/17/21 0415  72 (!) 34 98 %   09/17/21 0400  72 (!) 34 98 %   09/17/21 0345  72 (!) 34 98 %   09/17/21 0330  73 (!) 34 98 %   09/17/21 0315  71 (!) 34 97 %   09/17/21 0300  72 (!) 34 97 %   09/17/21 0245  72 (!) 34 97 %   09/17/21 0237  72 (!) 34 97 %   09/17/21 0230  72 (!) 34 97 %   09/17/21 0227    97 %   09/17/21 0215  73 (!) 34 97 %   09/17/21 0200  73 (!) 34 96 %   09/17/21 0145  72 (!) 34 96 %   09/17/21 0130  72 (!) 34 95 %      Gen: ET tube in place    Extem: 1+ edema     Current Facility-Administered Medications   Medication Dose Route Frequency    hydrALAZINE (APRESOLINE) 20 mg/mL injection 20 mg  20 mg IntraVENous Q6H PRN    morphine injection 1 mg  1 mg IntraVENous Q1H PRN    morphine injection 2 mg  2 mg IntraVENous Q2H PRN    morphine injection 4 mg  4 mg IntraVENous Q4H PRN    LORazepam (ATIVAN) injection 2 mg  2 mg IntraVENous Q2H PRN    0.9% sodium chloride infusion 250 mL  250 mL IntraVENous PRN    NOREPINephrine (LEVOPHED) 16,000 mcg in 0.9% sodium chloride 250 mL infusion  0.5-30 mcg/min IntraVENous TITRATE    dextrose 10% infusion  100 mL/hr IntraVENous CONTINUOUS    dextrose (D50W) injection syrg 12.5-25 g  25-50 mL IntraVENous PRN    ketamine (KETALAR) 2 mg/mL infusion  0.05-3 mg/kg/hr IntraVENous TITRATE    atracurium (TRACRIUM) 1,000 mg in 0.9% sodium chloride 250 mL infusion  0-15 mcg/kg/min IntraVENous TITRATE    fentaNYL in normal saline (pf) 25 mcg/mL infusion  0-200 mcg/hr IntraVENous TITRATE    acetaminophen (TYLENOL) tablet 650 mg  650 mg Per NG tube Q6H PRN    Or    acetaminophen (TYLENOL) suppository 650 mg  650 mg Rectal Q6H PRN    sodium chloride (NS) flush 5-10 mL  5-10 mL IntraVENous PRN    ondansetron (ZOFRAN ODT) tablet 4 mg  4 mg Oral Q8H PRN    Or    ondansetron (ZOFRAN) injection 4 mg  4 mg IntraVENous Q6H PRN    alum-mag hydroxide-simeth (MYLANTA) oral suspension 15 mL  15 mL Oral Q6H PRN    guaiFENesin-dextromethorphan (ROBITUSSIN DM) 100-10 mg/5 mL syrup 5 mL  5 mL Oral Q4H PRN    nitroglycerin (NITROSTAT) tablet 0.4 mg  0.4 mg SubLINGual Q5MIN PRN            Data Review:     LABS:   Recent Results (from the past 12 hour(s))   GLUCOSE, POC    Collection Time: 09/16/21  9:54 PM   Result Value Ref Range    Glucose (POC) 285 (H) 65 - 100 mg/dL    Performed by Charu Ballesteros    Collection Time: 09/16/21  9:55 PM   Result Value Ref Range    Glucose 285 mg/dL    Insulin order 11.3 units/hour    Insulin adminstered 11.3 units/hour    Multiplier 0.050     Low target 140 mg/dL    High target 180 mg/dL    D50 order 0.0 ml    D50 administered 0.00 ml    Minutes until next BG 60 min    Order initials POONAM     Administered initials POONAM     GLSCOM Comments     GLUCOSE, POC    Collection Time: 09/16/21 11:00 PM   Result Value Ref Range    Glucose (POC) 276 (H) 65 - 100 mg/dL    Performed by Charu Ballesteros    Collection Time: 09/16/21 11:01 PM   Result Value Ref Range    Glucose 276 mg/dL    Insulin order 13.0 units/hour    Insulin adminstered 13.0 units/hour    Multiplier 0.060     Low target 140 mg/dL    High target 180 mg/dL    D50 order 0.0 ml    D50 administered 0.00 ml    Minutes until next BG 60 min    Order initials poonam     Administered initials poonam     GLSCOM Comments     GLUCOSE, POC Collection Time: 09/17/21 12:01 AM   Result Value Ref Range    Glucose (POC) 258 (H) 65 - 100 mg/dL    Performed by Meredith    GLUCOSTABILIZER    Collection Time: 09/17/21 12:02 AM   Result Value Ref Range    Glucose 258 mg/dL    Insulin order 13.9 units/hour    Insulin adminstered 13.9 units/hour    Multiplier 0.070     Low target 140 mg/dL    High target 180 mg/dL    D50 order 0.0 ml    D50 administered 0.00 ml    Minutes until next BG 60 min    Order initials jillian     Administered initials jillian     GLSCOM Comments     MAGNESIUM    Collection Time: 09/17/21 12:03 AM   Result Value Ref Range    Magnesium 3.1 (H) 1.8 - 2.4 mg/dL   METABOLIC PANEL, BASIC    Collection Time: 09/17/21 12:03 AM   Result Value Ref Range    Sodium 132 (L) 136 - 145 mmol/L    Potassium 4.3 3.5 - 5.1 mmol/L    Chloride 92 (L) 98 - 107 mmol/L    CO2 23 21 - 32 mmol/L    Anion gap 17 (H) 7 - 16 mmol/L    Glucose 253 (H) 65 - 100 mg/dL     (H) 8 - 23 MG/DL    Creatinine 9.84 (H) 0.6 - 1.0 MG/DL    GFR est AA 5 (L) >60 ml/min/1.73m2    GFR est non-AA 4 (L) >60 ml/min/1.73m2    Calcium 6.7 (L) 8.3 - 10.4 MG/DL   ALBUMIN    Collection Time: 09/17/21 12:03 AM   Result Value Ref Range    Albumin 2.0 (L) 3.2 - 4.6 g/dL   GLUCOSE, POC    Collection Time: 09/17/21  1:10 AM   Result Value Ref Range    Glucose (POC) 251 (H) 65 - 100 mg/dL    Performed by Meredith    GLUCOSTABILIZER    Collection Time: 09/17/21  1:12 AM   Result Value Ref Range    Glucose 251 mg/dL    Insulin order 15.3 units/hour    Insulin adminstered 15.3 units/hour    Multiplier 0.080     Low target 140 mg/dL    High target 180 mg/dL    D50 order 0.0 ml    D50 administered 0.00 ml    Minutes until next BG 60 min    Order initials JILLIAN     Administered initials JILLIAN     GLSCOM Comments     GLUCOSE, POC    Collection Time: 09/17/21  2:13 AM   Result Value Ref Range    Glucose (POC) 226 (H) 65 - 100 mg/dL    Performed by [employer] Time: 09/17/21  2:14 AM   Result Value Ref Range    Glucose 226 mg/dL    Insulin order 14.9 units/hour    Insulin adminstered 14.9 units/hour    Multiplier 0.090     Low target 140 mg/dL    High target 180 mg/dL    D50 order 0.0 ml    D50 administered 0.00 ml    Minutes until next BG 60 min    Order initials poonam     Administered initials poonam     GLSCOM Comments     BLOOD GAS, ARTERIAL POC    Collection Time: 09/17/21  3:19 AM   Result Value Ref Range    Device: ADULT VENT      FIO2 (POC) 60 %    pH (POC) 7.39 7.35 - 7.45      pCO2 (POC) 36.7 35 - 45 MMHG    pO2 (POC) 72 (L) 75 - 100 MMHG    HCO3 (POC) 22.1 22 - 26 MMOL/L    sO2 (POC) 94.2 (L) 95 - 98 %    Base deficit (POC) 2.7 mmol/L    Mode Pressure regulated volume control      Tidal volume 430 ml    PEEP/CPAP (POC) 15 cmH2O    Allens test (POC) NOT APPLICABLE      Total resp.  rate 34      Site DRAWN FROM ARTERIAL LINE      Specimen type (POC) ARTERIAL      Performed by Isai     Respiratory comment: [id number]    GLUCOSE, POC    Collection Time: 09/17/21  3:21 AM   Result Value Ref Range    Glucose (POC) 201 (H) 65 - 100 mg/dL    Performed by Dawna Ambrose    Collection Time: 09/17/21  3:22 AM   Result Value Ref Range    Glucose 201 mg/dL    Insulin order 14.1 units/hour    Insulin adminstered 14.1 units/hour    Multiplier 0.100     Low target 140 mg/dL    High target 180 mg/dL    D50 order 0.0 ml    D50 administered 0.00 ml    Minutes until next BG 60 min    Order initials poonam     Administered initials poonam     GLSCOM Comments     RENAL FUNCTION PANEL    Collection Time: 09/17/21  3:23 AM   Result Value Ref Range    Sodium 132 (L) 136 - 145 mmol/L    Potassium 4.3 3.5 - 5.1 mmol/L    Chloride 92 (L) 98 - 107 mmol/L    CO2 23 21 - 32 mmol/L    Anion gap 17 (H) 7 - 16 mmol/L    Glucose 184 (H) 65 - 100 mg/dL     (H) 8 - 23 MG/DL    Creatinine 10.00 (H) 0.6 - 1.0 MG/DL    GFR est AA 5 (L) >60 ml/min/1.73m2    GFR est non-AA 4 (L) >60 ml/min/1.73m2    Calcium 6.4 (L) 8.3 - 10.4 MG/DL    Phosphorus 10.5 (H) 2.3 - 3.7 MG/DL    Albumin 2.0 (L) 3.2 - 4.6 g/dL   CBC W/O DIFF    Collection Time: 09/17/21  3:23 AM   Result Value Ref Range    WBC 12.7 (H) 4.3 - 11.1 K/uL    RBC 2.26 (L) 4.05 - 5.2 M/uL    HGB 7.1 (L) 11.7 - 15.4 g/dL    HCT 21.5 (L) 35.8 - 46.3 %    MCV 95.1 79.6 - 97.8 FL    MCH 31.4 26.1 - 32.9 PG    MCHC 33.0 31.4 - 35.0 g/dL    RDW 16.5 (H) 11.9 - 14.6 %    PLATELET 66 (L) 073 - 450 K/uL    MPV 11.8 9.4 - 12.3 FL    ABSOLUTE NRBC 0.49 (H) 0.0 - 0.2 K/uL   TRIGLYCERIDE    Collection Time: 09/17/21  3:23 AM   Result Value Ref Range    Triglyceride 258 (H) 35 - 150 MG/DL   MAGNESIUM    Collection Time: 09/17/21  3:23 AM   Result Value Ref Range    Magnesium 3.2 (H) 1.8 - 2.4 mg/dL   GLUCOSE, POC    Collection Time: 09/17/21  4:35 AM   Result Value Ref Range    Glucose (POC) 180 (H) 65 - 100 mg/dL    Performed by Vale Roque    Collection Time: 09/17/21  4:36 AM   Result Value Ref Range    Glucose 180 mg/dL    Insulin order 12.0 units/hour    Insulin adminstered 12.0 units/hour    Multiplier 0.100     Low target 140 mg/dL    High target 180 mg/dL    D50 order 0.0 ml    D50 administered 0.00 ml    Minutes until next BG 60 min    Order initials poonam     Administered initials poonam     GLSCOM Comments     GLUCOSE, POC    Collection Time: 09/17/21  5:34 AM   Result Value Ref Range    Glucose (POC) 176 (H) 65 - 100 mg/dL    Performed by Yoseph Melendez    Collection Time: 09/17/21  5:35 AM   Result Value Ref Range    Glucose 176 mg/dL    Insulin order 11.6 units/hour    Insulin adminstered 11.6 units/hour    Multiplier 0.100     Low target 140 mg/dL    High target 180 mg/dL    D50 order 0.0 ml    D50 administered 0.00 ml    Minutes until next BG 60 min    Order initials JCP     Administered initials JCP     GLSCOM Comments     GLUCOSE, POC    Collection Time: 09/17/21  7:00 AM   Result Value Ref Range    Glucose (POC) 164 (H) 65 - 100 mg/dL    Performed by [employer]    Collection Time: 09/17/21  7:01 AM   Result Value Ref Range    Glucose 164 mg/dL    Insulin order 10.4 units/hour    Insulin adminstered 10.4 units/hour    Multiplier 0.100     Low target 140 mg/dL    High target 180 mg/dL    D50 order 0.0 ml    D50 administered 0.00 ml    Minutes until next BG 60 min    Order initials poonam     Administered initials poonam     GLSCOM Comments     GLUCOSE, POC    Collection Time: 09/17/21  8:11 AM   Result Value Ref Range    Glucose (POC) 151 (H) 65 - 100 mg/dL    Performed by [employer]    Collection Time: 09/17/21  8:11 AM   Result Value Ref Range    Glucose 151 mg/dL    Insulin order 9.1 units/hour    Insulin adminstered 9.1 units/hour    Multiplier 0.100     Low target 140 mg/dL    High target 180 mg/dL    D50 order 0.0 ml    D50 administered 0.00 ml    Minutes until next BG 60 min    Order initials gk     Administered initials gk     GLSCOM Comments     GLUCOSTABILIZER    Collection Time: 09/17/21  9:14 AM   Result Value Ref Range    Glucose 154 mg/dL    Insulin order 9.4 units/hour    Insulin adminstered 9.4 units/hour    Multiplier 0.100     Low target 140 mg/dL    High target 180 mg/dL    D50 order 0.0 ml    D50 administered 0.00 ml    Minutes until next BG 60 min    Order initials gk     Administered initials gk     GLSCOM Comments           Plan:     Principal Problem:    Acute respiratory failure with hypoxia ([facility] (9/2/2021)    Active Problems:    Mixed hyperlipidemia ()      Acquired hypothyroidism ()      Type 2 diabetes with nephropathy ([facility] (5/2/2018)      Class 3 severe obesity with serious comorbidity and body mass index (BMI) of 40.0 to 44.9 in [facility]) (5/2/2018)      Chest pain (11/25/2019)      Coronary artery disease with stable angina pectoris ([facility] (2/3/2020)      ESRD on peritoneal dialysis ([facility] (8/28/2021)      Acute hypokalemia (8/28/2021)      COVID-19 in immunocompromised patient ([facility] (9/2/2021)      Cigarette nicotine dependence in remission (9/2/2021)      COVID-19 vaccination not done (9/2/2021)      Leukocytosis (9/8/2021)       1. ESRD on PD        2. COVID 19+/acute resp failure with hypoxemia - intubated  - shock on levophed  - pt didn't want long term intubated episode.   - has not made progress and planning for comfort measures.

## 2021-09-17 NOTE — PROGRESS NOTES
CM reviewed pt chart for continued stay. Pt is planned for transition to comfort today with daughter at bedside.  present. CM available if needed.

## 2021-09-17 NOTE — PROGRESS NOTES
[facility] and [facility] Critical Care Note[de-identified] 9/17/2021  Jose Marte  Admission Date: 9/2/2021     Length of Stay: 15 days    Background: . Patient is T 86 y.o.  female seen and evaluated at the request of Dr. Salome Salinas has history of ESRD on PD, Class III Obesity, IDDM, CAD s/p CABG and stents who presented with increasing SOB diagnosed with covid x 10 days ago. Briefly hospitalized and discharged stable on RA on 8/29. Sudden onset SOB and coughing on 9/1/21. Presented to [facility] on 9/2 via EMS who and she was found to be hypoxic at 77-80%. She was placed on 10LNC and readmitted. She has been doing peritoneal dialysis and has continued to decline slowly with her oxygenation. Her H&P says she would only want to be on a ventilator for 5 days. Intubated on 09/10/21. 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 She also has no past medical history of Adverse effect of anesthesia, Difficult intubation, Malignant hyperthermia due to anesthesia, Nausea & vomiting, or Pseudocholinesterase deficiency. 24 Hour events: day 7 on vent and On 60%. Hg dropping today. Spoke with family today and they do not want to continue currently care. Aware will pass away quickly. Daughter reports her  gets off work at 4 PM but she will be here earlier. ROS: unable to obtain/negative except as listed elsewhere.      Lines: (insertion date)   ETT: 9/10  [place]: (9/)  OGT: (9/10)  Central line: (9/10)  Arterial Line (9/10)    Drips: current dose (range)       Continuous        atracurium (TRACRIUM) 1,000 mg in 0.9% sodium chloride 250 mL infusion       0-15 mcg/kg/min, IV, TITRATE       Last Action:  Rate Verify, 6 mcg/kg/min at 09/16 1902       dextrose 10% infusion       100 mL/hr, IV, CONTINUOUS       Last Action:  Stopped, 09/16 1211       fentaNYL in normal saline (pf) 25 mcg/mL infusion       0-200 mcg/hr, IV, TITRATE       Last Action:  Rate Verify, 200 mcg/hr at 09/17 0703       insulin regular (NOVOLIN R, HUMULIN R) 100 Units in 0.9% sodium chloride 100 mL infusion       1-50 Units/hr, IV, TITRATE       Last Action:  Rate Change, 9.1 Units/hr at 09/17 7486       ketamine (KETALAR) 2 mg/mL infusion       0.05-3 mg/kg/hr, IV, TITRATE       Last Action:  Rate Verify, 0.05 mg/kg/hr at 09/17 0703       NOREPINephrine (LEVOPHED) 16,000 mcg in 0.9% sodium chloride 250 mL infusion       0.5-30 mcg/min, IV, TITRATE       Last Action:  Stopped, 09/17 0100          Pertinent Exam:         Blood pressure (!) 128/36, pulse 75, temperature 97.8 °F (36.6 °C), resp. rate (!) 34, height 5' 8\" (1.727 m), weight 282 lb 10.1 oz (128.2 kg), SpO2 96 %. Intake/Output Summary (Last 24 hours) at 9/17/2021 0741  Last data filed at 9/16/2021 1920  Gross per 24 hour   Intake 956.18 ml   Output    Net 956.18 ml     Ventilator Settings  Mode FIO2 Rate Tidal Volume Pressure PEEP   PRVC  60 %    430 ml  0 cm H2O  15 cm H20      Peak airway pressure: 34.4 cm H2O   Minute ventilation: 14.9 l/min       Constitutional:  intubated and mechanically ventilated. EENMT:  Sclera clear, pupils equal, oral mucosa moist, noted blood around mouth today. Respiratory:  crackles bilateral  Cardiovascular:  RRR  Gastrointestinal:  soft with no tenderness; positive bowel sounds present  Musculoskeletal:  warm with no cyanosis, no lower extremity edema  Skin:  no jaundice . Left abdomen with PD catheter.  ecchymotic areas  Neurologic:sedated  Psychiatric: sedated and paralyzed    CXR 9/17 with b/l infiltrates -- less dense in left base     CXR: 9/15: increased basilar infiltrates      Recent Labs     09/17/21  0323 09/16/21  1135 09/16/21  0432 09/15/21  0407 09/15/21  0407   WBC 12.7*  --  17.0*  --  22.5*   HGB 7.1* 7.9* 6.4*   < > 8.0*   HCT 21.5* 24.1* 20.1*   < > 24.8*   PLT 66*  --  85*  --  119*    < > = values in this interval not displayed. Recent Labs     09/17/21  0323 09/17/21  0003 09/16/21  1942 09/15/21  1127 09/15/21  0407   * 132* 131*   < > 130*   K 4.3 4.3 4.6   < > 5.1   CL 92* 92* 90*   < > 87*   CO2 23 23 24   < > 22   * 253* 214*   < > 452*   * 110* 111*   < > 103*   CREA 10.00* 9.84* 9.95*   < > 9.86*   MG 3.2* 3.1* 3.3*   < >  --    CA 6.4* 6.7* 6.8*   < > 6.4*   PHOS 10.5*  --   --   --  >9.0*   ALB 2.0* 2.0*  --   --  2.5*    < > = values in this interval not displayed. No results for input(s): LAC, TROPHS, BNPNT, CRP in the last 72 hours. No lab exists for component: ESR  Recent Labs     09/17/21  0700 09/17/21  0534 09/17/21  0435   GLUCPOC 164* 176* 180*     ECHO: No results found for this or any previous visit.      Results     Procedure Component Value Units Date/Time    CULTURE, RESPIRATORY/SPUTUM/BRONCH Elonda Locker STAIN [[id number]]  (Abnormal) Collected: 09/10/21 4333    Order Status: Completed Specimen: Sputum Updated: 09/14/21 9783     Special Requests: NO SPECIAL REQUESTS        GRAM STAIN 0 TO 10 WBCS SEEN PER OIF      0 TO 1 EPITHELIAL CELLS SEEN PER OIF      FEW YEAST         1+ MUCUS PRESENT        Culture result: MODERATE DELIO ALBICANS               NO NORMAL RESPIRATORY RENEE ISOLATED          CULTURE, BLOOD [[id number]] Collected: 09/10/21 1658    Order Status: Completed Specimen: Blood Updated: 09/15/21 0746     Special Requests: --        RIGHT  HAND       Culture result: NO GROWTH 5 DAYS       CULTURE, BLOOD [[id number]] Collected: 09/10/21 1657    Order Status: Completed Specimen: Blood Updated: 09/15/21 0746     Special Requests: --        RIGHT  Antecubital       Culture result: NO GROWTH 5 DAYS           Inpat Anti-Infectives (From admission, onward)     Start     Ordered Stop    09/11/21 0900  azithromycin (ZITHROMAX) tablet 500 mg  500 mg,   Per NG tube,   DAILY      09/10/21 2357 --    09/09/21 1300  piperacillin-tazobactam (ZOSYN) 3.375 g in 0.9% sodium chloride (MBP/ADV) 100 mL MBP  3.375 g,   IntraVENous,   EVERY 12 HOURS     Note to Pharmacy: Please renally dose with PD transitioning to HD    09/09/21 1206 --    09/06/21 2100  ciprofloxacin (CETRAXAL) 0.2 % otic solution 0.25 mL  0.25 mL,   Left Ear,   EVERY 12 HOURS      09/06/21 1510 --              Ventilator Settings:  Ideal body weight: 63.9 kg (140 lb 14 oz)   Mode FIO2 Rate Tidal Volume Pressure PEEP   PRVC  60 %    430 ml  0 cm H2O  15 cm H20    Peak airway pressure: 34.4 cm H2O       Minute ventilation: 14.9 l/min  ABG:  Recent Labs     09/17/21  0319 09/16/21  0203 09/15/21  1404   PHI 7.39 7.38 7.31*   PCO2I 36.7 41.1 47.6*   PO2I 72* 81 70*   HCO3I 22.1 24.4 24.1     Assessment and Plan:  (Medical Decision Making)   Impression: 59 y.o. y/o female with ESRD on peritoneal dialysis, covid pneumonia and respiratory failure     NEURO:   Sedation:  continue ketamine  Analgesia: fentanyl  Paralytics: tracium. CV:   Volume Status: running net even  Septic shock:  going up on  Levo  And partlyly likely from lower hg  PULM:    Acute hypoxemic/hypercapneic respiratory failure:  Intubated now day #7 and did not want to purse care over 5 days. So will stop today per family wishes. Planning comfot care. Severe ARDS 2/2 COVID: continue paralytics and proning  RENAL:  ESRD: on PD, tolerating okay and I/O have been adequate. GI:   Nutrition: TF  HEME:   Anemia: hb is down to 7.1 again. Will f/u and hold ASA/Plavix/heparin SQ for now. Platelets are falling  Thrombocytopenia: -- stable in low 110's now. Anticoagulation: see above. ID: getting , zosyn and azithromycin- stop both - no evidence for bacterial infection  COVID-19 in immunocompromised patient ([facility] [address] (9/2/2021): Not vaccinated, given dexa, changed to IV, not a remdesivir candidate, sp Toci  ENDO:   DM: On insulin drip and will continue  Skin: no decub, turns, preventive care  Prophy: now comfort care     Spoke with lakeisha and now planning comfort care. Spoke with nurse/respiratory and ICU coordinator/director and aware of plan.          Full Code    The patient is critically ill with respiratory failure, circulatory failure and requires high complexity decision making for assessment and support including frequent ventilator adjustment , frequent evaluation and titration of therapies , application of advanced monitoring technologies and extensive interpretation of multiple databases  Time devoted to patient care services described in this note- 10 min face to face/ 21 min total evaluation time    Cumulative time devoted to patient care services by me for day of service -31 min     Jake Griffin MD

## 2021-09-17 NOTE — PROGRESS NOTES
Ventilator check complete; patient has a #7.5 ET tube secured at the 24 at the lip. Patient is sedated. Patient is not able to follow commands. Breath sounds are diminished. Trachea is midline, Negative for subcutaneous air, and chest excursion is symmetric. Patient is also Negative for cyanosis and is Negative for pitting edema. All alarms are set and audible. Resuscitation bag is at the head of the bed.       Ventilator Settings  Mode FIO2 Rate Tidal Volume Pressure PEEP I:E Ratio   PRVC  60 %  34bpm  430 ml  0 cm H2O  15 cm H20  1:1.33      Peak airway pressure: 33 cm H2O   Minute ventilation: 14.8 l/min     ABG:    Pili Mak, RT

## 2022-10-03 NOTE — PROGRESS NOTES
Received call from Lin Naqvi at [facility] with [employer] Complaint. Subjective: Caller states \"Was seen in the ER on 9/17 after falling backwards and hitting head, sent home with medication for headache, nausea, and dizziness. It has gotten worse\"     Current Symptoms: strong headaches int he back of neck and head, nausea, and dizziness after head injury on 9/13. Onset: 9/13    Associated Symptoms: NA    Pain Severity: 9/10; squeezing; intermittent    Temperature: NA     What has been tried: rest improves headaches    LMP: NA Pregnant: NA    Recommended disposition: See PCP within 3 Days    Care advice provided, patient verbalizes understanding; denies any other questions or concerns; instructed to call back for any new or worsening symptoms. Patient/Caller agrees with recommended disposition; writer provided warm transfer to [facility] at [facility] for appointment scheduling    Attention Provider: Thank you for allowing me to participate in the care of your patient. The patient was connected to triage in response to information provided to the ECC. Please do not respond through this encounter as the response is not directed to a shared pool.     Reason for Disposition   Diagnosed with a concussion within last 14 days   Mild traumatic brain injury (mTBI; concussion) and more than 14 days since head injury   Traumatic Brain Injury (mTBI, concussion) symptoms are worsening    Protocols used: Head Injury-ADULT-[place], Concussion (mTBI) Less Than 14 Days Ago Follow-Up Call-ADULT-[place], Traumatic Brain Injury More than 14 Days Ago Follow-up Call-ADULT-[place] TRANSFER - IN REPORT: 
 
Verbal report received from Scottie Hernandez RN(name) on Clstefania [place]  being received from CVICU(unit) for routine progression of care Report consisted of patients Situation, Background, Assessment and  
Recommendations(SBAR). Information from the following report(s) SBAR, OR Summary, Procedure Summary, Intake/Output, MAR, Recent Results and Cardiac Rhythm NSR was reviewed with the receiving nurse. Opportunity for questions and clarification was provided. Assessment completed upon patients arrival to unit and care assumed. Dual skin assessment completed with RN. Allevyn pulled back, all skin assessed. Pt s/p punctures to B radials and R groin. Ecchymosis to sites but no bleeding or hematoma. Sacrum benign. PIVs patent, dsgs C/D/I. Otherwise, skin warm, dry, intact, and appropriate for ethnicity. Allevyn replaced.

## 2023-05-22 NOTE — ANESTHESIA PREPROCEDURE EVALUATION
Relevant Problems No relevant active problems Anesthetic History Review of Systems / Medical History Patient summary reviewed, nursing notes reviewed and pertinent labs reviewed Pulmonary Neuro/Psych Cardiovascular Hypertension CAD (15 yrs s/p CABG. Multiple subsequent PCIs/stents -- most recently Nov 2019) and hyperlipidemia Exercise tolerance: <4 METS Comments: TTE Oct 2019:  LVEF 55-60% GI/Hepatic/Renal 
  
 
 
Renal disease (Stage 4 chronic renal insufficiency) Endo/Other Diabetes: poorly controlled, type 2, using insulin Hypothyroidism: well controlled Obesity Other Findings Physical Exam 
 
Airway Mallampati: II 
TM Distance: 4 - 6 cm Neck ROM: decreased range of motion Mouth opening: Normal 
 
 Cardiovascular Regular rate and rhythm,  S1 and S2 normal,  no murmur, click, rub, or gallop Dental 
 
Dentition: Full upper dentures Pulmonary Breath sounds clear to auscultation Abdominal 
GI exam deferred Other Findings Anesthetic Plan ASA: 3 Anesthesia type: total IV anesthesia Post-op pain plan if not by surgeon: peripheral nerve block single Anesthetic plan and risks discussed with: Patient Daughter present
no

## (undated) DEVICE — Device

## (undated) DEVICE — INTENDED TO BE USED TO OCCLUDE, RETRACT AND IDENTIFY ARTERIES, VEINS, TENDONS AND NERVES IN SURGICAL PROCEDURES: Brand: STERION®  VESSEL LOOP

## (undated) DEVICE — SPONGE GZ W4XL4IN COT 12 PLY TYP VII WVN C FLD DSGN

## (undated) DEVICE — SYRINGE IRRIG 60ML SFT PLIABLE BLB EZ TO GRP 1 HND USE W/

## (undated) DEVICE — BLADE ASSEMB CLP HAIR FINE --

## (undated) DEVICE — SUT ETHLN 4-0 18IN PS2 BLK --

## (undated) DEVICE — 2000CC GUARDIAN II: Brand: GUARDIAN

## (undated) DEVICE — CARDINAL HEALTH FLEXIBLE LIGHT HANDLE COVER: Brand: CARDINAL HEALTH

## (undated) DEVICE — REM POLYHESIVE ADULT PATIENT RETURN ELECTRODE: Brand: VALLEYLAB

## (undated) DEVICE — AMD ANTIMICROBIAL GAUZE SPONGES,12 PLY USP TYPE VII, 0.2% POLYHEXAMETHYLENE BIGUANIDE HCI (PHMB): Brand: CURITY

## (undated) DEVICE — SUTURE VCRL SZ 3-0 L27IN ABSRB UD L26MM SH 1/2 CIR J416H

## (undated) DEVICE — SUTURE PERMAHAND SZ 2-0 L12X18IN NONABSORBABLE BLK SILK A185H

## (undated) DEVICE — TUBING, SUCTION, 1/4" X 10', STRAIGHT: Brand: MEDLINE

## (undated) DEVICE — BANDAGE,GAUZE,BULKEE II,4.5"X4.1YD,STRL: Brand: MEDLINE

## (undated) DEVICE — SYR 10ML LUER LOK 1/5ML GRAD --

## (undated) DEVICE — GOWN,REINF,POLY,ECL,PP SLV,XL: Brand: MEDLINE

## (undated) DEVICE — SUTURE ETHLN SZ 4-0 L18IN NONABSORBABLE BLK L19MM PS-2 3/8 1667H

## (undated) DEVICE — SYR LR LCK 1ML GRAD NSAF 30ML --

## (undated) DEVICE — AMD ANTIMICROBIAL BANDAGE ROLL,6 PLY: Brand: KERLIX

## (undated) DEVICE — STERILE LATEX POWDER-FREE SURGICAL GLOVESWITH NITRILE COATING: Brand: PROTEXIS

## (undated) DEVICE — STERILE HOOK LOCK LATEX FREE ELASTIC BANDAGE 4INX5YD: Brand: HOOK LOCK™

## (undated) DEVICE — SUT SLK 4-0 18IN TIE MP BLK --

## (undated) DEVICE — DRAPE TWL SURG 16X26IN BLU ORB04] ALLCARE INC]

## (undated) DEVICE — GOWN,PREVENTION PLUS,2XL,ST,22/CS: Brand: MEDLINE

## (undated) DEVICE — CONTAINER SPEC FRMLN 120ML --

## (undated) DEVICE — (D)PREP SKN CHLRAPRP APPL 26ML -- CONVERT TO ITEM 371833

## (undated) DEVICE — INTENDED FOR TISSUE SEPARATION, AND OTHER PROCEDURES THAT REQUIRE A SHARP SURGICAL BLADE TO PUNCTURE OR CUT.: Brand: BARD-PARKER SAFETY BLADES SIZE 11, STERILE

## (undated) DEVICE — UNIVERSAL DRAPES: Brand: MEDLINE INDUSTRIES, INC.

## (undated) DEVICE — DRAPE,U/SHT,SPLIT,FILM,60X84,STERILE: Brand: MEDLINE

## (undated) DEVICE — SUTURE PROL SZ 5-0 L24IN NONABSORBABLE BLU L9.3MM BV-1 3/8 9702H

## (undated) DEVICE — COVER LT HNDL FOR OR SURG LAMP

## (undated) DEVICE — COVER,MAYO STAND,STERILE: Brand: MEDLINE

## (undated) DEVICE — INTENDED USED TO PROTECT, TAG AND HELP LOCATED SUTURES DURING SURGERY: Brand: STERION®SUTURE AID BOOTIES

## (undated) DEVICE — GEL US 20GM NONIRRITATING OVERWRAPPED FILE PCH TRNSMIT

## (undated) DEVICE — COVER,LIGHT HANDLE,FLX,2/PK: Brand: MEDLINE INDUSTRIES, INC.

## (undated) DEVICE — AV FISTULA: Brand: MEDLINE INDUSTRIES, INC.

## (undated) DEVICE — BNDG,ELSTC,MATRIX,STRL,4"X5YD,LF,HOOK&LP: Brand: MEDLINE

## (undated) DEVICE — BUTTON SWITCH PENCIL BLADE ELECTRODE, HOLSTER: Brand: EDGE

## (undated) DEVICE — APPLICATOR BNDG 1MM ADH PREMIERPRO EXOFIN

## (undated) DEVICE — SPONGE LAP 18X18IN STRL -- 5/PK

## (undated) DEVICE — SUTURE PROL SZ 7-0 L24IN NONABSORBABLE BLU L9.3MM BV-1 3/8 M8702

## (undated) DEVICE — OCCLUSIVE GAUZE STRIP,3% BISMUTH TRIBROMOPHENATE IN PETROLATUM BLEND: Brand: XEROFORM

## (undated) DEVICE — INTENDED FOR TISSUE SEPARATION, AND OTHER PROCEDURES THAT REQUIRE A SHARP SURGICAL BLADE TO PUNCTURE OR CUT.: Brand: BARD-PARKER SAFETY BLADES SIZE 15, STERILE

## (undated) DEVICE — NEEDLE HYPO 25GA L1.5IN BLU POLYPR HUB S STL REG BVL STR

## (undated) DEVICE — SOLUTION IV 1000ML 0.9% SOD CHL

## (undated) DEVICE — CLOTH PRE OP W9XL10.5IN 2% CHG FRAGRANCE RNS FREE READYPREP

## (undated) DEVICE — GAUZE,SPONGE,8"X4",12PLY,XRAY,STRL,LF: Brand: MEDLINE

## (undated) DEVICE — AMD ANTIMICROBIAL SUPER SPONGES,MEDIUM: Brand: KERLIX

## (undated) DEVICE — 1840 FOAM BLOCK NEEDLE COUNTER: Brand: DEVON

## (undated) DEVICE — SUT ETHLN 3-0 18IN PS2 BLK --

## (undated) DEVICE — MEDI-VAC YANKAUER SUCTION HANDLE W/BULBOUS TIP: Brand: CARDINAL HEALTH

## (undated) DEVICE — SKIN MARKER,REGULAR TIP WITH RULER AND LABELS: Brand: DEVON

## (undated) DEVICE — SUTURE PERMAHAND SZ 3-0 L18IN NONABSORBABLE BLK SILK BRAID A184H